# Patient Record
Sex: MALE | Race: WHITE | Employment: UNEMPLOYED | URBAN - METROPOLITAN AREA
[De-identification: names, ages, dates, MRNs, and addresses within clinical notes are randomized per-mention and may not be internally consistent; named-entity substitution may affect disease eponyms.]

---

## 2018-05-14 PROBLEM — R42 DIZZINESS: Status: ACTIVE | Noted: 2017-06-30

## 2018-09-18 ENCOUNTER — HOSPITAL ENCOUNTER (EMERGENCY)
Age: 43
Discharge: HOME | End: 2018-09-18
Attending: EMERGENCY MEDICINE | Admitting: EMERGENCY MEDICINE
Payer: MEDICARE

## 2018-09-18 VITALS
OXYGEN SATURATION: 96 % | HEIGHT: 69 IN | RESPIRATION RATE: 18 BRPM | TEMPERATURE: 98.3 F | HEART RATE: 89 BPM | WEIGHT: 145 LBS | SYSTOLIC BLOOD PRESSURE: 149 MMHG | BODY MASS INDEX: 21.48 KG/M2 | DIASTOLIC BLOOD PRESSURE: 93 MMHG

## 2018-09-18 DIAGNOSIS — E16.2 HYPOGLYCEMIA: Primary | ICD-10-CM

## 2018-09-18 LAB
ALBUMIN SERPL-MCNC: 3.3 G/DL (ref 3.5–5)
ALBUMIN/GLOB SERPL: 0.9 {RATIO} (ref 1.1–2.2)
ALP SERPL-CCNC: 76 U/L (ref 45–117)
ALT SERPL-CCNC: 19 U/L (ref 12–78)
AMPHET UR QL SCN: NEGATIVE
ANION GAP SERPL CALC-SCNC: 9 MMOL/L (ref 5–15)
APPEARANCE UR: CLEAR
AST SERPL-CCNC: 17 U/L (ref 15–37)
BACTERIA URNS QL MICRO: NEGATIVE /HPF
BARBITURATES UR QL SCN: NEGATIVE
BASOPHILS # BLD: 0.2 K/UL (ref 0–0.1)
BASOPHILS NFR BLD: 2 % (ref 0–1)
BENZODIAZ UR QL: NEGATIVE
BILIRUB SERPL-MCNC: 0.2 MG/DL (ref 0.2–1)
BILIRUB UR QL CFM: NEGATIVE
BUN SERPL-MCNC: 15 MG/DL (ref 6–20)
BUN/CREAT SERPL: 12 (ref 12–20)
CALCIUM SERPL-MCNC: 8.6 MG/DL (ref 8.5–10.1)
CANNABINOIDS UR QL SCN: NEGATIVE
CAOX CRY URNS QL MICRO: ABNORMAL
CHLORIDE SERPL-SCNC: 104 MMOL/L (ref 97–108)
CO2 SERPL-SCNC: 26 MMOL/L (ref 21–32)
COCAINE UR QL SCN: NEGATIVE
COLOR UR: ABNORMAL
CREAT SERPL-MCNC: 1.28 MG/DL (ref 0.7–1.3)
DIFFERENTIAL METHOD BLD: ABNORMAL
DRUG SCRN COMMENT,DRGCM: NORMAL
EOSINOPHIL # BLD: 0.1 K/UL (ref 0–0.4)
EOSINOPHIL NFR BLD: 1 % (ref 0–7)
EPITH CASTS URNS QL MICRO: ABNORMAL /LPF
ERYTHROCYTE [DISTWIDTH] IN BLOOD BY AUTOMATED COUNT: 13.2 % (ref 11.5–14.5)
ETHANOL SERPL-MCNC: <10 MG/DL
GLOBULIN SER CALC-MCNC: 3.6 G/DL (ref 2–4)
GLUCOSE BLD STRIP.AUTO-MCNC: 132 MG/DL (ref 65–100)
GLUCOSE BLD STRIP.AUTO-MCNC: 153 MG/DL (ref 65–100)
GLUCOSE SERPL-MCNC: 156 MG/DL (ref 65–100)
GLUCOSE UR STRIP.AUTO-MCNC: NEGATIVE MG/DL
HCT VFR BLD AUTO: 33.5 % (ref 36.6–50.3)
HGB BLD-MCNC: 11.6 G/DL (ref 12.1–17)
HGB UR QL STRIP: ABNORMAL
IMM GRANULOCYTES # BLD: 0 K/UL
IMM GRANULOCYTES NFR BLD AUTO: 0 %
KETONES UR QL STRIP.AUTO: ABNORMAL MG/DL
LEUKOCYTE ESTERASE UR QL STRIP.AUTO: NEGATIVE
LYMPHOCYTES # BLD: 5.2 K/UL (ref 0.8–3.5)
LYMPHOCYTES NFR BLD: 47 % (ref 12–49)
MCH RBC QN AUTO: 30.9 PG (ref 26–34)
MCHC RBC AUTO-ENTMCNC: 34.6 G/DL (ref 30–36.5)
MCV RBC AUTO: 89.1 FL (ref 80–99)
METHADONE UR QL: NEGATIVE
MONOCYTES # BLD: 0.7 K/UL (ref 0–1)
MONOCYTES NFR BLD: 6 % (ref 5–13)
NEUTS SEG # BLD: 4.8 K/UL (ref 1.8–8)
NEUTS SEG NFR BLD: 44 % (ref 32–75)
NITRITE UR QL STRIP.AUTO: NEGATIVE
NRBC # BLD: 0 K/UL (ref 0–0.01)
NRBC BLD-RTO: 0 PER 100 WBC
OPIATES UR QL: NEGATIVE
PCP UR QL: NEGATIVE
PH UR STRIP: 5.5 [PH] (ref 5–8)
PLATELET # BLD AUTO: 275 K/UL (ref 150–400)
PMV BLD AUTO: 10.9 FL (ref 8.9–12.9)
POTASSIUM SERPL-SCNC: 3.1 MMOL/L (ref 3.5–5.1)
PROT SERPL-MCNC: 6.9 G/DL (ref 6.4–8.2)
PROT UR STRIP-MCNC: 100 MG/DL
RBC # BLD AUTO: 3.76 M/UL (ref 4.1–5.7)
RBC #/AREA URNS HPF: ABNORMAL /HPF (ref 0–5)
RBC MORPH BLD: ABNORMAL
SERVICE CMNT-IMP: ABNORMAL
SERVICE CMNT-IMP: ABNORMAL
SODIUM SERPL-SCNC: 139 MMOL/L (ref 136–145)
SP GR UR REFRACTOMETRY: 1.02 (ref 1–1.03)
UA: UC IF INDICATED,UAUC: ABNORMAL
UROBILINOGEN UR QL STRIP.AUTO: 0.2 EU/DL (ref 0.2–1)
WBC # BLD AUTO: 11 K/UL (ref 4.1–11.1)
WBC MORPH BLD: ABNORMAL
WBC URNS QL MICRO: ABNORMAL /HPF (ref 0–4)

## 2018-09-18 PROCEDURE — 93005 ELECTROCARDIOGRAM TRACING: CPT

## 2018-09-18 PROCEDURE — 80307 DRUG TEST PRSMV CHEM ANLYZR: CPT | Performed by: EMERGENCY MEDICINE

## 2018-09-18 PROCEDURE — 82962 GLUCOSE BLOOD TEST: CPT

## 2018-09-18 PROCEDURE — 80053 COMPREHEN METABOLIC PANEL: CPT | Performed by: EMERGENCY MEDICINE

## 2018-09-18 PROCEDURE — 85025 COMPLETE CBC W/AUTO DIFF WBC: CPT | Performed by: EMERGENCY MEDICINE

## 2018-09-18 PROCEDURE — 81001 URINALYSIS AUTO W/SCOPE: CPT | Performed by: EMERGENCY MEDICINE

## 2018-09-18 PROCEDURE — 96360 HYDRATION IV INFUSION INIT: CPT

## 2018-09-18 PROCEDURE — 74011250636 HC RX REV CODE- 250/636: Performed by: EMERGENCY MEDICINE

## 2018-09-18 PROCEDURE — 36415 COLL VENOUS BLD VENIPUNCTURE: CPT | Performed by: EMERGENCY MEDICINE

## 2018-09-18 PROCEDURE — 99284 EMERGENCY DEPT VISIT MOD MDM: CPT

## 2018-09-18 RX ADMIN — SODIUM CHLORIDE 1000 ML: 900 INJECTION, SOLUTION INTRAVENOUS at 20:50

## 2018-09-19 LAB
ATRIAL RATE: 76 BPM
CALCULATED P AXIS, ECG09: 33 DEGREES
CALCULATED R AXIS, ECG10: 54 DEGREES
CALCULATED T AXIS, ECG11: 28 DEGREES
DIAGNOSIS, 93000: NORMAL
P-R INTERVAL, ECG05: 138 MS
Q-T INTERVAL, ECG07: 388 MS
QRS DURATION, ECG06: 92 MS
QTC CALCULATION (BEZET), ECG08: 436 MS
VENTRICULAR RATE, ECG03: 76 BPM

## 2018-09-19 NOTE — ED NOTES
Pt presents to ED via EMS complaining of low blood sugar. Per EMS pt's blood glucose was 41 so he was given oral glucose and his blood sugar came up to 115. Per EMS, they were called because the pt was \"acting weird. \" Pt states \"I got into a staring contest with my food. Must have been 3 hours. \" Pt denies N/V, confusion, or diabetes. Pt reporting lower back pian, denies injury, no swelling or bruising noted at this time. Pt is alert and oriented x 4, RR even and unlabored, skin is warm and dry. Assessment completed and pt updated on plan of care. Emergency Department Nursing Plan of Care The Nursing Plan of Care is developed from the Nursing assessment and Emergency Department Attending provider initial evaluation. The plan of care may be reviewed in the ED Provider note. The Plan of Care was developed with the following considerations:  
Patient / Family readiness to learn indicated by:verbalized understanding Persons(s) to be included in education: patient Barriers to Learning/Limitations:No 
 
Signed Efraín Villavicencio   
9/18/2018   8:13 PM

## 2018-09-19 NOTE — ED PROVIDER NOTES
EMERGENCY DEPARTMENT HISTORY AND PHYSICAL EXAM 
 
 
Date: 9/18/2018 Patient Name: Mady Stafford History of Presenting Illness Chief Complaint Patient presents with  Low Blood Sugar  
  found by EMS at a [employer]. Employees called 911 because pt was not \"acting right,\" was found with BG of 31, they administered 15g oral glucose and pt's BG improved to 115. Pt now A&Ox4 History Provided By: Patient and EMS 
 
HPI: Mady Stafford, 37 y.o. male with PMHx significant for HTN, schizophrenia, bipolar disorder, and depression presents via EMS to the ED with cc of low blood sugar beginning today with no other associated sxs. Per EMS, customers at [email or web address] report he was acting strange and called 911. EMS report a blood sugar of 41 and gave him oral glucose bringing it up to 115 mg/dL. Pt states he does not eat much due to his living situation and is off his medications because he feels fine. He denies any other alleviating or exacerbating factors. He denies any hx of diabetes, alcohol or drug use. He specifically denies any fever, chills, CP, SOB, abd pain, nausea, vomiting, or diarrhea. Chief Complaint: Low blood sugar Duration: 1 Days Timing:  N/A Location: None Quality: None Severity: Mild Modifying Factors: None Associated Symptoms: denies any other associated signs or symptoms There are no other complaints, changes, or physical findings at this time. PCP: Awais Grover MD 
 
Current Facility-Administered Medications Medication Dose Route Frequency Provider Last Rate Last Dose  sodium chloride 0.9 % bolus infusion 1,000 mL  1,000 mL IntraVENous ONCE Melissa Cifuentes MD      
 
Current Outpatient Prescriptions Medication Sig Dispense Refill  albuterol (PROVENTIL VENTOLIN) 2.5 mg /3 mL (0.083 %) nebulizer solution 2.5mg NEB every 4 hours as needed  OTHER Baking Yeast, for good health  benztropine (COGENTIN) 2 mg tablet Take 1 Tab by mouth nightly.  busPIRone (BUSPAR) 15 mg tablet Take 1 Tab by mouth two (2) times a day.  cloZAPine (CLOZARIL) 100 mg tablet Take  by mouth. 3 tab in the morning and 2 at noon  docusate sodium (COLACE) 100 mg capsule Take 1 Tab by mouth two (2) times a day.  clotrimazole (LOTRIMIN) 1 % topical cream Apply to affected areas twice a day as needed  bisacodyl (DULCOLAX, BISACODYL,) 5 mg EC tablet Take 2 Tabs by mouth nightly.  gabapentin (NEURONTIN) 300 mg capsule Take 1 Cap by mouth three (3) times daily.  guaiFENesin (ROBITUSSIN) 100 mg/5 mL liquid 10mls every 4 hours as needed  ibuprofen (MOTRIN) 600 mg tablet Take 1 Tab by mouth every six (6) hours as needed.  lactase (LACTAID) 3,000 unit tablet Take 1-2 Tabs by mouth. Before or with dairy product  sodium chloride (OCEAN FOR KIDS) 0.65 % nasal squeeze bottle 2 Sprays by Both Nostrils route every two (2) hours as needed.  albuterol (PROAIR HFA) 90 mcg/actuation inhaler 2 Puffs every four (4) hours as needed.  propranolol LA (INDERAL LA) 120 mg SR capsule Take 1 Cap by mouth daily.  thiothixene (NAVANE) 5 mg capsule Take 1 Cap by mouth two (2) times a day.  buPROPion XL (WELLBUTRIN XL) 300 mg XL tablet Take 1 Tab by mouth daily.  propranolol LA (INDERAL LA) 120 mg SR capsule TAKE 1 CAPSULE BY MOUTH DAILY 90 Cap 3  ADVAIR DISKUS 250-50 mcg/dose diskus inhaler INHALE 1 PUFF BY MOUTH TWICE A DAY *RINSE MOUTH WITH WATER AFTER EACH USE* 180 Each 3 Past History Past Medical History: 
Past Medical History:  
Diagnosis Date  Back pain  Chronic bronchitis ([address] COPD  Elevated WBCs  H/O splenectomy  HTN (hypertension) Past Surgical History: 
Past Surgical History:  
Procedure Laterality Date  HX OTHER SURGICAL Right   
 two right wrist fractures  HX SPLENECTOMY Family History: 
Family History Problem Relation Age of Onset  No Known Problems Mother  No Known Problems Father  No Known Problems Brother Social History: 
Social History Substance Use Topics  Smoking status: Current Every Day Smoker  Smokeless tobacco: Never Used  Alcohol use No  
 
 
Allergies: Allergies Allergen Reactions  Haloperidol Other (comments) Review of Systems Review of Systems Constitutional: Negative for appetite change, fever and unexpected weight change. Respiratory: Negative for cough, chest tightness and shortness of breath. Cardiovascular: Negative for chest pain. Gastrointestinal: Negative for abdominal pain. Musculoskeletal: Negative for gait problem. Skin: Negative for rash. Neurological: Negative for dizziness, seizures and weakness. Psychiatric/Behavioral: Positive for confusion. Negative for agitation. All other systems reviewed and are negative. Physical Exam  
Physical Exam  
Constitutional: He is oriented to person, place, and time. He appears well-developed and well-nourished. Unkept HENT:  
Head: Normocephalic and atraumatic. Mouth/Throat: Oropharynx is clear and moist.  
Eyes: EOM are normal. Pupils are equal, round, and reactive to light. Neck: Normal range of motion. Neck supple. Cardiovascular: Normal rate, regular rhythm and normal heart sounds. Pulmonary/Chest: Effort normal and breath sounds normal.  
Abdominal: Soft. Musculoskeletal: Normal range of motion. He exhibits no tenderness. Neurological: He is alert and oriented to person, place, and time. No cranial nerve deficit. Coordination normal.  
Skin: Skin is warm and dry. Insect bites bilat lower legs Diagnostic Study Results Labs - Recent Results (from the past 12 hour(s)) EKG, 12 LEAD, INITIAL Collection Time: 09/18/18  8:29 PM  
Result Value Ref Range Ventricular Rate 76 BPM  
 Atrial Rate 76 BPM  
 P-R Interval 138 ms QRS Duration 92 ms Q-T Interval 388 ms QTC Calculation (Bezet) 436 ms Calculated P Axis 33 degrees Calculated R Axis 54 degrees Calculated T Axis 28 degrees Diagnosis Normal sinus rhythm Normal ECG No previous ECGs available GLUCOSE, POC Collection Time: 09/18/18  8:31 PM  
Result Value Ref Range Glucose (POC) 153 (H) 65 - 100 mg/dL Performed by [facility] Armando Radiologic Studies - No orders to display CT Results  (Last 48 hours) None CXR Results  (Last 48 hours) None Medical Decision Making I am the first provider for this patient. I reviewed the vital signs, available nursing notes, past medical history, past surgical history, family history and social history. Vital Signs-Reviewed the patient's vital signs. Patient Vitals for the past 12 hrs: 
 Temp Pulse Resp BP SpO2  
09/18/18 2010 98.3 °F (36.8 °C) 89 18 (!) 149/93 96 % Pulse Oximetry Analysis - 96% onRA Cardiac Monitor:  
Rate:89 bpm 
Rhythm: NSR 
 
EKG interpretation: (Preliminary) Rhythm: normal sinus rhythm; and regular . Rate (approx.): 76; Axis: normal; UT interval: normal; QRS interval: normal ; ST/T wave: normal; Other findings: normal. 
Written and interpreted by WILLIAM Cifuentes MD. Records Reviewed: Nursing Notes, Old Medical Records, Previous electrocardiograms, Ambulance Run Sheet, Previous Radiology Studies and Previous Laboratory Studies Provider Notes (Medical Decision Making):  
Hypoglycemia, electrolyte abnormality, alcohol intoxication, drug use/prescription misuse, dehydration ED Course:  
Initial assessment performed. The patients presenting problems have been discussed, and they are in agreement with the care plan formulated and outlined with them. I have encouraged them to ask questions as they arise throughout their visit. Pt has eaten and maintained blood sugar.  Remains A&ox4 and is ambulatory without assistance. Will DC to fu as an outpatient. Disposition: 
DISCHARGE NOTE 
11:11 PM 
 
The patient has been re-evaluated and is ready for discharge. Reviewed available results with patient and family. Counseled pt and family on diagnosis and care plan. Pt and family have expressed understanding, and all questions have been answered. Pt and family agree with plan and agree to F/U as recommended, or return to the ED if their sxs worsen. Discharge instructions have been provided and explained to the pt and their family, along with reasons to return to the ED. Written by Corrinne Shells, ED Scribe, as dictated by Guille Mccullough MD. 
 
PLAN: 
1. Current Discharge Medication List  
  
 
2. Follow-up Information Follow up With Details Comments Contact [facility] Schedule an appointment as soon as possible for a visit  [address] [place] [id number] [phone] Return to ED if worse Diagnosis Clinical Impression: 1. Hypoglycemia Attestations: This note is prepared by Corrinne Shells, acting as Scribe for Guille Mccullough MD. Charyl Son Call, MD: The scribe's documentation has been prepared under my direction and personally reviewed by me in its entirety. I confirm that the note above accurately reflects all work, treatment, procedures, and medical decision making performed by me. This note will not be viewable in [address]

## 2018-09-19 NOTE — DISCHARGE INSTRUCTIONS
Hypoglycemia: Care Instructions  Your Care Instructions    Hypoglycemia means that your blood sugar is low and your body is not getting enough fuel. Some people get low blood sugar from not eating often enough. Some medicines to treat diabetes can cause low blood sugar. People who have had surgery on their stomachs or intestines may get hypoglycemia. Problems with the pancreas, kidneys, or liver also can cause low blood sugar. A snack or drink with sugar in it will raise your blood sugar and should ease your symptoms right away. Your doctor may recommend that you change or stop your medicines until you can get your blood sugar levels under control. In the long run, you may need to change your diet and eating habits so that you get enough fuel for your body throughout the day. Follow-up care is a key part of your treatment and safety. Be sure to make and go to all appointments, and call your doctor if you are having problems. It's also a good idea to know your test results and keep a list of the medicines you take. How can you care for yourself at home? · Learn to recognize the early signs of low blood sugar. Signs include:  ¨ Nausea. ¨ Hunger. ¨ Feeling nervous, irritable, or shaky. ¨ Cold, clammy, wet skin. ¨ Sweating (when you are not exercising). ¨ A fast heartbeat. ¨ Numbness or tingling of the fingertips or lips. · If you feel an episode of low blood sugar coming on, drink fruit juice or sugared (not diet) soda, or eat sugar in the form of candy, cubes, or tablets. [employer] are another [email or web address]. · Eat small, frequent meals so that you do not get too hungry between meals. · Balance extra exercise with eating more. · Keep a written record of your low blood sugar episodes, including when you last ate and what you ate, so that you can learn what causes your blood sugar to drop.   · Make sure your family, friends, and coworkers know the symptoms of low blood sugar and know what to do to get your sugar level up. · Wear medical alert jewelry that lists your condition. You can buy this at most drugstores. When should you call for help? Call 911 anytime you think you may need emergency care. For example, call if:    · You passed out (lost consciousness).     · You are confused or cannot think clearly.     · Your blood sugar is very high or very low.    Watch closely for changes in your health, and be sure to contact your doctor if:    · Your blood sugar stays outside the level your doctor set for you.     · You have any problems. Where can you learn more? Go to http://holli-luiz.info/. Enter T913 in the search box to learn more about \"Hypoglycemia: Care Instructions. \"  Current as of: December 7, 2017  Content Version: 11.7  © 5955-1735 [employer]. Care instructions adapted under license by [employer] (which disclaims liability or warranty for this information). If you have questions about a medical condition or this instruction, always ask your healthcare professional. [address] any warranty or liability for your use of this information.

## 2018-09-19 NOTE — ED NOTES
Discharge instructions were given to the patient by Theo Glez RN. The patient left the Emergency Department ambulatory, alert and oriented and in no acute distress with 1 prescription. The patient was encouraged to call or return to the ED for worsening issues or problems and was encouraged to schedule a follow up appointment for continuing care. The patient verbalized understanding of discharge instructions and prescriptions, all questions were answered. The patient has no further concerns at this time.

## 2018-09-27 ENCOUNTER — HOSPITAL ENCOUNTER (INPATIENT)
Age: 43
LOS: 29 days | Discharge: HOME | DRG: 885 | End: 2018-10-26
Attending: PSYCHIATRY & NEUROLOGY
Payer: MEDICARE

## 2018-09-27 ENCOUNTER — APPOINTMENT (OUTPATIENT)
Dept: CT IMAGING | Age: 43
End: 2018-09-27
Attending: EMERGENCY MEDICINE
Payer: MEDICARE

## 2018-09-27 ENCOUNTER — HOSPITAL ENCOUNTER (EMERGENCY)
Age: 43
Discharge: ANOTHER HOSPITAL | End: 2018-09-27
Attending: EMERGENCY MEDICINE
Payer: MEDICARE

## 2018-09-27 VITALS
HEART RATE: 76 BPM | OXYGEN SATURATION: 98 % | DIASTOLIC BLOOD PRESSURE: 72 MMHG | RESPIRATION RATE: 18 BRPM | SYSTOLIC BLOOD PRESSURE: 131 MMHG | TEMPERATURE: 98 F

## 2018-09-27 DIAGNOSIS — F25.9 SCHIZOAFFECTIVE DISORDER, UNSPECIFIED TYPE (HCC): ICD-10-CM

## 2018-09-27 DIAGNOSIS — R44.3 HALLUCINATIONS: Primary | ICD-10-CM

## 2018-09-27 LAB
ALBUMIN SERPL-MCNC: 3.5 G/DL (ref 3.5–5)
ALBUMIN/GLOB SERPL: 1 {RATIO} (ref 1.1–2.2)
ALP SERPL-CCNC: 79 U/L (ref 45–117)
ALT SERPL-CCNC: 20 U/L (ref 12–78)
AMPHET UR QL SCN: NEGATIVE
ANION GAP SERPL CALC-SCNC: 9 MMOL/L (ref 5–15)
APAP SERPL-MCNC: <2 UG/ML (ref 10–30)
APPEARANCE UR: CLEAR
AST SERPL-CCNC: 19 U/L (ref 15–37)
BACTERIA URNS QL MICRO: NEGATIVE /HPF
BARBITURATES UR QL SCN: NEGATIVE
BASOPHILS # BLD: 0.1 K/UL (ref 0–0.1)
BASOPHILS NFR BLD: 1 % (ref 0–1)
BENZODIAZ UR QL: NEGATIVE
BILIRUB SERPL-MCNC: 0.3 MG/DL (ref 0.2–1)
BILIRUB UR QL: NEGATIVE
BUN SERPL-MCNC: 23 MG/DL (ref 6–20)
BUN/CREAT SERPL: 20 (ref 12–20)
CALCIUM SERPL-MCNC: 8.3 MG/DL (ref 8.5–10.1)
CANNABINOIDS UR QL SCN: NEGATIVE
CHLORIDE SERPL-SCNC: 104 MMOL/L (ref 97–108)
CO2 SERPL-SCNC: 26 MMOL/L (ref 21–32)
COCAINE UR QL SCN: NEGATIVE
COLOR UR: ABNORMAL
COMMENT, HOLDF: NORMAL
CREAT SERPL-MCNC: 1.14 MG/DL (ref 0.7–1.3)
DIFFERENTIAL METHOD BLD: ABNORMAL
DRUG SCRN COMMENT,DRGCM: NORMAL
EOSINOPHIL # BLD: 0.3 K/UL (ref 0–0.4)
EOSINOPHIL NFR BLD: 2 % (ref 0–7)
EPITH CASTS URNS QL MICRO: ABNORMAL /LPF
ERYTHROCYTE [DISTWIDTH] IN BLOOD BY AUTOMATED COUNT: 13.6 % (ref 11.5–14.5)
ETHANOL SERPL-MCNC: <10 MG/DL
GLOBULIN SER CALC-MCNC: 3.6 G/DL (ref 2–4)
GLUCOSE BLD STRIP.AUTO-MCNC: 97 MG/DL (ref 65–100)
GLUCOSE SERPL-MCNC: 79 MG/DL (ref 65–100)
GLUCOSE UR STRIP.AUTO-MCNC: NEGATIVE MG/DL
HCT VFR BLD AUTO: 33.6 % (ref 36.6–50.3)
HGB BLD-MCNC: 11.3 G/DL (ref 12.1–17)
HGB UR QL STRIP: ABNORMAL
IMM GRANULOCYTES # BLD: 0.1 K/UL (ref 0–0.04)
IMM GRANULOCYTES NFR BLD AUTO: 1 % (ref 0–0.5)
KETONES UR QL STRIP.AUTO: ABNORMAL MG/DL
LEUKOCYTE ESTERASE UR QL STRIP.AUTO: NEGATIVE
LYMPHOCYTES # BLD: 3.4 K/UL (ref 0.8–3.5)
LYMPHOCYTES NFR BLD: 33 % (ref 12–49)
MCH RBC QN AUTO: 31.3 PG (ref 26–34)
MCHC RBC AUTO-ENTMCNC: 33.6 G/DL (ref 30–36.5)
MCV RBC AUTO: 93.1 FL (ref 80–99)
METHADONE UR QL: NEGATIVE
MONOCYTES # BLD: 0.8 K/UL (ref 0–1)
MONOCYTES NFR BLD: 8 % (ref 5–13)
NEUTS SEG # BLD: 5.7 K/UL (ref 1.8–8)
NEUTS SEG NFR BLD: 55 % (ref 32–75)
NITRITE UR QL STRIP.AUTO: NEGATIVE
NRBC # BLD: 0 K/UL (ref 0–0.01)
NRBC BLD-RTO: 0 PER 100 WBC
OPIATES UR QL: NEGATIVE
PCP UR QL: NEGATIVE
PH UR STRIP: 6.5 [PH] (ref 5–8)
PLATELET # BLD AUTO: 289 K/UL (ref 150–400)
PMV BLD AUTO: 10.8 FL (ref 8.9–12.9)
POTASSIUM SERPL-SCNC: 3.5 MMOL/L (ref 3.5–5.1)
PROT SERPL-MCNC: 7.1 G/DL (ref 6.4–8.2)
PROT UR STRIP-MCNC: NEGATIVE MG/DL
RBC # BLD AUTO: 3.61 M/UL (ref 4.1–5.7)
RBC #/AREA URNS HPF: ABNORMAL /HPF (ref 0–5)
SALICYLATES SERPL-MCNC: <1.7 MG/DL (ref 2.8–20)
SAMPLES BEING HELD,HOLD: NORMAL
SERVICE CMNT-IMP: NORMAL
SODIUM SERPL-SCNC: 139 MMOL/L (ref 136–145)
SP GR UR REFRACTOMETRY: <1.005 (ref 1–1.03)
UA: UC IF INDICATED,UAUC: ABNORMAL
UROBILINOGEN UR QL STRIP.AUTO: 0.2 EU/DL (ref 0.2–1)
WBC # BLD AUTO: 10.3 K/UL (ref 4.1–11.1)
WBC URNS QL MICRO: ABNORMAL /HPF (ref 0–4)

## 2018-09-27 PROCEDURE — 81001 URINALYSIS AUTO W/SCOPE: CPT | Performed by: EMERGENCY MEDICINE

## 2018-09-27 PROCEDURE — 90791 PSYCH DIAGNOSTIC EVALUATION: CPT

## 2018-09-27 PROCEDURE — 99285 EMERGENCY DEPT VISIT HI MDM: CPT

## 2018-09-27 PROCEDURE — 85025 COMPLETE CBC W/AUTO DIFF WBC: CPT | Performed by: EMERGENCY MEDICINE

## 2018-09-27 PROCEDURE — 65220000003 HC RM SEMIPRIVATE PSYCH

## 2018-09-27 PROCEDURE — 70450 CT HEAD/BRAIN W/O DYE: CPT

## 2018-09-27 PROCEDURE — 82962 GLUCOSE BLOOD TEST: CPT

## 2018-09-27 PROCEDURE — 80307 DRUG TEST PRSMV CHEM ANLYZR: CPT | Performed by: EMERGENCY MEDICINE

## 2018-09-27 PROCEDURE — 93005 ELECTROCARDIOGRAM TRACING: CPT

## 2018-09-27 PROCEDURE — 80053 COMPREHEN METABOLIC PANEL: CPT | Performed by: EMERGENCY MEDICINE

## 2018-09-27 PROCEDURE — 36415 COLL VENOUS BLD VENIPUNCTURE: CPT | Performed by: EMERGENCY MEDICINE

## 2018-09-27 NOTE — ED PROVIDER NOTES
HPI Comments: 37 y.o. male with past medical history significant for Hypertension, Schizophrenia, COPD who presents via EMS with chief complaint of hearing voices. Patient is a poor historian due to history of schizophrenia. Upon examination patient states he has been unable to sleep due to \"hearing voices and seeing colors. \" When asked if the patient wants to hurt himself or if the voices are telling him to hurt himself the patient states he \"wants to stop running from the voices\", but does not answer the question directly. Patient reports accompanying head pain that onset \"days ago. \" When asked whether he uses tobacco, elicit drugs, or alcohol the patient stated \"not much. \" 
 
PCP: Renato Rg MD 
 
Note written by Elvia Phoenix, as dictated by Rosa Perez MD 2:12 PM 
 
 
The history is provided by the patient. Past Medical History:  
Diagnosis Date  Back pain  Chronic bronchitis ([address] COPD  Elevated WBCs  H/O splenectomy  HTN (hypertension) Past Surgical History:  
Procedure Laterality Date  HX OTHER SURGICAL Right   
 two right wrist fractures  HX SPLENECTOMY Family History:  
Problem Relation Age of Onset  No Known Problems Mother  No Known Problems Father  No Known Problems Brother Social History Social History  Marital status: SINGLE Spouse name: N/A  
 Number of children: N/A  
 Years of education: N/A Occupational History  Not on file. Social History Main Topics  Smoking status: Current Every Day Smoker  Smokeless tobacco: Never Used  Alcohol use No  
 Drug use: No  
   Comment: \"not for years\"  Sexual activity: Not on file Other Topics Concern  Not on file Social History Narrative Social History:  
    Reviewed history from 02/03/2017 and no changes required:  
       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  
       Alcohol: None, sober since 2014 Smoke: 1 PPD Drugs: None For fun: Fishing, crafts, pencil drawing Song Grewal,  Dr. Amari Aguirre, [employer] psychiatry Smoking History:  
       Patient currently smokes every day. Patient has been counseled to quit. ALLERGIES: Haloperidol Review of Systems Unable to perform ROS: Psychiatric disorder There were no vitals filed for this visit. Physical Exam  
Constitutional: He appears well-developed and well-nourished. No distress. Unkempt, disheveled, difficult to understand HENT:  
Head: Normocephalic and atraumatic. Right Ear: External ear normal.  
Left Ear: External ear normal.  
Eyes: EOM are normal. Pupils are equal, round, and reactive to light. Neck: Normal range of motion. Neck supple. No JVD present. No tracheal deviation present. Cardiovascular: Normal rate, regular rhythm and normal heart sounds. Exam reveals no gallop and no friction rub. No murmur heard. Pulmonary/Chest: Effort normal and breath sounds normal. No stridor. No respiratory distress. He has no wheezes. He has no rales. Abdominal: Soft. Bowel sounds are normal. He exhibits no distension. There is no tenderness. There is no rebound and no guarding. Musculoskeletal: Normal range of motion. He exhibits no edema or tenderness. Neurological: He is alert. He has normal reflexes. No cranial nerve deficit. Coordination normal.  
Not oriented to person, place, or time. Not coherent speech. Moving all extremities equally. Skin: Skin is warm and dry. No rash noted. He is not diaphoretic. No erythema. Psychiatric:  
Speaking but thoughts do not go together in a coherent manner Nursing note and vitals reviewed.  
Note written by Elvia Mai, as dictated by Ivet Briones MD 2:13 PM 
 MDM 
Number of Diagnoses or Management Options Diagnosis management comments: 49-year-old very poor historian who presents apparently hearing voices. Patient has a history of schizophrenia. Patient is unkempt. Difficult to tell further history from him. We'll check a head CT, basic blood work, urinalysis, drug screen. We'll have our psychiatric social worker see him. Will reassessed shortly after evaluation. Amount and/or Complexity of Data Reviewed Clinical lab tests: ordered and reviewed Tests in the radiology section of CPT®: ordered and reviewed Tests in the medicine section of CPT®: ordered and reviewed Discussion of test results with the performing providers: yes Decide to obtain previous medical records or to obtain history from someone other than the patient: yes Obtain history from someone other than the patient: yes Review and summarize past medical records: yes Discuss the patient with other providers: yes Independent visualization of images, tracings, or specimens: yes ED Course Procedures CT head- No acute process Blood work is WNL Waiting on UA and UDS 
 
ED EKG interpretation: 
Rhythm: normal sinus rhythm; and regular . Rate (approx.): 70 bpm; Axis: normal; ST/T wave: No ST elevations or depressions. Note written by Elvia Arce, as dictated by Swetha Yung MD 3:39 PM 
 
5:03 PM 
[facility] states that they are asking for a TDO PROGRESS NOTE: 
5:20 PM TDO patient for transfer to [address] [place] [address]. PROGRESS NOTE: 
6:33 PM Labs back and normal. Patient medically cleared for transfer.

## 2018-09-27 NOTE — BSMART NOTE
Comprehensive Assessment Form Part 1 Section I - Disposition Axis I - Psychosis NOS Axis II - Deferred Axis III - Past Medical History:  
Diagnosis Date  Back pain  Chronic bronchitis ([address] COPD  Elevated WBCs  H/O splenectomy  HTN (hypertension) Axis IV - Unknown Axis V - 30 The Medical Doctor to Psychiatrist conference was not completed. The Medical Doctor is in agreement with Psychiatrist disposition because of (reason) Admission recommended upon medical clearance. The plan is request TDO due to lack of capacity to consent. The on-call Psychiatrist consulted was Dr. Ignacio Cardenas. The admitting Psychiatrist will be Dr. Rebekah Wheeler. The admitting Diagnosis is Psychosis. The Payor source is self pay . Section II - Integrated Summary Summary:  Patient came in due to hallucinations. A citizen reportedly called the squad due to patient being on the streets seeming confused. Patient has reportedly come into contact with [place] several times recently but [facility] has not been involved. Patient is alert but not answering questions appropriately. He was showered on arrival due to being unclean and disheveled. A [place] address was given on arrival.  There is some indication in the chart that he has been seen by a [facility] provider last year and was working as a  and living with a girlfriend in [place], [place]. There are past medications in the chart to include Navane, Neurontin, Clozaril, Buspar, Wellbutrin, and Cogentin. None are current however. Patient responded \"hoofing\" when asked why he is here today. He also made a noise \"babababa. \"  He was unable to relay how he got here or why. Patient acknowledges hearing things and the voices say [facility] word that is written. \"  Patient reported decreased sleep when asked and indicated he stayed up last night praying and \"I didn't want them to do it and there's a butterfly on the wall and its all green. \" There is a butterfly picture in his ER room. Patient does not respond when asked if suicidal or homicidal but smacks his lips. Patient is smiling inappropriately at times, not making eye contact, and appears internally preoccupied. Patient can give birth date and shakes head yes when asked if he has ever had inpatient psychiatric treatment but shakes his head no when asked if he feels he needs it today. He is very disheveled and was bathed upon arrival to ER. Patient does not appear capable of meeting basic needs and caring for himself at this time. Patient does not appear to have capacity to consent to treatment either. Telephoned emergency contact listed and left voice message to return call but have not heard back. The patienthas not demonstrated mental capacity to provide informed consent. The information is given by the patient and past medical records. The Chief Complaint is confusion. The Precipitant Factors are Unknown. Previous Hospitalizations: Yes Current Psychiatrist and/or  is None known. Lethality Assessment: 
 
The potential for suicide noted by the following:Patient not responding to that questions . The potential for homicide is patient not responding. The patient has not been a perpetrator of sexual or physical abuse. There are unknown legal charges. The patient is not felt to be at risk for self harm or harm to others. The attending nurse was advised that security has been notified. Patient does not appear capable of consent and does not appear to be capable of making informed decision. Section III - Psychosocial 
The patient's overall mood and attitude is bizarre and confused. Feelings of helplessness and hopelessness are not observed. Generalized anxiety is not observed. Panic is not observed. Phobias are not observed. Obsessive compulsive tendencies are not observed.    
 
Section IV - Mental Status Exam 
 The patient's appearance is unkempt and shows poor hygiene. The patient's behavior is guarded and shows poor eye contact. The patient is disoriented. The patient's speech is impoverished. The patient's mood is withdrawn. The range of affect is flat and is innappropriate. The patient's thought content demonstrates no evidence of impairment. The thought process is blocking. The patient's perception demonstrated changes in the following:  auditory . The patient's memory unable to assess. The patient's appetite is decreased . Patient shakes his head yes to appetite problems and then stated \"its an image\". The patient's sleep has evidence of insomnia. The patient shows no insight. The patient's judgement is psychologically impaired. Section V - Substance Abuse The patient is not using substances. Section VI - Living Arrangements It is unknown patient's marital status or living situation. It is unknown if he has children or where he will go upon discharge. The patient's source of income comes from unknown. Buddhist and cultural practices have not been voiced at this time. The patient's greatest support comes from unknown and this person will not be involved with the treatment. The patient has not been in an event described as horrible or outside the realm of ordinary life experience either currently or in the past. Not reported The patient has not been a victim of sexual/physical abuse. Not reported. Section VII - Other Areas of Clinical Concern The highest grade achieved is NA with the overall quality of school experience being described as NA. Employment status is unknown and speaks [place] as a primary language. The patient has no communication impairments affecting communication. The patient's preference for learning can be described as: Unknown.   The patient's hearing is normal.   The patient's vision is normal. 
 
 
Nir Maradiaga, RUPESH

## 2018-09-27 NOTE — ED TRIAGE NOTES
Patient seen in the rain by a citizen who thought he needed help per squad- patient confused, fidgety, unable to focus, stating that he hears voices. Per squad, patient is off of his meds but will not state which medications those are; patient visibly unclean.

## 2018-09-28 PROBLEM — F29 PSYCHOSIS (HCC): Status: RESOLVED | Noted: 2018-09-28 | Resolved: 2018-09-28

## 2018-09-28 PROBLEM — F29 PSYCHOSIS (HCC): Status: ACTIVE | Noted: 2018-09-28

## 2018-09-28 LAB
ATRIAL RATE: 70 BPM
CALCULATED P AXIS, ECG09: 27 DEGREES
CALCULATED R AXIS, ECG10: 30 DEGREES
CALCULATED T AXIS, ECG11: 22 DEGREES
DIAGNOSIS, 93000: NORMAL
P-R INTERVAL, ECG05: 140 MS
Q-T INTERVAL, ECG07: 396 MS
QRS DURATION, ECG06: 90 MS
QTC CALCULATION (BEZET), ECG08: 427 MS
VENTRICULAR RATE, ECG03: 70 BPM

## 2018-09-28 PROCEDURE — 65220000003 HC RM SEMIPRIVATE PSYCH

## 2018-09-28 PROCEDURE — 74011250637 HC RX REV CODE- 250/637: Performed by: PSYCHIATRY & NEUROLOGY

## 2018-09-28 PROCEDURE — 74011250637 HC RX REV CODE- 250/637

## 2018-09-28 RX ORDER — CLOZAPINE 25 MG/1
25 TABLET ORAL
Status: DISCONTINUED | OUTPATIENT
Start: 2018-09-28 | End: 2018-09-29

## 2018-09-28 RX ORDER — CLOZAPINE 100 MG/1
300 TABLET ORAL DAILY
COMMUNITY
End: 2018-10-26

## 2018-09-28 RX ORDER — DOCUSATE SODIUM 100 MG/1
100 CAPSULE, LIQUID FILLED ORAL 2 TIMES DAILY
Status: DISCONTINUED | OUTPATIENT
Start: 2018-09-28 | End: 2018-10-26 | Stop reason: HOSPADM

## 2018-09-28 RX ORDER — BENZTROPINE MESYLATE 1 MG/1
0.5 TABLET ORAL EVERY 12 HOURS
Status: DISCONTINUED | OUTPATIENT
Start: 2018-09-28 | End: 2018-10-20 | Stop reason: SINTOL

## 2018-09-28 RX ORDER — ZOLPIDEM TARTRATE 10 MG/1
10 TABLET ORAL
Status: DISCONTINUED | OUTPATIENT
Start: 2018-09-28 | End: 2018-10-26 | Stop reason: HOSPADM

## 2018-09-28 RX ORDER — ACETAMINOPHEN 325 MG/1
650 TABLET ORAL
Status: DISCONTINUED | OUTPATIENT
Start: 2018-09-28 | End: 2018-10-26 | Stop reason: HOSPADM

## 2018-09-28 RX ORDER — ADHESIVE BANDAGE
30 BANDAGE TOPICAL DAILY PRN
Status: DISCONTINUED | OUTPATIENT
Start: 2018-09-28 | End: 2018-10-26 | Stop reason: HOSPADM

## 2018-09-28 RX ORDER — LORAZEPAM 2 MG/ML
2 INJECTION INTRAMUSCULAR
Status: DISCONTINUED | OUTPATIENT
Start: 2018-09-28 | End: 2018-10-26 | Stop reason: HOSPADM

## 2018-09-28 RX ORDER — PROPRANOLOL HYDROCHLORIDE 60 MG/1
120 CAPSULE, EXTENDED RELEASE ORAL DAILY
Status: DISCONTINUED | OUTPATIENT
Start: 2018-09-28 | End: 2018-10-26 | Stop reason: HOSPADM

## 2018-09-28 RX ORDER — BENZTROPINE MESYLATE 2 MG/1
1 TABLET ORAL
COMMUNITY
End: 2018-10-26

## 2018-09-28 RX ORDER — BENZTROPINE MESYLATE 1 MG/ML
2 INJECTION INTRAMUSCULAR; INTRAVENOUS
Status: DISCONTINUED | OUTPATIENT
Start: 2018-09-28 | End: 2018-10-26 | Stop reason: HOSPADM

## 2018-09-28 RX ORDER — IBUPROFEN 400 MG/1
400 TABLET ORAL
Status: DISCONTINUED | OUTPATIENT
Start: 2018-09-28 | End: 2018-10-26 | Stop reason: HOSPADM

## 2018-09-28 RX ORDER — DIPHENHYDRAMINE HCL 25 MG
50 CAPSULE ORAL
Status: DISCONTINUED | OUTPATIENT
Start: 2018-09-28 | End: 2018-10-26 | Stop reason: HOSPADM

## 2018-09-28 RX ORDER — OLANZAPINE 5 MG/1
5 TABLET ORAL
Status: DISCONTINUED | OUTPATIENT
Start: 2018-09-28 | End: 2018-10-26 | Stop reason: HOSPADM

## 2018-09-28 RX ORDER — BENZTROPINE MESYLATE 2 MG/1
2 TABLET ORAL
Status: DISCONTINUED | OUTPATIENT
Start: 2018-09-28 | End: 2018-10-26 | Stop reason: HOSPADM

## 2018-09-28 RX ORDER — IBUPROFEN 200 MG
1 TABLET ORAL
Status: DISCONTINUED | OUTPATIENT
Start: 2018-09-28 | End: 2018-10-26 | Stop reason: HOSPADM

## 2018-09-28 RX ORDER — LORAZEPAM 1 MG/1
1 TABLET ORAL
Status: DISCONTINUED | OUTPATIENT
Start: 2018-09-28 | End: 2018-10-23

## 2018-09-28 RX ORDER — CLOZAPINE 100 MG/1
200 TABLET ORAL
COMMUNITY
End: 2018-10-26

## 2018-09-28 RX ADMIN — BENZTROPINE MESYLATE 0.5 MG: 1 TABLET ORAL at 14:45

## 2018-09-28 RX ADMIN — DIPHENHYDRAMINE HYDROCHLORIDE 50 MG: 25 CAPSULE ORAL at 10:13

## 2018-09-28 RX ADMIN — DOCUSATE SODIUM 100 MG: 100 CAPSULE, LIQUID FILLED ORAL at 16:50

## 2018-09-28 RX ADMIN — OLANZAPINE 5 MG: 5 TABLET, FILM COATED ORAL at 10:13

## 2018-09-28 RX ADMIN — LORAZEPAM 1 MG: 1 TABLET ORAL at 00:24

## 2018-09-28 RX ADMIN — OLANZAPINE 5 MG: 5 TABLET, FILM COATED ORAL at 00:24

## 2018-09-28 RX ADMIN — LORAZEPAM 1 MG: 1 TABLET ORAL at 21:27

## 2018-09-28 RX ADMIN — LORAZEPAM 1 MG: 1 TABLET ORAL at 10:13

## 2018-09-28 RX ADMIN — DIPHENHYDRAMINE HYDROCHLORIDE 50 MG: 25 CAPSULE ORAL at 00:24

## 2018-09-28 RX ADMIN — CLOZAPINE 25 MG: 25 TABLET ORAL at 21:31

## 2018-09-28 RX ADMIN — BENZTROPINE MESYLATE 0.5 MG: 1 TABLET ORAL at 21:27

## 2018-09-28 RX ADMIN — PROPRANOLOL HYDROCHLORIDE 120 MG: 60 CAPSULE, EXTENDED RELEASE ORAL at 14:45

## 2018-09-28 NOTE — BH NOTES
Patient is in room most of my shift. OOB for meals. Scheduled to receive dose of Clozaril tonight. Affect disheveled and angry; mood euthymic. Will continue to monitor patient's statue & assess needs.

## 2018-09-28 NOTE — ROUTINE PROCESS
TRANSFER - OUT REPORT: 
 
Verbal report given to Gloria Aguila on Nely Ahumada  being transferred to psych [facility](unit) for routine progression of care Report consisted of patients Situation, Background, Assessment and  
Recommendations(SBAR). Information from the following report(s) ED Summary and Recent Results was reviewed with the receiving nurse. Lines:    
 
Opportunity for questions and clarification was provided.

## 2018-09-28 NOTE — PROGRESS NOTES
Problem: Altered Thought Process (Adult/Pediatric)  Goal: *STG: Decreased hallucinations  Outcome: Progressing Towards Goal  Patient is a new admission received at approximately 2250. No complaints noted during the night. No signs/symptoms of distress, agitation, or anxiety. Will monitor for changes. Q 15 minute checks continue. Pt slept 4.5 hrs this shift.

## 2018-09-28 NOTE — BH NOTES
ADMISSION NOTE:    PT is a 38 yo male here on temporary senior living order due to hearing voices , confused, unable to focus, off of meds unable to care for self. Pt present confused, disoriented x3 , unkempt , homeless. His speech is nonsensical and garbled, his thoughts are scattered and he responds inappropriate to questions. His UDS and BAL neg. He denied SI/HI . He is responding and is preoccupied. He was observed scratching at his lower legs and feet. There were reddened bumps on the lower ankle, eyes and hands and smaller ones on the feet . (Claudy Leon)  Dr. Isaura Alvares called and given report. He ordered [facility] protocol and benedryl 50mg Q6 PRN.  Pt placed on Q15 min checks for safety

## 2018-09-28 NOTE — PROGRESS NOTES
[facility] Admission Pharmacy Medication Reconciliation     Recommendations/Findings:   1) Per pharmacist at [facility], patient picked up prescriptions on 9/5/18 (all but clotrimazole which was filled in July). Patient then returned to the pharmacy on 9/7/18 and announced to the staff that he was moving. Patient is seen by Dr. Jesi Eastman at [facility] ([facility]). 2) Confirmed patient's clozapine dose to be 300 mg in the morning and 200 mg at bedtime. Unable to verify compliance with patient at this time due to current mental state. Pharmacist at [facility] that patient needs a lot of reminders to take his clozapine and get the lab work completed. Per clozapine REMS registry, patient's last [address] reported to the registry was 12.35 on 9/4/18. Patient's lowest reported ANC value is 4.72 which was reported in 2013. The following changes were made to the PTA medication list:    Additions: benztropine PRN dose   Modifications: bisacodyl changed from QHS to daily    Deletions: Advair Diskus 250-50 mcg/dose, albuterol inhaler, albuterol 2.5 mg/3 mL nebulizer solution, buspirone, calamine lotion, gabapentin (last filled June), guaifenesin, ibuprofen, lactase, baking yeast, duplicate propranolol, sodium chloride 0.65% nasal spray, thiothixene    Total Time Spent: 25 minutes     Information obtained from: [employer] in [place], [place] ([phone]), clozapine REMS registry    Patient allergies: Allergies as of 09/27/2018 - Review Complete 09/27/2018   Allergen Reaction Noted    Haloperidol Other (comments) 12/30/2013       Prior to Admission Medications   Prescriptions Last Dose Informant Patient Reported? Taking?   benztropine (COGENTIN) 2 mg tablet   Yes Yes   Sig: Take 1 Tab by mouth nightly. Indications: drug-induced extrapyramidal reaction   benztropine (COGENTIN) 2 mg tablet   Yes Yes   Sig: Take 1 mg by mouth daily as needed.  Indications: extrapyramidal symptoms   bisacodyl (DULCOLAX, BISACODYL,) 5 mg EC tablet   Yes Yes   Sig: Take 2 Tabs by mouth daily. Indications: constipation   buPROPion XL (WELLBUTRIN XL) 300 mg XL tablet   Yes Yes   Sig: Take 1 Tab by mouth daily. Indications: major depressive disorder   cloZAPine (CLOZARIL) 100 mg tablet   Yes Yes   Sig: Take 300 mg by mouth daily. Indications: TREATMENT-RESISTANT SCHIZOPHRENIA   cloZAPine (CLOZARIL) 100 mg tablet   Yes Yes   Sig: Take 200 mg by mouth nightly. Indications: TREATMENT-RESISTANT SCHIZOPHRENIA   clotrimazole (LOTRIMIN) 1 % topical cream   Yes Yes   Sig: Apply to affected areas twice a day as needed   docusate sodium (COLACE) 100 mg capsule   Yes Yes   Sig: Take 1 Tab by mouth two (2) times a day. Indications: constipation   ibuprofen (MOTRIN) 600 mg tablet   Yes Yes   Sig: Take 1 Tab by mouth every six (6) hours as needed.    propranolol LA (INDERAL LA) 120 mg SR capsule   No Yes   Sig: TAKE 1 CAPSULE BY MOUTH DAILY      Facility-Administered Medications: None        Thank you,  Jatin Redman, PHARMD, BCPS

## 2018-09-28 NOTE — H&P
INITIAL PSYCHIATRIC EVALUATION            IDENTIFICATION:    Patient Name  Nighat Raza   Date of Birth 1975   [facility] [id number]   Medical Record Number  [id number]      Age  37 y.o. PCP Nancy Lundberg MD   Admit date:  9/27/2018    Room Number  314/01  @ [facility]   Date of Service  9/28/2018            HISTORY         REASON FOR HOSPITALIZATION:  CC: \"psychosis\". Pt admitted under a temporary alf order (TDO) for severe psychosis proving to be an imminent danger to self and an inability to care for self. HISTORY OF PRESENT ILLNESS:    The patient, Nighat Raza, is a 37 y.o. WHITE [place]  male with a past psychiatric history significant for schizophrenia, who presents at this time with complaints of (and/or evidence of) the following emotional symptoms: psychotic behavior. Additional symptomatology include poor concentration. The above symptoms have been present for 2+ days. These symptoms are of high severity. These symptoms are constant. The patient's condition has been precipitated by unknown stressors. Patient's condition made worse by treatment noncompliance. UDS: +negative; BAL=0. Patient seen in his room; he is grossly disheveled and speaks incoherently. He is able to provide some answers to basic questions but can provide no meaningful narrative about the events prior to admission. He acknowledges taking Clozaril and repeats Haldol unprompted. ALLERGIES:   Allergies   Allergen Reactions    Haloperidol Other (comments)      MEDICATIONS PRIOR TO ADMISSION:   Prescriptions Prior to Admission   Medication Sig    benztropine (COGENTIN) 2 mg tablet Take 1 mg by mouth daily as needed. Indications: extrapyramidal symptoms    benztropine (COGENTIN) 2 mg tablet Take 1 Tab by mouth nightly. Indications: drug-induced extrapyramidal reaction    docusate sodium (COLACE) 100 mg capsule Take 1 Tab by mouth two (2) times a day.  Indications: constipation  clotrimazole (LOTRIMIN) 1 % topical cream Apply to affected areas twice a day as needed    bisacodyl (DULCOLAX, BISACODYL,) 5 mg EC tablet Take 2 Tabs by mouth daily. Indications: constipation    buPROPion XL (WELLBUTRIN XL) 300 mg XL tablet Take 1 Tab by mouth daily. Indications: major depressive disorder    propranolol LA (INDERAL LA) 120 mg SR capsule TAKE 1 CAPSULE BY MOUTH DAILY    cloZAPine (CLOZARIL) 100 mg tablet Take 300 mg by mouth daily. Indications: TREATMENT-RESISTANT SCHIZOPHRENIA    cloZAPine (CLOZARIL) 100 mg tablet Take 200 mg by mouth nightly. Indications: TREATMENT-RESISTANT SCHIZOPHRENIA      PAST MEDICAL HISTORY:   Past Medical History:   Diagnosis Date    Back pain     Chronic bronchitis (HCC)     COPD    Elevated WBCs     H/O splenectomy     HTN (hypertension)      Past Surgical History:   Procedure Laterality Date    HX OTHER SURGICAL Right     two right wrist fractures     HX SPLENECTOMY        SOCIAL HISTORY: from [place], recently left his treatment team under unclear circumstances   Social History     Social History    Marital status: SINGLE     Spouse name: N/A    Number of children: N/A    Years of education: N/A     Occupational History    Not on file.      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 worker          Dr. Mclain Comment, [facility] psychiatry                     Smoking History:          Patient currently smokes every day. Patient has been counseled to quit. FAMILY HISTORY: History reviewed. No pertinent family history. Family History   Problem Relation Age of Onset    No Known Problems Mother     No Known Problems Father     No Known Problems Brother        REVIEW OF SYSTEMS:     Pertinent items are noted in the History of Present Illness. All other Systems reviewed and are considered negative. MENTAL STATUS EXAM & VITALS     MENTAL STATUS EXAM (MSE):    MSE FINDINGS ARE WITHIN NORMAL LIMITS (WNL) UNLESS OTHERWISE STATED BELOW. ( ALL OF THE BELOW CATEGORIES OF THE MSE HAVE BEEN REVIEWED (reviewed 9/28/2018) AND UPDATED AS DEEMED APPROPRIATE )  General Presentation poor hygiene and thin & gaunt looking, cooperative   Orientation not oriented to situation   Vital Signs  See below (reviewed 9/28/2018); Vital Signs (BP, Pulse, & Temp) are within normal limits if not listed below.    Gait and Station Stable/steady, no ataxia   Musculoskeletal System No extrapyramidal symptoms (EPS); no abnormal muscular movements or Tardive Dyskinesia (TD); muscle strength and tone are within normal limits   Language No aphasia or dysarthria   Speech:  normal volume and incoherent   Thought Processes disorganized; normal rate of thoughts; poor abstract reasoning/computation   Thought Associations loose associations   Thought Content word salad   Suicidal Ideations unable to assess   Homicidal Ideations unable to assess   Mood:  anxious    Affect:  constricted and mood-congruent   Memory recent  impaired   Memory remote:  impaired   Concentration/Attention:  distractable   Fund of Knowledge average   Insight:  absent   Reliability unable to assess   Judgment:  poor          VITALS:     Patient Vitals for the past 24 hrs:   Temp Pulse Resp BP   09/28/18 0027 97.7 °F (36.5 °C) 93 16 146/84     Wt Readings from Last 3 Encounters:   09/18/18 65.8 kg (145 lb)     Temp Readings from Last 3 Encounters:   09/28/18 97.7 °F (36.5 °C)   09/27/18 98 °F (36.7 °C)   09/18/18 98.3 °F (36.8 °C)     BP Readings from Last 3 Encounters:   09/28/18 146/84   09/27/18 131/72   09/18/18 (!) 149/93     Pulse Readings from Last 3 Encounters:   09/28/18 93   09/27/18 76   09/18/18 89            DATA     LABORATORY DATA:  Labs Reviewed - No data to display  Admission on 09/27/2018, Discharged on 09/27/2018   Component Date Value Ref Range Status    Ventricular Rate 09/27/2018 70  BPM Final    Atrial Rate 09/27/2018 70  BPM Final    P-R Interval 09/27/2018 140  ms Final    QRS Duration 09/27/2018 90  ms Final    Q-T Interval 09/27/2018 396  ms Final    QTC Calculation (Bezet) 09/27/2018 427  ms Final    Calculated P Axis 09/27/2018 27  degrees Final    Calculated R Axis 09/27/2018 30  degrees Final    Calculated T Axis 09/27/2018 22  degrees Final    Diagnosis 09/27/2018    Final                    Value:Normal sinus rhythm  When compared with ECG of 18-SEP-2018 20:29,  No significant change was found  Confirmed by Sabino Jackson MD, [facility] [id number]) on 9/28/2018 12:18:33 PM      SAMPLES BEING HELD 09/27/2018 1BLU   Final    COMMENT 09/27/2018 Add-on orders for these samples will be processed based on acceptable specimen integrity and analyte stability, which may vary by analyte.     Final    Acetaminophen level 09/27/2018 <2* 10 - 30 ug/mL Final    Salicylate level 48/58/8560 <1.7* 2.8 - 20.0 MG/DL Final    AMPHETAMINES 09/27/2018 NEGATIVE   NEG   Final    BARBITURATES 09/27/2018 NEGATIVE   NEG   Final    BENZODIAZEPINES 09/27/2018 NEGATIVE   NEG   Final    COCAINE 09/27/2018 NEGATIVE   NEG   Final    METHADONE 09/27/2018 NEGATIVE   NEG   Final    OPIATES 09/27/2018 NEGATIVE   NEG   Final    PCP(PHENCYCLIDINE) 09/27/2018 NEGATIVE   NEG   Final    THC (TH-CANNABINOL) 09/27/2018 NEGATIVE   NEG   Final    Drug screen comment 09/27/2018 (NOTE)   Final    ALCOHOL(ETHYL),SERUM 09/27/2018 <10  <10 MG/DL Final    Color 09/27/2018 YELLOW/STRAW    Final    Appearance 09/27/2018 CLEAR  CLEAR   Final    Specific gravity 09/27/2018 <1.005  1.003 - 1.030 Final    pH (UA) 09/27/2018 6.5  5.0 - 8.0   Final    Protein 09/27/2018 NEGATIVE   NEG mg/dL Final    Glucose 09/27/2018 NEGATIVE   NEG mg/dL Final    Ketone 09/27/2018 TRACE* NEG mg/dL Final    Bilirubin 09/27/2018 NEGATIVE   NEG   Final    Blood 09/27/2018 MODERATE* NEG   Final    Urobilinogen 09/27/2018 0.2  0.2 - 1.0 EU/dL Final    Nitrites 09/27/2018 NEGATIVE   NEG   Final    Leukocyte Esterase 09/27/2018 NEGATIVE   NEG   Final    WBC 09/27/2018 0-4  0 - 4 /hpf Final    RBC 09/27/2018 0-5  0 - 5 /hpf Final    Epithelial cells 09/27/2018 FEW  FEW /lpf Final    Bacteria 09/27/2018 NEGATIVE   NEG /hpf Final    UA:UC IF INDICATED 09/27/2018 CULTURE NOT INDICATED BY UA RESULT  CNI   Final    WBC 09/27/2018 10.3  4.1 - 11.1 K/uL Final    RBC 09/27/2018 3.61* 4.10 - 5.70 M/uL Final    HGB 09/27/2018 11.3* 12.1 - 17.0 g/dL Final    HCT 09/27/2018 33.6* 36.6 - 50.3 % Final    MCV 09/27/2018 93.1  80.0 - 99.0 FL Final    MCH 09/27/2018 31.3  26.0 - 34.0 PG Final    MCHC 09/27/2018 33.6  30.0 - 36.5 g/dL Final    RDW 09/27/2018 13.6  11.5 - 14.5 % Final    PLATELET 33/04/4010 712  150 - 400 K/uL Final    MPV 09/27/2018 10.8  8.9 - 12.9 FL Final    NRBC 09/27/2018 0.0  0  WBC Final    ABSOLUTE NRBC 09/27/2018 0.00  0.00 - 0.01 K/uL Final    NEUTROPHILS 09/27/2018 55  32 - 75 % Final    LYMPHOCYTES 09/27/2018 33  12 - 49 % Final    MONOCYTES 09/27/2018 8  5 - 13 % Final    EOSINOPHILS 09/27/2018 2  0 - 7 % Final    BASOPHILS 09/27/2018 1  0 - 1 % Final    IMMATURE GRANULOCYTES 09/27/2018 1* 0.0 - 0.5 % Final    ABS. NEUTROPHILS 09/27/2018 5.7  1.8 - 8.0 K/UL Final    ABS. LYMPHOCYTES 09/27/2018 3.4  0.8 - 3.5 K/UL Final    ABS.  MONOCYTES 09/27/2018 0.8  0.0 - 1.0 K/UL Final    ABS. EOSINOPHILS 09/27/2018 0.3  0.0 - 0.4 K/UL Final    ABS. BASOPHILS 09/27/2018 0.1  0.0 - 0.1 K/UL Final    ABS. IMM. GRANS. 09/27/2018 0.1* 0.00 - 0.04 K/UL Final    DF 09/27/2018 AUTOMATED    Final    Sodium 09/27/2018 139  136 - 145 mmol/L Final    Potassium 09/27/2018 3.5  3.5 - 5.1 mmol/L Final    Chloride 09/27/2018 104  97 - 108 mmol/L Final    CO2 09/27/2018 26  21 - 32 mmol/L Final    Anion gap 09/27/2018 9  5 - 15 mmol/L Final    Glucose 09/27/2018 79  65 - 100 mg/dL Final    BUN 09/27/2018 23* 6 - 20 MG/DL Final    Creatinine 09/27/2018 1.14  0.70 - 1.30 MG/DL Final    BUN/Creatinine ratio 09/27/2018 20  12 - 20   Final    GFR est AA 09/27/2018 >60  >60 ml/min/1.73m2 Final    GFR est non-AA 09/27/2018 >60  >60 ml/min/1.73m2 Final    Calcium 09/27/2018 8.3* 8.5 - 10.1 MG/DL Final    Bilirubin, total 09/27/2018 0.3  0.2 - 1.0 MG/DL Final    ALT (SGPT) 09/27/2018 20  12 - 78 U/L Final    AST (SGOT) 09/27/2018 19  15 - 37 U/L Final    Alk. phosphatase 09/27/2018 79  45 - 117 U/L Final    Protein, total 09/27/2018 7.1  6.4 - 8.2 g/dL Final    Albumin 09/27/2018 3.5  3.5 - 5.0 g/dL Final    Globulin 09/27/2018 3.6  2.0 - 4.0 g/dL Final    A-G Ratio 09/27/2018 1.0* 1.1 - 2.2   Final    Glucose (POC) 09/27/2018 97  65 - 100 mg/dL Final    Performed by 09/27/2018 Jeannie Hughes   Final        RADIOLOGY REPORTS:  No results found for this or any previous visit. Ct Head Wo Cont    Result Date: 9/27/2018  EXAM:  CT HEAD WO CONT INDICATION:   Confusion, AMS COMPARISON: None. CONTRAST:  None. TECHNIQUE: Unenhanced CT of the head was performed using 5 mm images. Brain and bone windows were generated. CT dose reduction was achieved through use of a standardized protocol tailored for this examination and automatic exposure control for dose modulation. FINDINGS: The ventricles and sulci are normal in size, shape and configuration and midline.  There is no significant white matter disease. There is no intracranial hemorrhage, extra-axial collection, mass, mass effect or midline shift. The basilar cisterns are open. No acute infarct is identified. The bone windows demonstrate no abnormalities. The visualized portions of the paranasal sinuses and mastoid air cells are clear. IMPRESSION: No acute intracranial abnormality. MEDICATIONS       ALL MEDICATIONS  Current Facility-Administered Medications   Medication Dose Route Frequency    ziprasidone (GEODON) 20 mg in sterile water (preservative free) 1 mL injection  20 mg IntraMUSCular BID PRN    OLANZapine (ZyPREXA) tablet 5 mg  5 mg Oral Q6H PRN    benztropine (COGENTIN) tablet 2 mg  2 mg Oral BID PRN    benztropine (COGENTIN) injection 2 mg  2 mg IntraMUSCular BID PRN    LORazepam (ATIVAN) injection 2 mg  2 mg IntraMUSCular Q4H PRN    LORazepam (ATIVAN) tablet 1 mg  1 mg Oral Q4H PRN    zolpidem (AMBIEN) tablet 10 mg  10 mg Oral QHS PRN    acetaminophen (TYLENOL) tablet 650 mg  650 mg Oral Q4H PRN    ibuprofen (MOTRIN) tablet 400 mg  400 mg Oral Q8H PRN    magnesium hydroxide (MILK OF MAGNESIA) 400 mg/5 mL oral suspension 30 mL  30 mL Oral DAILY PRN    nicotine (NICODERM CQ) 21 mg/24 hr patch 1 Patch  1 Patch TransDERmal DAILY PRN    diphenhydrAMINE (BENADRYL) capsule 50 mg  50 mg Oral Q6H PRN    cloZAPine (CLOZARIL) tablet 25 mg  25 mg Oral QHS      SCHEDULED MEDICATIONS  Current Facility-Administered Medications   Medication Dose Route Frequency    cloZAPine (CLOZARIL) tablet 25 mg  25 mg Oral QHS                ASSESSMENT & PLAN        The patient, Buddy Montes, is a 37 y.o.  male who presents at this time for treatment of the following diagnoses:  Patient Active Hospital Problem List:   Schizoaffective disorder, bipolar type without good prognostic features ([facility] [address] (1/22/2014)    Assessment: patient with marked disorganization of thought. Suspect medication non-compliance.  Patient appears to have been stable on Clozaril for several years prior to this episode. Will restart, titrate appropriately. Plan:  - START Clozaril 25 mg QHS, plan to titrate  - START Cogentin 0.5 mg Q12H for EPS prophylaxis  - HOLD Wellbutrin for now  - [facility], obtain collateral             A coordinated, multidisplinary treatment team (includes the nurse, unit pharmcist,  and writer) round was conducted for this initial evaluation with the patient present. The following regarding medications was addressed during rounds with patient:   the risks and benefits of the proposed medication. The patient was given the opportunity to ask questions. Informed consent given to the use of the above medications. I will continue to adjust psychiatric and non-psychiatric medications (see above \"medication\" section and orders section for details) as deemed appropriate & based upon diagnoses and response to treatment. I have reviewed admission (and previous/old) labs and medical tests in the EHR and or transferring hospital documents. I will continue to order blood tests/labs and diagnostic tests as deemed appropriate and review results as they become available (see orders for details). I have reviewed old psychiatric and medical records available in the EHR. I Will order additional psychiatric records from other institutions to further elucidate the nature of patient's psychopathology and review once available. I will gather additional collateral information from friends, family and o/p treatment team to further elucidate the nature of patient's psychopathology and baselline level of psychiatric functioning.       ESTIMATED LENGTH OF STAY:  7+ days       STRENGTHS:  Knowledge of medications and Vocational interest, i.e., hobbies                                        SIGNED:    Damir Fernandez MD  9/28/2018

## 2018-09-28 NOTE — PROGRESS NOTES
Laboratory monitoring for mood stabilizer and antipsychotics:    Recommended baseline monitoring has been completed based on this patient's current medication regimen. The patient is currently taking the following medication(s):   Current Facility-Administered Medications   Medication Dose Route Frequency    cloZAPine (CLOZARIL) tablet 75 mg  75 mg Oral QHS    docusate sodium (COLACE) capsule 100 mg  100 mg Oral BID    propranolol LA (INDERAL LA) capsule 120 mg  120 mg Oral DAILY    benztropine (COGENTIN) tablet 0.5 mg  0.5 mg Oral Q12H     Height, Weight, BMI Estimation  Estimated body mass index is 22.89 kg/(m^2) as calculated from the following:    Height as of this encounter: 175.3 cm (69\"). Weight as of this encounter: 70.3 kg (155 lb). Renal Function, Hepatic Function and Chemistry  Estimated Creatinine Clearance: 81.4 mL/min (based on Cr of 1.14). Lab Results   Component Value Date/Time    Sodium 139 09/27/2018 02:26 PM    Potassium 3.5 09/27/2018 02:26 PM    Chloride 104 09/27/2018 02:26 PM    CO2 26 09/27/2018 02:26 PM    Anion gap 9 09/27/2018 02:26 PM    Glucose 79 09/27/2018 02:26 PM    BUN 23 (H) 09/27/2018 02:26 PM    Creatinine 1.14 09/27/2018 02:26 PM    BUN/Creatinine ratio 20 09/27/2018 02:26 PM    GFR est AA >60 09/27/2018 02:26 PM    GFR est non-AA >60 09/27/2018 02:26 PM    Calcium 8.3 (L) 09/27/2018 02:26 PM    ALT (SGPT) 20 09/27/2018 02:26 PM    AST (SGOT) 19 09/27/2018 02:26 PM    Alk.  phosphatase 79 09/27/2018 02:26 PM    Protein, total 7.1 09/27/2018 02:26 PM    Albumin 3.5 09/27/2018 02:26 PM    Globulin 3.6 09/27/2018 02:26 PM    A-G Ratio 1.0 (L) 09/27/2018 02:26 PM    Bilirubin, total 0.3 09/27/2018 02:26 PM       Lab Results   Component Value Date/Time    Glucose 79 09/27/2018 02:26 PM    Glucose (POC) 97 09/27/2018 03:40 PM       Lab Results   Component Value Date/Time    Hemoglobin A1c 5.3 09/30/2018 04:52 AM    Hemoglobin A1c, External 5.2 06/30/2017 Hematology  Lab Results   Component Value Date/Time    WBC 10.3 09/27/2018 02:26 PM    HGB 11.3 (L) 09/27/2018 02:26 PM    HCT 33.6 (L) 09/27/2018 02:26 PM    PLATELET 572 98/18/2149 02:26 PM    MCV 93.1 09/27/2018 02:26 PM    Hct, External 44.70 06/30/2017       Lipids  Lab Results   Component Value Date/Time    Cholesterol, total 107 09/30/2018 04:52 AM    HDL Cholesterol 23 09/30/2018 04:52 AM    LDL, calculated 59.2 09/30/2018 04:52 AM    Triglyceride 124 09/30/2018 04:52 AM    CHOL/HDL Ratio 4.7 09/30/2018 04:52 AM       Thyroid Function    Lab Results   Component Value Date/Time    TSH 1.500 06/30/2017 11:42 AM    Tsh, External 1.500 06/30/2017     Vitals  Visit Vitals    BP (!) 134/93 (BP 1 Location: Left arm, BP Patient Position: Sitting)    Pulse 87    Temp 97.8 °F (36.6 °C)    Resp 18    Ht 175.3 cm (69\")    Wt 70.3 kg (155 lb)    SpO2 100%    BMI 22.89 kg/m2       Myrna Reilly, PharmD, BCPS  [phone] (pharmacy)

## 2018-09-28 NOTE — PROGRESS NOTES
Clozapine Monitoring - Initial Verification    1. Prescriber is registered with the Clozapine REMS Program.    2. Patient is eligible to receive clozapine per the Clozapine REMS program.     3. ANC is current (within past 7 days) and is acceptable for clozapine dispensing. DATE ANC   9/27 5.7             Patient's CBC should be obtained monthly  Next CBC due to be reported to the REMS Program: 10/25/18    4.  Current dosing regimen:  25 mg PO QHS (re-titration due to unable to confirm last clozapine dose)    Thank you,  SHAQ Dooley, [facility]  [phone]

## 2018-09-28 NOTE — BH NOTES
PSYCHOSOCIAL ASSESSMENT  :Patient identifying info:  dAeola Burns is a 37 y.o., male admitted 9/27/2018 11:29 PM     Presenting problem and precipitating factors:  Pt presents as psychotic. Poor historian at this time. States he used to live in [place]. Found on streets seemingly confused - [place] citizen reported to police. Per report pt is followed [facility] Address: [address] [place] [address], [address] [place] [address], Phone: [phone] provider Susan Bach. A message left with Rosangela Hutton -  for collateral information and a return call has not yet occurred. Mental status assessment:  Pt is alert and oriented. Pt denies SI/HI. Pt's mood is anxious, affect is constricted. Pt's thought process is illoigcal  - disorganized, word salad. Pt's insight and judgment is impaired, reliability is poor. Current psychiatric providers and contact info:   [facility] Address: [address] [place] [address], [address] [place] [address], Phone: [phone] provider Susan Bach. A message left with Rosangela Hutton -  and a return call has not yet occurred. Previous psychiatric services/providers and response to treatment: Unknown     Family history of mental illness : Unknown at this time. Substance abuse history:  UDS negative, BAL=0  Per reports sober since 2014  Social History   Substance Use Topics    Smoking status: Current Every Day Smoker    Smokeless tobacco: Never Used    Alcohol use No       Family constellation: Unknown at this time. Is significant other involved? Unknown     [facility]      Describe living arrangements and home environment: Unknown   Health issues:   Hospital Problems  Never Reviewed          Codes Class Noted POA    * (Principal)Schizoaffective disorder, bipolar type without good prognostic features ([facility] ICD-10-CM: F25.0  ICD-9-CM: 295.70  1/22/2014 Yes              Trauma history:  Unknown. Legal issues: Unknown. History of  service: Unknown at this time. Financial status:  SSDI. Adventism/cultural factors:  None expressed at this time. Education/work history: Unknown. 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 Have you been licensed as a rafy care professional (current or ): No.   Leisure and recreation preferences: painting, fishing, crafts, pencil drawing   Describe coping skills: Poor.      Chandana Naqvi  2018

## 2018-09-28 NOTE — CONSULTS
Medical Consult for [facility] Patient    Consult H&P   dictated, see patient chart    Impression:    Marya Donaldson a 37 y.o. male with past medical history of hypertension, schizophrenia, copd, and constipation presents with behavioral health problems of auditory hallucinations admitted for further psychiatric evaluation and treatment. Plan:   1. Psychiatry to manage mental health issues  2. Continuing home medications once confirmed by nursing/pharmacy. 3. Medically stable at this time, will follow up as needed. 4. No VTE prophylaxis indicated or necessary at this time.      Thank you  Vishal Arriola MD  9/28/2018, 6:21 PM

## 2018-09-29 PROCEDURE — 74011250637 HC RX REV CODE- 250/637

## 2018-09-29 PROCEDURE — 74011250637 HC RX REV CODE- 250/637: Performed by: PSYCHIATRY & NEUROLOGY

## 2018-09-29 PROCEDURE — 65220000003 HC RM SEMIPRIVATE PSYCH

## 2018-09-29 RX ORDER — CLOZAPINE 25 MG/1
50 TABLET ORAL
Status: DISCONTINUED | OUTPATIENT
Start: 2018-09-29 | End: 2018-10-01

## 2018-09-29 RX ADMIN — BENZTROPINE MESYLATE 0.5 MG: 1 TABLET ORAL at 08:50

## 2018-09-29 RX ADMIN — OLANZAPINE 5 MG: 5 TABLET, FILM COATED ORAL at 10:35

## 2018-09-29 RX ADMIN — ZOLPIDEM TARTRATE 10 MG: 10 TABLET ORAL at 21:38

## 2018-09-29 RX ADMIN — PROPRANOLOL HYDROCHLORIDE 120 MG: 60 CAPSULE, EXTENDED RELEASE ORAL at 08:50

## 2018-09-29 RX ADMIN — CLOZAPINE 50 MG: 25 TABLET ORAL at 22:24

## 2018-09-29 RX ADMIN — LORAZEPAM 1 MG: 1 TABLET ORAL at 10:35

## 2018-09-29 RX ADMIN — DOCUSATE SODIUM 100 MG: 100 CAPSULE, LIQUID FILLED ORAL at 08:50

## 2018-09-29 RX ADMIN — DOCUSATE SODIUM 100 MG: 100 CAPSULE, LIQUID FILLED ORAL at 16:26

## 2018-09-29 RX ADMIN — BENZTROPINE MESYLATE 0.5 MG: 1 TABLET ORAL at 21:38

## 2018-09-29 NOTE — BH NOTES
Patient has been resting quietly in room. Denies SI/HI. Will continue to monitor patient and assess needs per [facility] status.

## 2018-09-29 NOTE — BH NOTES
PSYCHIATRIC PROGRESS NOTE         Patient Name  Abi Stiles   Date of Birth 1975   [facility] [id number]   Medical Record Number  [id number]      Age  37 y.o. PCP Siria Duff MD   Admit date:  9/27/2018    Room Number  36/65  @ [facility]   Date of Service  9/29/2018          PSYCHOTHERAPY SESSION NOTE:  Length of psychotherapy session: 20 minutes    Main condition/diagnosis/issues treated during session today, 9/29/2018 : attempted to gather hx    I   Overall, patient is not progressing    Treatment Plan Update (reviewed an updated 9/29/2018) : I will modify psychotherapy tx plan by implementing more stress management strategies, building upon cognitive behavioral techniques, increasing coping skills, as well as shoring up psychological defenses). An extended energy and skill set was needed to engage pt in psychotherapy due to some of the following: resistiveness, complexity, negativity, confrontational nature, hostile behaviors, and/or severe abnormalities in thought processes/psychosis resulting in the loss of expressive/receptive language communication skills. E & M PROGRESS NOTE:         HISTORY       CC:  psychotic  HISTORY OF PRESENT ILLNESS/INTERVAL HISTORY:  (reviewed/updated 9/29/2018). per initial evaluation:   The patient, Abi Stiles, is a 37 y.o. WHITE OR  male with a past psychiatric history significant for schizophrenia, who presents at this time with complaints of (and/or evidence of) the following emotional symptoms: psychotic behavior. Additional symptomatology include poor concentration. The above symptoms have been present for 2+ days. These symptoms are of high severity. These symptoms are constant. The patient's condition has been precipitated by unknown stressors. Patient's condition made worse by treatment noncompliance.  UDS: +negative; BAL=0.      Patient seen in his room; he is grossly disheveled and speaks incoherently. He is able to provide some answers to basic questions but can provide no meaningful narrative about the events prior to admission. He acknowledges taking Clozaril and repeats Haldol unprompted. Jordan Sarah presents/reports/evidences the following emotional symptoms today, 9/29/2018:agitation, delusions and psychotic behavior. The above symptoms have been present for unk. These symptoms are of very high severity. The symptoms are  Constant in nature. Additional symptomatology and features include severely disorganized thoughts, paranoia and lability. Peterson Cochran SIDE EFFECTS: (reviewed/updated 9/29/2018)  None reported or admitted to. ALLERGIES:(reviewed/updated 9/29/2018)  Allergies   Allergen Reactions    Haloperidol Other (comments)      MEDICATIONS PRIOR TO ADMISSION:(reviewed/updated 9/29/2018)  Prescriptions Prior to Admission   Medication Sig    benztropine (COGENTIN) 2 mg tablet Take 1 mg by mouth daily as needed. Indications: extrapyramidal symptoms    benztropine (COGENTIN) 2 mg tablet Take 1 Tab by mouth nightly. Indications: drug-induced extrapyramidal reaction    docusate sodium (COLACE) 100 mg capsule Take 1 Tab by mouth two (2) times a day. Indications: constipation    clotrimazole (LOTRIMIN) 1 % topical cream Apply to affected areas twice a day as needed    bisacodyl (DULCOLAX, BISACODYL,) 5 mg EC tablet Take 2 Tabs by mouth daily. Indications: constipation    buPROPion XL (WELLBUTRIN XL) 300 mg XL tablet Take 1 Tab by mouth daily. Indications: major depressive disorder    propranolol LA (INDERAL LA) 120 mg SR capsule TAKE 1 CAPSULE BY MOUTH DAILY    cloZAPine (CLOZARIL) 100 mg tablet Take 300 mg by mouth daily. Indications: TREATMENT-RESISTANT SCHIZOPHRENIA    cloZAPine (CLOZARIL) 100 mg tablet Take 200 mg by mouth nightly.  Indications: TREATMENT-RESISTANT SCHIZOPHRENIA      PAST MEDICAL HISTORY: Past medical history from the initial psychiatric evaluation has been reviewed (reviewed/updated 9/29/2018) with no additional updates (I asked patient and no additional past medical history provided). Past Medical History:   Diagnosis Date    Back pain     Chronic bronchitis (HCC)     COPD    Elevated WBCs     H/O splenectomy     HTN (hypertension)      Past Surgical History:   Procedure Laterality Date    HX OTHER SURGICAL Right     two right wrist fractures     HX SPLENECTOMY        SOCIAL HISTORY: Social history from the initial psychiatric evaluation has been reviewed (reviewed/updated 9/29/2018) with no additional updates (I asked patient and no additional social history provided). Social History     Social History    Marital status: SINGLE     Spouse name: N/A    Number of children: N/A    Years of education: N/A     Occupational History    Not on file. Social History Main Topics    Smoking status: Current Every Day Smoker    Smokeless tobacco: Never Used    Alcohol use No    Drug use: No      Comment: \"not for years\"    Sexual activity: Not on file     Other Topics Concern    Not on file     Social History Narrative    Social History:       Reviewed history from 02/03/2017 and no changes required:          Home: 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 Henrry Pisano, [employer] psychiatry                     Smoking History:          Patient currently smokes every day. Patient has been counseled to quit. FAMILY HISTORY: Family history from the initial psychiatric evaluation has been reviewed (reviewed/updated 9/29/2018) with no additional updates (I asked patient and no additional family history provided).  Family History Problem Relation Age of Onset    No Known Problems Mother     No Known Problems Father     No Known Problems Brother        REVIEW OF SYSTEMS: (reviewed/updated 9/29/2018)  Appetite:good   Sleep: good   All other Review of Systems: Negative except poor hygiene, psychosis         [address] [facility] ([facility]):    MSE FINDINGS ARE WITHIN NORMAL LIMITS (WNL) UNLESS OTHERWISE STATED BELOW. ( ALL OF THE BELOW CATEGORIES OF THE MSE HAVE BEEN REVIEWED (reviewed 9/29/2018) AND UPDATED AS DEEMED APPROPRIATE )  General Presentation age appropriate and casually dressed, cooperative   Orientation not oriented to situation   Vital Signs  See below (reviewed 9/29/2018); Vital Signs (BP, Pulse, & Temp) are within normal limits if not listed below. Gait and Station Stable/steady, no ataxia   Musculoskeletal System No extrapyramidal symptoms (EPS); no abnormal muscular movements or Tardive Dyskinesia (TD); muscle strength and tone are within normal limits   Language No aphasia or dysarthria   Speech:  non-pressured   Thought Processes (+)Illogical; slow rate of thoughts; poor abstract reasoning/computation   Thought Associations loose associations   Thought Content internally preoccupied   Suicidal Ideations none   Homicidal Ideations none   Mood:  labile    Affect:  labile   Memory recent  fair   Memory remote:  fair   Concentration/Attention:  distractable   Fund of Knowledge avg.    Insight:  limited   Reliability poor   Judgment:  poor          VITALS:     Patient Vitals for the past 24 hrs:   Temp Pulse Resp BP   09/29/18 0627 98 °F (36.7 °C) (!) 102 16 133/89   09/28/18 2000 98 °F (36.7 °C) 79 12 (!) 121/92     Wt Readings from Last 3 Encounters:   09/18/18 65.8 kg (145 lb)     Temp Readings from Last 3 Encounters:   09/29/18 98 °F (36.7 °C)   09/27/18 98 °F (36.7 °C)   09/18/18 98.3 °F (36.8 °C)     BP Readings from Last 3 Encounters:   09/29/18 133/89   09/27/18 131/72   09/18/18 (!) 149/93 Pulse Readings from Last 3 Encounters:   09/29/18 (!) 102   09/27/18 76   09/18/18 89            DATA     LABORATORY DATA:(reviewed/updated 9/29/2018)  No results found for this or any previous visit (from the past 24 hour(s)). No results found for: VALF2, VALAC, VALP, VALPR, DS6, CRBAM, CRBAMP, CARB2, XCRBAM  No results found for: LITHM   RADIOLOGY REPORTS:(reviewed/updated 9/29/2018)  Ct Head Wo Cont    Result Date: 9/27/2018  EXAM:  CT HEAD WO CONT INDICATION:   Confusion, AMS COMPARISON: None. CONTRAST:  None. TECHNIQUE: Unenhanced CT of the head was performed using 5 mm images. Brain and bone windows were generated. CT dose reduction was achieved through use of a standardized protocol tailored for this examination and automatic exposure control for dose modulation. FINDINGS: The ventricles and sulci are normal in size, shape and configuration and midline. There is no significant white matter disease. There is no intracranial hemorrhage, extra-axial collection, mass, mass effect or midline shift. The basilar cisterns are open. No acute infarct is identified. The bone windows demonstrate no abnormalities. The visualized portions of the paranasal sinuses and mastoid air cells are clear. IMPRESSION: No acute intracranial abnormality.           MEDICATIONS     ALL MEDICATIONS:   Current Facility-Administered Medications   Medication Dose Route Frequency    ziprasidone (GEODON) 20 mg in sterile water (preservative free) 1 mL injection  20 mg IntraMUSCular BID PRN    OLANZapine (ZyPREXA) tablet 5 mg  5 mg Oral Q6H PRN    benztropine (COGENTIN) tablet 2 mg  2 mg Oral BID PRN    benztropine (COGENTIN) injection 2 mg  2 mg IntraMUSCular BID PRN    LORazepam (ATIVAN) injection 2 mg  2 mg IntraMUSCular Q4H PRN    LORazepam (ATIVAN) tablet 1 mg  1 mg Oral Q4H PRN    zolpidem (AMBIEN) tablet 10 mg  10 mg Oral QHS PRN    acetaminophen (TYLENOL) tablet 650 mg  650 mg Oral Q4H PRN    ibuprofen (MOTRIN) tablet 400 mg  400 mg Oral Q8H PRN    magnesium hydroxide (MILK OF MAGNESIA) 400 mg/5 mL oral suspension 30 mL  30 mL Oral DAILY PRN    nicotine (NICODERM CQ) 21 mg/24 hr patch 1 Patch  1 Patch TransDERmal DAILY PRN    diphenhydrAMINE (BENADRYL) capsule 50 mg  50 mg Oral Q6H PRN    cloZAPine (CLOZARIL) tablet 25 mg  25 mg Oral QHS    docusate sodium (COLACE) capsule 100 mg  100 mg Oral BID    propranolol LA (INDERAL LA) capsule 120 mg  120 mg Oral DAILY    benztropine (COGENTIN) tablet 0.5 mg  0.5 mg Oral Q12H      SCHEDULED MEDICATIONS:   Current Facility-Administered Medications   Medication Dose Route Frequency    cloZAPine (CLOZARIL) tablet 25 mg  25 mg Oral QHS    docusate sodium (COLACE) capsule 100 mg  100 mg Oral BID    propranolol LA (INDERAL LA) capsule 120 mg  120 mg Oral DAILY    benztropine (COGENTIN) tablet 0.5 mg  0.5 mg Oral Q12H          ASSESSMENT & PLAN     DIAGNOSES REQUIRING ACTIVE TREATMENT AND MONITORING: (reviewed/updated 9/29/2018)  Patient Active Hospital Problem List:   Schizoaffective disorder, bipolar type without good prognostic features ([facility] [address] (1/22/2014)    Assessment: severe, very poor functioning    Plan: continued inpatient hospitalization for further stabilization, safety monitoring and medication management  Medications- continue clozaril titration, monitor ANC      In summary, Sudhir Yang, is a 37 y.o.  male who presents with a severe exacerbation of the principal diagnosis of Schizoaffective disorder, bipolar type without good prognostic features ([facility] [address]  Patient's condition is improving. Patient requires continued inpatient hospitalization for further stabilization, safety monitoring and medication management. I will continue to coordinate the provision of individual, milieu, occupational, group, and substance abuse therapies to address target symptoms/diagnoses as deemed appropriate for the individual patient.   A coordinated, multidisplinary treatment team round was conducted with the patient (this team consists of the nurse, psychiatric unit pharmcist,  and writer). Complete current electronic health record for patient has been reviewed today including consultant notes, ancillary staff notes, nurses and psychiatric tech notes. Suicide risk assessment completed and patient deemed to be of low risk for suicide at this time. The following regarding medications was addressed during rounds with patient:   the risks and benefits of the proposed medication. The patient was given the opportunity to ask questions. Informed consent given to the use of the above medications. Will continue to adjust psychiatric and non-psychiatric medications (see above \"medication\" section and orders section for details) as deemed appropriate & based upon diagnoses and response to treatment. I will continue to order blood tests/labs and diagnostic tests as deemed appropriate and review results as they become available (see orders for details and above listed lab/test results). I will order psychiatric records from previous [facility] hospitals to further elucidate the nature of patient's psychopathology and review once available. I will gather additional collateral information from friends, family and o/p treatment team to further elucidate the nature of patient's psychopathology and baselline level of psychiatric functioning. I certify that this patient's inpatient psychiatric hospital services furnished since the previous certification were, and continue to be, required for treatment that could reasonably be expected to improve the patient's condition, or for diagnostic study, and that the patient continues to need, on a daily basis, active treatment furnished directly by or requiring the supervision of inpatient psychiatric facility personnel.  In addition, the hospital records show that services furnished were intensive treatment services, admission or related services, or equivalent services.     EXPECTED DISCHARGE DATE/DAY: TBD     DISPOSITION: Home       Signed By:   Swati Reyna MD  9/29/2018

## 2018-09-29 NOTE — BH NOTES
GROUP THERAPY PROGRESS NOTE    Mavis Clifford is participating in Medication Management- PRNs. Group time: 20 minutes    Personal goal for participation: pt didn't state a goal, he sat in the back of the room and observed    Goal orientation: medication education    Group therapy participation: minimal    Therapeutic interventions reviewed and discussed: non-therapeutic interventions that can be done prior to medications    Impression of participation: Pt was actively participating by nodding his head throughout the group showing that he was listening. He didn't ask any questions was attentive to other peoples questions.

## 2018-09-29 NOTE — BH NOTES
Patient continue to exhibit bizarre behavior, patient took of his clothes and came out of his room without clothes on . Patient was compliant with medications but appear responding to internal stimuli. Staff will continue to monitor patient. Q 15 minutes check maintained for safety.

## 2018-09-29 NOTE — BH NOTES
Remained in bed resting quietly x 7hrs , monitored q15 minutes. Attempted to draw blood uncooperative, unable to obtain blood samples.

## 2018-09-29 NOTE — PROGRESS NOTES
Problem: Altered Thought Process (Adult/Pediatric)  Goal: *STG: Decreased hallucinations  Outcome: Progressing Towards Goal  Pt has had decreased hallucinations today. Pt spent a lot of time in the day room watching a movie with peers. Pt laughed and was sociable which wasn't present yesterday. Pt did have episode of anxiety this am, but calmed down shortly after speaking to medication nurse. Pt has been diet compliant with no concerns or complaints at this time. Denied SI, HI, but did have some hallucinations/ delusions present this morning. Much improvement from yesterday was observed with patient in much better mood overall.

## 2018-09-29 NOTE — BH NOTES
Patient pacing the avitia and responding to internal stimuli. Grabbing self constantly. Appears restless and anxious. PRN Zyprexa and Ativan PO given per order. Will continue to monitor patient status and assess needs per [facility] protocol.

## 2018-09-29 NOTE — BH NOTES
Pt was interactive in the dayroom with staff and other pts. Pt was talking frequently about past memories and observations about his environment. Pt did have a fixation on another pt and would stare at him. Pt did have moments of conversation where he was mumbling incoherently, but they would go back to normal conversation.

## 2018-09-30 LAB
APPEARANCE UR: CLEAR
BACTERIA URNS QL MICRO: NEGATIVE /HPF
BILIRUB UR QL: NEGATIVE
CHOLEST SERPL-MCNC: 107 MG/DL
COLOR UR: ABNORMAL
EPITH CASTS URNS QL MICRO: ABNORMAL /LPF
EST. AVERAGE GLUCOSE BLD GHB EST-MCNC: 105 MG/DL
GLUCOSE UR STRIP.AUTO-MCNC: NEGATIVE MG/DL
HBA1C MFR BLD: 5.3 % (ref 4.2–6.3)
HDLC SERPL-MCNC: 23 MG/DL
HDLC SERPL: 4.7 {RATIO} (ref 0–5)
HGB UR QL STRIP: ABNORMAL
KETONES UR QL STRIP.AUTO: NEGATIVE MG/DL
LDLC SERPL CALC-MCNC: 59.2 MG/DL (ref 0–100)
LEUKOCYTE ESTERASE UR QL STRIP.AUTO: NEGATIVE
LIPID PROFILE,FLP: NORMAL
NITRITE UR QL STRIP.AUTO: NEGATIVE
PH UR STRIP: 6 [PH] (ref 5–8)
PROT UR STRIP-MCNC: 30 MG/DL
RBC #/AREA URNS HPF: ABNORMAL /HPF (ref 0–5)
SP GR UR REFRACTOMETRY: 1.01 (ref 1–1.03)
TRIGL SERPL-MCNC: 124 MG/DL (ref ?–150)
UA: UC IF INDICATED,UAUC: ABNORMAL
UROBILINOGEN UR QL STRIP.AUTO: 0.2 EU/DL (ref 0.2–1)
VLDLC SERPL CALC-MCNC: 24.8 MG/DL
WBC URNS QL MICRO: ABNORMAL /HPF (ref 0–4)

## 2018-09-30 PROCEDURE — 74011250637 HC RX REV CODE- 250/637: Performed by: PSYCHIATRY & NEUROLOGY

## 2018-09-30 PROCEDURE — 36415 COLL VENOUS BLD VENIPUNCTURE: CPT | Performed by: PSYCHIATRY & NEUROLOGY

## 2018-09-30 PROCEDURE — 81001 URINALYSIS AUTO W/SCOPE: CPT | Performed by: FAMILY MEDICINE

## 2018-09-30 PROCEDURE — 65220000003 HC RM SEMIPRIVATE PSYCH

## 2018-09-30 PROCEDURE — 83036 HEMOGLOBIN GLYCOSYLATED A1C: CPT | Performed by: PSYCHIATRY & NEUROLOGY

## 2018-09-30 PROCEDURE — 74011250637 HC RX REV CODE- 250/637

## 2018-09-30 PROCEDURE — 80061 LIPID PANEL: CPT | Performed by: PSYCHIATRY & NEUROLOGY

## 2018-09-30 RX ADMIN — CLOZAPINE 50 MG: 25 TABLET ORAL at 21:52

## 2018-09-30 RX ADMIN — BENZTROPINE MESYLATE 0.5 MG: 1 TABLET ORAL at 21:52

## 2018-09-30 RX ADMIN — DOCUSATE SODIUM 100 MG: 100 CAPSULE, LIQUID FILLED ORAL at 08:38

## 2018-09-30 RX ADMIN — BENZTROPINE MESYLATE 0.5 MG: 1 TABLET ORAL at 08:39

## 2018-09-30 RX ADMIN — LORAZEPAM 1 MG: 1 TABLET ORAL at 17:13

## 2018-09-30 RX ADMIN — DOCUSATE SODIUM 100 MG: 100 CAPSULE, LIQUID FILLED ORAL at 17:13

## 2018-09-30 RX ADMIN — PROPRANOLOL HYDROCHLORIDE 120 MG: 60 CAPSULE, EXTENDED RELEASE ORAL at 08:37

## 2018-09-30 NOTE — BH NOTES
PSYCHIATRIC PROGRESS NOTE         Patient Name  Abdi Blunt   Date of Birth 1975   CSN [id number]   Medical Record Number  [id number]      Age  37 y.o. PCP Yao Gary MD   Admit date:  9/27/2018    Room Number  36/65  @ [facility]   Date of Service  9/30/2018          PSYCHOTHERAPY SESSION NOTE:  Length of psychotherapy session: 20 minutes    Main condition/diagnosis/issues treated during session today, 9/30/2018 : attempted to gather hx    I   Overall, patient is not progressing    Treatment Plan Update (reviewed an updated 9/30/2018) : I will modify psychotherapy tx plan by implementing more stress management strategies, building upon cognitive behavioral techniques, increasing coping skills, as well as shoring up psychological defenses). An extended energy and skill set was needed to engage pt in psychotherapy due to some of the following: resistiveness, complexity, negativity, confrontational nature, hostile behaviors, and/or severe abnormalities in thought processes/psychosis resulting in the loss of expressive/receptive language communication skills. E & M PROGRESS NOTE:         HISTORY       CC:  psychotic  HISTORY OF PRESENT ILLNESS/INTERVAL HISTORY:  (reviewed/updated 9/30/2018). per initial evaluation:   The patient, Abdi Blunt, is a 37 y.o. WHITE OR  male with a past psychiatric history significant for schizophrenia, who presents at this time with complaints of (and/or evidence of) the following emotional symptoms: psychotic behavior. Additional symptomatology include poor concentration. The above symptoms have been present for 2+ days. These symptoms are of high severity. These symptoms are constant. The patient's condition has been precipitated by unknown stressors. Patient's condition made worse by treatment noncompliance.  UDS: +negative; BAL=0.      Patient seen in his room; he is grossly disheveled and speaks incoherently. He is able to provide some answers to basic questions but can provide no meaningful narrative about the events prior to admission. He acknowledges taking Clozaril and repeats Haldol unprompted. Jose C Lamar presents/reports/evidences the following emotional symptoms today, 9/30/2018:agitation, delusions and psychotic behavior. The above symptoms have been present for unk. These symptoms are of very high severity. The symptoms are  Constant in nature. Additional symptomatology and features include severely disorganized thoughts, paranoia and lability. No changes. Compliant with meds      SIDE EFFECTS: (reviewed/updated 9/30/2018)  None reported or admitted to. ALLERGIES:(reviewed/updated 9/30/2018)  Allergies   Allergen Reactions    Haloperidol Other (comments)      MEDICATIONS PRIOR TO ADMISSION:(reviewed/updated 9/30/2018)  Prescriptions Prior to Admission   Medication Sig    benztropine (COGENTIN) 2 mg tablet Take 1 mg by mouth daily as needed. Indications: extrapyramidal symptoms    benztropine (COGENTIN) 2 mg tablet Take 1 Tab by mouth nightly. Indications: drug-induced extrapyramidal reaction    docusate sodium (COLACE) 100 mg capsule Take 1 Tab by mouth two (2) times a day. Indications: constipation    clotrimazole (LOTRIMIN) 1 % topical cream Apply to affected areas twice a day as needed    bisacodyl (DULCOLAX, BISACODYL,) 5 mg EC tablet Take 2 Tabs by mouth daily. Indications: constipation    buPROPion XL (WELLBUTRIN XL) 300 mg XL tablet Take 1 Tab by mouth daily. Indications: major depressive disorder    propranolol LA (INDERAL LA) 120 mg SR capsule TAKE 1 CAPSULE BY MOUTH DAILY    cloZAPine (CLOZARIL) 100 mg tablet Take 300 mg by mouth daily. Indications: TREATMENT-RESISTANT SCHIZOPHRENIA    cloZAPine (CLOZARIL) 100 mg tablet Take 200 mg by mouth nightly.  Indications: TREATMENT-RESISTANT SCHIZOPHRENIA      PAST MEDICAL HISTORY: Past medical history from the initial psychiatric evaluation has been reviewed (reviewed/updated 9/30/2018) with no additional updates (I asked patient and no additional past medical history provided). Past Medical History:   Diagnosis Date    Back pain     Chronic bronchitis (HCC)     COPD    Elevated WBCs     H/O splenectomy     HTN (hypertension)      Past Surgical History:   Procedure Laterality Date    HX OTHER SURGICAL Right     two right wrist fractures     HX SPLENECTOMY        SOCIAL HISTORY: Social history from the initial psychiatric evaluation has been reviewed (reviewed/updated 9/30/2018) with no additional updates (I asked patient and no additional social history provided). Social History     Social History    Marital status: SINGLE     Spouse name: N/A    Number of children: N/A    Years of education: N/A     Occupational History    Not on file. 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 Mclain Comment, [employer] psychiatry                     Smoking History:          Patient currently smokes every day. Patient has been counseled to quit.       FAMILY HISTORY: Family history from the initial psychiatric evaluation has been reviewed (reviewed/updated 9/30/2018) with no additional updates (I asked patient and no additional family history provided). Family History   Problem Relation Age of Onset    No Known Problems Mother     No Known Problems Father     No Known Problems Brother        REVIEW OF SYSTEMS: (reviewed/updated 9/30/2018)  Appetite:good   Sleep: good   All other Review of Systems: Negative except poor hygiene, psychosis         MENTAL STATUS EXAM & VITALS     MENTAL STATUS EXAM (MSE):    MSE FINDINGS ARE WITHIN NORMAL LIMITS (WNL) UNLESS OTHERWISE STATED BELOW. ( ALL OF THE BELOW CATEGORIES OF THE MSE HAVE BEEN REVIEWED (reviewed 9/30/2018) AND UPDATED AS DEEMED APPROPRIATE )  General Presentation age appropriate and casually dressed, cooperative   Orientation not oriented to situation   Vital Signs  See below (reviewed 9/30/2018); Vital Signs (BP, Pulse, & Temp) are within normal limits if not listed below. Gait and Station Stable/steady, no ataxia   Musculoskeletal System No extrapyramidal symptoms (EPS); no abnormal muscular movements or Tardive Dyskinesia (TD); muscle strength and tone are within normal limits   Language No aphasia or dysarthria   Speech:  non-pressured   Thought Processes (+)Illogical; slow rate of thoughts; poor abstract reasoning/computation   Thought Associations loose associations   Thought Content internally preoccupied   Suicidal Ideations none   Homicidal Ideations none   Mood:  labile    Affect:  labile   Memory recent  fair   Memory remote:  fair   Concentration/Attention:  distractable   Fund of Knowledge avg.    Insight:  limited   Reliability poor   Judgment:  poor          VITALS:     Patient Vitals for the past 24 hrs:   Temp Pulse Resp BP SpO2   09/30/18 1453 97.2 °F (36.2 °C) 77 18 (!) 136/91 100 %   09/30/18 0701 98.1 °F (36.7 °C) 95 19 132/85 -   09/29/18 2114 98.2 °F (36.8 °C) 84 17 132/77 -     Wt Readings from Last 3 Encounters:   09/30/18 70.3 kg (155 lb)   09/18/18 65.8 kg (145 lb)     Temp Readings from Last 3 Encounters:   09/30/18 97.2 °F (36.2 °C)   09/27/18 98 °F (36.7 °C) 09/18/18 98.3 °F (36.8 °C)     BP Readings from Last 3 Encounters:   09/30/18 (!) 136/91   09/27/18 131/72   09/18/18 (!) 149/93     Pulse Readings from Last 3 Encounters:   09/30/18 77   09/27/18 76   09/18/18 89            DATA     LABORATORY DATA:(reviewed/updated 9/30/2018)  Recent Results (from the past 24 hour(s))   URINALYSIS W/ REFLEX CULTURE    Collection Time: 09/30/18  4:52 AM   Result Value Ref Range    Color YELLOW/STRAW      Appearance CLEAR CLEAR      Specific gravity 1.015 1.003 - 1.030      pH (UA) 6.0 5.0 - 8.0      Protein 30 (A) NEG mg/dL    Glucose NEGATIVE  NEG mg/dL    Ketone NEGATIVE  NEG mg/dL    Bilirubin NEGATIVE  NEG      Blood LARGE (A) NEG      Urobilinogen 0.2 0.2 - 1.0 EU/dL    Nitrites NEGATIVE  NEG      Leukocyte Esterase NEGATIVE  NEG      WBC 0-4 0 - 4 /hpf    RBC 20-50 0 - 5 /hpf    Epithelial cells FEW FEW /lpf    Bacteria NEGATIVE  NEG /hpf    UA:UC IF INDICATED CULTURE NOT INDICATED BY UA RESULT CNI     LIPID PANEL    Collection Time: 09/30/18  4:52 AM   Result Value Ref Range    LIPID PROFILE          Cholesterol, total 107 <200 MG/DL    Triglyceride 124 <150 MG/DL    HDL Cholesterol 23 MG/DL    LDL, calculated 59.2 0 - 100 MG/DL    VLDL, calculated 24.8 MG/DL    CHOL/HDL Ratio 4.7 0 - 5.0     HEMOGLOBIN A1C WITH EAG    Collection Time: 09/30/18  4:52 AM   Result Value Ref Range    Hemoglobin A1c 5.3 4.2 - 6.3 %    Est. average glucose 105 mg/dL     No results found for: VALF2, VALAC, VALP, VALPR, DS6, CRBAM, CRBAMP, CARB2, XCRBAM  No results found for: LITHM   RADIOLOGY REPORTS:(reviewed/updated 9/30/2018)  Ct Head Wo Cont    Result Date: 9/27/2018  EXAM:  CT HEAD WO CONT INDICATION:   Confusion, AMS COMPARISON: None. CONTRAST:  None. TECHNIQUE: Unenhanced CT of the head was performed using 5 mm images. Brain and bone windows were generated.   CT dose reduction was achieved through use of a standardized protocol tailored for this examination and automatic exposure control for dose modulation. FINDINGS: The ventricles and sulci are normal in size, shape and configuration and midline. There is no significant white matter disease. There is no intracranial hemorrhage, extra-axial collection, mass, mass effect or midline shift. The basilar cisterns are open. No acute infarct is identified. The bone windows demonstrate no abnormalities. The visualized portions of the paranasal sinuses and mastoid air cells are clear. IMPRESSION: No acute intracranial abnormality.           MEDICATIONS     ALL MEDICATIONS:   Current Facility-Administered Medications   Medication Dose Route Frequency    cloZAPine (CLOZARIL) tablet 50 mg  50 mg Oral QHS    ziprasidone (GEODON) 20 mg in sterile water (preservative free) 1 mL injection  20 mg IntraMUSCular BID PRN    OLANZapine (ZyPREXA) tablet 5 mg  5 mg Oral Q6H PRN    benztropine (COGENTIN) tablet 2 mg  2 mg Oral BID PRN    benztropine (COGENTIN) injection 2 mg  2 mg IntraMUSCular BID PRN    LORazepam (ATIVAN) injection 2 mg  2 mg IntraMUSCular Q4H PRN    LORazepam (ATIVAN) tablet 1 mg  1 mg Oral Q4H PRN    zolpidem (AMBIEN) tablet 10 mg  10 mg Oral QHS PRN    acetaminophen (TYLENOL) tablet 650 mg  650 mg Oral Q4H PRN    ibuprofen (MOTRIN) tablet 400 mg  400 mg Oral Q8H PRN    magnesium hydroxide (MILK OF MAGNESIA) 400 mg/5 mL oral suspension 30 mL  30 mL Oral DAILY PRN    nicotine (NICODERM CQ) 21 mg/24 hr patch 1 Patch  1 Patch TransDERmal DAILY PRN    diphenhydrAMINE (BENADRYL) capsule 50 mg  50 mg Oral Q6H PRN    docusate sodium (COLACE) capsule 100 mg  100 mg Oral BID    propranolol LA (INDERAL LA) capsule 120 mg  120 mg Oral DAILY    benztropine (COGENTIN) tablet 0.5 mg  0.5 mg Oral Q12H      SCHEDULED MEDICATIONS:   Current Facility-Administered Medications   Medication Dose Route Frequency    cloZAPine (CLOZARIL) tablet 50 mg  50 mg Oral QHS    docusate sodium (COLACE) capsule 100 mg  100 mg Oral BID    propranolol LA (INDERAL LA) capsule 120 mg  120 mg Oral DAILY    benztropine (COGENTIN) tablet 0.5 mg  0.5 mg Oral Q12H          ASSESSMENT & PLAN     DIAGNOSES REQUIRING ACTIVE TREATMENT AND MONITORING: (reviewed/updated 9/30/2018)  Patient C/Abdullahi Torrez [address] Problem List:   Schizoaffective disorder, bipolar type without good prognostic features ([facility] [address] (1/22/2014)    Assessment: severe, very poor functioning    Plan: continued inpatient hospitalization for further stabilization, safety monitoring and medication management  Medications- continue clozaril titration, monitor ANC      In summary, Vladislav Nelson, is a 37 y.o.  male who presents with a severe exacerbation of the principal diagnosis of Schizoaffective disorder, bipolar type without good prognostic features ([facility] [address]  Patient's condition is improving. Patient requires continued inpatient hospitalization for further stabilization, safety monitoring and medication management. I will continue to coordinate the provision of individual, milieu, occupational, group, and substance abuse therapies to address target symptoms/diagnoses as deemed appropriate for the individual patient. A coordinated, multidisplinary treatment team round was conducted with the patient (this team consists of the nurse, psychiatric unit pharmcist,  and writer). Complete current electronic health record for patient has been reviewed today including consultant notes, ancillary staff notes, nurses and psychiatric tech notes. Suicide risk assessment completed and patient deemed to be of low risk for suicide at this time. The following regarding medications was addressed during rounds with patient:   the risks and benefits of the proposed medication. The patient was given the opportunity to ask questions. Informed consent given to the use of the above medications.  Will continue to adjust psychiatric and non-psychiatric medications (see above \"medication\" section and orders section for details) as deemed appropriate & based upon diagnoses and response to treatment. I will continue to order blood tests/labs and diagnostic tests as deemed appropriate and review results as they become available (see orders for details and above listed lab/test results). I will order psychiatric records from previous [facility] hospitals to further elucidate the nature of patient's psychopathology and review once available. I will gather additional collateral information from friends, family and o/p treatment team to further elucidate the nature of patient's psychopathology and baselline level of psychiatric functioning. I certify that this patient's inpatient psychiatric hospital services furnished since the previous certification were, and continue to be, required for treatment that could reasonably be expected to improve the patient's condition, or for diagnostic study, and that the patient continues to need, on a daily basis, active treatment furnished directly by or requiring the supervision of inpatient psychiatric facility personnel. In addition, the hospital records show that services furnished were intensive treatment services, admission or related services, or equivalent services.     EXPECTED DISCHARGE DATE/DAY: TBD     DISPOSITION: Home       Signed By:   Ty Sanchez MD  9/30/2018

## 2018-09-30 NOTE — BH NOTES
GROUP THERAPY PROGRESS NOTE    The patient Katalina Carrero is participating in [employer]. Group time: 30 minutes    Personal goal for participation: to orient the patient to the unit.     Goal orientation: successful adoption of unit rules    Group therapy participation: active    Therapeutic interventions reviewed and discussed: Yes    Impression of participation:     Mal Raygoza  9/30/2018 9:23 AM

## 2018-09-30 NOTE — CONSULTS
[address]  MR#: [id number]  : 1975  ACCOUNT #: [de-identified]   DATE OF SERVICE: 2018    REFERRING PHYSICIAN:  Naomi Cota MD    REASON FOR CONSULTATION:  Medical evaluation for psychiatric admission. CHIEF COMPLAINT:  Auditory hallucinations. HISTORY OF PRESENT ILLNESS:  This is a 40-year-old who presents with auditory hallucinations requiring further psychiatric evaluation and treatment. He states he does not have a primary and has not been on any medicines and is essentially homeless, has been out of his meds. Denies any chest pain, shortness of breath, nausea, vomiting or diarrhea. PAST MEDICAL HISTORY:  Schizophrenia and COPD with borderline hypertension. PAST SURGICAL HISTORY:  Splenectomy in  as related to accident. ALLERGIES:  HALDOL. MEDICATIONS:  In the past, Cogentin, Colace, Lotrimin, Wellbutrin, propranolol, clozapine, which he has not been on for months. SOCIAL HISTORY:  Does smoke cigarettes, were not quantified. Denies any alcohol or illicit drug use. Single, has no kids, currently is unemployed. REVIEW OF SYSTEMS:  The patient does state he has some constipation. PHYSICAL EXAMINATION:  VITAL SIGNS:  Temperature is 97.7, blood pressure 146/84, pulse 93, respirations 16. GENERAL:  Pleasant, no acute distress. HEAD, EYES, EARS, NOSE AND THROAT:  Oropharynx is clear. NECK:  Supple. LUNGS:  Clear to auscultation. No wheeze, rales or rhonchi. No increased work of breathing. No accessory muscle use. HEART:  Regular rhythm and rate. No murmurs, gallops or rubs. ABDOMEN:  Soft, nontender, nondistended, normoactive bowel sounds. No hepatosplenomegaly. EXTREMITIES:  No cyanosis, clubbing or edema. LABORATORY DATA:  Head CT, no acute intracranial abnormalities. EKG, normal sinus rhythm. No acute ST-T wave changes.   Heart rate is 70.  UA shows moderate blood with specific gravity of 1.005.  CBC:  Hemoglobin 11.3, hematocrit 33.6. Sodium 139, potassium 3.5, chloride 104, bicarbonate 26, BUN is 23, creatinine is 1.14, glucose 79. Acetaminophen less than 2, salicylate less than 1.7. Alcohol level less than 10. Tox screen is negative. IMPRESSION:  A 69-year-old male with past medical history of mental health disease, presents with auditory hallucinations and homeless, admitted for further psychiatric evaluation and treatment. PLAN:  1. Psychiatric management of mental health issues. 2.  Medically stable, will follow up on a p.r.n. basis. 3.  We will give Colace for constipation. 4.  Patient's hemoglobin is 11.3 consistent with anemia. We will start ferrous sulfate, and the patient will need to follow up with primary on an outpatient basis. 5.  Patient's UA shows a moderate amount of blood. We will likely need to send out a urine culture and sensitivity and follow up accordingly. He is asymptomatic. 6.  Otherwise, medically stable. Follow up on a p.r.n. basis. 7.  No VTE prophylaxis indicated or warranted at this time. Thank you for this consult.       MD Dimple Meek / Lily Hung  D: 09/29/2018 12:10     T: 09/29/2018 13:18  JOB #: [id number]

## 2018-09-30 NOTE — BH NOTES
Patient was restless and appears responding to internal stimuli. Patient continue to pace in the milieu and was also mumbling. Patient took his hs medications and also requested for prn for sleep. Zolpidem 10 mg administered for sleep. Patient slept for 3 hours. Q 15 minutes check maintained for safety.

## 2018-09-30 NOTE — BH NOTES
Pt has been fidgety at times, especially when talking to someone. Pt's thoughts are disorganized and appears to be having flight of ideas. Pt is calm and cooperative though and smiles when approached. Will continue to monitor per protocol.

## 2018-10-01 PROCEDURE — 74011250637 HC RX REV CODE- 250/637: Performed by: FAMILY MEDICINE

## 2018-10-01 PROCEDURE — 74011250637 HC RX REV CODE- 250/637

## 2018-10-01 PROCEDURE — 90471 IMMUNIZATION ADMIN: CPT

## 2018-10-01 PROCEDURE — 90686 IIV4 VACC NO PRSV 0.5 ML IM: CPT | Performed by: PSYCHIATRY & NEUROLOGY

## 2018-10-01 PROCEDURE — 65220000003 HC RM SEMIPRIVATE PSYCH

## 2018-10-01 PROCEDURE — 74011250637 HC RX REV CODE- 250/637: Performed by: PSYCHIATRY & NEUROLOGY

## 2018-10-01 PROCEDURE — 74011250636 HC RX REV CODE- 250/636: Performed by: PSYCHIATRY & NEUROLOGY

## 2018-10-01 RX ORDER — NYSTATIN 100000 U/G
CREAM TOPICAL 2 TIMES DAILY
Status: DISCONTINUED | OUTPATIENT
Start: 2018-10-01 | End: 2018-10-26 | Stop reason: HOSPADM

## 2018-10-01 RX ORDER — CLOZAPINE 25 MG/1
75 TABLET ORAL
Status: DISCONTINUED | OUTPATIENT
Start: 2018-10-01 | End: 2018-10-02

## 2018-10-01 RX ADMIN — INFLUENZA VIRUS VACCINE 0.5 ML: 15; 15; 15; 15 SUSPENSION INTRAMUSCULAR at 09:24

## 2018-10-01 RX ADMIN — CLOZAPINE 75 MG: 25 TABLET ORAL at 21:08

## 2018-10-01 RX ADMIN — DIPHENHYDRAMINE HYDROCHLORIDE 50 MG: 25 CAPSULE ORAL at 13:06

## 2018-10-01 RX ADMIN — OLANZAPINE 5 MG: 5 TABLET, FILM COATED ORAL at 20:11

## 2018-10-01 RX ADMIN — DOCUSATE SODIUM 100 MG: 100 CAPSULE, LIQUID FILLED ORAL at 17:31

## 2018-10-01 RX ADMIN — DOCUSATE SODIUM 100 MG: 100 CAPSULE, LIQUID FILLED ORAL at 09:24

## 2018-10-01 RX ADMIN — BENZTROPINE MESYLATE 0.5 MG: 1 TABLET ORAL at 09:24

## 2018-10-01 RX ADMIN — BENZTROPINE MESYLATE 0.5 MG: 1 TABLET ORAL at 20:11

## 2018-10-01 RX ADMIN — NYSTATIN: 100000 CREAM TOPICAL at 21:08

## 2018-10-01 RX ADMIN — PROPRANOLOL HYDROCHLORIDE 120 MG: 60 CAPSULE, EXTENDED RELEASE ORAL at 09:24

## 2018-10-01 NOTE — BH NOTES
GROUP THERAPY PROGRESS NOTE The patient Evans harris 37 y.o. male is participating in [employer]. Group time: 1 hour Personal goal for participation: To concentrate on selected task Goal orientation: social 
 
Group therapy participation: active Therapeutic interventions reviewed and discussed: Crafts, games, music Impression of participation: The patient was attentive. Nilsa Dimas 10/1/2018  5:29 PM

## 2018-10-01 NOTE — BH NOTES
GROUP THERAPY PROGRESS NOTE    Billie Nunezright is participating in [employer].      Group time: 30 minutes    Personal goal for participation:  Unit orientation    Goal orientation: Community    Group therapy participation: active    Therapeutic interventions reviewed and discussed: Yes    Impression of participation: good

## 2018-10-01 NOTE — PROGRESS NOTES
S: pt reported rash in between toes. When I asked, pt states rash is stable and not bad. O: Skin: bwteen toes pt has mild rash with mild white coloratin with no raise lesion or no erythema    A/P: Tinea pedis: mild. Nystatin to be placed on dry area's between toes bilaterally.

## 2018-10-01 NOTE — BH NOTES
GROUP THERAPY PROGRESS NOTE The patient Nikhil harris 37 y.o. male is participating in [employer] Group time: 30 minutes Personal goal for participation: To discuss the daily goals Goal orientation: personal 
 
Group therapy participation: passive Therapeutic interventions reviewed and discussed:  Yes Impression of participation: Otoniel Quezada 9/30/2018  9:31 PM
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
## 2018-10-01 NOTE — PROGRESS NOTES
Problem: Altered Thought Process (Adult/Pediatric)  Goal: *STG: Decreased hallucinations  Outcome: Progressing Towards Goal  Pt mostly isolates self to room. Pt compliant with medication. Pt's thoughts are disorganized. Pt is calm and cooperative. Will continue to monitor Q 15 minutes for safety.

## 2018-10-02 PROCEDURE — 65220000003 HC RM SEMIPRIVATE PSYCH

## 2018-10-02 PROCEDURE — 74011250637 HC RX REV CODE- 250/637: Performed by: PSYCHIATRY & NEUROLOGY

## 2018-10-02 PROCEDURE — 74011250637 HC RX REV CODE- 250/637

## 2018-10-02 RX ORDER — CLOZAPINE 100 MG/1
100 TABLET ORAL
Status: DISCONTINUED | OUTPATIENT
Start: 2018-10-02 | End: 2018-10-03

## 2018-10-02 RX ADMIN — NYSTATIN: 100000 CREAM TOPICAL at 08:05

## 2018-10-02 RX ADMIN — CLOZAPINE 100 MG: 100 TABLET ORAL at 21:30

## 2018-10-02 RX ADMIN — PROPRANOLOL HYDROCHLORIDE 120 MG: 60 CAPSULE, EXTENDED RELEASE ORAL at 08:05

## 2018-10-02 RX ADMIN — DOCUSATE SODIUM 100 MG: 100 CAPSULE, LIQUID FILLED ORAL at 08:05

## 2018-10-02 RX ADMIN — ZOLPIDEM TARTRATE 10 MG: 10 TABLET ORAL at 21:32

## 2018-10-02 RX ADMIN — BENZTROPINE MESYLATE 0.5 MG: 1 TABLET ORAL at 08:05

## 2018-10-02 RX ADMIN — NYSTATIN: 100000 CREAM TOPICAL at 16:14

## 2018-10-02 RX ADMIN — DOCUSATE SODIUM 100 MG: 100 CAPSULE, LIQUID FILLED ORAL at 16:14

## 2018-10-02 RX ADMIN — BENZTROPINE MESYLATE 0.5 MG: 1 TABLET ORAL at 21:30

## 2018-10-02 NOTE — BH NOTES
Pt is meal & medication compliant. Affect is dull; mood quiet, distracted, disorganized. Will continue to monitor patent's status & assess needs.
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
## 2018-10-02 NOTE — BH NOTES
ANIKA  IOP GROUP THERAPY NOTE 
 
IOP Treatment Group: Process Group Description: Education group led by unit staff. Patients are educated on skills designed to support the patient's management of their diagnosis Additional Description of Group: [id number] Session Goal: Patient will gain understanding and treatment of their diagnosis Group Facilitator: Delight Lundborg Co-Facilitator: NONE Date: 10/1/18 Time: 4P 
Duration (Minutes): 20MIN Method: Free discussion Patient Attendance: Less than 50% Patient Level of Participation: Attended - Did Not Participate Patient Behavior/Symptoms: Disinterested Patient Progress: Appears to be improving Patient Progress Note: SEE PROGRESS NOTE

## 2018-10-02 NOTE — BH NOTES
Patient appears responding to internal stimuli, continue to pace in the hallway, olanzapine 5 mg prn administered for psychosis, patient was compliant with hs medications. Medication  was effective. Patient slept for 7 hours, breathing was even and unlabored. There are no noticeable distress at this time. Staff will continue to monitor patient. Q 15 minutes check maintained for safety.

## 2018-10-02 NOTE — BH NOTES
GROUP THERAPY PROGRESS NOTE The patient Mady Stafford a 37 y.o. male is participating in Creative Expression Group. Group time: 1 hour Personal goal for participation: To concentrate on selected task Goal orientation: social 
 
Group therapy participation: minimal 
 
Therapeutic interventions reviewed and discussed: Crafts, games, music Impression of participation: The patient was present-elected to watch-humza Jordan 10/2/2018  5:43 PM

## 2018-10-02 NOTE — PROGRESS NOTES
Problem: Altered Thought Process (Adult/Pediatric)  Goal: *STG: Remains safe in hospital  Outcome: Progressing Towards Goal  Denies SI/HI. Unable to state  About AVH. Cooperative. Med and meal compliant. No acute distress at this time.

## 2018-10-02 NOTE — BH NOTES
Pt was seen in treatment team this morning. Pt is alert and oriented. Pt denies SI/HI. Pt's mood is anxious, affect is anxious. Pt's thought process is disorganized. Pt's insight and judgment are impaired.  spoke with Nazia Fitch and Devonte Jain from [facility] [employer] -report that pt stated on September 5th, \"he was moving to [place], [place] without reason but dead set on moving. \"  Pt has a history of chronic paranoia. They reported he had stable housing, employment and took his medication regularly. Pt had section 8 housing voucher and last saw psychiatrist Dr. Stormy Moore on August 28th. Pt was on ACT team for 3 years  [id number] and decreased services due to stabilizing and not needing daily support. Workers state that if he chooses to come back to [place], [place] they will help him reestablish services and housing. The social work department will continue to coordinate plans for discharge.

## 2018-10-03 PROCEDURE — 74011250637 HC RX REV CODE- 250/637: Performed by: PSYCHIATRY & NEUROLOGY

## 2018-10-03 PROCEDURE — 74011250637 HC RX REV CODE- 250/637

## 2018-10-03 PROCEDURE — 65220000003 HC RM SEMIPRIVATE PSYCH

## 2018-10-03 RX ADMIN — DOCUSATE SODIUM 100 MG: 100 CAPSULE, LIQUID FILLED ORAL at 08:14

## 2018-10-03 RX ADMIN — NYSTATIN: 100000 CREAM TOPICAL at 17:47

## 2018-10-03 RX ADMIN — BENZTROPINE MESYLATE 0.5 MG: 1 TABLET ORAL at 08:14

## 2018-10-03 RX ADMIN — IBUPROFEN 400 MG: 400 TABLET ORAL at 08:14

## 2018-10-03 RX ADMIN — CLOZAPINE 125 MG: 100 TABLET ORAL at 21:41

## 2018-10-03 RX ADMIN — BENZTROPINE MESYLATE 0.5 MG: 1 TABLET ORAL at 21:42

## 2018-10-03 RX ADMIN — PROPRANOLOL HYDROCHLORIDE 120 MG: 60 CAPSULE, EXTENDED RELEASE ORAL at 08:14

## 2018-10-03 RX ADMIN — DOCUSATE SODIUM 100 MG: 100 CAPSULE, LIQUID FILLED ORAL at 17:47

## 2018-10-03 NOTE — PROGRESS NOTES
Mr. Elise Payne actively participated in Spirituality Group about Spiritual Gifts on [facility] 22 unit     Marilynn Parkinson M.Div.    Paging Service [phone])

## 2018-10-03 NOTE — BH NOTES
GROUP THERAPY PROGRESS NOTE The patient Tony Carroll a 37 y.o. male is participating in Creative Expression Group. Group time: 1 hour Personal goal for participation: To concentrate on selected task Goal orientation: social 
 
Group therapy participation: minimal 
 
Therapeutic interventions reviewed and discussed: Crafts, games, music Impression of participation: The patient was present-elected to watch Lynn Agosto 10/3/2018  5:57 PM
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
## 2018-10-04 LAB
BASOPHILS # BLD: 0.2 K/UL (ref 0–0.1)
BASOPHILS NFR BLD: 2 % (ref 0–1)
DIFFERENTIAL METHOD BLD: ABNORMAL
EOSINOPHIL # BLD: 0.5 K/UL (ref 0–0.4)
EOSINOPHIL NFR BLD: 4 % (ref 0–7)
ERYTHROCYTE [DISTWIDTH] IN BLOOD BY AUTOMATED COUNT: 14.2 % (ref 11.5–14.5)
HCT VFR BLD AUTO: 35.4 % (ref 36.6–50.3)
HGB BLD-MCNC: 11.7 G/DL (ref 12.1–17)
IMM GRANULOCYTES # BLD: 0 K/UL
IMM GRANULOCYTES NFR BLD AUTO: 0 %
LYMPHOCYTES # BLD: 6.5 K/UL (ref 0.8–3.5)
LYMPHOCYTES NFR BLD: 52 % (ref 12–49)
MCH RBC QN AUTO: 31 PG (ref 26–34)
MCHC RBC AUTO-ENTMCNC: 33.1 G/DL (ref 30–36.5)
MCV RBC AUTO: 93.7 FL (ref 80–99)
MONOCYTES # BLD: 0.6 K/UL (ref 0–1)
MONOCYTES NFR BLD: 5 % (ref 5–13)
NEUTS SEG # BLD: 4.6 K/UL (ref 1.8–8)
NEUTS SEG NFR BLD: 37 % (ref 32–75)
NRBC # BLD: 0 K/UL (ref 0–0.01)
NRBC BLD-RTO: 0 PER 100 WBC
PLATELET # BLD AUTO: 301 K/UL (ref 150–400)
PMV BLD AUTO: 11.2 FL (ref 8.9–12.9)
RBC # BLD AUTO: 3.78 M/UL (ref 4.1–5.7)
RBC MORPH BLD: ABNORMAL
WBC # BLD AUTO: 12.4 K/UL (ref 4.1–11.1)

## 2018-10-04 PROCEDURE — 74011250637 HC RX REV CODE- 250/637

## 2018-10-04 PROCEDURE — 36415 COLL VENOUS BLD VENIPUNCTURE: CPT | Performed by: PSYCHIATRY & NEUROLOGY

## 2018-10-04 PROCEDURE — 74011250637 HC RX REV CODE- 250/637: Performed by: PSYCHIATRY & NEUROLOGY

## 2018-10-04 PROCEDURE — 85025 COMPLETE CBC W/AUTO DIFF WBC: CPT | Performed by: PSYCHIATRY & NEUROLOGY

## 2018-10-04 PROCEDURE — 65220000003 HC RM SEMIPRIVATE PSYCH

## 2018-10-04 RX ADMIN — CLOZAPINE 150 MG: 25 TABLET ORAL at 21:29

## 2018-10-04 RX ADMIN — NYSTATIN: 100000 CREAM TOPICAL at 17:28

## 2018-10-04 RX ADMIN — MAGNESIUM HYDROXIDE 30 ML: 400 SUSPENSION ORAL at 12:48

## 2018-10-04 RX ADMIN — DOCUSATE SODIUM 100 MG: 100 CAPSULE, LIQUID FILLED ORAL at 08:05

## 2018-10-04 RX ADMIN — BENZTROPINE MESYLATE 0.5 MG: 1 TABLET ORAL at 08:05

## 2018-10-04 RX ADMIN — DOCUSATE SODIUM 100 MG: 100 CAPSULE, LIQUID FILLED ORAL at 17:22

## 2018-10-04 RX ADMIN — BENZTROPINE MESYLATE 0.5 MG: 1 TABLET ORAL at 21:29

## 2018-10-04 RX ADMIN — NYSTATIN: 100000 CREAM TOPICAL at 08:07

## 2018-10-04 RX ADMIN — PROPRANOLOL HYDROCHLORIDE 120 MG: 60 CAPSULE, EXTENDED RELEASE ORAL at 08:05

## 2018-10-04 NOTE — PROGRESS NOTES
Problem: Altered Thought Process (Adult/Pediatric)  Goal: *STG: Remains safe in hospital  Outcome: Progressing Towards Goal  Pt rested quietly in bed with eyes closed. No signs/symptoms of distress, agitation, or anxiety. Will monitor for changes. Q 15 minute checks continue.  PT slept 6.5 hrs

## 2018-10-04 NOTE — BH NOTES
GROUP THERAPY PROGRESS NOTE Cristina De Santiago is participating in [employer]. Group time: 30 minutes Personal goal for participation:  Unit orientation Goal orientation: [place] Group therapy participation: Pt did not attend Therapeutic interventions reviewed and discussed: Yes Impression of participation: N/A

## 2018-10-04 NOTE — PROGRESS NOTES
Problem: Altered Thought Process (Adult/Pediatric)  Goal: *STG: Decreased hallucinations  Outcome: Progressing Towards Goal  Patient is alert and oriented to person. Denies SI/HI, denies auditory or visual hallucinations, but noted responding to internal stimuli. Patient continues to pace in the hallway. Patient was compliant with hs medications. Staff will continue to monitor patient.  Q 15 minutes check maintained for safety

## 2018-10-04 NOTE — BH NOTES
GROUP THERAPY PROGRESS NOTE The patient Evans Bae a 37 y.o. male is participating in [employer]. Group time: 1 hour Personal goal for participation: To concentrate on selected task Goal orientation: social 
 
Group therapy participation: minimal 
 
Therapeutic interventions reviewed and discussed: Crafts, games, music Impression of participation: The patient was present-left early Nilsa Dimas 10/4/2018  6:22 PM
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
## 2018-10-04 NOTE — BH NOTES
Pt has paced or been outside his door most of the shift. Pt denies SI, HI, or pain/ discomfort. Pt did state that he is depressed more than usual today. When asked patient if a new stressor was present he stated no and looked down at the floor. Pt received PRN nicotine patch. Med and diet compliant.

## 2018-10-04 NOTE — PROGRESS NOTES
Clozapine Daily Monitoring  Current regimen:  clozapine 125 mg PO QHS (home dose 500 mg/day - 300 mg daily and 200 mg QHS)  Last CBC done AND reported to the registry: 10/4/18  Next CBC due:  10/11/18     DATE ANC   10/4 4.6   9/27 5.7         ANC must be >1 to dispense clozapine. If patient experiences ANC <1.5, refer to the Clozapine REMS ANC monitoring algorithm for frequency of ANC monitoring. Visit Vitals    /84 (BP Patient Position: Pre-activity; Sitting)    Pulse 88    Temp 97.8 °F (36.6 °C)    Resp 18    Ht 175.3 cm (69\")    Wt 70.3 kg (155 lb)    SpO2 100%    BMI 22.89 kg/m2       Recommendations/comments: ANC is within normal range and is acceptable for clozapine dispensing. ANC was reported to the clozapine REMS registry. Next [address] is due on Thursday, 10/11.       Thank you,  Yohana Lund, [address], [facility]  [phone]

## 2018-10-05 PROCEDURE — 74011250637 HC RX REV CODE- 250/637

## 2018-10-05 PROCEDURE — 74011250637 HC RX REV CODE- 250/637: Performed by: PSYCHIATRY & NEUROLOGY

## 2018-10-05 PROCEDURE — 65220000003 HC RM SEMIPRIVATE PSYCH

## 2018-10-05 RX ORDER — SENNOSIDES 8.6 MG/1
1 TABLET ORAL DAILY
Status: DISCONTINUED | OUTPATIENT
Start: 2018-10-06 | End: 2018-10-24

## 2018-10-05 RX ORDER — CLOZAPINE 100 MG/1
200 TABLET ORAL
Status: DISCONTINUED | OUTPATIENT
Start: 2018-10-06 | End: 2018-10-08

## 2018-10-05 RX ADMIN — NYSTATIN: 100000 CREAM TOPICAL at 08:13

## 2018-10-05 RX ADMIN — BENZTROPINE MESYLATE 0.5 MG: 1 TABLET ORAL at 08:12

## 2018-10-05 RX ADMIN — DOCUSATE SODIUM 100 MG: 100 CAPSULE, LIQUID FILLED ORAL at 17:04

## 2018-10-05 RX ADMIN — MAGNESIUM HYDROXIDE 30 ML: 400 SUSPENSION ORAL at 14:09

## 2018-10-05 RX ADMIN — DOCUSATE SODIUM 100 MG: 100 CAPSULE, LIQUID FILLED ORAL at 08:11

## 2018-10-05 RX ADMIN — BENZTROPINE MESYLATE 0.5 MG: 1 TABLET ORAL at 21:02

## 2018-10-05 RX ADMIN — PROPRANOLOL HYDROCHLORIDE 120 MG: 60 CAPSULE, EXTENDED RELEASE ORAL at 08:12

## 2018-10-05 RX ADMIN — CLOZAPINE 175 MG: 25 TABLET ORAL at 21:03

## 2018-10-05 RX ADMIN — NYSTATIN: 100000 CREAM TOPICAL at 21:03

## 2018-10-05 NOTE — BH NOTES
Pt was seen in treatment team this morning. Pt is alert and oriented. Pt denies SI/HI. Pt's mood is anxious, affect is anxious. Pt's thought process is disorganized and uncertain of timeframe in which he plans to return to [facility]. Pt denied wanting to speak with Annie Carrasco his  at this time - state he will try calling her over the weekend or next week.

## 2018-10-05 NOTE — PROGRESS NOTES
Problem: Altered Thought Process (Adult/Pediatric)  Goal: *STG: Remains safe in hospital  Outcome: Progressing Towards Goal  Pt has been visible on the unit. He appears to be preoccupied and disorganized. Pt's mood has been pleasant. Pt spends a lot of time in his room sitting in the corner by the window. His interactions with staff have been appropriate. Pt has been compliant with meals and meds. He received prn milk of magnesia for constipation. Staff will continue to monitor pt's symptoms and offer support as needed.

## 2018-10-05 NOTE — BH NOTES
Pt alert and visible on the unit. Pt interacting with staff, conversation disorganized and tangential. Pt calm and cooperative, meal and medication compliant. Staff will continue to monitor for health and safety.

## 2018-10-05 NOTE — BH NOTES
GROUP THERAPY PROGRESS NOTE The patient Javier harris 37 y.o. male is participating in Coping Skills Group. Group time: 45 minutes Personal goal for participation: To participate in healthy relationships bingo game Goal orientation:  personal 
 
Group therapy participation: active Therapeutic interventions reviewed and discussed: things pertaining to relationships Impression of participation:  The patient was attentive. Pedro Banda 10/5/2018  5:16 PM
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
## 2018-10-06 PROCEDURE — 74011250637 HC RX REV CODE- 250/637: Performed by: PSYCHIATRY & NEUROLOGY

## 2018-10-06 PROCEDURE — 65220000003 HC RM SEMIPRIVATE PSYCH

## 2018-10-06 RX ADMIN — DOCUSATE SODIUM 100 MG: 100 CAPSULE, LIQUID FILLED ORAL at 17:31

## 2018-10-06 RX ADMIN — SENNOSIDES 8.6 MG: 8.6 TABLET, FILM COATED ORAL at 08:51

## 2018-10-06 RX ADMIN — CLOZAPINE 200 MG: 100 TABLET ORAL at 21:54

## 2018-10-06 RX ADMIN — BENZTROPINE MESYLATE 0.5 MG: 1 TABLET ORAL at 21:54

## 2018-10-06 RX ADMIN — DOCUSATE SODIUM 100 MG: 100 CAPSULE, LIQUID FILLED ORAL at 08:51

## 2018-10-06 RX ADMIN — NYSTATIN: 100000 CREAM TOPICAL at 09:02

## 2018-10-06 RX ADMIN — PROPRANOLOL HYDROCHLORIDE 120 MG: 60 CAPSULE, EXTENDED RELEASE ORAL at 08:50

## 2018-10-06 RX ADMIN — BENZTROPINE MESYLATE 0.5 MG: 1 TABLET ORAL at 08:51

## 2018-10-06 NOTE — PROGRESS NOTES
Problem: Altered Thought Process (Adult/Pediatric)  Goal: *STG: Decreased hallucinations  Outcome: Progressing Towards Goal  Patient alert and oriented at baseline. Isolative to room for most of shift but visible during activities/groups, meals, meds and snacks. Calm and cooperative and less delusional. Pt states he's not hearing voices although he is preoccupied and isolates. Denies SI/HI. Denies hallucinations. Denies pain/discomfort. Continues to be preoccupied but no major behavioral issues. Continues on q15 min safety checks. Will continue to monitor and continue plan of care.

## 2018-10-06 NOTE — PROGRESS NOTES
Problem: Altered Thought Process (Adult/Pediatric)  Goal: *STG: Decreased hallucinations  Outcome: Progressing Towards Goal  Pt was having minimal hallucinating this am.
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
## 2018-10-06 NOTE — BH NOTES
Pt has been up in the day room most of the morning. No c/o SI, HI, or pain/ discomfort. Pt did state that he was depressed, but there was nothing he or we could do about it. Pt med and diet compliant. Minimal hallucinations and delusions, but patient was still able to hold conversation with this nurse.

## 2018-10-06 NOTE — BH NOTES
GROUP THERAPY PROGRESS NOTE Cristina De Santiago is participating in [place]. Group time: 15 minutes Personal goal for participation: daily orientation Goal orientation: personal 
 
Group therapy participation: active Therapeutic interventions reviewed and discussed: yes Impression of participation: good

## 2018-10-07 PROCEDURE — 65220000003 HC RM SEMIPRIVATE PSYCH

## 2018-10-07 PROCEDURE — 74011250637 HC RX REV CODE- 250/637: Performed by: PSYCHIATRY & NEUROLOGY

## 2018-10-07 PROCEDURE — 74011250637 HC RX REV CODE- 250/637

## 2018-10-07 RX ADMIN — PROPRANOLOL HYDROCHLORIDE 120 MG: 60 CAPSULE, EXTENDED RELEASE ORAL at 09:59

## 2018-10-07 RX ADMIN — CLOZAPINE 200 MG: 100 TABLET ORAL at 21:54

## 2018-10-07 RX ADMIN — BENZTROPINE MESYLATE 0.5 MG: 1 TABLET ORAL at 21:55

## 2018-10-07 RX ADMIN — DOCUSATE SODIUM 100 MG: 100 CAPSULE, LIQUID FILLED ORAL at 09:58

## 2018-10-07 RX ADMIN — DOCUSATE SODIUM 100 MG: 100 CAPSULE, LIQUID FILLED ORAL at 17:00

## 2018-10-07 RX ADMIN — SENNOSIDES 8.6 MG: 8.6 TABLET, FILM COATED ORAL at 09:59

## 2018-10-07 RX ADMIN — BENZTROPINE MESYLATE 0.5 MG: 1 TABLET ORAL at 09:58
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
## 2018-10-07 NOTE — BH NOTES
ANIKA  IOP GROUP THERAPY NOTE 
 
IOP Treatment Group: Process Group Description: Education group led by unit staff. Patients are educated on skills designed to support the patient's management of their diagnosis Additional Description of Group: [id number] Session Goal: Patient will gain understanding and treatment of their diagnosis Group Facilitator: Claire Anthony Co-Facilitator: NONE Date: 10/6/18 Time: 4P 
Duration (Minutes): 20MIN Method: Free discussion Patient Attendance: 100% Patient Level of Participation: Participated with prompts Patient Behavior/Symptoms: Appropriate to discussion Patient Progress: Appears to be stable Patient Progress Note: SEE PROGRESS NOTE

## 2018-10-07 NOTE — BH NOTES
GROUP THERAPY PROGRESS NOTE The patient Regina Clifford is participating in [employer]. Group time: 30 minutes Personal goal for participation: to orient the patient to the unit. Goal orientation: successful adoption of unit rules Group therapy participation: active Therapeutic interventions reviewed and discussed: Yes Impression of participation:  
 
Daisy Alvarez 10/7/2018 8:48 AM

## 2018-10-07 NOTE — PROGRESS NOTES
Problem: Altered Thought Process (Adult/Pediatric)  Goal: *STG: Remains safe in hospital  Outcome: Progressing Towards Goal  Pt slept 7 hours overnight. No medical or behavioral concerns noted. Staff will continue to monitor for health and safety.

## 2018-10-07 NOTE — PROGRESS NOTES
Problem: Altered Thought Process (Adult/Pediatric)  Goal: *STG: Remains safe in hospital  Outcome: Progressing Towards Goal  Pt alert and oriented. Pt isolates self mostly to room this shift. Compliant with medications. No med/beh concerns. Staff will continue to monitor for health and safety.

## 2018-10-07 NOTE — BH NOTES
Pt mostly isolates to his room, but has been up and out of bed more today than usual. Pt is calm and cooperative with care, Bright and smiling when approached. Will continue to monitor per protocol.

## 2018-10-08 PROCEDURE — 74011250637 HC RX REV CODE- 250/637: Performed by: FAMILY MEDICINE

## 2018-10-08 PROCEDURE — 74011250637 HC RX REV CODE- 250/637: Performed by: PSYCHIATRY & NEUROLOGY

## 2018-10-08 PROCEDURE — 65220000003 HC RM SEMIPRIVATE PSYCH

## 2018-10-08 RX ORDER — BUSPIRONE HYDROCHLORIDE 10 MG/1
10 TABLET ORAL 2 TIMES DAILY
Status: DISCONTINUED | OUTPATIENT
Start: 2018-10-08 | End: 2018-10-18

## 2018-10-08 RX ADMIN — SENNOSIDES 8.6 MG: 8.6 TABLET, FILM COATED ORAL at 07:52

## 2018-10-08 RX ADMIN — BENZTROPINE MESYLATE 0.5 MG: 1 TABLET ORAL at 21:26

## 2018-10-08 RX ADMIN — BENZTROPINE MESYLATE 0.5 MG: 1 TABLET ORAL at 08:05

## 2018-10-08 RX ADMIN — PROPRANOLOL HYDROCHLORIDE 120 MG: 60 CAPSULE, EXTENDED RELEASE ORAL at 07:52

## 2018-10-08 RX ADMIN — CLOZAPINE 225 MG: 25 TABLET ORAL at 21:28

## 2018-10-08 RX ADMIN — NYSTATIN: 100000 CREAM TOPICAL at 16:00

## 2018-10-08 RX ADMIN — DOCUSATE SODIUM 100 MG: 100 CAPSULE, LIQUID FILLED ORAL at 16:00

## 2018-10-08 RX ADMIN — DOCUSATE SODIUM 100 MG: 100 CAPSULE, LIQUID FILLED ORAL at 07:52

## 2018-10-08 RX ADMIN — BUSPIRONE HYDROCHLORIDE 10 MG: 10 TABLET ORAL at 21:26

## 2018-10-08 RX ADMIN — BUSPIRONE HYDROCHLORIDE 10 MG: 10 TABLET ORAL at 12:10

## 2018-10-08 RX ADMIN — NYSTATIN: 100000 CREAM TOPICAL at 07:52
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
## 2018-10-08 NOTE — BH NOTES
Pt was isolative at start of shift, out for snack then watched tv til bed. Pt observed resting quietly, respirations even and unlabored. No s/s distress noted, no complaints voiced. Pt slept 7 hours. Will continue Q 15 minute monitoring for safety.

## 2018-10-08 NOTE — BH NOTES
GROUP THERAPY PROGRESS NOTE The patient Maria L Barragan a 37 y.o. male is participating in Creative Expression Group. Group time: 1 hour Personal goal for participation: To concentrate on selected task Goal orientation: social 
 
Group therapy participation: active Therapeutic interventions reviewed and discussed: Crafts, games, music Impression of participation: The patient was attentive. Salma Gaines 10/8/2018  6:04 PM

## 2018-10-09 PROCEDURE — 65220000003 HC RM SEMIPRIVATE PSYCH

## 2018-10-09 PROCEDURE — 74011250637 HC RX REV CODE- 250/637

## 2018-10-09 PROCEDURE — 74011250637 HC RX REV CODE- 250/637: Performed by: PSYCHIATRY & NEUROLOGY

## 2018-10-09 RX ADMIN — NYSTATIN: 100000 CREAM TOPICAL at 16:13

## 2018-10-09 RX ADMIN — BENZTROPINE MESYLATE 0.5 MG: 1 TABLET ORAL at 21:06

## 2018-10-09 RX ADMIN — SENNOSIDES 8.6 MG: 8.6 TABLET, FILM COATED ORAL at 09:35

## 2018-10-09 RX ADMIN — CLOZAPINE 250 MG: 25 TABLET ORAL at 21:04

## 2018-10-09 RX ADMIN — NYSTATIN: 100000 CREAM TOPICAL at 08:00

## 2018-10-09 RX ADMIN — DOCUSATE SODIUM 100 MG: 100 CAPSULE, LIQUID FILLED ORAL at 09:35

## 2018-10-09 RX ADMIN — DOCUSATE SODIUM 100 MG: 100 CAPSULE, LIQUID FILLED ORAL at 16:13

## 2018-10-09 RX ADMIN — BUSPIRONE HYDROCHLORIDE 10 MG: 10 TABLET ORAL at 09:35

## 2018-10-09 RX ADMIN — BUSPIRONE HYDROCHLORIDE 10 MG: 10 TABLET ORAL at 21:06

## 2018-10-09 RX ADMIN — BENZTROPINE MESYLATE 0.5 MG: 1 TABLET ORAL at 09:35

## 2018-10-09 RX ADMIN — PROPRANOLOL HYDROCHLORIDE 120 MG: 60 CAPSULE, EXTENDED RELEASE ORAL at 09:35

## 2018-10-09 NOTE — BH NOTES
GROUP THERAPY PROGRESS NOTE The patient Marisela Shepherd a 37 y.o. male is participating in Coping Skills Group. Group time: 45 minutes Personal goal for participation: To participate in happiness game Goal orientation:  personal 
 
Group therapy participation: active Therapeutic interventions reviewed and discussed: things pertaining to happiness Impression of participation:  The patient was attentive. Raynald Lesch 10/9/2018  5:05 PM

## 2018-10-09 NOTE — BH NOTES
ANIKA  IOP GROUP THERAPY NOTE 
 
IOP Treatment Group: Process Group Description: Education group led by unit staff. Patients are educated on skills designed to support the patient's management of their diagnosis Additional Description of Group: [id number] Session Goal: Patient will gain understanding and treatment of their diagnosis Group Facilitator: NONE 
 
Co-Facilitator: NONE Date: 10/9/18 Time: 4P 
Duration (Minutes): 20MIN Method: Free discussion Patient Attendance: 100% Patient Level of Participation: Participated with prompts Patient Behavior/Symptoms: Distracted Patient Progress: Appears to be stable Patient Progress Note: SEE PROGRESS NOTE

## 2018-10-09 NOTE — PROGRESS NOTES
Problem: Altered Thought Process (Adult/Pediatric)  Goal: *STG: Remains safe in hospital  Outcome: Progressing Towards Goal  Patient isolative mostly to room but observed on the unit in the milieu acting bizarrely. Patient observed clapping his hands, talking to himself, responding to internal stimuli and shaking his head rapidly. When approached, patient stops and goes into his room. No major behavioral issues noted. Participatory in some groups/activities. Meal and medication compliant. No PRNs required on shift. No complaints of pain/discomfort. Positive for auditory hallucinations. Continues on q15 min safety checks. Will continue to monitor and continue plan of care.
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
## 2018-10-09 NOTE — BH NOTES
Pt initially pacing in room but then slept well in bed with even respirations. Total hours slept approximately  6. No s/s of distress noted. Patient has remained safe. Will continue to monitor.

## 2018-10-10 PROCEDURE — 65220000003 HC RM SEMIPRIVATE PSYCH

## 2018-10-10 PROCEDURE — 74011250637 HC RX REV CODE- 250/637: Performed by: PSYCHIATRY & NEUROLOGY

## 2018-10-10 PROCEDURE — 74011250637 HC RX REV CODE- 250/637

## 2018-10-10 RX ADMIN — BUSPIRONE HYDROCHLORIDE 10 MG: 10 TABLET ORAL at 08:47

## 2018-10-10 RX ADMIN — BUSPIRONE HYDROCHLORIDE 10 MG: 10 TABLET ORAL at 21:58

## 2018-10-10 RX ADMIN — NYSTATIN: 100000 CREAM TOPICAL at 08:47

## 2018-10-10 RX ADMIN — NYSTATIN: 100000 CREAM TOPICAL at 16:11

## 2018-10-10 RX ADMIN — BENZTROPINE MESYLATE 0.5 MG: 1 TABLET ORAL at 21:57

## 2018-10-10 RX ADMIN — DOCUSATE SODIUM 100 MG: 100 CAPSULE, LIQUID FILLED ORAL at 08:47

## 2018-10-10 RX ADMIN — PROPRANOLOL HYDROCHLORIDE 120 MG: 60 CAPSULE, EXTENDED RELEASE ORAL at 08:46

## 2018-10-10 RX ADMIN — CLOZAPINE 275 MG: 25 TABLET ORAL at 21:57

## 2018-10-10 RX ADMIN — SENNOSIDES 8.6 MG: 8.6 TABLET, FILM COATED ORAL at 08:47

## 2018-10-10 RX ADMIN — DOCUSATE SODIUM 100 MG: 100 CAPSULE, LIQUID FILLED ORAL at 16:11

## 2018-10-10 RX ADMIN — LORAZEPAM 1 MG: 1 TABLET ORAL at 08:47

## 2018-10-10 RX ADMIN — BENZTROPINE MESYLATE 0.5 MG: 1 TABLET ORAL at 08:47

## 2018-10-10 NOTE — BH NOTES
GROUP THERAPY PROGRESS NOTE The patient Abi Stiles a 37 y.o. male is participating in [employer]. Group time: 1 hour Personal goal for participation: To concentrate on selected task Goal orientation: social 
 
Group therapy participation: active Therapeutic interventions reviewed and discussed: Crafts, games, music Impression of participation: The patient was attentive. Leonor Fagan 10/10/2018  5:19 PM

## 2018-10-10 NOTE — BH NOTES
Pt was seen in treatment team this morning. Pt is alert and oriented. Pt denies SI/HI. Pt's mood is anxious, affect is anxious. Pt's thought process is disorganized - expressed interest in returning to work. Pt's insight and judgment are impaired, reliability is fair to poor.  visited patient during afternoon and he was sitting on floor in room - appeared to be responding to internal stimuli. Pleasant mood - pt stated he would call [place] [address], his  on Thursday. Social work department will continue to coordinate discharge plans.

## 2018-10-10 NOTE — PROGRESS NOTES
Problem: Altered Thought Process (Adult/Pediatric)  Goal: *STG: Participates in treatment plan  [address] [facility] [address]  Master Treatment Plan for Abdi Blunt    Date Treatment Plan Initiated: 9/28/18  Treatment Plan Modalities:  Type of Modality Amount  (x minutes) Frequency (x/week) Duration (x days) Name of Responsible Staff  [address] [facility] meetings to encourage peer interactions 15 7 1     Group psychotherapy to assist in building coping skills and internal controls 60 7 1 Gus Sauer  Therapeutic activity groups to build coping skills 60 7 1 Gus Sauer  Psychoeducation in group setting to address:  Medication education   15 7 1   Coping skills        Relaxation techniques        Symptom management        Discharge planning   61 2 1 [facility] [address]   60 1 1 volunteer  Recovery/AA/NA      volunteer  Physician medication management   15 7 1   Family meeting/discharge planning   15 2 [address] [facility] and [place]                               Outcome: Progressing Towards Goal  Patient calm and cooperative. No behavioral issues at this time. Will continue to assess and complete safety checks.

## 2018-10-10 NOTE — PROGRESS NOTES
Problem: Altered Thought Process (Adult/Pediatric)  Goal: *STG: Remains safe in hospital  Outcome: Progressing Towards Goal  Patient alert and oriented x 4. Appears anxious and restless but cooperative; no behavioral issues at this time. Compliant with medications and meals. Patient denies suicidal and homicidal ideations. Denies any hallucinations. Will continue to assess patient and complete safety checks.
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
## 2018-10-11 LAB
BASOPHILS # BLD: 0 K/UL (ref 0–0.1)
BASOPHILS NFR BLD: 0 % (ref 0–1)
DIFFERENTIAL METHOD BLD: ABNORMAL
EOSINOPHIL # BLD: 0.7 K/UL (ref 0–0.4)
EOSINOPHIL NFR BLD: 5 % (ref 0–7)
ERYTHROCYTE [DISTWIDTH] IN BLOOD BY AUTOMATED COUNT: 13.5 % (ref 11.5–14.5)
HCT VFR BLD AUTO: 34.3 % (ref 36.6–50.3)
HGB BLD-MCNC: 11.4 G/DL (ref 12.1–17)
IMM GRANULOCYTES # BLD: 0 K/UL
IMM GRANULOCYTES NFR BLD AUTO: 0 %
LYMPHOCYTES # BLD: 7 K/UL (ref 0.8–3.5)
LYMPHOCYTES NFR BLD: 49 % (ref 12–49)
MCH RBC QN AUTO: 30.8 PG (ref 26–34)
MCHC RBC AUTO-ENTMCNC: 33.2 G/DL (ref 30–36.5)
MCV RBC AUTO: 92.7 FL (ref 80–99)
MONOCYTES # BLD: 1 K/UL (ref 0–1)
MONOCYTES NFR BLD: 7 % (ref 5–13)
NEUTS BAND NFR BLD MANUAL: 3 % (ref 0–6)
NEUTS SEG # BLD: 5.6 K/UL (ref 1.8–8)
NEUTS SEG NFR BLD: 36 % (ref 32–75)
NRBC # BLD: 0 K/UL (ref 0–0.01)
NRBC BLD-RTO: 0 PER 100 WBC
PLATELET # BLD AUTO: 316 K/UL (ref 150–400)
PMV BLD AUTO: 11.2 FL (ref 8.9–12.9)
RBC # BLD AUTO: 3.7 M/UL (ref 4.1–5.7)
RBC MORPH BLD: ABNORMAL
WBC # BLD AUTO: 14.3 K/UL (ref 4.1–11.1)

## 2018-10-11 PROCEDURE — 36415 COLL VENOUS BLD VENIPUNCTURE: CPT | Performed by: PSYCHIATRY & NEUROLOGY

## 2018-10-11 PROCEDURE — 74011250637 HC RX REV CODE- 250/637

## 2018-10-11 PROCEDURE — 65220000003 HC RM SEMIPRIVATE PSYCH

## 2018-10-11 PROCEDURE — 74011250637 HC RX REV CODE- 250/637: Performed by: PSYCHIATRY & NEUROLOGY

## 2018-10-11 PROCEDURE — 74011250637 HC RX REV CODE- 250/637: Performed by: FAMILY MEDICINE

## 2018-10-11 PROCEDURE — 85025 COMPLETE CBC W/AUTO DIFF WBC: CPT | Performed by: PSYCHIATRY & NEUROLOGY

## 2018-10-11 RX ORDER — CLOZAPINE 100 MG/1
300 TABLET ORAL
Status: DISCONTINUED | OUTPATIENT
Start: 2018-10-11 | End: 2018-10-26 | Stop reason: HOSPADM

## 2018-10-11 RX ADMIN — BUSPIRONE HYDROCHLORIDE 10 MG: 10 TABLET ORAL at 09:12

## 2018-10-11 RX ADMIN — SENNOSIDES 8.6 MG: 8.6 TABLET, FILM COATED ORAL at 09:12

## 2018-10-11 RX ADMIN — BENZTROPINE MESYLATE 0.5 MG: 1 TABLET ORAL at 00:05

## 2018-10-11 RX ADMIN — DOCUSATE SODIUM 100 MG: 100 CAPSULE, LIQUID FILLED ORAL at 09:12

## 2018-10-11 RX ADMIN — CLOZAPINE 300 MG: 100 TABLET ORAL at 21:11

## 2018-10-11 RX ADMIN — BUSPIRONE HYDROCHLORIDE 10 MG: 10 TABLET ORAL at 21:11

## 2018-10-11 RX ADMIN — BENZTROPINE MESYLATE 0.5 MG: 1 TABLET ORAL at 09:12

## 2018-10-11 RX ADMIN — NYSTATIN: 100000 CREAM TOPICAL at 09:12

## 2018-10-11 RX ADMIN — DOCUSATE SODIUM 100 MG: 100 CAPSULE, LIQUID FILLED ORAL at 16:07

## 2018-10-11 RX ADMIN — PROPRANOLOL HYDROCHLORIDE 120 MG: 60 CAPSULE, EXTENDED RELEASE ORAL at 09:12

## 2018-10-11 RX ADMIN — LORAZEPAM 1 MG: 1 TABLET ORAL at 21:11

## 2018-10-11 RX ADMIN — NYSTATIN: 100000 CREAM TOPICAL at 16:07
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
## 2018-10-11 NOTE — PROGRESS NOTES
Problem: Altered Thought Process (Adult/Pediatric)  Goal: *STG: Remains safe in hospital  Outcome: Progressing Towards Goal  Patient alert and oriented. Denies suicidal and homicidal ideations. Denies any hallucinations. Patient compliant with meals and medications. Isolative to his room; present in dayroom for meals. No behavioral issues at this time. Will continue to assess patient and complete safety checks.

## 2018-10-11 NOTE — PROGRESS NOTES
Clozapine Daily Monitoring  Current regimen:  clozapine 275 mg PO QHS  (home dose 500 mg/day - 300 mg daily and 200 mg QHS)  Last CBC done AND reported to the registry: 10/11/18  Next CBC due:  10/18/18     DATE ANC   10/11 5.6   10/4 4.6   9/27 5.7     ANC must be >1 to dispense clozapine. Visit Vitals    /79    Pulse 95    Temp 97.8 °F (36.6 °C)    Resp 18    Ht 175.3 cm (69\")    Wt 70.3 kg (155 lb)    SpO2 98%    BMI 22.89 kg/m2       Assessment/plan: ANC is within normal limits, 5.6 K/uL, and is within acceptable range to continue clozapine dispensing. Next [address] is due on 10/18/18.     Thank you,  Bing Higuera, PHARMD, BCPS

## 2018-10-11 NOTE — PROGRESS NOTES
Problem: Altered Thought Process (Adult/Pediatric)  Goal: *STG: Participates in treatment plan  [address] [facility] [address]  Master Treatment Plan for Billie Ruiz    Date Treatment Plan Initiated: 9/28/18  Treatment Plan Modalities:  Type of Modality Amount  (x minutes) Frequency (x/week) Duration (x days) Name of Responsible Staff  [address] [facility] meetings to encourage peer interactions 15 7 1     Group psychotherapy to assist in building coping skills and internal controls 60 7 1 Gus Sauer  Therapeutic activity groups to build coping skills 60 7 1 Gus Sauer  Psychoeducation in group setting to address:  Medication education   15 7 1   Coping skills        Relaxation techniques        Symptom management        Discharge planning   61 [phone] [facility] 2 1 [address] [facility]   60 1 1 volunteer  Recovery/AA/NA      volunteer  Physician medication management   15 7 1   Family meeting/discharge planning   15 2 [address] [facility] and Maegan [place]                               Outcome: Progressing Towards Goal  Patient alert and oriented. Denies SI/HI. No behavioral issues at this time. Will continue safety checks and assessments.

## 2018-10-12 PROCEDURE — 74011250637 HC RX REV CODE- 250/637: Performed by: PSYCHIATRY & NEUROLOGY

## 2018-10-12 PROCEDURE — 74011250637 HC RX REV CODE- 250/637

## 2018-10-12 PROCEDURE — 65220000003 HC RM SEMIPRIVATE PSYCH

## 2018-10-12 RX ORDER — CLOZAPINE 25 MG/1
25 TABLET ORAL DAILY
Status: DISCONTINUED | OUTPATIENT
Start: 2018-10-13 | End: 2018-10-15

## 2018-10-12 RX ADMIN — BENZTROPINE MESYLATE 0.5 MG: 1 TABLET ORAL at 21:23

## 2018-10-12 RX ADMIN — DOCUSATE SODIUM 100 MG: 100 CAPSULE, LIQUID FILLED ORAL at 09:34

## 2018-10-12 RX ADMIN — PROPRANOLOL HYDROCHLORIDE 120 MG: 60 CAPSULE, EXTENDED RELEASE ORAL at 09:34

## 2018-10-12 RX ADMIN — MAGNESIUM HYDROXIDE 30 ML: 400 SUSPENSION ORAL at 18:28

## 2018-10-12 RX ADMIN — BUSPIRONE HYDROCHLORIDE 10 MG: 10 TABLET ORAL at 21:23

## 2018-10-12 RX ADMIN — NYSTATIN: 100000 CREAM TOPICAL at 17:00

## 2018-10-12 RX ADMIN — BENZTROPINE MESYLATE 0.5 MG: 1 TABLET ORAL at 09:34

## 2018-10-12 RX ADMIN — BUSPIRONE HYDROCHLORIDE 10 MG: 10 TABLET ORAL at 09:34

## 2018-10-12 RX ADMIN — CLOZAPINE 300 MG: 100 TABLET ORAL at 21:21

## 2018-10-12 RX ADMIN — SENNOSIDES 8.6 MG: 8.6 TABLET, FILM COATED ORAL at 09:34

## 2018-10-12 RX ADMIN — DOCUSATE SODIUM 100 MG: 100 CAPSULE, LIQUID FILLED ORAL at 17:00

## 2018-10-12 RX ADMIN — NYSTATIN: 100000 CREAM TOPICAL at 09:34
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
## 2018-10-12 NOTE — BH NOTES
GROUP THERAPY PROGRESS NOTE The patient Karlie harris 37 y.o. male is participating in Coping Skills Group. Group time: 45 minutes Personal goal for participation: To participate in relaxation activity Goal orientation:  relaxation Group therapy participation: active Therapeutic interventions reviewed and discussed: favorite ways to relax Impression of participation:  The patient was attentive. Leta Morfin 10/12/2018  5:59 PM

## 2018-10-12 NOTE — PROGRESS NOTES
Problem: Altered Thought Process (Adult/Pediatric)  Goal: *STG: Participates in treatment plan  [address] [facility] [address]  Master Treatment Plan for Tony Carroll    Date Treatment Plan Initiated: 9/28/18  Treatment Plan Modalities:  Type of Modality Amount  (x minutes) Frequency (x/week) Duration (x days) Name of Responsible Staff  [address] [facility] meetings to encourage peer interactions 15 7 1     Group psychotherapy to assist in building coping skills and internal controls 60 7 1 Gus Sauer  Therapeutic activity groups to build coping skills 60 7 1 Gus Sauer  Psychoeducation in group setting to address:  Medication education   15 7 1   Coping skills        Relaxation techniques        Symptom management        Discharge planning   61 2 [address] [facility] 2 1 [facility]   61 1 1 volunteer  Recovery/AA/NA      volunteer  Physician medication management   15 7 1   Family meeting/discharge planning   15 2 [facility]                               Outcome: Progressing Towards Goal  Patient in dayroom siting quietly. Quiet and keeps to himself. Remains isolative to room but is gradually becoming more visible on the milieu. Patient denies HI/SI. No behavioral issues at this time. Will continue to assess patient and complete safety checks.

## 2018-10-12 NOTE — BH NOTES
GROUP THERAPY PROGRESS NOTE The patient Juan Carlos harris 37 y.o. male is participating in Creative Expression Group. Group time: 1 hour Personal goal for participation: To concentrate on selected task Goal orientation: social 
 
Group therapy participation: active Therapeutic interventions reviewed and discussed: Crafts, games, music Impression of participation: The patient was attentive. Vy Huerta 10/12/2018  6:23 PM

## 2018-10-12 NOTE — PROGRESS NOTES
Problem: Altered Thought Process (Adult/Pediatric)  Goal: *STG: Remains safe in hospital  Outcome: Progressing Towards Goal  Patient alert and oriented. No behavioral issues at this time. Remains isolative to room. Patient denies HI/SI. Compliant with meals and medications. Will continue assessments and safety checks.

## 2018-10-13 PROCEDURE — 74011250637 HC RX REV CODE- 250/637: Performed by: PSYCHIATRY & NEUROLOGY

## 2018-10-13 PROCEDURE — 65220000003 HC RM SEMIPRIVATE PSYCH

## 2018-10-13 RX ADMIN — BENZTROPINE MESYLATE 0.5 MG: 1 TABLET ORAL at 08:35

## 2018-10-13 RX ADMIN — DOCUSATE SODIUM 100 MG: 100 CAPSULE, LIQUID FILLED ORAL at 16:52

## 2018-10-13 RX ADMIN — BUSPIRONE HYDROCHLORIDE 10 MG: 10 TABLET ORAL at 21:28

## 2018-10-13 RX ADMIN — CLOZAPINE 300 MG: 100 TABLET ORAL at 21:27

## 2018-10-13 RX ADMIN — SENNOSIDES 8.6 MG: 8.6 TABLET, FILM COATED ORAL at 08:34

## 2018-10-13 RX ADMIN — PROPRANOLOL HYDROCHLORIDE 120 MG: 60 CAPSULE, EXTENDED RELEASE ORAL at 08:34

## 2018-10-13 RX ADMIN — BUSPIRONE HYDROCHLORIDE 10 MG: 10 TABLET ORAL at 08:35

## 2018-10-13 RX ADMIN — CLOZAPINE 25 MG: 25 TABLET ORAL at 08:37

## 2018-10-13 RX ADMIN — NYSTATIN: 100000 CREAM TOPICAL at 16:52

## 2018-10-13 RX ADMIN — DOCUSATE SODIUM 100 MG: 100 CAPSULE, LIQUID FILLED ORAL at 08:35

## 2018-10-13 RX ADMIN — BENZTROPINE MESYLATE 0.5 MG: 1 TABLET ORAL at 21:29

## 2018-10-13 RX ADMIN — NYSTATIN: 100000 CREAM TOPICAL at 08:37
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
## 2018-10-13 NOTE — BH NOTES
GROUP THERAPY PROGRESS NOTE    Sudhir Yang is participating in Psychiatric Medication Management. Group time: 35 minutes    Personal goal for participation: understand medications more thoroughly     Goal orientation: medication education    Group therapy participation: active    Therapeutic interventions reviewed and discussed:    psychotropic medications (side effects, adverse reactions, symptoms of toxicity as appropriate for each med, therapeutic ranges of specific meds)    Impression of participation: Pt shared own experience about when he took Haldol with group. He also asked several questions throughout the group.

## 2018-10-13 NOTE — BH NOTES
Patient appears restless, pacing, and anxious. Patient was visible in the milieu this evening occasionally responding to internal stimuli. Patient denies SI/HI, auditory hallucination noticeable. Patient was compliant with medications. Staff will continue to monitor patient. Q 15 minutes check maintained for safety.

## 2018-10-13 NOTE — PROGRESS NOTES
Diet as tolerated. Added lactose free restriction as pt is lactose intolerant. Pt noted to be meal compliant. Hx notable for COPD, HTN, hyperlipidemia, impaired glucose tolerance, lactose intolerance.   Ht: 5'9\"  Wt: 155 lb  BMI: 22.89 kg/(m^2) c/w normal weight  Est energy needs: 1810 kcal, 70 g protein, 1 mL/kcal fluids  Pt will consume > 75% of meals at follow up 7-10 days

## 2018-10-13 NOTE — PROGRESS NOTES
Problem: Altered Thought Process (Adult/Pediatric)  Goal: *STG: Remains safe in hospital  Outcome: Progressing Towards Goal  Pt is alert. Pt is visible on the unit, with appropriate interaction with peers and staff. Pt is compliant with medications and meals. Pt denies any needs or distress at this time. Will continue to monitor pt q15 minutes.

## 2018-10-13 NOTE — BH NOTES
Patient is calm and remained quiet. Patient slept for 6.5 hours. There is no anxiety reported. Patient breathing is even and unlabored. Staff will continue to monitor patient. Q 15 minutes check maintained for safety.

## 2018-10-14 PROCEDURE — 74011250637 HC RX REV CODE- 250/637

## 2018-10-14 PROCEDURE — 74011250637 HC RX REV CODE- 250/637: Performed by: PSYCHIATRY & NEUROLOGY

## 2018-10-14 PROCEDURE — 65220000003 HC RM SEMIPRIVATE PSYCH

## 2018-10-14 RX ADMIN — BUSPIRONE HYDROCHLORIDE 10 MG: 10 TABLET ORAL at 20:48

## 2018-10-14 RX ADMIN — NYSTATIN: 100000 CREAM TOPICAL at 16:29

## 2018-10-14 RX ADMIN — BENZTROPINE MESYLATE 0.5 MG: 1 TABLET ORAL at 20:48

## 2018-10-14 RX ADMIN — DOCUSATE SODIUM 100 MG: 100 CAPSULE, LIQUID FILLED ORAL at 16:29

## 2018-10-14 RX ADMIN — PROPRANOLOL HYDROCHLORIDE 120 MG: 60 CAPSULE, EXTENDED RELEASE ORAL at 09:25

## 2018-10-14 RX ADMIN — DOCUSATE SODIUM 100 MG: 100 CAPSULE, LIQUID FILLED ORAL at 09:26

## 2018-10-14 RX ADMIN — NYSTATIN: 100000 CREAM TOPICAL at 09:27

## 2018-10-14 RX ADMIN — BENZTROPINE MESYLATE 0.5 MG: 1 TABLET ORAL at 09:26

## 2018-10-14 RX ADMIN — NYSTATIN: 100000 CREAM TOPICAL at 20:49

## 2018-10-14 RX ADMIN — SENNOSIDES 8.6 MG: 8.6 TABLET, FILM COATED ORAL at 09:25

## 2018-10-14 RX ADMIN — CLOZAPINE 25 MG: 25 TABLET ORAL at 09:46

## 2018-10-14 RX ADMIN — CLOZAPINE 300 MG: 100 TABLET ORAL at 20:50

## 2018-10-14 RX ADMIN — BUSPIRONE HYDROCHLORIDE 10 MG: 10 TABLET ORAL at 09:25

## 2018-10-14 NOTE — BH NOTES
GROUP THERAPY PROGRESS NOTE Adeola Burns is participating in [employer]. Group time: 30 minutes Personal goal for participation:  Unit orientation Goal orientation: [place] Group therapy participation: active Therapeutic interventions reviewed and discussed: Yes Impression of participation: good

## 2018-10-14 NOTE — BH NOTES
Patient appears responding to internal stimuli, restless and continue to pace on the hallway. Patient was compliant with medications. Patient affect is labile. Staff will continue to monitor patient. Q 15 minutes check maintained for safety.

## 2018-10-14 NOTE — PROGRESS NOTES
Problem: Altered Thought Process (Adult/Pediatric)  Goal: *STG: Remains safe in hospital  Outcome: Progressing Towards Goal  Pt is alert. He is compliant with medications and meals. Pt is visible on the unit. Pt denies any needs at this time. Pt appears preoccupied but he's cooperative. Will continue to monitor pt q15 minutes for safety and support.

## 2018-10-14 NOTE — BH NOTES
Patient had no complaints throughout the night, remained in room, monitored q 15minutes for safety, and slept for 8 hours. Will continue to monitor for remainder of shift for changes/issues/complaints.

## 2018-10-15 PROCEDURE — 65220000003 HC RM SEMIPRIVATE PSYCH

## 2018-10-15 PROCEDURE — 74011250637 HC RX REV CODE- 250/637: Performed by: PSYCHIATRY & NEUROLOGY

## 2018-10-15 RX ORDER — CLOZAPINE 25 MG/1
50 TABLET ORAL DAILY
Status: DISCONTINUED | OUTPATIENT
Start: 2018-10-16 | End: 2018-10-16

## 2018-10-15 RX ADMIN — BUSPIRONE HYDROCHLORIDE 10 MG: 10 TABLET ORAL at 08:53

## 2018-10-15 RX ADMIN — BENZTROPINE MESYLATE 0.5 MG: 1 TABLET ORAL at 08:54

## 2018-10-15 RX ADMIN — PROPRANOLOL HYDROCHLORIDE 120 MG: 60 CAPSULE, EXTENDED RELEASE ORAL at 08:00

## 2018-10-15 RX ADMIN — SENNOSIDES 8.6 MG: 8.6 TABLET, FILM COATED ORAL at 08:00

## 2018-10-15 RX ADMIN — DOCUSATE SODIUM 100 MG: 100 CAPSULE, LIQUID FILLED ORAL at 08:53

## 2018-10-15 RX ADMIN — CLOZAPINE 300 MG: 100 TABLET ORAL at 21:06

## 2018-10-15 RX ADMIN — BENZTROPINE MESYLATE 0.5 MG: 1 TABLET ORAL at 21:03

## 2018-10-15 RX ADMIN — DOCUSATE SODIUM 100 MG: 100 CAPSULE, LIQUID FILLED ORAL at 17:16

## 2018-10-15 RX ADMIN — NYSTATIN: 100000 CREAM TOPICAL at 08:00

## 2018-10-15 RX ADMIN — NYSTATIN: 100000 CREAM TOPICAL at 17:47

## 2018-10-15 RX ADMIN — BUSPIRONE HYDROCHLORIDE 10 MG: 10 TABLET ORAL at 21:03
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
## 2018-10-15 NOTE — BH NOTES
GROUP THERAPY PROGRESS NOTE The patient Katalina harris 37 y.o. male is participating in Reflections Group Group time: 30 minutes Personal goal for participation: To discuss the daily goals Goal orientation: personal 
 
Group therapy participation: passive Therapeutic interventions reviewed and discussed:  Yes Impression of participation: maria g Rockwell 10/14/2018  9:48 PM

## 2018-10-15 NOTE — PROGRESS NOTES
Problem: Altered Thought Process (Adult/Pediatric)  Goal: *STG: Remains safe in hospital  Outcome: Progressing Towards Goal  Alert and oriented. Calm and cooperative. No distress noted. Medication compliant. Remains isolative to self and appears to continue to respond to internal stimuli.

## 2018-10-15 NOTE — BH NOTES
Patient remained quiet and calm all night. Patient slept for 6.5 hours, breathing was even and unlabored. Staff will continue to monitor patient.

## 2018-10-15 NOTE — BH NOTES
met with pt to encourage call to [facility] support workers in [address] [facility] - [phone]. Pt left voicemail. Enrique Atkins will be expecting a call from pt on Wednesday at 8:15 AM.  Pt is alert and oriented. Pt denies SI/HI. Pt's mood is irritable, labile, affect is irrtiable. Pt wanted the television sound turned downed but was unwilling to wait for staff to use remote and unplugged the TV himself. Pt's thought process is more organized. Pt's insight and judgment are impaired, reliability is fair to poor. Social work department will continue to coordinate discharge plans.

## 2018-10-15 NOTE — BH NOTES
Patient appears responding to internal stimuli by talking to self. Patient was compliant with medications. Staff will continue to monitor patient. Q 15 minutes check maintained for safety.

## 2018-10-15 NOTE — BH NOTES
GROUP THERAPY PROGRESS NOTE The patient Mavis harris 37 y.o. male is participating in Creative Expression Group. Group time: 1 hour Personal goal for participation: To concentrate on selected task Goal orientation: social 
 
Group therapy participation: active Therapeutic interventions reviewed and discussed: Crafts, games, music Impression of participation: The patient was attentive. [place] 10/15/2018  6:03 PM

## 2018-10-16 PROCEDURE — 74011250637 HC RX REV CODE- 250/637: Performed by: FAMILY MEDICINE

## 2018-10-16 PROCEDURE — 65220000003 HC RM SEMIPRIVATE PSYCH

## 2018-10-16 PROCEDURE — 74011250637 HC RX REV CODE- 250/637

## 2018-10-16 PROCEDURE — 74011250637 HC RX REV CODE- 250/637: Performed by: PSYCHIATRY & NEUROLOGY

## 2018-10-16 RX ORDER — CLOZAPINE 25 MG/1
75 TABLET ORAL DAILY
Status: DISCONTINUED | OUTPATIENT
Start: 2018-10-17 | End: 2018-10-17

## 2018-10-16 RX ADMIN — BENZTROPINE MESYLATE 0.5 MG: 1 TABLET ORAL at 09:08

## 2018-10-16 RX ADMIN — CLOZAPINE 300 MG: 100 TABLET ORAL at 21:08

## 2018-10-16 RX ADMIN — BUSPIRONE HYDROCHLORIDE 10 MG: 10 TABLET ORAL at 09:08

## 2018-10-16 RX ADMIN — NYSTATIN: 100000 CREAM TOPICAL at 09:09

## 2018-10-16 RX ADMIN — BUSPIRONE HYDROCHLORIDE 10 MG: 10 TABLET ORAL at 21:08

## 2018-10-16 RX ADMIN — CLOZAPINE 50 MG: 25 TABLET ORAL at 09:09

## 2018-10-16 RX ADMIN — DOCUSATE SODIUM 100 MG: 100 CAPSULE, LIQUID FILLED ORAL at 17:09

## 2018-10-16 RX ADMIN — NYSTATIN 1 G: 100000 CREAM TOPICAL at 17:10

## 2018-10-16 RX ADMIN — DOCUSATE SODIUM 100 MG: 100 CAPSULE, LIQUID FILLED ORAL at 09:09

## 2018-10-16 RX ADMIN — PROPRANOLOL HYDROCHLORIDE 120 MG: 60 CAPSULE, EXTENDED RELEASE ORAL at 09:08

## 2018-10-16 RX ADMIN — BENZTROPINE MESYLATE 0.5 MG: 1 TABLET ORAL at 21:10

## 2018-10-16 RX ADMIN — SENNOSIDES 8.6 MG: 8.6 TABLET, FILM COATED ORAL at 09:08

## 2018-10-16 NOTE — BH NOTES
Patient ambulates in hallway. Is appropriate upon approach. Affect is interested; mood calm, cooperative. Compliant with medications & meals.
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
## 2018-10-16 NOTE — BH NOTES
Patient was calm and quiet. Patient denies SI/HI, denies auditory or visual hallucination, observed responding to internal stimuli. Patient was visible in the milieu and compliant with medications. Staff will continue to monitor patient.

## 2018-10-17 PROCEDURE — 74011250636 HC RX REV CODE- 250/636

## 2018-10-17 PROCEDURE — 74011250637 HC RX REV CODE- 250/637

## 2018-10-17 PROCEDURE — 74011000250 HC RX REV CODE- 250

## 2018-10-17 PROCEDURE — 65220000003 HC RM SEMIPRIVATE PSYCH

## 2018-10-17 PROCEDURE — 74011250637 HC RX REV CODE- 250/637: Performed by: PSYCHIATRY & NEUROLOGY

## 2018-10-17 RX ORDER — CLOZAPINE 100 MG/1
100 TABLET ORAL DAILY
Status: DISCONTINUED | OUTPATIENT
Start: 2018-10-18 | End: 2018-10-18

## 2018-10-17 RX ADMIN — ZOLPIDEM TARTRATE 10 MG: 10 TABLET ORAL at 21:27

## 2018-10-17 RX ADMIN — DOCUSATE SODIUM 100 MG: 100 CAPSULE, LIQUID FILLED ORAL at 08:00

## 2018-10-17 RX ADMIN — NYSTATIN: 100000 CREAM TOPICAL at 16:12

## 2018-10-17 RX ADMIN — PROPRANOLOL HYDROCHLORIDE 120 MG: 60 CAPSULE, EXTENDED RELEASE ORAL at 08:00

## 2018-10-17 RX ADMIN — BUSPIRONE HYDROCHLORIDE 10 MG: 10 TABLET ORAL at 09:00

## 2018-10-17 RX ADMIN — BUSPIRONE HYDROCHLORIDE 10 MG: 10 TABLET ORAL at 21:26

## 2018-10-17 RX ADMIN — BENZTROPINE MESYLATE 0.5 MG: 1 TABLET ORAL at 21:26

## 2018-10-17 RX ADMIN — SENNOSIDES 8.6 MG: 8.6 TABLET, FILM COATED ORAL at 08:00

## 2018-10-17 RX ADMIN — WATER 20 MG: 1 INJECTION INTRAMUSCULAR; INTRAVENOUS; SUBCUTANEOUS at 16:59

## 2018-10-17 RX ADMIN — DOCUSATE SODIUM 100 MG: 100 CAPSULE, LIQUID FILLED ORAL at 16:12

## 2018-10-17 RX ADMIN — BENZTROPINE MESYLATE 0.5 MG: 1 TABLET ORAL at 09:00

## 2018-10-17 RX ADMIN — CLOZAPINE 300 MG: 100 TABLET ORAL at 21:25

## 2018-10-17 RX ADMIN — CLOZAPINE 75 MG: 25 TABLET ORAL at 08:00

## 2018-10-17 RX ADMIN — LORAZEPAM 2 MG: 2 INJECTION INTRAMUSCULAR; INTRAVENOUS at 16:59

## 2018-10-17 NOTE — PROGRESS NOTES
Problem: Altered Thought Process (Adult/Pediatric)  Goal: *STG: Remains safe in hospital  Outcome: Progressing Towards Goal  Pt alert, calm and cooperative this shift. Pt is meal and medication compliant. Thought process seems linear and clearer. NO medical/behavioral concerns this shift. Staff will continue to monitor for health and safety.

## 2018-10-17 NOTE — BH NOTES
Patient remains calm, and cooperative. Thoughts seem clearer. No medical/behavioral concerns thus far. Will monitor for patient's status & assess needs.

## 2018-10-17 NOTE — BH NOTES
Pt was in room pacing cursing loud. Responding to internal stimuli. Throwing items around in room. Pt was redirected to calm down and express feeling with staff. Patient continued to become increasingly   agitated. Yelling and screaming. Pacing in hallways continues to curse and display pyshically threatening behaviors. Pt was offered PO PRN but refused to take medications. Security called. Patient was given ativan 2mg IM and Geodon 20mg IM. Placed in time out for about 15 minutes. Patient was moved into room 314 by himself for impulsive behaviors and being unpredictable. Will continue to monitor patient and assess needs.
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
## 2018-10-17 NOTE — BH NOTES
GROUP THERAPY PROGRESS NOTE The patient Mavis harris 37 y.o. male is participating in [employer]. Group time: 1 hour Personal goal for participation: To concentrate on selected task Goal orientation: social 
 
Group therapy participation: active Therapeutic interventions reviewed and discussed: Crafts, games, music Impression of participation: The patient was attentive. Hue Branch 10/17/2018  5:56 PM

## 2018-10-18 LAB
BASOPHILS # BLD: 0 K/UL (ref 0–0.1)
BASOPHILS NFR BLD: 0 % (ref 0–1)
DIFFERENTIAL METHOD BLD: ABNORMAL
EOSINOPHIL # BLD: 1 K/UL (ref 0–0.4)
EOSINOPHIL NFR BLD: 6 % (ref 0–7)
ERYTHROCYTE [DISTWIDTH] IN BLOOD BY AUTOMATED COUNT: 13.6 % (ref 11.5–14.5)
HCT VFR BLD AUTO: 35.7 % (ref 36.6–50.3)
HGB BLD-MCNC: 12 G/DL (ref 12.1–17)
IMM GRANULOCYTES # BLD: 0 K/UL
IMM GRANULOCYTES NFR BLD AUTO: 0 %
LYMPHOCYTES # BLD: 5.3 K/UL (ref 0.8–3.5)
LYMPHOCYTES NFR BLD: 33 % (ref 12–49)
MCH RBC QN AUTO: 31.1 PG (ref 26–34)
MCHC RBC AUTO-ENTMCNC: 33.6 G/DL (ref 30–36.5)
MCV RBC AUTO: 92.5 FL (ref 80–99)
MONOCYTES # BLD: 1.1 K/UL (ref 0–1)
MONOCYTES NFR BLD: 7 % (ref 5–13)
NEUTS BAND NFR BLD MANUAL: 2 % (ref 0–6)
NEUTS SEG # BLD: 8.8 K/UL (ref 1.8–8)
NEUTS SEG NFR BLD: 52 % (ref 32–75)
NRBC # BLD: 0 K/UL (ref 0–0.01)
NRBC BLD-RTO: 0 PER 100 WBC
PLATELET # BLD AUTO: 331 K/UL (ref 150–400)
PMV BLD AUTO: 10.6 FL (ref 8.9–12.9)
RBC # BLD AUTO: 3.86 M/UL (ref 4.1–5.7)
RBC MORPH BLD: ABNORMAL
WBC # BLD AUTO: 16.2 K/UL (ref 4.1–11.1)

## 2018-10-18 PROCEDURE — 65220000003 HC RM SEMIPRIVATE PSYCH

## 2018-10-18 PROCEDURE — 74011250637 HC RX REV CODE- 250/637: Performed by: PSYCHIATRY & NEUROLOGY

## 2018-10-18 PROCEDURE — 36415 COLL VENOUS BLD VENIPUNCTURE: CPT | Performed by: PSYCHIATRY & NEUROLOGY

## 2018-10-18 PROCEDURE — 85025 COMPLETE CBC W/AUTO DIFF WBC: CPT | Performed by: PSYCHIATRY & NEUROLOGY

## 2018-10-18 PROCEDURE — 74011250637 HC RX REV CODE- 250/637

## 2018-10-18 RX ADMIN — DOCUSATE SODIUM 100 MG: 100 CAPSULE, LIQUID FILLED ORAL at 08:00

## 2018-10-18 RX ADMIN — MAGNESIUM HYDROXIDE 30 ML: 400 SUSPENSION ORAL at 19:44

## 2018-10-18 RX ADMIN — CLOZAPINE 100 MG: 100 TABLET ORAL at 08:59

## 2018-10-18 RX ADMIN — BUSPIRONE HYDROCHLORIDE 15 MG: 10 TABLET ORAL at 21:37

## 2018-10-18 RX ADMIN — CLOZAPINE 300 MG: 100 TABLET ORAL at 21:38

## 2018-10-18 RX ADMIN — PROPRANOLOL HYDROCHLORIDE 120 MG: 60 CAPSULE, EXTENDED RELEASE ORAL at 08:00

## 2018-10-18 RX ADMIN — BENZTROPINE MESYLATE 0.5 MG: 1 TABLET ORAL at 09:00

## 2018-10-18 RX ADMIN — DOCUSATE SODIUM 100 MG: 100 CAPSULE, LIQUID FILLED ORAL at 17:10

## 2018-10-18 RX ADMIN — NYSTATIN: 100000 CREAM TOPICAL at 12:03

## 2018-10-18 RX ADMIN — SENNOSIDES 8.6 MG: 8.6 TABLET, FILM COATED ORAL at 08:00

## 2018-10-18 RX ADMIN — BENZTROPINE MESYLATE 0.5 MG: 1 TABLET ORAL at 21:36

## 2018-10-18 RX ADMIN — LORAZEPAM 1 MG: 1 TABLET ORAL at 11:08

## 2018-10-18 RX ADMIN — BUSPIRONE HYDROCHLORIDE 10 MG: 10 TABLET ORAL at 09:00

## 2018-10-18 RX ADMIN — NYSTATIN: 100000 CREAM TOPICAL at 17:10

## 2018-10-18 NOTE — PROGRESS NOTES
Problem: Altered Thought Process (Adult/Pediatric)  Goal: *STG: Remains safe in hospital  Outcome: Progressing Towards Goal  Patient alert and oriented. Ativan 1 mg PO given at 1108 for anxiety. Compliant with meals and medications. No behavioral issues at this time. Patient denies SI/HI. Will continue assessments and safety checks.

## 2018-10-18 NOTE — BH NOTES
GROUP THERAPY PROGRESS NOTE Wilfred Sprague is participating in [employer]. Group time: 30 minutes Personal goal for participation:  Unit orientation Goal orientation: [employer] Group therapy participation: active Therapeutic interventions reviewed and discussed: Yes Impression of participation: good

## 2018-10-18 NOTE — BH NOTES
GROUP THERAPY PROGRESS NOTE The patient Mavis harris 37 y.o. male is participating in [employer]. Group time: 1 hour Personal goal for participation: To concentrate on selected task Goal orientation: social 
 
Group therapy participation: active Therapeutic interventions reviewed and discussed: Crafts, games, music Impression of participation: The patient was attentive. Hue Branch 10/18/2018  6:50 PM
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
## 2018-10-18 NOTE — PROGRESS NOTES
Patient reports athletes foot and has spots on the bottom of his feet. Will contact the doctor. 1204 Order was found and cream was given.

## 2018-10-18 NOTE — BH NOTES
After Visit Summary   4/27/2017    Codi Headley    MRN: [id number]           Patient Information     Date Of Birth          1994        Visit Information        Provider Department      4/27/2017 11:20 AM Tianna Barber APRN [facility]        Care Instructions      Local Lymph Node Swelling in the Neck, No Antibiotic Treatment    You have a swollen lymph node in your neck that is not infected. The lymph nodes are part of the immune system. They are found under the jaw and along the side of the neck, in the armpits, and in the groin. A nearby infection or inflammation causes the lymph nodes in that area to swell. They may also be mildly tender. This is normal.  Antibiotics are not used for a swollen lymph node that is not infected. You can use hot compresses and pain medicine to treat this condition. The pain will get better over the next 7 to 10 days. The swelling may take several months to go away.  Rarely, a bacterial infection occurs inside the lymph node, itself. When this happens, the lymph node becomes very painful and the nearby skin gets red and warm. You may also have a fever. If this happens, call your health care provider. You may need to take antibiotics. You may also need to have the lymph node drained.  Home care  Follow these guidelines when caring for yourself at home:    Make a hot compress by running hot water over a face cloth. Put the compress on the sore area until the compress cools off. Repeat this for 20 minutes. Use the hot compress 3 times a day for the first 3 days, or until the pain and redness begin to get better. The heat will make more blood flow to the area and speed the healing process.    You may use acetaminophen or ibuprofen to control pain and fever, unless another medicine was prescribed for this. Don t use ibuprofen in children under 6 months of age. If you have chronic liver or kidney disease, talk with your health care  GROUP THERAPY PROGRESS NOTE The patient Camelia harris 37 y.o. male is participating in Coping Skills Group. Group time: 45 minutes Personal goal for participation: To participate in mental health journey game Goal orientation:  personal 
 
Group therapy participation: active Therapeutic interventions reviewed and discussed: choices in recovery Impression of participation:  The patient was attentive. Kenroy Boudreaux 10/18/2018  6:23 PM 
 
 provider before using these medicines. Also talk with your provider if you ve had a stomach ulcer or GI bleeding. Don t give aspirin to anyone under 18 years of age who is ill with a fever. It may cause severe liver damage.  Follow-up care  Follow up with your health care provider, or as advised.  When to seek medical advice  Call your health care provider right away if any of these occur:    Redness over the lymph node    Swelling or pain in the lymph node gets worse    Lymph node is getting soft in the middle    Pus or fluid drains from the lymph node    You have difficulty breathing or swallowing    Fever of 101 F (38.3 C) oral or higher that doesn t get better with fever medicine    You have questions or concerns     9212-1118 [employer]. [address] [place] [address], [place] [address]. All rights reserved. This information is not intended as a substitute for professional medical care. Always follow your healthcare professional's instructions.              Follow-ups after your visit        Your next 10 appointments already scheduled     May 04, 2017  9:30 AM CDT   [facility]   [facility] ([facility])    [address] [place] [id number] [phone]              Who to contact     If you have questions or need follow up information about today's clinic visit or your schedule please contact [facility] directly at [phone].  Normal or non-critical lab and imaging results will be communicated to you by MyChart, letter or phone within 4 business days after the clinic has received the results. If you do not hear from us within 7 days, please contact the clinic through MyChart or phone. If you have a critical or abnormal lab result, we will notify you by phone as soon as possible.  Submit refill requests through [employer] or call your pharmacy and they will forward the refill request to us.  "Please allow 3 business days for your refill to be completed.          Additional Information About Your Visit        MyChart Information     [employer] gives you secure access to your electronic health record. If you see a primary care provider, you can also send messages to your care team and make appointments. If you have questions, please call your primary care clinic.  If you do not have a primary care provider, please call [phone] and they will assist you.        Care EveryWhere ID     This is your Care EveryWhere ID. This could be used by other organizations to access your [place] medical records  [id number]        Your Vitals Were     Pulse Temperature Height Last Period Pulse Oximetry Breastfeeding?    76 98.2  F (36.8  C) 5' 10\" (1.778 m) 04/21/2017 100% No    BMI (Body Mass Index)                   15.93 kg/m2            Blood Pressure from Last 3 Encounters:   04/27/17 107/66   01/27/17 121/84   12/05/16 105/66    Weight from Last 3 Encounters:   04/27/17 111 lb (50.3 kg)   01/27/17 106 lb (48.1 kg)   12/05/16 108 lb (49 kg)              Today, you had the following     No orders found for display       Primary Care Provider Office Phone # Fax #    Maggie Powell PA-C [phone]       [facility] [address] [facility] [address]        Thank you!     Thank you for choosing [facility]  for your care. Our goal is always to provide you with excellent care. Hearing back from our patients is one way we can continue to improve our services. Please take a few minutes to complete the written survey that you may receive in the mail after your visit with us. Thank you!             Your Updated Medication List - Protect others around you: Learn how to safely use, store and throw away your medicines at www.disposemymeds.org.          This list is accurate as of: 4/27/17 11:59 AM.  Always use your most recent med list.                   " Brand Name Dispense Instructions for use    calcium carbonate-vitamin D 600-400 MG-UNIT Chew     180 tablet    Take 1 chew tab by mouth 2 times daily       fluticasone 50 MCG/ACT spray    FLONASE    1 Bottle    Spray 1-2 sprays into both nostrils daily       lamoTRIgine 200 MG tablet    LaMICtal    60 tablet    Take 1 and 1/2 to 2 per day       loratadine 10 MG tablet    CLARITIN    30 tablet    Take 1 tablet (10 mg) by mouth daily       Multi-vitamin Tabs tablet      Take 1 tablet by mouth daily Reported on 4/27/2017       PARoxetine 40 MG tablet    PAXIL    30 tablet    Take 1 tablet per day       polyethylene glycol powder    MIRALAX    510 g    Take 17 g (1 capful) by mouth daily

## 2018-10-18 NOTE — PROGRESS NOTES
Problem: Altered Thought Process (Adult/Pediatric)  Goal: *STG: Remains safe in hospital  Outcome: Progressing Towards Goal  Patient alert and oriented. Denies SI/HI. No behavioral issues at this time. Will continue assessments and safety checks.

## 2018-10-18 NOTE — PROGRESS NOTES
Clozapine Daily Monitoring  Current regimen:  100 mg PO daily and 300 mg PO QHS (home regimen 500 mg/day - 300 mg daily and 200 mg QHS) - to start 125 mg PO daily and 300 mg PO QHS tomorrow (10/19)  Last CBC done AND reported to the registry:  10/18/18  Next CBC due:  10/25/18     DATE ANC   10/18 8.8   10/11 5.6   10/4 4.6     ANC must be >1 to dispense clozapine. Visit Vitals  /90 (BP 1 Location: Right arm, BP Patient Position: Sitting)   Pulse (!) 118   Temp 97.3 °F (36.3 °C)   Resp 18   Ht 175.3 cm (69\")   Wt 70.3 kg (155 lb)   SpO2 100%   BMI 22.89 kg/m²     Recommendations/comments:  ANC is slightly elevated but within the acceptable range for clozapine dispensing. ANC has been reported to the Clozapine REMS program.  Next ANC is due on 10/25/18.       Thank you,  Yohana Lund, [address], [facility]  [phone]

## 2018-10-18 NOTE — BH NOTES
Patient was observed to be sleeping for 7 hours without any distress and even respirations. He was cooperative with blood drawn for the labs. Q 15 minutes monitoring maintained for the safety and support.

## 2018-10-19 PROCEDURE — 74011250637 HC RX REV CODE- 250/637

## 2018-10-19 PROCEDURE — 74011250637 HC RX REV CODE- 250/637: Performed by: PSYCHIATRY & NEUROLOGY

## 2018-10-19 PROCEDURE — 65220000003 HC RM SEMIPRIVATE PSYCH

## 2018-10-19 RX ADMIN — SENNOSIDES 8.6 MG: 8.6 TABLET, FILM COATED ORAL at 08:38

## 2018-10-19 RX ADMIN — MAGNESIUM HYDROXIDE 30 ML: 400 SUSPENSION ORAL at 13:12

## 2018-10-19 RX ADMIN — LORAZEPAM 1 MG: 1 TABLET ORAL at 10:14

## 2018-10-19 RX ADMIN — CLOZAPINE 125 MG: 100 TABLET ORAL at 08:36

## 2018-10-19 RX ADMIN — NYSTATIN: 100000 CREAM TOPICAL at 17:51

## 2018-10-19 RX ADMIN — PROPRANOLOL HYDROCHLORIDE 120 MG: 60 CAPSULE, EXTENDED RELEASE ORAL at 08:37

## 2018-10-19 RX ADMIN — DOCUSATE SODIUM 100 MG: 100 CAPSULE, LIQUID FILLED ORAL at 17:48

## 2018-10-19 RX ADMIN — DOCUSATE SODIUM 100 MG: 100 CAPSULE, LIQUID FILLED ORAL at 08:38

## 2018-10-19 RX ADMIN — CLOZAPINE 300 MG: 100 TABLET ORAL at 21:20

## 2018-10-19 RX ADMIN — BUSPIRONE HYDROCHLORIDE 15 MG: 10 TABLET ORAL at 08:37

## 2018-10-19 RX ADMIN — NYSTATIN: 100000 CREAM TOPICAL at 09:25

## 2018-10-19 RX ADMIN — BENZTROPINE MESYLATE 0.5 MG: 1 TABLET ORAL at 21:18

## 2018-10-19 RX ADMIN — BENZTROPINE MESYLATE 0.5 MG: 1 TABLET ORAL at 09:26

## 2018-10-19 RX ADMIN — BUSPIRONE HYDROCHLORIDE 15 MG: 10 TABLET ORAL at 21:18

## 2018-10-19 NOTE — BH NOTES
GROUP THERAPY PROGRESS NOTE The patient Karlie harris 37 y.o. male is participating in Creative [employer] Group. Group time: 1 hour Personal goal for participation: To concentrate on selected task Goal orientation: social 
 
Group therapy participation: minimal 
 
Therapeutic interventions reviewed and discussed: Crafts, games, music Impression of participation: The patient was present-arrived late Leta Morfin 10/19/2018  5:51 PM

## 2018-10-19 NOTE — BH NOTES
Pt was seen in treatment team this morning. Pt is alert and oriented. Pt denies HI - reports intermittent SI but currently wants to live. Opened up about previous suicide attempts. Pt's mood is less anxious, affect is brighter. Pt's thought process is more organized and goal oriented. Pt's insight and judgment are fair, reliability is fair. Pt asked if MD would consider small dose of Klonopin as a PRN due to times of \"extreme anxiety\". Records obtained from [facility] in [place]. [employer] worker - Josette Gonsalves was reached after multiple attempts this week and will meet with patient in regard to [facility] application on Monday. Social work department will continue to coordinate discharge plans.
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
## 2018-10-19 NOTE — PROGRESS NOTES
Problem: Altered Thought Process (Adult/Pediatric)  Goal: *STG: Remains safe in hospital  Outcome: Progressing Towards Goal  Patient alert and oriented to person. Patient isolates self to room. Compliant with medications. Patient denies HI/SI, but observed responding to internal stimuli. No behavioral issues at this time. Will continue assessments and Q 15 checks for safety.

## 2018-10-19 NOTE — BH NOTES
GROUP THERAPY PROGRESS NOTE The patient Vladislav harris 37 y.o. male is participating in Coping Skills Group. Group time: 45 minutes Personal goal for participation: To participate in relaxation activity Goal orientation:  relaxation Group therapy participation: active Therapeutic interventions reviewed and discussed: favorite ways to relax Impression of participation:  The patient was attentive. [employer] 10/19/2018  2:10 PM

## 2018-10-20 PROCEDURE — 74011250637 HC RX REV CODE- 250/637

## 2018-10-20 PROCEDURE — 80159 DRUG ASSAY CLOZAPINE: CPT | Performed by: PSYCHIATRY & NEUROLOGY

## 2018-10-20 PROCEDURE — 74011250637 HC RX REV CODE- 250/637: Performed by: PSYCHIATRY & NEUROLOGY

## 2018-10-20 PROCEDURE — 36415 COLL VENOUS BLD VENIPUNCTURE: CPT | Performed by: PSYCHIATRY & NEUROLOGY

## 2018-10-20 PROCEDURE — 65220000003 HC RM SEMIPRIVATE PSYCH

## 2018-10-20 PROCEDURE — 74011250637 HC RX REV CODE- 250/637: Performed by: FAMILY MEDICINE

## 2018-10-20 RX ORDER — MAGNESIUM CITRATE
296 SOLUTION, ORAL ORAL
Status: COMPLETED | OUTPATIENT
Start: 2018-10-20 | End: 2018-10-20

## 2018-10-20 RX ADMIN — BUSPIRONE HYDROCHLORIDE 15 MG: 10 TABLET ORAL at 08:46

## 2018-10-20 RX ADMIN — BENZTROPINE MESYLATE 0.5 MG: 1 TABLET ORAL at 08:47

## 2018-10-20 RX ADMIN — NYSTATIN: 100000 CREAM TOPICAL at 16:51

## 2018-10-20 RX ADMIN — DOCUSATE SODIUM 100 MG: 100 CAPSULE, LIQUID FILLED ORAL at 08:47

## 2018-10-20 RX ADMIN — ZOLPIDEM TARTRATE 10 MG: 10 TABLET ORAL at 21:55

## 2018-10-20 RX ADMIN — DOCUSATE SODIUM 100 MG: 100 CAPSULE, LIQUID FILLED ORAL at 16:41

## 2018-10-20 RX ADMIN — BUSPIRONE HYDROCHLORIDE 15 MG: 10 TABLET ORAL at 21:27

## 2018-10-20 RX ADMIN — MAGESIUM CITRATE 296 ML: 1.75 LIQUID ORAL at 22:28

## 2018-10-20 RX ADMIN — SENNOSIDES 8.6 MG: 8.6 TABLET, FILM COATED ORAL at 08:47

## 2018-10-20 RX ADMIN — MAGNESIUM HYDROXIDE 30 ML: 400 SUSPENSION ORAL at 08:48

## 2018-10-20 RX ADMIN — PROPRANOLOL HYDROCHLORIDE 120 MG: 60 CAPSULE, EXTENDED RELEASE ORAL at 08:46

## 2018-10-20 RX ADMIN — CLOZAPINE 150 MG: 25 TABLET ORAL at 08:50

## 2018-10-20 RX ADMIN — NYSTATIN: 100000 CREAM TOPICAL at 08:51

## 2018-10-20 RX ADMIN — CLOZAPINE 300 MG: 100 TABLET ORAL at 21:27

## 2018-10-20 RX ADMIN — LORAZEPAM 1 MG: 1 TABLET ORAL at 12:26

## 2018-10-20 NOTE — PROGRESS NOTES
Problem: Altered Thought Process (Adult/Pediatric)  Goal: *STG: Remains safe in hospital  Outcome: Progressing Towards Goal  Patient alert and oriented at baseline. Anxious and pacing the unit. Visible interacting appropriately with staff and peers. Cooperative. Participatory in groups/activities. Denies SI/HI/A/V Hallucinations. Clorazil labs obtained, awaiting results. WBC noted 16.2 and ANC 8.8 on 10/18. Placed in H&P book for consult with medical doctor. Patient reports no bowel movement x 3 days. PRN MOM administered, awaiting results. Cogentin discontinued by Psychiatrist. Ativan PRN administered for anxiety. No major behavioral issues noted. Continue on q15 min safety checks. Will continue to monitor for changes in mental and physical health and continue plan of care.

## 2018-10-20 NOTE — PROGRESS NOTES
Pnt resting quietly in bed with eyes closed. RR even and unlabored. No agitation or aggressive behaviors observed. Pnt stable. Will continue to monitor pnt for safety.

## 2018-10-20 NOTE — BH NOTES
Pt approached writer and requested something for anxiety. Pt appeared anxious a/e/b/ his pacing in his room and the hallway. Administered PRN ativan PO.

## 2018-10-21 ENCOUNTER — HOSPITAL ENCOUNTER (OUTPATIENT)
Dept: GENERAL RADIOLOGY | Age: 43
Discharge: HOME | End: 2018-10-21
Attending: FAMILY MEDICINE
Payer: MEDICARE

## 2018-10-21 LAB
APPEARANCE UR: CLEAR
BACTERIA URNS QL MICRO: NEGATIVE /HPF
BILIRUB UR QL: NEGATIVE
COLOR UR: NORMAL
EPITH CASTS URNS QL MICRO: NORMAL /LPF
GLUCOSE UR STRIP.AUTO-MCNC: NEGATIVE MG/DL
HGB UR QL STRIP: NEGATIVE
KETONES UR QL STRIP.AUTO: NEGATIVE MG/DL
LEUKOCYTE ESTERASE UR QL STRIP.AUTO: NEGATIVE
NITRITE UR QL STRIP.AUTO: NEGATIVE
PH UR STRIP: 7 [PH] (ref 5–8)
PROT UR STRIP-MCNC: NEGATIVE MG/DL
RBC #/AREA URNS HPF: NORMAL /HPF (ref 0–5)
SP GR UR REFRACTOMETRY: 1.01 (ref 1–1.03)
UA: UC IF INDICATED,UAUC: NORMAL
UROBILINOGEN UR QL STRIP.AUTO: 0.2 EU/DL (ref 0.2–1)
WBC URNS QL MICRO: NORMAL /HPF (ref 0–4)

## 2018-10-21 PROCEDURE — 81001 URINALYSIS AUTO W/SCOPE: CPT | Performed by: FAMILY MEDICINE

## 2018-10-21 PROCEDURE — 74011250637 HC RX REV CODE- 250/637: Performed by: PSYCHIATRY & NEUROLOGY

## 2018-10-21 PROCEDURE — 71045 X-RAY EXAM CHEST 1 VIEW: CPT

## 2018-10-21 PROCEDURE — 74011250637 HC RX REV CODE- 250/637

## 2018-10-21 PROCEDURE — 65220000003 HC RM SEMIPRIVATE PSYCH

## 2018-10-21 PROCEDURE — 74011250637 HC RX REV CODE- 250/637: Performed by: FAMILY MEDICINE

## 2018-10-21 RX ORDER — CLONAZEPAM 0.5 MG/1
0.25 TABLET ORAL 2 TIMES DAILY
Status: DISCONTINUED | OUTPATIENT
Start: 2018-10-21 | End: 2018-10-22

## 2018-10-21 RX ADMIN — NYSTATIN: 100000 CREAM TOPICAL at 08:29

## 2018-10-21 RX ADMIN — CLOZAPINE 300 MG: 100 TABLET ORAL at 22:23

## 2018-10-21 RX ADMIN — LORAZEPAM 1 MG: 1 TABLET ORAL at 11:48

## 2018-10-21 RX ADMIN — BUSPIRONE HYDROCHLORIDE 15 MG: 10 TABLET ORAL at 21:18

## 2018-10-21 RX ADMIN — PROPRANOLOL HYDROCHLORIDE 120 MG: 60 CAPSULE, EXTENDED RELEASE ORAL at 08:30

## 2018-10-21 RX ADMIN — CLOZAPINE 150 MG: 25 TABLET ORAL at 08:29

## 2018-10-21 RX ADMIN — BUSPIRONE HYDROCHLORIDE 15 MG: 10 TABLET ORAL at 08:29

## 2018-10-21 RX ADMIN — SENNOSIDES 8.6 MG: 8.6 TABLET, FILM COATED ORAL at 08:29

## 2018-10-21 RX ADMIN — DOCUSATE SODIUM 100 MG: 100 CAPSULE, LIQUID FILLED ORAL at 08:29

## 2018-10-21 RX ADMIN — ZOLPIDEM TARTRATE 10 MG: 10 TABLET ORAL at 21:18

## 2018-10-21 RX ADMIN — DOCUSATE SODIUM 100 MG: 100 CAPSULE, LIQUID FILLED ORAL at 16:12

## 2018-10-21 RX ADMIN — CLONAZEPAM 0.25 MG: 0.5 TABLET ORAL at 16:12

## 2018-10-21 RX ADMIN — NYSTATIN: 100000 CREAM TOPICAL at 16:12

## 2018-10-21 NOTE — PROGRESS NOTES
S; Ask to see pt due to increase wbc's. Pt is doing well and has no complaints. Denies any f/c, cough, fatigue, sore throat or bladder problems. Pt is having issues with constipation which is a chronic issues. Pt states he responds to magnesium citrate. O: Afebrile with stable vitals signs. Gen: nad  HEENT: OP clear  Lungs: cta bilaterally  CVS; RRR  Abd: s, nt, nd, +nabs no hsm. Ext; no c/c/e. Labs: reviewed. With wbc's trending up with no shift. A/P:1. Leukocytosis: unknown etiology? . Will check cxr and UA. Clinical stable. Monitor wbc's.   2. Constipation: chronic issues. Increase fiber. Trial of magnesium citrate.
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
## 2018-10-21 NOTE — PROGRESS NOTES
Problem: Altered Thought Process (Adult/Pediatric)  Goal: *STG: Decreased hallucinations  Outcome: Progressing Towards Goal  Patient alert and oriented. Calm, cooperative, isolative. Denies SI/HI. No behavioral issues at this time will continue to assess patient and complete safety checks.

## 2018-10-21 NOTE — BH NOTES
Patient was restless but visible in the milieu with guarded affect. Patient received scheduled magnesium citrate for constipation. Patient remained restless with clearer thoughts. Start will continue to monitor patient.
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
## 2018-10-21 NOTE — PROGRESS NOTES
Problem: Altered Thought Process (Adult/Pediatric)  Goal: *STG: Remains safe in hospital  Outcome: Progressing Towards Goal  Patient denies any HI/SI. Compliant with meals and medications. Anxious and isolative to room. No behavioral issues at this time.

## 2018-10-22 PROCEDURE — 74011250637 HC RX REV CODE- 250/637

## 2018-10-22 PROCEDURE — 65220000003 HC RM SEMIPRIVATE PSYCH

## 2018-10-22 PROCEDURE — 74011250637 HC RX REV CODE- 250/637: Performed by: PSYCHIATRY & NEUROLOGY

## 2018-10-22 RX ADMIN — BUSPIRONE HYDROCHLORIDE 15 MG: 10 TABLET ORAL at 21:57

## 2018-10-22 RX ADMIN — LORAZEPAM 1 MG: 1 TABLET ORAL at 16:54

## 2018-10-22 RX ADMIN — BUSPIRONE HYDROCHLORIDE 15 MG: 10 TABLET ORAL at 08:51

## 2018-10-22 RX ADMIN — DOCUSATE SODIUM 100 MG: 100 CAPSULE, LIQUID FILLED ORAL at 08:51

## 2018-10-22 RX ADMIN — CLOZAPINE 300 MG: 100 TABLET ORAL at 21:57

## 2018-10-22 RX ADMIN — CLONAZEPAM 0.25 MG: 0.5 TABLET ORAL at 08:51

## 2018-10-22 RX ADMIN — NYSTATIN: 100000 CREAM TOPICAL at 16:56

## 2018-10-22 RX ADMIN — PROPRANOLOL HYDROCHLORIDE 120 MG: 60 CAPSULE, EXTENDED RELEASE ORAL at 08:52

## 2018-10-22 RX ADMIN — NYSTATIN: 100000 CREAM TOPICAL at 13:00

## 2018-10-22 RX ADMIN — DOCUSATE SODIUM 100 MG: 100 CAPSULE, LIQUID FILLED ORAL at 16:54

## 2018-10-22 RX ADMIN — CLOZAPINE 175 MG: 25 TABLET ORAL at 12:59

## 2018-10-22 RX ADMIN — LORAZEPAM 1 MG: 1 TABLET ORAL at 11:05

## 2018-10-22 RX ADMIN — SENNOSIDES 8.6 MG: 8.6 TABLET, FILM COATED ORAL at 08:52
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
## 2018-10-22 NOTE — PROGRESS NOTES
Pnt ambulatory verbal alert and oriented  RR even and unlabored  Denies any complaints @ this time  No agitation or aggressive behaviors noted  Will continue to monitor for safety

## 2018-10-23 LAB
CLOZAPINE SERPL-MCNC: 295 NG/ML (ref 350–650)
CLOZAPINE+NOR SERPL-MCNC: 478 NG/ML
NORCLOZAPINE SERPL-MCNC: 183 NG/ML

## 2018-10-23 PROCEDURE — 65220000003 HC RM SEMIPRIVATE PSYCH

## 2018-10-23 PROCEDURE — 74011250637 HC RX REV CODE- 250/637

## 2018-10-23 PROCEDURE — 74011250637 HC RX REV CODE- 250/637: Performed by: PSYCHIATRY & NEUROLOGY

## 2018-10-23 RX ORDER — HYDROXYZINE PAMOATE 25 MG/1
25 CAPSULE ORAL
Status: DISCONTINUED | OUTPATIENT
Start: 2018-10-23 | End: 2018-10-26 | Stop reason: HOSPADM

## 2018-10-23 RX ORDER — CLOZAPINE 100 MG/1
200 TABLET ORAL DAILY
Status: DISCONTINUED | OUTPATIENT
Start: 2018-10-24 | End: 2018-10-26 | Stop reason: HOSPADM

## 2018-10-23 RX ADMIN — BUSPIRONE HYDROCHLORIDE 15 MG: 10 TABLET ORAL at 21:44

## 2018-10-23 RX ADMIN — CLOZAPINE 175 MG: 25 TABLET ORAL at 10:23

## 2018-10-23 RX ADMIN — NYSTATIN: 100000 CREAM TOPICAL at 17:02

## 2018-10-23 RX ADMIN — CLOZAPINE 300 MG: 100 TABLET ORAL at 21:44

## 2018-10-23 RX ADMIN — NYSTATIN: 100000 CREAM TOPICAL at 09:01

## 2018-10-23 RX ADMIN — SENNOSIDES 8.6 MG: 8.6 TABLET, FILM COATED ORAL at 08:58

## 2018-10-23 RX ADMIN — DOCUSATE SODIUM 100 MG: 100 CAPSULE, LIQUID FILLED ORAL at 08:58

## 2018-10-23 RX ADMIN — HYDROXYZINE PAMOATE 25 MG: 25 CAPSULE ORAL at 15:59

## 2018-10-23 RX ADMIN — LORAZEPAM 1 MG: 1 TABLET ORAL at 10:24

## 2018-10-23 RX ADMIN — PROPRANOLOL HYDROCHLORIDE 120 MG: 60 CAPSULE, EXTENDED RELEASE ORAL at 08:58

## 2018-10-23 RX ADMIN — DOCUSATE SODIUM 100 MG: 100 CAPSULE, LIQUID FILLED ORAL at 17:02

## 2018-10-23 RX ADMIN — BUSPIRONE HYDROCHLORIDE 15 MG: 10 TABLET ORAL at 08:58

## 2018-10-23 RX ADMIN — MAGNESIUM HYDROXIDE 30 ML: 400 SUSPENSION ORAL at 19:34

## 2018-10-23 NOTE — BH NOTES
Pt received PRN ativan for anxiety. He was tapping on the medication window and pacing. Pt stated he is ready to talk to the doctor about going home and needs help calming down. PRN given and accepted by patient.

## 2018-10-23 NOTE — BH NOTES
GROUP THERAPY PROGRESS NOTE The patient Scarlett harris 37 y.o. male is participating in [employer]. Group time: 1 hour Personal goal for participation: To concentrate on selected task Goal orientation: social 
 
Group therapy participation: minimal 
 
Therapeutic interventions reviewed and discussed: Crafts, games, music Impression of participation: The patient was present-arrived late Marie Pelletier 10/23/2018  5:58 PM

## 2018-10-23 NOTE — BH NOTES
Pt mostly stays in his room, comes out into the hallway and talks with staff and peers. Pt is calm, pleasant and appropriate. Team disussed with pt the idea of getting a roommate today and he agreed to try. When pt was given a roommate he was receptive and agreed that he could come to staff if he felt unsafe. Pt has remained calm and in control of himself. Will continue to monitor per protocol.

## 2018-10-23 NOTE — BH NOTES
Lexa Smith,  from [facility] will be coming to interview patient for placement at [facility] on Thursday.  will complete [facility] and MD ordered chest X-Ray.

## 2018-10-23 NOTE — BH NOTES
GROUP THERAPY PROGRESS NOTE The patient Mady Stafford a 37 y.o. male is participating in Coping Skills Group. Group time: 45 minutes Personal goal for participation: To participate in happiness game Goal orientation:  personal 
 
Group therapy participation: active Therapeutic interventions reviewed and discussed: life scenarios exploring the pursuit of happiness Impression of participation:  The patient was attentive. Kristi Jordan 10/23/2018  5:30 PM

## 2018-10-23 NOTE — BH NOTES
GROUP THERAPY PROGRESS NOTE Sonna Duverney is participating in [employer]. Group time: 30 minutes Personal goal for participation:  Unit orientation Goal orientation: [place] Group therapy participation: active Therapeutic interventions reviewed and discussed: Yes Impression of participation: good
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
## 2018-10-24 ENCOUNTER — HOSPITAL ENCOUNTER (OUTPATIENT)
Dept: GENERAL RADIOLOGY | Age: 43
Discharge: HOME | End: 2018-10-24
Attending: PSYCHIATRY & NEUROLOGY
Payer: MEDICARE

## 2018-10-24 PROCEDURE — 74011250637 HC RX REV CODE- 250/637

## 2018-10-24 PROCEDURE — 74011250637 HC RX REV CODE- 250/637: Performed by: PSYCHIATRY & NEUROLOGY

## 2018-10-24 PROCEDURE — 71045 X-RAY EXAM CHEST 1 VIEW: CPT

## 2018-10-24 PROCEDURE — 65220000003 HC RM SEMIPRIVATE PSYCH

## 2018-10-24 RX ORDER — MAGNESIUM CITRATE
296 SOLUTION, ORAL ORAL
Status: COMPLETED | OUTPATIENT
Start: 2018-10-24 | End: 2018-10-24

## 2018-10-24 RX ORDER — SENNOSIDES 8.6 MG/1
2 TABLET ORAL DAILY
Status: DISCONTINUED | OUTPATIENT
Start: 2018-10-25 | End: 2018-10-26 | Stop reason: HOSPADM

## 2018-10-24 RX ADMIN — MAGESIUM CITRATE 296 ML: 1.75 LIQUID ORAL at 13:02

## 2018-10-24 RX ADMIN — SENNOSIDES 8.6 MG: 8.6 TABLET, FILM COATED ORAL at 09:00

## 2018-10-24 RX ADMIN — CLOZAPINE 200 MG: 100 TABLET ORAL at 09:01

## 2018-10-24 RX ADMIN — BUSPIRONE HYDROCHLORIDE 15 MG: 10 TABLET ORAL at 21:43

## 2018-10-24 RX ADMIN — NYSTATIN: 100000 CREAM TOPICAL at 16:22

## 2018-10-24 RX ADMIN — DOCUSATE SODIUM 100 MG: 100 CAPSULE, LIQUID FILLED ORAL at 16:22

## 2018-10-24 RX ADMIN — BUSPIRONE HYDROCHLORIDE 15 MG: 10 TABLET ORAL at 09:00

## 2018-10-24 RX ADMIN — HYDROXYZINE PAMOATE 25 MG: 25 CAPSULE ORAL at 12:10

## 2018-10-24 RX ADMIN — CLOZAPINE 300 MG: 100 TABLET ORAL at 21:45

## 2018-10-24 RX ADMIN — DOCUSATE SODIUM 100 MG: 100 CAPSULE, LIQUID FILLED ORAL at 09:00

## 2018-10-24 RX ADMIN — PROPRANOLOL HYDROCHLORIDE 120 MG: 60 CAPSULE, EXTENDED RELEASE ORAL at 09:00

## 2018-10-24 NOTE — BH NOTES
GROUP THERAPY PROGRESS NOTE The patient Pat Meter a 37 y.o. male is participating in Creative Expression Group. Group time: 1 hour Personal goal for participation: To concentrate on selected task Goal orientation: social 
 
Group therapy participation: active Therapeutic interventions reviewed and discussed: Crafts, games, music Impression of participation: The patient was attentive. Jonas Serna 10/24/2018  6:16 PM

## 2018-10-24 NOTE — BH NOTES
GROUP THERAPY PROGRESS NOTE The patient Maria L Barragan a 37 y.o. male is participating in Coping Skills Group. Group time: 45 minutes Personal goal for participation: To participate in chair exercise routine Goal orientation:  personal 
 
Group therapy participation: minimal 
 
Therapeutic interventions reviewed and discussed: benefits of exercise Impression of participation:  The patient was present-arrived late Salma Eder 10/24/2018  5:44 PM

## 2018-10-24 NOTE — BH NOTES
GROUP THERAPY PROGRESS NOTE The patient Abi Ards a 37 y.o. male is participating in Reflections Group Group time: 30 minutes Personal goal for participation: To discuss the daily goals Goal orientation: personal 
 
Group therapy participation: {GROUP THERAPY PARTICIPATION:[id number]} Therapeutic interventions reviewed and discussed:  Yes Impression of participation: MINIMAL Vance Romero 10/23/2018  10:28 PM

## 2018-10-24 NOTE — PROGRESS NOTES
Problem: Altered Thought Process (Adult/Pediatric)  Goal: *STG: Participates in treatment plan  [address] [facility] [address]  Master Treatment Plan for Mancarlos Sitter    Date Treatment Plan Initiated: 9/28/18  Treatment Plan Modalities:  Type of Modality Amount  (x minutes) Frequency (x/week) Duration (x days) Name of Responsible Staff  [address] [facility] meetings to encourage peer interactions 15 7 1     Group psychotherapy to assist in building coping skills and internal controls 60 7 1 Gus Sauer  Therapeutic activity groups to build coping skills 60 7 1 Gus Sauer  Psychoeducation in group setting to address:  Medication education   15 7 1   Coping skills        Relaxation techniques        Symptom management        Discharge planning   60 2 1 [facility] [address] Slater [address]   60 1 1 volunteer  Recovery/AA/NA      volunteer  Physician medication management   15 7 1   Family meeting/discharge planning   15 2 [address] [facility] and Georgie Pak                               Outcome: Progressing Towards Goal  Pt was alert and visible on the unit. Pt mood was labile. He was cooperative and playful at times. Pt engaged with peers appropriately. Pt is meal and medication compliant. No med/beh concerns noted. Staff will continue to support and monitor for health and safety.
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
## 2018-10-24 NOTE — PROGRESS NOTES
Problem: Altered Thought Process (Adult/Pediatric)  Goal: Interventions  Outcome: Progressing Towards Goal  Pt denies SI, HI, hallucinations, and delusions. Pt was able to voice his medications and what they were for. Pt has been med and diet compliant. PRN was requested earlier due to anxiety he was experiencing. PRN was effective and pt has had no further complaints or concerns.

## 2018-10-25 PROBLEM — F25.9 SCHIZOAFFECTIVE DISORDER (HCC): Status: ACTIVE | Noted: 2018-10-25

## 2018-10-25 LAB
BASOPHILS # BLD: 0.2 K/UL (ref 0–0.1)
BASOPHILS NFR BLD: 1 % (ref 0–1)
DIFFERENTIAL METHOD BLD: ABNORMAL
EOSINOPHIL # BLD: 0.7 K/UL (ref 0–0.4)
EOSINOPHIL NFR BLD: 4 % (ref 0–7)
ERYTHROCYTE [DISTWIDTH] IN BLOOD BY AUTOMATED COUNT: 13.8 % (ref 11.5–14.5)
HCT VFR BLD AUTO: 37.9 % (ref 36.6–50.3)
HGB BLD-MCNC: 12.4 G/DL (ref 12.1–17)
IMM GRANULOCYTES # BLD: 0 K/UL
IMM GRANULOCYTES NFR BLD AUTO: 0 %
LYMPHOCYTES # BLD: 6.4 K/UL (ref 0.8–3.5)
LYMPHOCYTES NFR BLD: 38 % (ref 12–49)
MCH RBC QN AUTO: 31 PG (ref 26–34)
MCHC RBC AUTO-ENTMCNC: 32.7 G/DL (ref 30–36.5)
MCV RBC AUTO: 94.8 FL (ref 80–99)
MONOCYTES # BLD: 1 K/UL (ref 0–1)
MONOCYTES NFR BLD: 6 % (ref 5–13)
NEUTS SEG # BLD: 8.6 K/UL (ref 1.8–8)
NEUTS SEG NFR BLD: 51 % (ref 32–75)
NRBC # BLD: 0 K/UL (ref 0–0.01)
NRBC BLD-RTO: 0 PER 100 WBC
PLATELET # BLD AUTO: 340 K/UL (ref 150–400)
PMV BLD AUTO: 10.7 FL (ref 8.9–12.9)
RBC # BLD AUTO: 4 M/UL (ref 4.1–5.7)
RBC MORPH BLD: ABNORMAL
WBC # BLD AUTO: 16.9 K/UL (ref 4.1–11.1)

## 2018-10-25 PROCEDURE — 74011250637 HC RX REV CODE- 250/637: Performed by: FAMILY MEDICINE

## 2018-10-25 PROCEDURE — 65220000001 HC RM PRIVATE PSYCH

## 2018-10-25 PROCEDURE — 74011250637 HC RX REV CODE- 250/637: Performed by: PSYCHIATRY & NEUROLOGY

## 2018-10-25 PROCEDURE — 36415 COLL VENOUS BLD VENIPUNCTURE: CPT | Performed by: PSYCHIATRY & NEUROLOGY

## 2018-10-25 PROCEDURE — 85025 COMPLETE CBC W/AUTO DIFF WBC: CPT | Performed by: PSYCHIATRY & NEUROLOGY

## 2018-10-25 RX ADMIN — BUSPIRONE HYDROCHLORIDE 15 MG: 10 TABLET ORAL at 08:40

## 2018-10-25 RX ADMIN — BUSPIRONE HYDROCHLORIDE 15 MG: 10 TABLET ORAL at 21:04

## 2018-10-25 RX ADMIN — DOCUSATE SODIUM 100 MG: 100 CAPSULE, LIQUID FILLED ORAL at 08:40

## 2018-10-25 RX ADMIN — CLOZAPINE 300 MG: 100 TABLET ORAL at 21:05

## 2018-10-25 RX ADMIN — CLOZAPINE 200 MG: 100 TABLET ORAL at 08:40

## 2018-10-25 RX ADMIN — DOCUSATE SODIUM 100 MG: 100 CAPSULE, LIQUID FILLED ORAL at 16:15

## 2018-10-25 RX ADMIN — NYSTATIN: 100000 CREAM TOPICAL at 16:15

## 2018-10-25 RX ADMIN — PROPRANOLOL HYDROCHLORIDE 120 MG: 60 CAPSULE, EXTENDED RELEASE ORAL at 08:40

## 2018-10-25 RX ADMIN — HYDROXYZINE PAMOATE 25 MG: 25 CAPSULE ORAL at 13:51

## 2018-10-25 RX ADMIN — SENNOSIDES 17.2 MG: 8.6 TABLET, FILM COATED ORAL at 08:40

## 2018-10-25 NOTE — BH NOTES
GROUP THERAPY PROGRESS NOTE The patient Jax harris 37 y.o. male is participating in Coping Skills Group. Group time: 45 minutes Personal goal for participation: To participate in healthy relationships bingo game Goal orientation:  personal 
 
Group therapy participation: active Therapeutic interventions reviewed and discussed: things pertaining to healthy relationships Impression of participation:  The patient was attentive. Darius Ballard 10/25/2018  6:10 PM

## 2018-10-25 NOTE — BH NOTES
Patient is alert and oriented. Medication and meal compliant. Denies SI / HI, pain or discomfort. Continue to monitor the patient's status and assess needs.
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
## 2018-10-25 NOTE — PROGRESS NOTES
Clozapine Daily Monitoring  Current regimen:  200 mg daily + 300 mg at bedtime (500 mg/day)  Last CBC done AND reported to the registry:  10/25/18  Next CBC due:  11/1/18     DATE ANC   10/25 8.6   10/18 8.8   10/11 5.6     ANC must be >1 to dispense clozapine. If patient experiences ANC <1.5, refer to the Clozapine REMS ANC monitoring algorithm for frequency of ANC monitoring. Visit Vitals  /81   Pulse 90   Temp 98.2 °F (36.8 °C)   Resp 18   Ht 175.3 cm (69\")   Wt 70.3 kg (155 lb)   SpO2 100%   BMI 22.89 kg/m²     Recommendations/comments:  ANC is within acceptable range to continue with clozapine dispensing.   ANC was reported to the Clozapine REMS program.  Next ANC is due Thursday, November 1st.      Thank you,  Courtney Soliz, [address], [facility]  [phone]

## 2018-10-25 NOTE — PROGRESS NOTES
Problem: Altered Thought Process (Adult/Pediatric)  Goal: *STG: Participates in treatment plan  [address] [facility]  Master Treatment Plan for Vladislav Nelson    Date Treatment Plan Initiated: 9/28/18  Treatment Plan Modalities:  Type of Modality Amount  (x minutes) Frequency (x/week) Duration (x days) Name of Responsible Staff  [address] [facility] meetings to encourage peer interactions 15 7 1     Group psychotherapy to assist in building coping skills and internal controls 60 7 1 Gus Sauer  Therapeutic activity groups to build coping skills 60 7 1 Gus Sauer  Psychoeducation in group setting to address:  Medication education   15 7 1   Coping skills        Relaxation techniques        Symptom management        Discharge planning   61 2 [address] [facility] 2 1 [facility]   60 1 1 volunteer  Recovery/AA/NA      volunteer  Physician medication management   15 7 1   Family meeting/discharge planning   15 2 1400 [facility] and Ani Barrios                               Outcome: Progressing Towards Goal  Patient alert and oriented. Compliant with meals and medications. Denies SI/HI. No behavioral issues at this time. Patient being transferred to room 307. Will continue assessments and safety checks.

## 2018-10-25 NOTE — BH NOTES
Pt approached  nurse about a female peer being beautiful and he is attracted to her. Patient was educated about being appropriate on the unit with females peers and other staff made aware. Will continue to monitor patient and assess needs.

## 2018-10-26 VITALS
DIASTOLIC BLOOD PRESSURE: 88 MMHG | OXYGEN SATURATION: 100 % | WEIGHT: 155 LBS | HEIGHT: 69 IN | SYSTOLIC BLOOD PRESSURE: 148 MMHG | HEART RATE: 100 BPM | TEMPERATURE: 98 F | RESPIRATION RATE: 16 BRPM | BODY MASS INDEX: 22.96 KG/M2

## 2018-10-26 PROCEDURE — 74011250637 HC RX REV CODE- 250/637: Performed by: PSYCHIATRY & NEUROLOGY

## 2018-10-26 RX ORDER — SENNOSIDES 8.6 MG/1
2 TABLET ORAL DAILY
Qty: 28 TAB | Refills: 0 | Status: ON HOLD | OUTPATIENT
Start: 2018-10-27 | End: 2020-05-27

## 2018-10-26 RX ORDER — PROPRANOLOL HYDROCHLORIDE 120 MG/1
120 CAPSULE, EXTENDED RELEASE ORAL DAILY
Qty: 30 CAP | Refills: 0 | Status: ON HOLD | OUTPATIENT
Start: 2018-10-27 | End: 2020-05-27

## 2018-10-26 RX ORDER — BUSPIRONE HYDROCHLORIDE 15 MG/1
15 TABLET ORAL 2 TIMES DAILY
Qty: 28 TAB | Refills: 0 | Status: ON HOLD | OUTPATIENT
Start: 2018-10-26 | End: 2020-05-27

## 2018-10-26 RX ORDER — CLOZAPINE 100 MG/1
300 TABLET ORAL
Qty: 21 TAB | Refills: 0 | Status: ON HOLD | OUTPATIENT
Start: 2018-10-26 | End: 2020-05-27

## 2018-10-26 RX ORDER — CLOZAPINE 200 MG/1
200 TABLET ORAL DAILY
Qty: 7 TAB | Refills: 0 | Status: ON HOLD | OUTPATIENT
Start: 2018-10-27 | End: 2020-05-27

## 2018-10-26 RX ORDER — DOCUSATE SODIUM 100 MG/1
100 CAPSULE, LIQUID FILLED ORAL 2 TIMES DAILY
Qty: 28 CAP | Refills: 0 | Status: SHIPPED | OUTPATIENT
Start: 2018-10-26 | End: 2018-11-09

## 2018-10-26 RX ADMIN — NYSTATIN: 100000 CREAM TOPICAL at 08:59

## 2018-10-26 RX ADMIN — CLOZAPINE 200 MG: 100 TABLET ORAL at 08:56

## 2018-10-26 RX ADMIN — DOCUSATE SODIUM 100 MG: 100 CAPSULE, LIQUID FILLED ORAL at 09:01

## 2018-10-26 RX ADMIN — SENNOSIDES 17.2 MG: 8.6 TABLET, FILM COATED ORAL at 08:57

## 2018-10-26 RX ADMIN — BUSPIRONE HYDROCHLORIDE 15 MG: 10 TABLET ORAL at 08:56

## 2018-10-26 RX ADMIN — PROPRANOLOL HYDROCHLORIDE 120 MG: 60 CAPSULE, EXTENDED RELEASE ORAL at 08:57

## 2018-10-26 NOTE — PROGRESS NOTES
Problem: Altered Thought Process (Adult/Pediatric)  Goal: *STG: Remains safe in hospital  Outcome: Progressing Towards Goal  Patient has been relatively stable and calm. Patient was medication compliant, denies SI/HI, appears responding to internal stimuli. Staff will continue to monitor patient.

## 2018-10-26 NOTE — BH NOTES
Pt is alert and oriented x 4. Pt denies SI/HI and denies hallucinations. Pt was briefed on discharge instructions and prescriptions. All questions asked were answered. All belongings and valuables were returned to pt. Pt was discharged with transportation provided by taxi.

## 2018-10-26 NOTE — BH NOTES
Behavioral Health Transition Record to Provider    Patient Name: Karlie Dejesus  YOB: 1975  Medical Record Number: [id number]  Date of Admission: 9/27/2018  Date of Discharge: 10/26/2018     Attending Provider: Jonas Up, *  Discharging Provider: Jonas Up, *  To contact this individual call [phone]  and ask the  to page. If unavailable, ask to be transferred to [facility] Provider on call. [facility] Provider will be available on call 24/7 and during holidays. Primary Care Provider: Yumiko Michele MD    Allergies   Allergen Reactions    Haloperidol Other (comments)       Reason for Admission: Pt was disorganized and off his medications.      Admission Diagnosis: schizophrenia  Psychosis  Schizoaffective disorder ([facility] [address]    * No surgery found *    Results for orders placed or performed during the hospital encounter of 09/27/18   URINALYSIS W/ REFLEX CULTURE   Result Value Ref Range    Color YELLOW/STRAW      Appearance CLEAR CLEAR      Specific gravity 1.015 1.003 - 1.030      pH (UA) 6.0 5.0 - 8.0      Protein 30 (A) NEG mg/dL    Glucose NEGATIVE  NEG mg/dL    Ketone NEGATIVE  NEG mg/dL    Bilirubin NEGATIVE  NEG      Blood LARGE (A) NEG      Urobilinogen 0.2 0.2 - 1.0 EU/dL    Nitrites NEGATIVE  NEG      Leukocyte Esterase NEGATIVE  NEG      WBC 0-4 0 - 4 /hpf    RBC 20-50 0 - 5 /hpf    Epithelial cells FEW FEW /lpf    Bacteria NEGATIVE  NEG /hpf    UA:UC IF INDICATED CULTURE NOT INDICATED BY UA RESULT CNI     LIPID PANEL   Result Value Ref Range    LIPID PROFILE          Cholesterol, total 107 <200 MG/DL    Triglyceride 124 <150 MG/DL    HDL Cholesterol 23 MG/DL    LDL, calculated 59.2 0 - 100 MG/DL    VLDL, calculated 24.8 MG/DL    CHOL/HDL Ratio 4.7 0 - 5.0     HEMOGLOBIN A1C WITH EAG   Result Value Ref Range    Hemoglobin A1c 5.3 4.2 - 6.3 %    Est. average glucose 105 mg/dL   CBC WITH AUTOMATED DIFF   Result Value Ref Range WBC 12.4 (H) 4.1 - 11.1 K/uL    RBC 3.78 (L) 4.10 - 5.70 M/uL    HGB 11.7 (L) 12.1 - 17.0 g/dL    HCT 35.4 (L) 36.6 - 50.3 %    MCV 93.7 80.0 - 99.0 FL    MCH 31.0 26.0 - 34.0 PG    MCHC 33.1 30.0 - 36.5 g/dL    RDW 14.2 11.5 - 14.5 %    PLATELET 480 478 - 506 K/uL    MPV 11.2 8.9 - 12.9 FL    NRBC 0.0 0  WBC    ABSOLUTE NRBC 0.00 0.00 - 0.01 K/uL    NEUTROPHILS 37 32 - 75 %    LYMPHOCYTES 52 (H) 12 - 49 %    MONOCYTES 5 5 - 13 %    EOSINOPHILS 4 0 - 7 %    BASOPHILS 2 (H) 0 - 1 %    IMMATURE GRANULOCYTES 0 %    ABS. NEUTROPHILS 4.6 1.8 - 8.0 K/UL    ABS. LYMPHOCYTES 6.5 (H) 0.8 - 3.5 K/UL    ABS. MONOCYTES 0.6 0.0 - 1.0 K/UL    ABS. EOSINOPHILS 0.5 (H) 0.0 - 0.4 K/UL    ABS. BASOPHILS 0.2 (H) 0.0 - 0.1 K/UL    ABS. IMM. GRANS. 0.0 K/UL    DF MANUAL      RBC COMMENTS NORMOCYTIC, NORMOCHROMIC     CBC WITH AUTOMATED DIFF   Result Value Ref Range    WBC 14.3 (H) 4.1 - 11.1 K/uL    RBC 3.70 (L) 4.10 - 5.70 M/uL    HGB 11.4 (L) 12.1 - 17.0 g/dL    HCT 34.3 (L) 36.6 - 50.3 %    MCV 92.7 80.0 - 99.0 FL    MCH 30.8 26.0 - 34.0 PG    MCHC 33.2 30.0 - 36.5 g/dL    RDW 13.5 11.5 - 14.5 %    PLATELET 125 906 - 519 K/uL    MPV 11.2 8.9 - 12.9 FL    NRBC 0.0 0  WBC    ABSOLUTE NRBC 0.00 0.00 - 0.01 K/uL    NEUTROPHILS 36 32 - 75 %    BAND NEUTROPHILS 3 0 - 6 %    LYMPHOCYTES 49 12 - 49 %    MONOCYTES 7 5 - 13 %    EOSINOPHILS 5 0 - 7 %    BASOPHILS 0 0 - 1 %    IMMATURE GRANULOCYTES 0 %    ABS. NEUTROPHILS 5.6 1.8 - 8.0 K/UL    ABS. LYMPHOCYTES 7.0 (H) 0.8 - 3.5 K/UL    ABS. MONOCYTES 1.0 0.0 - 1.0 K/UL    ABS. EOSINOPHILS 0.7 (H) 0.0 - 0.4 K/UL    ABS. BASOPHILS 0.0 0.0 - 0.1 K/UL    ABS. IMM.  GRANS. 0.0 K/UL    DF MANUAL      RBC COMMENTS NORMOCYTIC, NORMOCHROMIC     CBC WITH AUTOMATED DIFF   Result Value Ref Range    WBC 16.2 (H) 4.1 - 11.1 K/uL    RBC 3.86 (L) 4.10 - 5.70 M/uL    HGB 12.0 (L) 12.1 - 17.0 g/dL    HCT 35.7 (L) 36.6 - 50.3 %    MCV 92.5 80.0 - 99.0 FL    MCH 31.1 26.0 - 34.0 PG    MCHC 33.6 30.0 - 36.5 g/dL    RDW 13.6 11.5 - 14.5 %    PLATELET 081 546 - 413 K/uL    MPV 10.6 8.9 - 12.9 FL    NRBC 0.0 0  WBC    ABSOLUTE NRBC 0.00 0.00 - 0.01 K/uL    NEUTROPHILS 52 32 - 75 %    BAND NEUTROPHILS 2 0 - 6 %    LYMPHOCYTES 33 12 - 49 %    MONOCYTES 7 5 - 13 %    EOSINOPHILS 6 0 - 7 %    BASOPHILS 0 0 - 1 %    IMMATURE GRANULOCYTES 0 %    ABS. NEUTROPHILS 8.8 (H) 1.8 - 8.0 K/UL    ABS. LYMPHOCYTES 5.3 (H) 0.8 - 3.5 K/UL    ABS. MONOCYTES 1.1 (H) 0.0 - 1.0 K/UL    ABS. EOSINOPHILS 1.0 (H) 0.0 - 0.4 K/UL    ABS. BASOPHILS 0.0 0.0 - 0.1 K/UL    ABS. IMM.  GRANS. 0.0 K/UL    DF MANUAL      RBC COMMENTS NORMOCYTIC, NORMOCHROMIC     CLOZAPINE   Result Value Ref Range    Clozapine 295 (L) 350 - 650 ng/mL    Norclozapine, Serum 183 Not Estab. ng/mL    Total(Cloz+Norcloz) 478 ng/mL   URINALYSIS W/ REFLEX CULTURE   Result Value Ref Range    Color YELLOW/STRAW      Appearance CLEAR CLEAR      Specific gravity 1.010 1.003 - 1.030      pH (UA) 7.0 5.0 - 8.0      Protein NEGATIVE  NEG mg/dL    Glucose NEGATIVE  NEG mg/dL    Ketone NEGATIVE  NEG mg/dL    Bilirubin NEGATIVE  NEG      Blood NEGATIVE  NEG      Urobilinogen 0.2 0.2 - 1.0 EU/dL    Nitrites NEGATIVE  NEG      Leukocyte Esterase NEGATIVE  NEG      WBC 0-4 0 - 4 /hpf    RBC 0-5 0 - 5 /hpf    Epithelial cells FEW FEW /lpf    Bacteria NEGATIVE  NEG /hpf    UA:UC IF INDICATED CULTURE NOT INDICATED BY UA RESULT CNI     CBC WITH AUTOMATED DIFF   Result Value Ref Range    WBC 16.9 (H) 4.1 - 11.1 K/uL    RBC 4.00 (L) 4.10 - 5.70 M/uL    HGB 12.4 12.1 - 17.0 g/dL    HCT 37.9 36.6 - 50.3 %    MCV 94.8 80.0 - 99.0 FL    MCH 31.0 26.0 - 34.0 PG    MCHC 32.7 30.0 - 36.5 g/dL    RDW 13.8 11.5 - 14.5 %    PLATELET 277 816 - 090 K/uL    MPV 10.7 8.9 - 12.9 FL    NRBC 0.0 0  WBC    ABSOLUTE NRBC 0.00 0.00 - 0.01 K/uL    NEUTROPHILS 51 32 - 75 %    LYMPHOCYTES 38 12 - 49 %    MONOCYTES 6 5 - 13 %    EOSINOPHILS 4 0 - 7 %    BASOPHILS 1 0 - 1 %    IMMATURE GRANULOCYTES 0 % ABS. NEUTROPHILS 8.6 (H) 1.8 - 8.0 K/UL    ABS. LYMPHOCYTES 6.4 (H) 0.8 - 3.5 K/UL    ABS. MONOCYTES 1.0 0.0 - 1.0 K/UL    ABS. EOSINOPHILS 0.7 (H) 0.0 - 0.4 K/UL    ABS. BASOPHILS 0.2 (H) 0.0 - 0.1 K/UL    ABS. IMM. GRANS. 0.0 K/UL    DF MANUAL      RBC COMMENTS NORMOCYTIC, NORMOCHROMIC         Immunizations administered during this encounter:   Immunization History   Administered Date(s) Administered    Influenza Vaccine 10/07/2014, 01/07/2016    Influenza Vaccine (Quad) PF 10/01/2018    Pneumococcal Vaccine (Unspecified Type) 01/07/2016    TB Skin Test (PPD) 09/22/2014       Screening for Metabolic Disorders for Patients on Antipsychotic Medications  (Data obtained from the EMR)    Estimated Body Mass Index  Estimated body mass index is 22.89 kg/m² as calculated from the following:    Height as of this encounter: 5' 9\" (1.753 m). Weight as of this encounter: 70.3 kg (155 lb). Vital Signs/Blood Pressure  Visit Vitals  /88 (BP 1 Location: Right arm, BP Patient Position: Sitting)   Pulse 100   Temp 98 °F (36.7 °C)   Resp 16   Ht 5' 9\" (1.753 m)   Wt 70.3 kg (155 lb)   SpO2 100%   BMI 22.89 kg/m²       Blood Glucose/Hemoglobin A1c  Lab Results   Component Value Date/Time    Glucose 79 09/27/2018 02:26 PM    Glucose (POC) 97 09/27/2018 03:40 PM       Lab Results   Component Value Date/Time    Hemoglobin A1c 5.3 09/30/2018 04:52 AM    Hemoglobin A1c, External 5.2 06/30/2017        Lipid Panel  Lab Results   Component Value Date/Time    Cholesterol, total 107 09/30/2018 04:52 AM    HDL Cholesterol 23 09/30/2018 04:52 AM    LDL, calculated 59.2 09/30/2018 04:52 AM    Triglyceride 124 09/30/2018 04:52 AM    CHOL/HDL Ratio 4.7 09/30/2018 04:52 AM        Discharge Diagnosis: Schizophrenia     Discharge Plan: Pt [facility]  Pt was alert and oriented. Pt denies SI/HI. Pt's thought process is linear. Pt's insight and judgment has improved.  Pt is in agreement with the plan. Discharge Medication List and Instructions:   Current Discharge Medication List      START taking these medications    Details   busPIRone (BUSPAR) 15 mg tablet Take 1 Tab by mouth two (2) times a day. Qty: 28 Tab, Refills: 0      senna (SENOKOT) 8.6 mg tablet Take 2 Tabs by mouth daily. Qty: 28 Tab, Refills: 0         CONTINUE these medications which have CHANGED    Details   !! cloZAPine (CLOZARIL) 100 mg tablet Take 3 Tabs by mouth nightly. Qty: 21 Tab, Refills: 0      !! cloZAPine 200 mg tablet Take 1 Tab by mouth daily. Qty: 7 Tab, Refills: 0      propranolol LA (INDERAL LA) 120 mg SR capsule Take 1 Cap by mouth daily. Qty: 30 Cap, Refills: 0      !! docusate sodium (COLACE) 100 mg capsule Take 1 Cap by mouth two (2) times a day for 14 days. Qty: 28 Cap, Refills: 0       !! - Potential duplicate medications found. Please discuss with provider. CONTINUE these medications which have NOT CHANGED    Details   !! docusate sodium (COLACE) 100 mg capsule Take 1 Tab by mouth two (2) times a day. Indications: constipation       !! - Potential duplicate medications found. Please discuss with provider. STOP taking these medications       benztropine (COGENTIN) 2 mg tablet Comments:   Reason for Stopping:         benztropine (COGENTIN) 2 mg tablet Comments:   Reason for Stopping:         clotrimazole (LOTRIMIN) 1 % topical cream Comments:   Reason for Stopping:         bisacodyl (DULCOLAX, BISACODYL,) 5 mg EC tablet Comments:   Reason for Stopping:         buPROPion XL (WELLBUTRIN XL) 300 mg XL tablet Comments:   Reason for Stopping:               Unresulted Labs (24h ago, onward)    None        To obtain results of studies pending at discharge, please contact N/A     Follow-up Information     Follow up With Specialties Details Why Contact Mckay SANTANA  Go on 10/29/2018 Rapid access appointment 8:00AM -11:00AM & 12:00PM-3:00PM for ongoing mental health case management.    [facility]   Address: [address], [address]  Phone: [phone]  Fax: [address] on 11/5/2018 Primary care appointment at 1:30 PM with Dr. Abby Zhong. Please bring list of medications and 2 forms of I.D. Address: [facility], [address]    Phone: [phone]  Fax: [phone], [address]74 [address]  [phone]            Advanced Directive:   Does the patient have an appointed surrogate decision maker? No  Does the patient have a Medical Advance Directive? No  Does the patient have a Psychiatric Advance Directive? No  If the patient does not have a surrogate or Medical Advance Directive AND Psychiatric Advance Directive, the patient was offered information on these advance directives Patient will complete at a later time    Patient Instructions: Please continue all medications until otherwise directed by physician. Review discharge paperwork     Tobacco Cessation Discharge Plan:   Is the patient a smoker and needs referral for smoking cessation? Yes  Patient referred to the following for smoking cessation with an appointment? No     Patient was offered medication to assist with smoking cessation at discharge? No  Was education for smoking cessation added to the discharge instructions? Yes    Alcohol/Substance Abuse Discharge Plan:   Does the patient have a history of substance/alcohol abuse and requires a referral for treatment? No  Patient referred to the following for substance/alcohol abuse treatment with an appointment? No  Patient was offered medication to assist with alcohol cessation at discharge? No  Was education for substance/alcohol abuse added to discharge instructions? No    Patient discharged to Home; provided to the patient/caregiver either in hard copy or electronically.

## 2018-10-26 NOTE — DISCHARGE INSTRUCTIONS
Learning About Schizoaffective Disorder  What is schizoaffective disorder? Schizoaffective (say \"[id number]-tiv\") disorder is a complex mental illness. People who have it have the symptoms of both schizophrenia and a mood disorder. The disorder affects how clearly you can think. It can also make it hard to manage your emotions and connect with others. And it affects how happy or sad you feel. What causes it? Experts don't know what causes schizoaffective disorder. It may have different causes for different people. It's not caused by anything you did or how your parents raised you. And it's not a sign of weakness. What are the symptoms? The symptoms of schizoaffective disorder are the same as those of schizophrenia and a mood disorder. People with schizoaffective disorder may have many of these symptoms. Schizophrenia symptoms include:  · Having hallucinations. This means that you see or hear things that aren't really there. · Having delusions. These are beliefs that aren't real.  · Having a hard time feeling and showing emotion. Mood disorder symptoms include:  · Depression. · Feeling extremely happy or having lots of energy. How is it diagnosed? Your doctor or mental health professional usually can tell if you have schizoaffective disorder by talking with you. He or she will look at the order and timing of your symptoms and how long your symptoms last.  Your doctor will ask you about other things too. These may include questions about:  · Any odd experiences you may have had, such as hearing voices or having confusing thoughts. · Your feelings. · Any changes in eating habits, energy level, and interest in daily tasks. · How well you are sleeping. · If you can focus on the things you do. How is it treated? Finding out that you have schizoaffective disorder can be scary and hard to deal with. But the disorder can be treated.   The goal of treatment is to lower your stress and help your brain work as it should. Ongoing treatment can keep the disorder under control. Treatment includes medicines and counseling. Medicines help your symptoms. It's important to take your medicines on schedule to keep your moods even. When you feel good, you may think that you don't need them. But it is important to keep taking them. Counseling helps you change how you think about things. It can also help you cope with the illness. You will work with a mental health professional. This may be a psychologist, a licensed professional counselor, a clinical , or a psychiatrist.  Follow-up care is a key part of your treatment and safety. Be sure to make and go to all appointments, and call your doctor if you are having problems. It's also a good idea to know your test results and keep a list of the medicines you take. Where can you learn more? Go to http://holli-luiz.info/. Enter A016 in the search box to learn more about \"Learning About Schizoaffective Disorder. \"  Current as of: December 7, 2017  Content Version: 11.8  © 3321-2361 [employer]. Care instructions adapted under license by [employer] (which disclaims liability or warranty for this information). If you have questions about a medical condition or this instruction, always ask your healthcare professional. [address] any warranty or liability for your use of this information. DISCHARGE SUMMARY from Nurse    PATIENT INSTRUCTIONS:  If I feel that I can not keep these promises and I am at risk of hurting myself or others, I will call the crisis office and speak with a crisis worker who will assist me during my crisis.     Chapito Sprague  [phone]  [address] [place] [address]         What to do at Home:  Recommended activity: Activity as tolerated        *  Please give a list of your current medications to your Primary Care Provider. *  Please update this list whenever your medications are discontinued, doses are      changed, or new medications (including over-the-counter products) are added. *  Please carry medication information at all times in case of emergency situations. These are general instructions for a healthy lifestyle:    No smoking/ No tobacco products/ Avoid exposure to second hand smoke  Surgeon General's Warning:  Quitting smoking now greatly reduces serious risk to your health. Obesity, smoking, and sedentary lifestyle greatly increases your risk for illness    A healthy diet, regular physical exercise & weight monitoring are important for maintaining a healthy lifestyle    You may be retaining fluid if you have a history of heart failure or if you experience any of the following symptoms:  Weight gain of 3 pounds or more overnight or 5 pounds in a week, increased swelling in our hands or feet or shortness of breath while lying flat in bed. Please call your doctor as soon as you notice any of these symptoms; do not wait until your next office visit. Recognize signs and symptoms of STROKE:    F-face looks uneven    A-arms unable to move or move unevenly    S-speech slurred or non-existent    T-time-call 911 as soon as signs and symptoms begin-DO NOT go       Back to bed or wait to see if you get better-TIME IS BRAIN. Warning Signs of HEART ATTACK     Call 911 if you have these symptoms:   Chest discomfort. Most heart attacks involve discomfort in the center of the chest that lasts more than a few minutes, or that goes away and comes back. It can feel like uncomfortable pressure, squeezing, fullness, or pain.  Discomfort in other areas of the upper body. Symptoms can include pain or discomfort in one or both arms, the back, neck, jaw, or stomach.  Shortness of breath with or without chest discomfort.    Other signs may include breaking out in a cold sweat, nausea, or lightheadedness. Don't wait more than five minutes to call 911 - MINUTES MATTER! Fast action can save your life. Calling 911 is almost always the fastest way to get lifesaving treatment. Emergency Medical Services staff can begin treatment when they arrive -- up to an hour sooner than if someone gets to the hospital by car. The discharge information has been reviewed with the patient. The patient verbalized understanding. Discharge medications reviewed with the patient and appropriate educational materials and side effects teaching were provided.   ___________________________________________________________________________________________________________________________________
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
## 2018-11-21 ENCOUNTER — HOSPITAL ENCOUNTER (EMERGENCY)
Age: 43
Discharge: HOME | End: 2018-11-21
Attending: EMERGENCY MEDICINE | Admitting: EMERGENCY MEDICINE
Payer: MEDICARE

## 2018-11-21 VITALS
HEART RATE: 112 BPM | DIASTOLIC BLOOD PRESSURE: 89 MMHG | RESPIRATION RATE: 20 BRPM | SYSTOLIC BLOOD PRESSURE: 132 MMHG | OXYGEN SATURATION: 99 % | TEMPERATURE: 97.8 F

## 2018-11-21 DIAGNOSIS — N39.0 URINARY TRACT INFECTION WITH HEMATURIA, SITE UNSPECIFIED: Primary | ICD-10-CM

## 2018-11-21 DIAGNOSIS — R31.9 URINARY TRACT INFECTION WITH HEMATURIA, SITE UNSPECIFIED: Primary | ICD-10-CM

## 2018-11-21 LAB
ALBUMIN SERPL-MCNC: 3.5 G/DL (ref 3.5–5)
ALBUMIN/GLOB SERPL: 0.8 {RATIO} (ref 1.1–2.2)
ALP SERPL-CCNC: 122 U/L (ref 45–117)
ALT SERPL-CCNC: 16 U/L (ref 12–78)
AMPHET UR QL SCN: NEGATIVE
ANION GAP SERPL CALC-SCNC: 11 MMOL/L (ref 5–15)
APPEARANCE UR: ABNORMAL
AST SERPL-CCNC: 17 U/L (ref 15–37)
BACTERIA URNS QL MICRO: NEGATIVE /HPF
BARBITURATES UR QL SCN: NEGATIVE
BASOPHILS # BLD: 0.1 K/UL (ref 0–0.1)
BASOPHILS NFR BLD: 1 % (ref 0–1)
BENZODIAZ UR QL: NEGATIVE
BILIRUB SERPL-MCNC: 0.6 MG/DL (ref 0.2–1)
BILIRUB UR QL: NEGATIVE
BUN SERPL-MCNC: 21 MG/DL (ref 6–20)
BUN/CREAT SERPL: 17 (ref 12–20)
CALCIUM SERPL-MCNC: 8.7 MG/DL (ref 8.5–10.1)
CANNABINOIDS UR QL SCN: NEGATIVE
CHLORIDE SERPL-SCNC: 101 MMOL/L (ref 97–108)
CO2 SERPL-SCNC: 25 MMOL/L (ref 21–32)
COCAINE UR QL SCN: NEGATIVE
COLOR UR: ABNORMAL
CREAT SERPL-MCNC: 1.22 MG/DL (ref 0.7–1.3)
DIFFERENTIAL METHOD BLD: ABNORMAL
DRUG SCRN COMMENT,DRGCM: NORMAL
EOSINOPHIL # BLD: 0.3 K/UL (ref 0–0.4)
EOSINOPHIL NFR BLD: 2 % (ref 0–7)
EPITH CASTS URNS QL MICRO: ABNORMAL /LPF
ERYTHROCYTE [DISTWIDTH] IN BLOOD BY AUTOMATED COUNT: 13.2 % (ref 11.5–14.5)
ETHANOL SERPL-MCNC: <10 MG/DL
GLOBULIN SER CALC-MCNC: 4.3 G/DL (ref 2–4)
GLUCOSE SERPL-MCNC: 86 MG/DL (ref 65–100)
GLUCOSE UR STRIP.AUTO-MCNC: NEGATIVE MG/DL
HCT VFR BLD AUTO: 37.1 % (ref 36.6–50.3)
HGB BLD-MCNC: 12.4 G/DL (ref 12.1–17)
HGB UR QL STRIP: ABNORMAL
IMM GRANULOCYTES # BLD: 0 K/UL
IMM GRANULOCYTES NFR BLD AUTO: 0 %
KETONES UR QL STRIP.AUTO: NEGATIVE MG/DL
LEUKOCYTE ESTERASE UR QL STRIP.AUTO: ABNORMAL
LYMPHOCYTES # BLD: 6.4 K/UL (ref 0.8–3.5)
LYMPHOCYTES NFR BLD: 48 % (ref 12–49)
MCH RBC QN AUTO: 30.5 PG (ref 26–34)
MCHC RBC AUTO-ENTMCNC: 33.4 G/DL (ref 30–36.5)
MCV RBC AUTO: 91.2 FL (ref 80–99)
METHADONE UR QL: NEGATIVE
MONOCYTES # BLD: 0.9 K/UL (ref 0–1)
MONOCYTES NFR BLD: 7 % (ref 5–13)
NEUTS SEG # BLD: 5.6 K/UL (ref 1.8–8)
NEUTS SEG NFR BLD: 42 % (ref 32–75)
NITRITE UR QL STRIP.AUTO: NEGATIVE
NRBC # BLD: 0 K/UL (ref 0–0.01)
NRBC BLD-RTO: 0 PER 100 WBC
OPIATES UR QL: NEGATIVE
PCP UR QL: NEGATIVE
PH UR STRIP: 5.5 [PH] (ref 5–8)
PLATELET # BLD AUTO: 298 K/UL (ref 150–400)
PMV BLD AUTO: 10.5 FL (ref 8.9–12.9)
POTASSIUM SERPL-SCNC: 3.8 MMOL/L (ref 3.5–5.1)
PROT SERPL-MCNC: 7.8 G/DL (ref 6.4–8.2)
PROT UR STRIP-MCNC: 100 MG/DL
RBC # BLD AUTO: 4.07 M/UL (ref 4.1–5.7)
RBC #/AREA URNS HPF: ABNORMAL /HPF (ref 0–5)
RBC MORPH BLD: ABNORMAL
SODIUM SERPL-SCNC: 137 MMOL/L (ref 136–145)
SP GR UR REFRACTOMETRY: 1.02 (ref 1–1.03)
UA: UC IF INDICATED,UAUC: ABNORMAL
UROBILINOGEN UR QL STRIP.AUTO: 0.2 EU/DL (ref 0.2–1)
WBC # BLD AUTO: 13.3 K/UL (ref 4.1–11.1)
WBC MORPH BLD: ABNORMAL
WBC URNS QL MICRO: ABNORMAL /HPF (ref 0–4)

## 2018-11-21 PROCEDURE — 81001 URINALYSIS AUTO W/SCOPE: CPT

## 2018-11-21 PROCEDURE — 87086 URINE CULTURE/COLONY COUNT: CPT

## 2018-11-21 PROCEDURE — 36415 COLL VENOUS BLD VENIPUNCTURE: CPT

## 2018-11-21 PROCEDURE — 80053 COMPREHEN METABOLIC PANEL: CPT

## 2018-11-21 PROCEDURE — 87491 CHLMYD TRACH DNA AMP PROBE: CPT

## 2018-11-21 PROCEDURE — 90791 PSYCH DIAGNOSTIC EVALUATION: CPT

## 2018-11-21 PROCEDURE — 99284 EMERGENCY DEPT VISIT MOD MDM: CPT

## 2018-11-21 PROCEDURE — 85025 COMPLETE CBC W/AUTO DIFF WBC: CPT

## 2018-11-21 PROCEDURE — 80307 DRUG TEST PRSMV CHEM ANLYZR: CPT

## 2018-11-21 PROCEDURE — 74011250637 HC RX REV CODE- 250/637: Performed by: PHYSICIAN ASSISTANT

## 2018-11-21 RX ORDER — CIPROFLOXACIN 500 MG/1
500 TABLET ORAL
Status: COMPLETED | OUTPATIENT
Start: 2018-11-21 | End: 2018-11-21

## 2018-11-21 RX ORDER — CIPROFLOXACIN 500 MG/1
500 TABLET ORAL 2 TIMES DAILY
Qty: 14 TAB | Refills: 0 | Status: SHIPPED | OUTPATIENT
Start: 2018-11-21 | End: 2018-11-28

## 2018-11-21 RX ADMIN — CIPROFLOXACIN HYDROCHLORIDE 500 MG: 500 TABLET, FILM COATED ORAL at 14:42

## 2018-11-21 NOTE — BSMART NOTE
Comprehensive Assessment Form Part 1 Section I - Disposition Axis I - Schizoaffetive Disorder Axis II - Deferred Axis III - Past Medical History:  
Diagnosis Date  Back pain  Chronic bronchitis ([address] COPD  Elevated WBCs  H/O splenectomy  HTN (hypertension) Axis IV - None reported Stephenson V - 38 The Medical Doctor to Psychiatrist conference was not completed. The Medical Doctor is in agreement with Psychiatrist disposition because of (reason) Admission is not recommended or requested by patient. The plan is discharge to [facility] and follow up 11-28-18 with [facility] , Kassie Mckinnon. The on-call Psychiatrist consulted was Dr. Carmen Cortez. The admitting Psychiatrist will be Dr. Sita Whalen. The admitting Diagnosis is NA. The Payor source is Medicare and Medicaid from out of state. Section II - Integrated Summary Summary:  Patient was brought him by squad due to hallucinations. Per [facility] he was talking to self, jumping off and on the curb, and making weird hand gestures and they had never seen him do that so staff encouraged him to come in for evaluation. Patient came voluntarily to be seen and has been cooperative in the ER. Patient has history of Schizoaffective Disorder and was admitted to [facility] on a TDO on 9-27-18 to 10-26-18. Patient currently reporting medication compliance and was discharged on Clozaril and Buspar. Patient is seen by [facility] and his  is Rosa. He seen by  for the first time on 11-7-18 and the  agreed to see him on 11-28-18 at 10:30a for follow up. Patient is alert and oriented. Patient reported he \"got carried away in the front yard\" and they got nervous. Patient reported he went out to work on leaves but \"tied up talking. \"  Patient reported the voices and spirits were crossing his mind at the time.   Patient denied any suicidal or homicidal ideation currently. Patient was seen by this clinician when admitted previously and was much clearer today and able to give some history on his own this time when last time was totally unable to give history or unable to volunteer for admission at that time. Patient does not want admission and wants to go home. He reported the home and HA are supportive. Patient's adult home, [facility], was contacted and they admitted they became concerned because they don't know him very well but they were agreeable to taking him back tonight and relieved that everything was all right with the patient. The patienthas demonstrated mental capacity to provide informed consent. The information is given by the patient, past medical records and  and [facility]. The Chief Complaint is hallucinations. The Precipitant Factors are not clear. Previous Hospitalizations: Yes The patient has been in restraints in the past and has not escaped from them. Current Psychiatrist and/or  is Sara Howard. Lethality Assessment: 
 
The potential for suicide noted by the following: No current suicidal ideation but reported he cut himself when 31 yo and overdosed 12 years ago. The potential for homicide is not noted. Patient did report a couple of assault charges 10-15 years ago. The patient has not been a perpetrator of sexual or physical abuse. There are not pending charges. The patient is not felt to be at risk for self harm or harm to others. The attending nurse was advised that security has not been notified. Section III - Psychosocial 
The patient's overall mood and attitude is \"good\". Feelings of helplessness and hopelessness are not observed. Generalized anxiety is not observed. Panic is not observed. Phobias are not observed. Obsessive compulsive tendencies are not observed.    
 
Section IV - Mental Status Exam 
 The patient's appearance is unkempt. The patient's behavior shows no evidence of impairment. The patient is oriented to time, place, person and situation. The patient's speech shows no evidence of impairment. The patient's mood is euthymic. The range of affect shows no evidence of impairment. The patient's thought content demonstrates no evidence of impairment. The thought process shows no evidence of impairment. The patient's perception demonstrated changes in the following:  auditory . The patient's memory shows no evidence of impairment. The patient's appetite shows no evidence of impairment. The patient's sleep shows no evidence of impairment. The patient shows no insight. The patient's judgement is psychologically impaired. Section V - Substance Abuse The patient is not using substances. Section VI - Living Arrangements The patient is single. The patient lives with a caregiver. The patient has no children. The patient does plan to return home upon discharge. The patient does not have legal issues pending. The patient's source of income comes from disability. Pentecostalism and cultural practices have not been voiced at this time. The patient's greatest support comes from [employer] and this person will be involved with the treatment. The patient has not been in an event described as horrible or outside the realm of ordinary life experience either currently or in the past. 
The patient has not been a victim of sexual/physical abuse. Section VII - Other Areas of Clinical Concern The highest grade achieved is GED with the overall quality of school experience being described as NA. The patient is currently disabled and speaks [place] as a primary language. The patient has no communication impairments affecting communication. The patient's preference for learning can be described as: can read and write adequately.   The patient's hearing is normal.  The patient's vision is normal. 
 
 
Risa Rome, RUPESH

## 2018-11-21 NOTE — ED PROVIDER NOTES
EMERGENCY DEPARTMENT HISTORY AND PHYSICAL EXAM 
 
 
Date: 11/21/2018 Patient Name: Vero Massey History of Presenting Illness Chief Complaint Patient presents with  Mental Health Problem According to pt hearing voices, wandering off facility property, repeative movement ex, stepping in and out of the road for hours. Pt denies SI/HI, denies hearing or seeing people yet talking to people in the back of the ambulance that doesn't includ EMS History Provided By: Patient and EMS 
 
HPI: Vero Massey, 37 y.o. male with PMHx significant for COPD, splenectomy, HTN, hyperlipidemia, schizoaffective disorder, insomnia presents viz EMS to the ED with cc from residence and staff in home facility that pt has been wandering around/ off property at night and talking to himself w/ repetitive motions x 2 days. Pt denies SI/HI/hallucinations. No pain, alcohol, drug use, sob, headache, abd pain, n/v, fever, chills, recent illness. Endorses taking his Rx as prescribed daily including today. There are no other complaints, changes, or physical findings at this time. PCP: None Current Outpatient Medications Medication Sig Dispense Refill  ciprofloxacin HCl (CIPRO) 500 mg tablet Take 1 Tab by mouth two (2) times a day for 7 days. 14 Tab 0  cloZAPine (CLOZARIL) 100 mg tablet Take 3 Tabs by mouth nightly. 21 Tab 0  cloZAPine 200 mg tablet Take 1 Tab by mouth daily. 7 Tab 0  
 busPIRone (BUSPAR) 15 mg tablet Take 1 Tab by mouth two (2) times a day. 28 Tab 0  propranolol LA (INDERAL LA) 120 mg SR capsule Take 1 Cap by mouth daily. 30 Cap 0  
 senna (SENOKOT) 8.6 mg tablet Take 2 Tabs by mouth daily. 28 Tab 0  
 docusate sodium (COLACE) 100 mg capsule Take 1 Tab by mouth two (2) times a day. Indications: constipation Past History Past Medical History: 
Past Medical History:  
Diagnosis Date  Back pain  Chronic bronchitis ([address] COPD  Elevated WBCs  H/O splenectomy  HTN (hypertension) Past Surgical History: 
Past Surgical History:  
Procedure Laterality Date  HX OTHER SURGICAL Right   
 two right wrist fractures  HX SPLENECTOMY Family History: 
Family History Problem Relation Age of Onset  No Known Problems Mother  No Known Problems Father  No Known Problems Brother Social History: 
Social History Tobacco Use  Smoking status: Current Every Day Smoker  Smokeless tobacco: Never Used Substance Use Topics  Alcohol use: No  
 Drug use: No  
  Comment: \"not for years\" Allergies: Allergies Allergen Reactions  Haloperidol Other (comments) Review of Systems Review of Systems Constitutional: Negative. Negative for activity change, appetite change, chills, fatigue and fever. HENT: Negative. Eyes: Negative. Negative for pain and visual disturbance. Respiratory: Negative. Negative for apnea, cough, chest tightness and shortness of breath. Cardiovascular: Negative. Negative for chest pain, palpitations and leg swelling. Gastrointestinal: Negative. Negative for abdominal pain, diarrhea, nausea and vomiting. Genitourinary: Negative. Musculoskeletal: Negative. Skin: Negative. Neurological: Negative for dizziness, tremors, syncope, weakness, light-headedness and headaches. Psychiatric/Behavioral: Positive for behavioral problems and sleep disturbance. Negative for self-injury and suicidal ideas. Physical Exam  
Physical Exam  
Constitutional: He is oriented to person, place, and time. He appears well-nourished. No distress. Disheveled  male. HENT:  
Head: Normocephalic and atraumatic. Right Ear: Hearing and external ear normal.  
Left Ear: Hearing and external ear normal.  
Nose: Nose normal.  
Eyes: Conjunctivae and EOM are normal. Pupils are equal, round, and reactive to light. Neck: Normal range of motion. Cardiovascular: Normal rate, regular rhythm and normal heart sounds. Pulmonary/Chest: Effort normal and breath sounds normal. No accessory muscle usage. No respiratory distress. Musculoskeletal: Normal range of motion. Neurological: He is alert and oriented to person, place, and time. He is not disoriented. GCS eye subscore is 4. GCS verbal subscore is 5. GCS motor subscore is 6. Skin: Skin is warm, dry and intact. He is not diaphoretic. No pallor. Psychiatric: He has a normal mood and affect. His speech is normal and behavior is normal. Judgment and thought content normal. Thought content is not paranoid. He expresses no homicidal and no suicidal ideation. He expresses no suicidal plans and no homicidal plans. Nursing note and vitals reviewed. Diagnostic Study Results Labs - Recent Results (from the past 12 hour(s)) ETHYL ALCOHOL Collection Time: 11/21/18  1:45 PM  
Result Value Ref Range ALCOHOL(ETHYL),SERUM <10 <10 MG/DL  
CBC WITH AUTOMATED DIFF Collection Time: 11/21/18  1:45 PM  
Result Value Ref Range WBC 13.3 (H) 4.1 - 11.1 K/uL  
 RBC 4.07 (L) 4.10 - 5.70 M/uL  
 HGB 12.4 12.1 - 17.0 g/dL HCT 37.1 36.6 - 50.3 % MCV 91.2 80.0 - 99.0 FL  
 MCH 30.5 26.0 - 34.0 PG  
 MCHC 33.4 30.0 - 36.5 g/dL  
 RDW 13.2 11.5 - 14.5 % PLATELET 804 585 - 746 K/uL MPV 10.5 8.9 - 12.9 FL  
 NRBC 0.0 0  WBC ABSOLUTE NRBC 0.00 0.00 - 0.01 K/uL NEUTROPHILS 42 32 - 75 % LYMPHOCYTES 48 12 - 49 % MONOCYTES 7 5 - 13 % EOSINOPHILS 2 0 - 7 % BASOPHILS 1 0 - 1 % IMMATURE GRANULOCYTES 0 %  
 ABS. NEUTROPHILS 5.6 1.8 - 8.0 K/UL  
 ABS. LYMPHOCYTES 6.4 (H) 0.8 - 3.5 K/UL  
 ABS. MONOCYTES 0.9 0.0 - 1.0 K/UL  
 ABS. EOSINOPHILS 0.3 0.0 - 0.4 K/UL  
 ABS. BASOPHILS 0.1 0.0 - 0.1 K/UL  
 ABS. IMM.  GRANS. 0.0 K/UL  
 DF SMEAR SCANNED    
 RBC COMMENTS NORMOCYTIC, NORMOCHROMIC    
 WBC COMMENTS REACTIVE LYMPHS    
DRUG SCREEN, URINE  
 Collection Time: 11/21/18  1:45 PM  
Result Value Ref Range AMPHETAMINES NEGATIVE  NEG    
 BARBITURATES NEGATIVE  NEG BENZODIAZEPINES NEGATIVE  NEG    
 COCAINE NEGATIVE  NEG METHADONE NEGATIVE  NEG    
 OPIATES NEGATIVE  NEG    
 PCP(PHENCYCLIDINE) NEGATIVE  NEG    
 THC (TH-CANNABINOL) NEGATIVE  NEG Drug screen comment (NOTE) METABOLIC PANEL, COMPREHENSIVE Collection Time: 11/21/18  1:45 PM  
Result Value Ref Range Sodium 137 136 - 145 mmol/L Potassium 3.8 3.5 - 5.1 mmol/L Chloride 101 97 - 108 mmol/L  
 CO2 25 21 - 32 mmol/L Anion gap 11 5 - 15 mmol/L Glucose 86 65 - 100 mg/dL BUN 21 (H) 6 - 20 MG/DL Creatinine 1.22 0.70 - 1.30 MG/DL  
 BUN/Creatinine ratio 17 12 - 20 GFR est AA >60 >60 ml/min/1.73m2 GFR est non-AA >60 >60 ml/min/1.73m2 Calcium 8.7 8.5 - 10.1 MG/DL Bilirubin, total 0.6 0.2 - 1.0 MG/DL  
 ALT (SGPT) 16 12 - 78 U/L  
 AST (SGOT) 17 15 - 37 U/L Alk. phosphatase 122 (H) 45 - 117 U/L Protein, total 7.8 6.4 - 8.2 g/dL Albumin 3.5 3.5 - 5.0 g/dL Globulin 4.3 (H) 2.0 - 4.0 g/dL A-G Ratio 0.8 (L) 1.1 - 2.2 URINALYSIS W/ REFLEX CULTURE Collection Time: 11/21/18  1:45 PM  
Result Value Ref Range Color YELLOW/STRAW Appearance CLOUDY (A) CLEAR Specific gravity 1.020 1.003 - 1.030    
 pH (UA) 5.5 5.0 - 8.0 Protein 100 (A) NEG mg/dL Glucose NEGATIVE  NEG mg/dL Ketone NEGATIVE  NEG mg/dL Bilirubin NEGATIVE  NEG Blood TRACE (A) NEG Urobilinogen 0.2 0.2 - 1.0 EU/dL Nitrites NEGATIVE  NEG Leukocyte Esterase SMALL (A) NEG    
 WBC 5-10 0 - 4 /hpf  
 RBC 5-10 0 - 5 /hpf Epithelial cells FEW FEW /lpf Bacteria NEGATIVE  NEG /hpf  
 UA:UC IF INDICATED URINE CULTURE ORDERED (A) CNI Radiologic Studies - No orders to display CT Results  (Last 48 hours) None CXR Results  (Last 48 hours) None Medical Decision Making I am the first provider for this patient. I reviewed the vital signs, available nursing notes, past medical history, past surgical history, family history and social history. Vital Signs-Reviewed the patient's vital signs. Patient Vitals for the past 12 hrs: 
 Temp Pulse Resp BP SpO2  
11/21/18 1316 97.8 °F (36.6 °C) (!) 112 20 132/89 99 % Pulse Oximetry Analysis - 99% on RA Records Reviewed: Nursing Notes, Old Medical Records and Previous Laboratory Studies Provider Notes (Medical Decision Making): DDx: hallucinations, insomnia, schizophrenia, mood disorder, substance induced disorder, substance abuse, medication noncompliance ED Course:  
Initial assessment performed. The patients presenting problems have been discussed, and they are in agreement with the care plan formulated and outlined with them. I have encouraged them to ask questions as they arise throughout their visit. 3:55 PM 
[facility] en route; pt resting comfortably in room in NAD. 
 
4:23 PM 
Coni Brower, endorses pt is moderately improved from previous visit/admission. On assessment, she does not feel he meets criteria for admission. Pt does not want to be admitted. She will continue to attempt to call Home Facility to find out if there is more to the storyline, but plan to discharge unless inclined differently. 4:33 PM 
Home facility informed [facility] they are willing to have the pt return. Pt has been at their facility for only 1 month and they were not aware of his baseline. Pt was supposedly making strange gestures and walking close to road so they were concerned for safety. They agree with care plan at present. Critical Care Time:  
 
Disposition: 
4:33 PM 
I have discussed with patient their diagnosis, treatment, and follow up plan. The patient agrees to follow up as outlined in discharge paperwork and also to return to the ED with any worsening. Amalia Brennan PA-C 
 
 
PLAN: 
1. Current Discharge Medication List  
  
START taking these medications Details  
ciprofloxacin HCl (CIPRO) 500 mg tablet Take 1 Tab by mouth two (2) times a day for 7 days. Qty: 14 Tab, Refills: 0 CONTINUE these medications which have NOT CHANGED Details  
!! cloZAPine (CLOZARIL) 100 mg tablet Take 3 Tabs by mouth nightly. Qty: 21 Tab, Refills: 0  
  
!! cloZAPine 200 mg tablet Take 1 Tab by mouth daily. Qty: 7 Tab, Refills: 0  
  
busPIRone (BUSPAR) 15 mg tablet Take 1 Tab by mouth two (2) times a day. Qty: 28 Tab, Refills: 0  
  
propranolol LA (INDERAL LA) 120 mg SR capsule Take 1 Cap by mouth daily. Qty: 30 Cap, Refills: 0  
  
senna (SENOKOT) 8.6 mg tablet Take 2 Tabs by mouth daily. Qty: 28 Tab, Refills: 0  
  
docusate sodium (COLACE) 100 mg capsule Take 1 Tab by mouth two (2) times a day. Indications: constipation !! - Potential duplicate medications found. Please discuss with provider. 2.  
Follow-up Information Follow up With Specialties Details Why Contact [facility]  Schedule an appointment as soon as possible for a visit in 1 day As needed [address] [place] [address] 
[phone] Return to ED if worse Diagnosis Clinical Impression: 1. Urinary tract infection with hematuria, site unspecified Attestations:

## 2018-11-21 NOTE — ED NOTES
Pt here for evaluation of repetative behavior according to facility. Pt brought in by EMS, Pt is speaking in complete sentences. Emergency Department Nursing Plan of Care The Nursing Plan of Care is developed from the Nursing assessment and Emergency Department Attending provider initial evaluation. The plan of care may be reviewed in the ED Provider note. The Plan of Care was developed with the following considerations:  
Patient / Family readiness to learn indicated by:verbalized understanding Persons(s) to be included in education: patient Barriers to Learning/Limitations:No 
 
Signed Laura Vidal RN   
11/21/2018   1:22 PM

## 2018-11-21 NOTE — DISCHARGE INSTRUCTIONS

## 2018-11-21 NOTE — ED TRIAGE NOTES
Patient presents to ED via EMS for concerns of wandering at night. [facility] Caretakers were concerned that patient was wandering last night. Patient alert to name and place. Currently denies SI/HI. Does admit prevoius inpatient psychiatric admissions. Currently, denies ETOH, illicit substance or prescription drug abuse.

## 2018-11-21 NOTE — ED NOTES
Nursing supervisor contacted to assist with arranging return transportation back to [facility].

## 2018-11-21 NOTE — ED NOTES
[facility] spoke with provider and plan is to return pt to facility. Plan is to DC pt back to facility. PIV removed intact and pt awaiting transportation to return to facility. Patient (s)  given copy of dc instructions and 0 script(s). Patient (s)  verbalized understanding of instructions and script (s). Patient given a current medication reconciliation form and verbalized understanding of their medications. Patient (s) verbalized understanding of the importance of discussing medications with  his or her physician or clinic they will be following up with. Patient alert and oriented and in no acute distress. Patient discharged home ambulatory with self.

## 2018-11-22 NOTE — ED NOTES
Discharge summary and discharge medications reviewed with patient and appropriate educational materials and side effects teaching were provided. patient  Given 1 paper prescriptions and 0 electronic prescriptions sent to pt's listed pharmacy. Patient  verbalized understanding of the importance of discussing medications with his or her physician or clinic they will be following up with. No si/s of acute distress prior to discharge. Patient offered wheelchair from treatment area to hospital entrance, patient escorted out via wheelchair. Discharged via [place]. ED tech escorted patient to cab ride/Lyft.

## 2018-11-23 LAB
BACTERIA SPEC CULT: NORMAL
C TRACH DNA SPEC QL NAA+PROBE: NEGATIVE
CC UR VC: NORMAL
N GONORRHOEA DNA SPEC QL NAA+PROBE: NEGATIVE
SAMPLE TYPE: NORMAL
SERVICE CMNT-IMP: NORMAL
SERVICE CMNT-IMP: NORMAL
SPECIMEN SOURCE: NORMAL

## 2018-12-02 ENCOUNTER — APPOINTMENT (OUTPATIENT)
Dept: GENERAL RADIOLOGY | Age: 43
End: 2018-12-02
Attending: PHYSICIAN ASSISTANT
Payer: MEDICARE

## 2018-12-02 ENCOUNTER — HOSPITAL ENCOUNTER (EMERGENCY)
Age: 43
Discharge: HOME | End: 2018-12-02
Attending: EMERGENCY MEDICINE | Admitting: EMERGENCY MEDICINE
Payer: MEDICARE

## 2018-12-02 VITALS
HEART RATE: 84 BPM | DIASTOLIC BLOOD PRESSURE: 101 MMHG | HEIGHT: 70 IN | TEMPERATURE: 97.9 F | BODY MASS INDEX: 23.91 KG/M2 | WEIGHT: 167 LBS | RESPIRATION RATE: 16 BRPM | SYSTOLIC BLOOD PRESSURE: 140 MMHG | OXYGEN SATURATION: 95 %

## 2018-12-02 DIAGNOSIS — K59.00 CONSTIPATION, UNSPECIFIED CONSTIPATION TYPE: Primary | ICD-10-CM

## 2018-12-02 PROCEDURE — 74019 RADEX ABDOMEN 2 VIEWS: CPT

## 2018-12-02 PROCEDURE — 99284 EMERGENCY DEPT VISIT MOD MDM: CPT

## 2018-12-02 PROCEDURE — 74011250637 HC RX REV CODE- 250/637: Performed by: PHYSICIAN ASSISTANT

## 2018-12-02 RX ORDER — MAGNESIUM CITRATE
296 SOLUTION, ORAL ORAL
Status: COMPLETED | OUTPATIENT
Start: 2018-12-02 | End: 2018-12-02

## 2018-12-02 RX ADMIN — MAGESIUM CITRATE 296 ML: 1.75 LIQUID ORAL at 10:40

## 2018-12-02 NOTE — ED NOTES
Soap suds enema completed. BSC positioned closely to bed for patient to use. Call bell within reach.

## 2018-12-02 NOTE — ED PROVIDER NOTES
37year old male presenting to the ED for constipation. Pt reports that he has had a problem with constipation for years. Notes that for the last 1.5 weeks has had increasing issues. Last BM 3-4 days ago. No relief with senna or colace. + abdominal and back pain. No vomiting. No fever. PMHx: splenectomy (traumatic), HTN, COPD Social: lives in a group home.  + tobacco use. Past Medical History:  
Diagnosis Date  Back pain  Chronic bronchitis ([address] COPD  Elevated WBCs  H/O splenectomy  HTN (hypertension)  Ill-defined condition   
 constipation  Psychiatric disorder   
 depression, anxiety Past Surgical History:  
Procedure Laterality Date  HX OTHER SURGICAL Right   
 two right wrist fractures  HX SPLENECTOMY Family History:  
Problem Relation Age of Onset  No Known Problems Mother  No Known Problems Father  No Known Problems Brother Social History Socioeconomic History  Marital status: SINGLE Spouse name: Not on file  Number of children: Not on file  Years of education: Not on file  Highest education level: Not on file Social Needs  Financial resource strain: Not on file  Food insecurity - worry: Not on file  Food insecurity - inability: Not on file  Transportation needs - medical: Not on file  Transportation needs - non-medical: Not on file Occupational History  Not on file Tobacco Use  Smoking status: Current Every Day Smoker  Smokeless tobacco: Never Used Substance and Sexual Activity  Alcohol use: No  
 Drug use: No  
  Comment: \"not for years\"  Sexual activity: No  
Other Topics Concern  Not on file Social History Narrative Social History:  
    Reviewed history from 02/03/2017 and no changes required:  
       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  
       Alcohol: None, sober since 2014 Smoke: 1 PPD Drugs: None For fun: Fishing, crafts, pencil drawing Salvador Corbin,  Dr. Puri, [employer] psychiatry Smoking History:  
       Patient currently smokes every day. Patient has been counseled to quit. ALLERGIES: Haloperidol Review of Systems Constitutional: Negative for fever. HENT: Negative for congestion. Respiratory: Negative for shortness of breath. Cardiovascular: Negative for chest pain. Gastrointestinal: Positive for abdominal pain. Negative for diarrhea, nausea and vomiting. Genitourinary: Negative for difficulty urinating. Musculoskeletal: Negative for joint swelling. Skin: Negative for wound. Neurological: Negative for syncope. All other systems reviewed and are negative. Vitals:  
 12/02/18 8004 BP: (!) 140/101 Pulse: 84 Resp: 16 Temp: 97.9 °F (36.6 °C) SpO2: 95% Weight: 75.8 kg (167 lb) Height: 5' 9.5\" (1.765 m) Physical Exam  
Constitutional: He appears well-developed and well-nourished. No distress. Pleasant WM  
HENT:  
Right Ear: External ear normal.  
Left Ear: External ear normal.  
Eyes: Pupils are equal, round, and reactive to light. Neck: Normal range of motion. Neck supple. Cardiovascular: Normal rate, regular rhythm and normal heart sounds. Exam reveals no gallop and no friction rub. No murmur heard. Pulmonary/Chest: Effort normal and breath sounds normal. No respiratory distress. He has no wheezes. Abdominal: Soft. Bowel sounds are normal. There is no tenderness. There is no guarding. Soft, non-tender Musculoskeletal: Normal range of motion. Neurological: He is alert. Skin: Skin is warm and dry. Psychiatric: He has a normal mood and affect. Nursing note and vitals reviewed.  
  
 
[place] 
 Number of Diagnoses or Management Options Constipation, unspecified constipation type:  
Diagnosis management comments: 37year old male presenting for abdominal pain, constipation. Hx same. Prior surgery - no signs of obstruction on XR, no vomiting, abdomen benign, normal bowel sounds. Markedly improved after mag citrate, enema in the ED. Return precautions given, pt already has senna and colace at home. Amount and/or Complexity of Data Reviewed Tests in the radiology section of CPT®: ordered and reviewed Discuss the patient with other providers: yes (Dr. Saira Ortega, ED attending) Independent visualization of images, tracings, or specimens: yes (KUB) Procedures Pt had BM and feels much better. Ready for discharge.  
SHAINA Angeles 
11:50 AM

## 2018-12-02 NOTE — ED TRIAGE NOTES
Pt states that he has been having difficulty have a BM since Thursday with pain to his abd. Pt was recently on cipro for an URI.

## 2018-12-02 NOTE — ED NOTES
Patient resting comfortably in chair, patient requesting OJ. Per provider, patient unable to have OJ at this time. Patient denies further needs at this time.

## 2018-12-02 NOTE — DISCHARGE INSTRUCTIONS
- return for new or worsening symptoms  - primary care follow up this week     Constipation: Care Instructions  Your Care Instructions    Constipation means that you have a hard time passing stools (bowel movements). People pass stools from 3 times a day to once every 3 days. What is normal for you may be different. Constipation may occur with pain in the rectum and cramping. The pain may get worse when you try to pass stools. Sometimes there are small amounts of bright red blood on toilet paper or the surface of stools. This is because of enlarged veins near the rectum (hemorrhoids). A few changes in your diet and lifestyle may help you avoid ongoing constipation. Your doctor may also prescribe medicine to help loosen your stool. Some medicines can cause constipation. These include pain medicines and antidepressants. Tell your doctor about all the medicines you take. Your doctor may want to make a medicine change to ease your symptoms. Follow-up care is a key part of your treatment and safety. Be sure to make and go to all appointments, and call your doctor if you are having problems. It's also a good idea to know your test results and keep a list of the medicines you take. How can you care for yourself at home? · Drink plenty of fluids, enough so that your urine is light yellow or clear like water. If you have kidney, heart, or liver disease and have to limit fluids, talk with your doctor before you increase the amount of fluids you drink. · Include high-fiber foods in your diet each day. These include fruits, vegetables, beans, and whole grains. · Get at least 30 minutes of exercise on most days of the week. Walking is a good choice. You also may want to do other activities, such as running, swimming, cycling, or playing tennis or team sports. · Take a fiber supplement, such as Citrucel or Metamucil, every day. Read and follow all instructions on the label. · Schedule time each day for a bowel movement. A daily routine may help. Take your time having your bowel movement. · Support your feet with a small step stool when you sit on the toilet. This helps flex your hips and places your pelvis in a squatting position. · Your doctor may recommend an over-the-counter laxative to relieve your constipation. Examples are Milk of Magnesia and MiraLax. Read and follow all instructions on the label. Do not use laxatives on a long-term basis. When should you call for help? Call your doctor now or seek immediate medical care if:    · You have new or worse belly pain.     · You have new or worse nausea or vomiting.     · You have blood in your stools.    Watch closely for changes in your health, and be sure to contact your doctor if:    · Your constipation is getting worse.     · You do not get better as expected. Where can you learn more? Go to http://holli-luiz.info/. Enter [phone] in the search box to learn more about \"Constipation: Care Instructions. \"  Current as of: November 20, 2017  Content Version: 11.8  © 1630-4031 [employer], Incorporated. Care instructions adapted under license by [employer] (which disclaims liability or warranty for this information). If you have questions about a medical condition or this instruction, always ask your healthcare professional. [address] any warranty or liability for your use of this information.

## 2019-02-26 ENCOUNTER — HOSPITAL ENCOUNTER (EMERGENCY)
Age: 44
Discharge: HOME | End: 2019-02-26
Attending: EMERGENCY MEDICINE
Payer: MEDICARE

## 2019-02-26 VITALS
HEART RATE: 96 BPM | SYSTOLIC BLOOD PRESSURE: 123 MMHG | OXYGEN SATURATION: 97 % | TEMPERATURE: 97.9 F | DIASTOLIC BLOOD PRESSURE: 86 MMHG | RESPIRATION RATE: 16 BRPM

## 2019-02-26 DIAGNOSIS — S09.90XA CLOSED HEAD INJURY, INITIAL ENCOUNTER: ICD-10-CM
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
## 2019-02-26 DIAGNOSIS — S01.81XA FACIAL LACERATION, INITIAL ENCOUNTER: Primary | ICD-10-CM

## 2019-02-26 PROCEDURE — 77030018836 HC SOL IRR NACL ICUM -A

## 2019-02-26 PROCEDURE — 75810000293 HC SIMP/SUPERF WND  RPR

## 2019-02-26 PROCEDURE — 77030002888 HC SUT CHRMC J&J -A

## 2019-02-26 PROCEDURE — 74011250636 HC RX REV CODE- 250/636: Performed by: PHYSICIAN ASSISTANT

## 2019-02-26 PROCEDURE — 74011250637 HC RX REV CODE- 250/637: Performed by: PHYSICIAN ASSISTANT

## 2019-02-26 PROCEDURE — 99283 EMERGENCY DEPT VISIT LOW MDM: CPT

## 2019-02-26 RX ORDER — LIDOCAINE HYDROCHLORIDE 10 MG/ML
10 INJECTION, SOLUTION EPIDURAL; INFILTRATION; INTRACAUDAL; PERINEURAL ONCE
Status: COMPLETED | OUTPATIENT
Start: 2019-02-26 | End: 2019-02-26

## 2019-02-26 RX ORDER — ACETAMINOPHEN 500 MG
1000 TABLET ORAL
Status: COMPLETED | OUTPATIENT
Start: 2019-02-26 | End: 2019-02-26

## 2019-02-26 RX ADMIN — LIDOCAINE HYDROCHLORIDE 10 ML: 10 INJECTION, SOLUTION EPIDURAL; INFILTRATION; INTRACAUDAL; PERINEURAL at 17:37

## 2019-02-26 RX ADMIN — ACETAMINOPHEN 1000 MG: 500 TABLET ORAL at 17:37

## 2019-02-26 NOTE — DISCHARGE INSTRUCTIONS
Patient Education        Cuts: Care Instructions  Your Care Instructions  A cut can happen anywhere on your body. Stitches, staples, skin adhesives, or pieces of tape called Steri-Strips are sometimes used to keep the edges of a cut together and help it heal. Steri-Strips can be used by themselves or with stitches or staples. Sometimes cuts are left open. If the cut went deep and through the skin, the doctor may have closed the cut in two layers. A deeper layer of stitches brings the deep part of the cut together. These stitches will dissolve and don't need to be removed. The upper layer closure, which could be stitches, staples, Steri-Strips, or adhesive, is what you see on the cut. A cut is often covered by a bandage. The doctor has checked you carefully, but problems can develop later. If you notice any problems or new symptoms, get medical treatment right away. Follow-up care is a key part of your treatment and safety. Be sure to make and go to all appointments, and call your doctor if you are having problems. It's also a good idea to know your test results and keep a list of the medicines you take. How can you care for yourself at home? If a cut is open or closed  · Prop up the sore area on a pillow anytime you sit or lie down during the next 3 days. Try to keep it above the level of your heart. This will help reduce swelling. · Keep the cut dry for the first 24 to 48 hours. After this, you can shower if your doctor okays it. Pat the cut dry. · Don't soak the cut, such as in a bathtub. Your doctor will tell you when it's safe to get the cut wet. · After the first 24 to 48 hours, clean the cut with soap and water 2 times a day unless your doctor gives you different instructions. ? Don't use hydrogen peroxide or alcohol, which can slow healing. ? You may cover the cut with a thin layer of petroleum jelly and a nonstick bandage.   ? If the doctor put a bandage over the cut, put on a new bandage after cleaning the cut or if the bandage gets wet or dirty. · Avoid any activity that could cause your cut to reopen. · Be safe with medicines. Read and follow all instructions on the label. ? If the doctor gave you a prescription medicine for pain, take it as prescribed. ? If you are not taking a prescription pain medicine, ask your doctor if you can take an over-the-counter medicine. If the cut is closed with stitches, staples, or Steri-Strips  · Follow the above instructions for open or closed cuts. · Do not remove the stitches or staples on your own. Your doctor will tell you when to come back to have the stitches or staples removed. · Leave Steri-Strips on until they fall off. If the cut is closed with a skin adhesive  · Follow the above instructions for open or closed cuts. · Leave the skin adhesive on your skin until it falls off on its own. This may take 5 to 10 days. · Do not scratch, rub, or pick at the adhesive. · Do not put the sticky part of a bandage directly on the adhesive. · Do not put any kind of ointment, cream, or lotion over the area. This can make the adhesive fall off too soon. Do not use hydrogen peroxide or alcohol, which can slow healing. When should you call for help? Call your doctor now or seek immediate medical care if:    · You have new pain, or your pain gets worse.     · The skin near the cut is cold or pale or changes color.     · You have tingling, weakness, or numbness near the cut.     · The cut starts to bleed, and blood soaks through the bandage. Oozing small amounts of blood is normal.     · You have trouble moving the area near the cut.     · You have symptoms of infection, such as:  ? Increased pain, swelling, warmth, or redness around the cut.  ? Red streaks leading from the cut.  ? Pus draining from the cut.  ? A fever.    Watch closely for changes in your health, and be sure to contact your doctor if:    · The cut reopens.     · You do not get better as expected. Where can you learn more? Go to http://holli-luiz.info/. Enter M735 in the search box to learn more about \"Cuts: Care Instructions. \"  Current as of: September 23, 2018  Content Version: 11.9  © 5432-3013 [employer]. Care instructions adapted under license by [employer] (which disclaims liability or warranty for this information). If you have questions about a medical condition or this instruction, always ask your healthcare professional. [place] [address] any warranty or liability for your use of this information. Patient Education        Learning About a Closed Head Injury  What is a closed head injury? A closed head injury happens when your head gets hit hard. The strong force of the blow causes your brain to shake in your skull. This movement can cause the brain to bruise, swell, or tear. Sometimes nerves or blood vessels also get damaged. This can cause bleeding in or around the brain. A concussion is a type of closed head injury. What are the symptoms? If you have a mild concussion, you may have a mild headache or feel \"not quite right. \" These symptoms are common. They usually go away over a few days to 4 weeks. But sometimes after a concussion, you feel like you can't function as well as before the injury. And you have new symptoms. This is called postconcussive syndrome. You may:  · Find it harder to solve problems, think, concentrate, or remember. · Have headaches. · Have changes in your sleep patterns, such as not being able to sleep or sleeping all the time. · Have changes in your personality. · Not be interested in your usual activities. · Feel angry or anxious without a clear reason. · Lose your sense of taste or smell. · Be dizzy, lightheaded, or unsteady. It may be hard to stand or walk. How is a closed head injury treated? Any person who may have a concussion needs to see a doctor.  Some people have to stay in the hospital to be watched. Others can go home safely. If you go home, follow your doctor's instructions. He or she will tell you if you need someone to watch you closely for the next 24 hours or longer. Rest is the best treatment. Get plenty of sleep at night. And try to rest during the day. · Avoid activities that are physically or mentally demanding. These include housework, exercise, and schoolwork. And don't play video games, send text messages, or use the computer. You may need to change your school or work schedule to be able to avoid these activities. · Ask your doctor when it's okay to drive, ride a bike, or operate machinery. · Take an over-the-counter pain medicine, such as acetaminophen (Tylenol), ibuprofen (Advil, Motrin), or naproxen (Aleve). Be safe with medicines. Read and follow all instructions on the label. · Check with your doctor before you use any other medicines for pain. · Do not drink alcohol or use illegal drugs. They can slow recovery. They can also increase your risk of getting a second head injury. Follow-up care is a key part of your treatment and safety. Be sure to make and go to all appointments, and call your doctor if you are having problems. It's also a good idea to know your test results and keep a list of the medicines you take. Where can you learn more? Go to http://holli-luiz.info/. Enter E235 in the search box to learn more about \"Learning About a Closed Head Injury. \"  Current as of: Jessica 3, 2018  Content Version: 11.9  © 0986-6561 [employer]. Care instructions adapted under license by [employer] (which disclaims liability or warranty for this information). If you have questions about a medical condition or this instruction, always ask your healthcare professional. [address] any warranty or liability for your use of this information.
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
## 2019-02-26 NOTE — ED NOTES
Discharge instructions given to patient by PA and nurse. Pt verbalizes understanding. Pt ambulated off of unit in no signs of distress. [employer] set up.

## 2020-05-26 ENCOUNTER — HOSPITAL ENCOUNTER (INPATIENT)
Age: 45
LOS: 10 days | Discharge: ANOTHER HOSPITAL | DRG: 885 | End: 2020-06-05
Attending: EMERGENCY MEDICINE | Admitting: INTERNAL MEDICINE
Payer: MEDICARE

## 2020-05-26 ENCOUNTER — APPOINTMENT (OUTPATIENT)
Dept: GENERAL RADIOLOGY | Age: 45
DRG: 885 | End: 2020-05-26
Attending: EMERGENCY MEDICINE
Payer: MEDICARE

## 2020-05-26 DIAGNOSIS — T18.9XXA FOREIGN BODY IN DIGESTIVE TRACT, INITIAL ENCOUNTER: ICD-10-CM

## 2020-05-26 DIAGNOSIS — F25.9 SCHIZOAFFECTIVE DISORDER, UNSPECIFIED TYPE (HCC): Primary | ICD-10-CM

## 2020-05-26 PROBLEM — F20.9 SCHIZOPHRENIA (HCC): Status: ACTIVE | Noted: 2020-05-26

## 2020-05-26 LAB
ALBUMIN SERPL-MCNC: 2.8 G/DL (ref 3.5–5)
ALBUMIN/GLOB SERPL: 0.8 {RATIO} (ref 1.1–2.2)
ALP SERPL-CCNC: 88 U/L (ref 45–117)
ALT SERPL-CCNC: 18 U/L (ref 12–78)
AMPHET UR QL SCN: NEGATIVE
ANION GAP SERPL CALC-SCNC: 8 MMOL/L (ref 5–15)
APAP SERPL-MCNC: 2 UG/ML (ref 10–30)
APPEARANCE UR: ABNORMAL
AST SERPL-CCNC: 17 U/L (ref 15–37)
BACTERIA URNS QL MICRO: NEGATIVE /HPF
BARBITURATES UR QL SCN: NEGATIVE
BASOPHILS # BLD: 0.1 K/UL (ref 0–0.1)
BASOPHILS NFR BLD: 1 % (ref 0–1)
BENZODIAZ UR QL: NEGATIVE
BILIRUB SERPL-MCNC: 0.2 MG/DL (ref 0.2–1)
BILIRUB UR QL CFM: NEGATIVE
BUN SERPL-MCNC: 17 MG/DL (ref 6–20)
BUN/CREAT SERPL: 16 (ref 12–20)
CALCIUM SERPL-MCNC: 8.1 MG/DL (ref 8.5–10.1)
CANNABINOIDS UR QL SCN: POSITIVE
CHLORIDE SERPL-SCNC: 106 MMOL/L (ref 97–108)
CO2 SERPL-SCNC: 27 MMOL/L (ref 21–32)
COCAINE UR QL SCN: NEGATIVE
COLOR UR: ABNORMAL
CREAT SERPL-MCNC: 1.09 MG/DL (ref 0.7–1.3)
DIFFERENTIAL METHOD BLD: ABNORMAL
DRUG SCRN COMMENT,DRGCM: ABNORMAL
EOSINOPHIL # BLD: 0.4 K/UL (ref 0–0.4)
EOSINOPHIL NFR BLD: 3 % (ref 0–7)
EPITH CASTS URNS QL MICRO: ABNORMAL /LPF
ERYTHROCYTE [DISTWIDTH] IN BLOOD BY AUTOMATED COUNT: 13 % (ref 11.5–14.5)
ETHANOL SERPL-MCNC: <10 MG/DL
GLOBULIN SER CALC-MCNC: 3.4 G/DL (ref 2–4)
GLUCOSE SERPL-MCNC: 122 MG/DL (ref 65–100)
GLUCOSE UR STRIP.AUTO-MCNC: NEGATIVE MG/DL
HCT VFR BLD AUTO: 35.5 % (ref 36.6–50.3)
HGB BLD-MCNC: 12 G/DL (ref 12.1–17)
HGB UR QL STRIP: ABNORMAL
IMM GRANULOCYTES # BLD AUTO: 0 K/UL (ref 0–0.04)
IMM GRANULOCYTES NFR BLD AUTO: 0 % (ref 0–0.5)
KETONES UR QL STRIP.AUTO: ABNORMAL MG/DL
LEUKOCYTE ESTERASE UR QL STRIP.AUTO: NEGATIVE
LYMPHOCYTES # BLD: 3.7 K/UL (ref 0.8–3.5)
LYMPHOCYTES NFR BLD: 28 % (ref 12–49)
MCH RBC QN AUTO: 30.6 PG (ref 26–34)
MCHC RBC AUTO-ENTMCNC: 33.8 G/DL (ref 30–36.5)
MCV RBC AUTO: 90.6 FL (ref 80–99)
METHADONE UR QL: NEGATIVE
MONOCYTES # BLD: 1.3 K/UL (ref 0–1)
MONOCYTES NFR BLD: 10 % (ref 5–13)
NEUTS SEG # BLD: 7.4 K/UL (ref 1.8–8)
NEUTS SEG NFR BLD: 58 % (ref 32–75)
NITRITE UR QL STRIP.AUTO: NEGATIVE
NRBC # BLD: 0 K/UL (ref 0–0.01)
NRBC BLD-RTO: 0 PER 100 WBC
OPIATES UR QL: NEGATIVE
PCP UR QL: NEGATIVE
PH UR STRIP: 6 [PH] (ref 5–8)
PLATELET # BLD AUTO: 346 K/UL (ref 150–400)
PMV BLD AUTO: 10.2 FL (ref 8.9–12.9)
POTASSIUM SERPL-SCNC: 3.3 MMOL/L (ref 3.5–5.1)
PROT SERPL-MCNC: 6.2 G/DL (ref 6.4–8.2)
PROT UR STRIP-MCNC: >300 MG/DL
RBC # BLD AUTO: 3.92 M/UL (ref 4.1–5.7)
RBC #/AREA URNS HPF: ABNORMAL /HPF (ref 0–5)
SALICYLATES SERPL-MCNC: 3.2 MG/DL (ref 2.8–20)
SODIUM SERPL-SCNC: 141 MMOL/L (ref 136–145)
SP GR UR REFRACTOMETRY: 1.02 (ref 1–1.03)
UA: UC IF INDICATED,UAUC: ABNORMAL
UROBILINOGEN UR QL STRIP.AUTO: 1 EU/DL (ref 0.2–1)
WBC # BLD AUTO: 12.9 K/UL (ref 4.1–11.1)
WBC URNS QL MICRO: ABNORMAL /HPF (ref 0–4)

## 2020-05-26 PROCEDURE — 80307 DRUG TEST PRSMV CHEM ANLYZR: CPT

## 2020-05-26 PROCEDURE — 80053 COMPREHEN METABOLIC PANEL: CPT

## 2020-05-26 PROCEDURE — 36415 COLL VENOUS BLD VENIPUNCTURE: CPT

## 2020-05-26 PROCEDURE — 81001 URINALYSIS AUTO W/SCOPE: CPT

## 2020-05-26 PROCEDURE — 65220000003 HC RM SEMIPRIVATE PSYCH

## 2020-05-26 PROCEDURE — 85025 COMPLETE CBC W/AUTO DIFF WBC: CPT

## 2020-05-26 PROCEDURE — 99285 EMERGENCY DEPT VISIT HI MDM: CPT

## 2020-05-26 PROCEDURE — 74018 RADEX ABDOMEN 1 VIEW: CPT

## 2020-05-26 RX ORDER — LORAZEPAM 2 MG/ML
1 INJECTION INTRAMUSCULAR
Status: DISCONTINUED | OUTPATIENT
Start: 2020-05-26 | End: 2020-06-05 | Stop reason: HOSPADM

## 2020-05-26 RX ORDER — ACETAMINOPHEN 325 MG/1
650 TABLET ORAL
Status: DISCONTINUED | OUTPATIENT
Start: 2020-05-26 | End: 2020-06-05 | Stop reason: HOSPADM

## 2020-05-26 RX ORDER — OLANZAPINE 5 MG/1
5 TABLET ORAL
Status: DISCONTINUED | OUTPATIENT
Start: 2020-05-26 | End: 2020-06-05 | Stop reason: HOSPADM

## 2020-05-26 RX ORDER — HYDROXYZINE 25 MG/1
50 TABLET, FILM COATED ORAL
Status: DISCONTINUED | OUTPATIENT
Start: 2020-05-26 | End: 2020-06-05 | Stop reason: HOSPADM

## 2020-05-26 RX ORDER — CHLORPROMAZINE HYDROCHLORIDE 25 MG/ML
25 INJECTION INTRAMUSCULAR
Status: DISCONTINUED | OUTPATIENT
Start: 2020-05-26 | End: 2020-06-05 | Stop reason: HOSPADM

## 2020-05-26 RX ORDER — ADHESIVE BANDAGE
30 BANDAGE TOPICAL DAILY PRN
Status: DISCONTINUED | OUTPATIENT
Start: 2020-05-26 | End: 2020-06-01

## 2020-05-26 RX ORDER — BENZTROPINE MESYLATE 1 MG/1
1 TABLET ORAL
Status: DISCONTINUED | OUTPATIENT
Start: 2020-05-26 | End: 2020-06-05 | Stop reason: HOSPADM

## 2020-05-26 RX ORDER — DIPHENHYDRAMINE HYDROCHLORIDE 50 MG/ML
50 INJECTION, SOLUTION INTRAMUSCULAR; INTRAVENOUS
Status: DISCONTINUED | OUTPATIENT
Start: 2020-05-26 | End: 2020-06-05 | Stop reason: HOSPADM

## 2020-05-26 RX ORDER — TRAZODONE HYDROCHLORIDE 50 MG/1
50 TABLET ORAL
Status: DISCONTINUED | OUTPATIENT
Start: 2020-05-26 | End: 2020-06-05 | Stop reason: HOSPADM
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
## 2020-05-26 NOTE — ED PROVIDER NOTES
EMERGENCY DEPARTMENT HISTORY AND PHYSICAL EXAM      Date: 5/26/2020  Patient Name: Caron Hollins    Please note that this dictation was completed with [employer], the computer voice recognition software. Quite often unanticipated grammatical, syntax, homophones, and other interpretive errors are inadvertently transcribed by the computer software. Please disregard these errors. Please excuse any errors that have escaped final proofreading. History of Presenting Illness     Chief Complaint   Patient presents with   [address]       History Provided By: Patient, police    HPI: Caron Hollins, 50 y.o. male, with a past medical history significant for schizoaffective disorder brought in in police custody after he was found sitting in front of a store eating grass and breaking apart pieces of loose pavement. Please initially tried to get him to move away from the store, but he kept coming back. They reported his behavior is extremely bizarre. Patient is oriented to place, but not time or completely to situation. He states that he was \"grinding out daija dust \"and that he is a  and he is working on making \"earth dope \". He denies any SI or HI. He is very tangential and is unable to provide a coherent history. He denies any alcohol today, he does report that he would like to drink, he also reports that he would like to use drugs, but he denies any recent drug use. Denies any acute medical concerns today. Denies any chest pain, abdominal pain, nausea vomiting or diarrhea. PCP: None    No current facility-administered medications on file prior to encounter. Current Outpatient Medications on File Prior to Encounter   Medication Sig Dispense Refill    cloZAPine (CLOZARIL) 100 mg tablet Take 3 Tabs by mouth nightly. 21 Tab 0    cloZAPine 200 mg tablet Take 1 Tab by mouth daily. 7 Tab 0    busPIRone (BUSPAR) 15 mg tablet Take 1 Tab by mouth two (2) times a day.  28 Tab 0    propranolol LA (INDERAL LA) 120 mg SR capsule Take 1 Cap by mouth daily. 30 Cap 0    senna (SENOKOT) 8.6 mg tablet Take 2 Tabs by mouth daily. 28 Tab 0    docusate sodium (COLACE) 100 mg capsule Take 1 Tab by mouth two (2) times a day. Indications: constipation         Past History     Past Medical History:  Past Medical History:   Diagnosis Date    Back pain     Chronic bronchitis (HCC)     COPD    Elevated WBCs     H/O splenectomy     HTN (hypertension)     Ill-defined condition     constipation    Psychiatric disorder     depression, anxiety       Past Surgical History:  Past Surgical History:   Procedure Laterality Date    HX OTHER SURGICAL Right     two right wrist fractures     HX SPLENECTOMY         Family History:  Family History   Problem Relation Age of Onset    No Known Problems Mother     No Known Problems Father     No Known Problems Brother        Social History:  Social History     Tobacco Use    Smoking status: Former Smoker    Smokeless tobacco: Never Used   Substance Use Topics    Alcohol use: No    Drug use: No     Comment: \"not for years\"       Allergies: Allergies   Allergen Reactions    Haloperidol Other (comments)         Review of Systems   Review of Systems   Constitutional: Negative for chills and fever. Respiratory: Negative for cough and shortness of breath. Cardiovascular: Negative for chest pain. Gastrointestinal: Negative for nausea and vomiting. Psychiatric/Behavioral: Positive for behavioral problems. All other systems reviewed and are negative. Physical Exam   Physical Exam  Vitals signs and nursing note reviewed. Constitutional:       Appearance: He is well-developed. Comments: Disheveled male,   HENT:      Head: Normocephalic and atraumatic. Eyes:      General:         Right eye: No discharge. Left eye: No discharge. Conjunctiva/sclera: Conjunctivae normal.      Pupils: Pupils are equal, round, and reactive to light.    Neck: Musculoskeletal: Normal range of motion and neck supple. Trachea: No tracheal deviation. Cardiovascular:      Rate and Rhythm: Normal rate and regular rhythm. Heart sounds: Normal heart sounds. No murmur. Pulmonary:      Effort: Pulmonary effort is normal. No respiratory distress. Breath sounds: Normal breath sounds. No wheezing or rales. Abdominal:      General: Bowel sounds are normal.      Palpations: Abdomen is soft. Tenderness: There is no abdominal tenderness. There is no guarding or rebound. Musculoskeletal: Normal range of motion. General: No tenderness or deformity. Skin:     General: Skin is warm and dry. Findings: No erythema or rash. Comments: Patient skin is extremely dirty, he has several excoriations on the arms and on his face that appear most consistent with picking and scratching. Neurological:      Mental Status: He is alert. Comments:  place, but not time or situation   Psychiatric:         Attention and Perception: He perceives auditory and visual hallucinations. Speech: Speech is tangential.         Behavior: Behavior is uncooperative. Thought Content: Thought content is delusional.         Judgment: Judgment is inappropriate. Comments: Patient reports auditory and visual hallucinations. He reports seeing a temple and hearing spirits. He is tangential and delusional.  For me he is most fixated on the idea that he is a  and that he is making something called \"earth dope \".          Diagnostic Study Results     Labs -     Recent Results (from the past 12 hour(s))   CBC WITH AUTOMATED DIFF    Collection Time: 05/26/20  3:44 PM   Result Value Ref Range    WBC 12.9 (H) 4.1 - 11.1 K/uL    RBC 3.92 (L) 4.10 - 5.70 M/uL    HGB 12.0 (L) 12.1 - 17.0 g/dL    HCT 35.5 (L) 36.6 - 50.3 %    MCV 90.6 80.0 - 99.0 FL    MCH 30.6 26.0 - 34.0 PG    MCHC 33.8 30.0 - 36.5 g/dL    RDW 13.0 11.5 - 14.5 %    PLATELET 048 854 - 674 K/uL MPV 10.2 8.9 - 12.9 FL    NRBC 0.0 0  WBC    ABSOLUTE NRBC 0.00 0.00 - 0.01 K/uL    NEUTROPHILS 58 32 - 75 %    LYMPHOCYTES 28 12 - 49 %    MONOCYTES 10 5 - 13 %    EOSINOPHILS 3 0 - 7 %    BASOPHILS 1 0 - 1 %    IMMATURE GRANULOCYTES 0 0.0 - 0.5 %    ABS. NEUTROPHILS 7.4 1.8 - 8.0 K/UL    ABS. LYMPHOCYTES 3.7 (H) 0.8 - 3.5 K/UL    ABS. MONOCYTES 1.3 (H) 0.0 - 1.0 K/UL    ABS. EOSINOPHILS 0.4 0.0 - 0.4 K/UL    ABS. BASOPHILS 0.1 0.0 - 0.1 K/UL    ABS. IMM. GRANS. 0.0 0.00 - 0.04 K/UL    DF AUTOMATED     METABOLIC PANEL, COMPREHENSIVE    Collection Time: 05/26/20  3:44 PM   Result Value Ref Range    Sodium 141 136 - 145 mmol/L    Potassium 3.3 (L) 3.5 - 5.1 mmol/L    Chloride 106 97 - 108 mmol/L    CO2 27 21 - 32 mmol/L    Anion gap 8 5 - 15 mmol/L    Glucose 122 (H) 65 - 100 mg/dL    BUN 17 6 - 20 MG/DL    Creatinine 1.09 0.70 - 1.30 MG/DL    BUN/Creatinine ratio 16 12 - 20      GFR est AA >60 >60 ml/min/1.73m2    GFR est non-AA >60 >60 ml/min/1.73m2    Calcium 8.1 (L) 8.5 - 10.1 MG/DL    Bilirubin, total 0.2 0.2 - 1.0 MG/DL    ALT (SGPT) 18 12 - 78 U/L    AST (SGOT) 17 15 - 37 U/L    Alk.  phosphatase 88 45 - 117 U/L    Protein, total 6.2 (L) 6.4 - 8.2 g/dL    Albumin 2.8 (L) 3.5 - 5.0 g/dL    Globulin 3.4 2.0 - 4.0 g/dL    A-G Ratio 0.8 (L) 1.1 - 2.2     ACETAMINOPHEN    Collection Time: 05/26/20  3:44 PM   Result Value Ref Range    Acetaminophen level 2 (L) 10 - 30 ug/mL   SALICYLATE    Collection Time: 05/26/20  3:44 PM   Result Value Ref Range    Salicylate level 3.2 2.8 - 20.0 MG/DL   ETHYL ALCOHOL    Collection Time: 05/26/20  3:44 PM   Result Value Ref Range    ALCOHOL(ETHYL),SERUM <10 <10 MG/DL   DRUG SCREEN, URINE    Collection Time: 05/26/20  5:34 PM   Result Value Ref Range    AMPHETAMINES Negative NEG      BARBITURATES Negative NEG      BENZODIAZEPINES Negative NEG      COCAINE Negative NEG      METHADONE Negative NEG      OPIATES Negative NEG      PCP(PHENCYCLIDINE) Negative NEG      THC (TH-CANNABINOL) Positive (A) NEG      Drug screen comment (NOTE)    URINALYSIS W/ REFLEX CULTURE    Collection Time: 05/26/20  5:34 PM   Result Value Ref Range    Color YELLOW/STRAW      Appearance CLOUDY (A) CLEAR      Specific gravity 1.025 1.003 - 1.030      pH (UA) 6.0 5.0 - 8.0      Protein >300 (A) NEG mg/dL    Glucose Negative NEG mg/dL    Ketone TRACE (A) NEG mg/dL    Blood LARGE (A) NEG      Urobilinogen 1.0 0.2 - 1.0 EU/dL    Nitrites Negative NEG      Leukocyte Esterase Negative NEG      WBC 0-4 0 - 4 /hpf    RBC 10-20 0 - 5 /hpf    Epithelial cells FEW FEW /lpf    Bacteria Negative NEG /hpf    UA:UC IF INDICATED CULTURE NOT INDICATED BY UA RESULT CNI     BILIRUBIN, CONFIRM    Collection Time: 05/26/20  5:34 PM   Result Value Ref Range    Bilirubin UA, confirm Negative NEG         Radiologic Studies -   XR ABD PORT  1 V   Final Result   IMPRESSION: Radiopaque structures in the right lower quadrant and transverse   colon could be compatible with ingested rocks. No plain film evidence for   obstruction                       CT Results  (Last 48 hours)    None        CXR Results  (Last 48 hours)    None            Medical Decision Making   I am the first provider for this patient. I reviewed the vital signs, available nursing notes, past medical history, past surgical history, family history and social history. Vital Signs-Reviewed the patient's vital signs. Patient Vitals for the past 12 hrs:   Temp Pulse Resp BP SpO2   05/26/20 1937  91 16 120/67 97 %   05/26/20 1524    135/78    05/26/20 1503 99.2 °F (37.3 °C) (!) 108 20  99 %           Records Reviewed:   Nursing notes, Prior visits     Provider Notes (Medical Decision Making):   Decompensated schizoaffective male with delusions. I believe he likely needs to be hospitalized, will initiate medical clearance with labs. Will obtain an x-ray of the abdomen given reports that he may have been ingesting rocks and other foreign substances.     ED Course:   Initial assessment performed. The patients presenting problems have been discussed, and they are in agreement with the care plan formulated and outlined with them. I have encouraged them to ask questions as they arise throughout their visit. ED Course as of May 26 2014   Tue May 26, 2020   1618 Lab interpretation: White blood cell count chronically elevated, will replete potassium. KUB shows possible radiopaque foreign body in the right lower quadrant. These could be rocks as per reports the patient may have been eating rocks or pavement. These are small and will pass on their own and should not likely cause much of a problem.    [AR]      ED Course User Index  [AR] Neelam Mahajan DO             Critical Care Time:   none    Disposition:  Patient is being admitted to the hospital by Dr. Lanre Christian The results of their tests and reasons for their admission have been discussed with them and/or available family. They convey agreement and understanding for the need to be admitted and for their admission diagnosis. Consultation has been made with the inpatient physician specialist for hospitalization. PLAN:  1. Current Discharge Medication List        2. Follow-up Information    None         Return to ED if worse     Diagnosis     Clinical Impression:   1. Schizoaffective disorder, unspecified type ([facility] [address]    2. Foreign body in digestive tract, initial encounter        Attestations:   This note was completed by Burgess Tiffany DO

## 2020-05-27 PROCEDURE — 65220000003 HC RM SEMIPRIVATE PSYCH

## 2020-05-27 PROCEDURE — 74011250637 HC RX REV CODE- 250/637: Performed by: NURSE PRACTITIONER

## 2020-05-27 PROCEDURE — 74011250637 HC RX REV CODE- 250/637: Performed by: PSYCHIATRY & NEUROLOGY

## 2020-05-27 RX ORDER — PROPRANOLOL HYDROCHLORIDE 10 MG/1
20 TABLET ORAL 2 TIMES DAILY
Status: DISCONTINUED | OUTPATIENT
Start: 2020-05-27 | End: 2020-06-05 | Stop reason: HOSPADM

## 2020-05-27 RX ORDER — CLOZAPINE 25 MG/1
25 TABLET ORAL
Status: DISCONTINUED | OUTPATIENT
Start: 2020-05-27 | End: 2020-05-28

## 2020-05-27 RX ORDER — MONTELUKAST SODIUM 10 MG/1
10 TABLET ORAL
Status: DISCONTINUED | OUTPATIENT
Start: 2020-05-27 | End: 2020-06-05 | Stop reason: HOSPADM

## 2020-05-27 RX ORDER — AMOXICILLIN 250 MG
2 CAPSULE ORAL DAILY
Status: DISCONTINUED | OUTPATIENT
Start: 2020-05-28 | End: 2020-06-05 | Stop reason: HOSPADM

## 2020-05-27 RX ADMIN — CLOZAPINE 25 MG: 25 TABLET ORAL at 21:30

## 2020-05-27 RX ADMIN — PROPRANOLOL HYDROCHLORIDE 20 MG: 10 TABLET ORAL at 18:18

## 2020-05-27 RX ADMIN — MONTELUKAST SODIUM 10 MG: 10 TABLET, FILM COATED ORAL at 21:30

## 2020-05-27 RX ADMIN — TRAZODONE HYDROCHLORIDE 50 MG: 50 TABLET ORAL at 22:02

## 2020-05-27 NOTE — BH NOTES
Attempted to meet with Brigette Almaraz today. He reports that he does not want to talk to this writer and that \"i'm good. \" Informed him that this writer would check back with him tomorrow and he nodded his head. Will follow up with patient tomorrow.     Arsenio Hammond LPC [facility]

## 2020-05-27 NOTE — H&P
INITIAL PSYCHIATRIC EVALUATION            IDENTIFICATION:    Patient Name  Lubertha Boeck   Date of Birth 1975   [facility] [id number]   Medical Record Number  [id number]      Age  50 y.o. PCP None   Admit date:  5/26/2020    Room Number  617/76  @ [facility]   Date of Service  5/27/2020            HISTORY         REASON FOR HOSPITALIZATION:  CC: \"psychosis\". Pt admitted under a temporary correction order (TDO) for severe psychosis proving to be an imminent danger to self and an inability to care for self. HISTORY OF PRESENT ILLNESS:    The patient, Lubertha Boeck, is a 50 y.o. WHITE [place]  male with a past psychiatric history significant for schizophrenia, who presents at this time with complaints of (and/or evidence of) the following emotional symptoms: delusions and psychotic behavior. Additional symptomatology include poor self care. The above symptoms have been present for 2+ weeks. These symptoms are of moderate to high severity. These symptoms are constant in nature. The patient's condition has been precipitated by psychosocial stressors. Patient's condition made worse by illicit substance use and treatment noncompliance. UDS: THC; BAL=0. The patient is a fair historian. The patient corroborates the above narrative. The patient contracts for safety on the unit and gives consent for the team to contact collateral. The patient is amenable to initiating treatment while on the unit. The patient is forthcoming about his symptoms and lifestyle since leaving the hospital, he acknowledges recently leaving a hospital and living in a tent recently. ALLERGIES:   Allergies   Allergen Reactions    Haloperidol Other (comments)      MEDICATIONS PRIOR TO ADMISSION:   No medications prior to admission.       PAST MEDICAL HISTORY:   Past Medical History:   Diagnosis Date    Back pain     Chronic bronchitis (HCC)     COPD    Elevated WBCs     H/O splenectomy     HTN (hypertension)  Ill-defined condition     constipation    Psychiatric disorder     depression, anxiety     Past Surgical History:   Procedure Laterality Date    HX OTHER SURGICAL Right     two right wrist fractures     HX SPLENECTOMY        SOCIAL HISTORY:   Social History     Socioeconomic History    Marital status: SINGLE     Spouse name: Not on file    Number of children: Not on file    Years of education: Not on file    Highest education level: Not on file   Occupational History    Not on file   Social Needs    Financial resource strain: Not on file    Food insecurity     Worry: Not on file     Inability: Not on file    Transportation needs     Medical: Not on file     Non-medical: Not on file   Tobacco Use    Smoking status: Former Smoker    Smokeless tobacco: Never Used   Substance and Sexual Activity    Alcohol use: No    Drug use: No     Comment: \"not for years\"    Sexual activity: Not on file   Lifestyle    Physical activity     Days per week: Not on file     Minutes per session: Not on file    Stress: Not on file   Relationships    Social connections     Talks on phone: Not on file     Gets together: Not on file     Attends Scientologist service: Not on file     Active member of club or organization: Not on file     Attends meetings of clubs or organizations: Not on file     Relationship status: Not on file    Intimate partner violence     Fear of current or ex partner: Not on file     Emotionally abused: Not on file     Physically abused: Not on file     Forced sexual activity: Not on file   Other Topics Concern    Not on file   Social History Narrative    Social History:       Reviewed history from 02/03/2017 and no changes required:          Home: Lives with Carlos Jones, his girlfriend of 2 years, in a [place] apartment with pet lizard and fish          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 Candido Vidal, [facility] psychiatry                     Smoking History:          Patient currently smokes every day. Patient has been counseled to quit. FAMILY HISTORY: History reviewed, pertinent family history as below:   Family History   Problem Relation Age of Onset    No Known Problems Mother     No Known Problems Father     No Known Problems Brother        REVIEW OF SYSTEMS:   Pertinent items are noted in the History of Present Illness. All other Systems reviewed and are considered negative. MENTAL STATUS EXAM & VITALS     MENTAL STATUS EXAM (MSE):    MSE FINDINGS ARE WITHIN NORMAL LIMITS (WNL) UNLESS OTHERWISE STATED BELOW. ( ALL OF THE BELOW CATEGORIES OF THE MSE HAVE BEEN REVIEWED (reviewed 5/27/2020) AND UPDATED AS DEEMED APPROPRIATE )  General Presentation disheveled, cooperative   Orientation not oriented to situation   Vital Signs  See below (reviewed 5/27/2020); Vital Signs (BP, Pulse, & Temp) are within normal limits if not listed below.    Gait and Station Stable/steady, no ataxia   Musculoskeletal System No extrapyramidal symptoms (EPS); no abnormal muscular movements or Tardive Dyskinesia (TD); muscle strength and tone are within normal limits   Language No aphasia or dysarthria   Speech:  normal volume and non-pressured   Thought Processes illogical; normal rate of thoughts; poor abstract reasoning/computation   Thought Associations circumstantial   Thought Content paranoid delusions, grandiose delusions and preoccupations   Suicidal Ideations none   Homicidal Ideations none   Mood:  euthymic   Affect:  euthymic and odd demeanor   Memory recent  intact   Memory remote:  intact   Concentration/Attention:  intact   Fund of Knowledge average   Insight:  poor   Reliability fair   Judgment:  poor          VITALS:     Patient Vitals for the past 24 hrs:   Temp Pulse Resp BP SpO2   05/27/20 0807 98.1 °F (36.7 °C) 99 16 144/84 97 %   05/26/20 2100 99 °F (37.2 °C) 95 20 (!) 138/94 98 %   05/26/20 1937  91 16 120/67 97 %   05/26/20 1524    135/78    05/26/20 1503 99.2 °F (37.3 °C) (!) 108 20  99 %     Wt Readings from Last 3 Encounters:   05/26/20 68 kg (150 lb)   12/02/18 75.8 kg (167 lb)   09/30/18 70.3 kg (155 lb)     Temp Readings from Last 3 Encounters:   05/27/20 98.1 °F (36.7 °C)   02/26/19 97.9 °F (36.6 °C)   12/02/18 97.9 °F (36.6 °C)     BP Readings from Last 3 Encounters:   05/27/20 144/84   02/26/19 123/86   12/02/18 (!) 140/101     Pulse Readings from Last 3 Encounters:   05/27/20 99   02/26/19 96   12/02/18 84            DATA     LABORATORY DATA:  Labs Reviewed   CBC WITH AUTOMATED DIFF - Abnormal; Notable for the following components:       Result Value    WBC 12.9 (*)     RBC 3.92 (*)     HGB 12.0 (*)     HCT 35.5 (*)     ABS. LYMPHOCYTES 3.7 (*)     ABS.  MONOCYTES 1.3 (*)     All other components within normal limits   METABOLIC PANEL, COMPREHENSIVE - Abnormal; Notable for the following components:    Potassium 3.3 (*)     Glucose 122 (*)     Calcium 8.1 (*)     Protein, total 6.2 (*)     Albumin 2.8 (*)     A-G Ratio 0.8 (*)     All other components within normal limits   ACETAMINOPHEN - Abnormal; Notable for the following components:    Acetaminophen level 2 (*)     All other components within normal limits   DRUG SCREEN, URINE - Abnormal; Notable for the following components:    THC (TH-CANNABINOL) Positive (*)     All other components within normal limits   URINALYSIS W/ REFLEX CULTURE - Abnormal; Notable for the following components:    Appearance CLOUDY (*)     Protein >300 (*)     Ketone TRACE (*)     Blood LARGE (*)     All other components within normal limits   SALICYLATE   ETHYL ALCOHOL   BILIRUBIN, CONFIRM     Admission on 05/26/2020   Component Date Value Ref Range Status    WBC 05/26/2020 12.9* 4.1 - 11.1 K/uL Final    RBC 05/26/2020 3.92* 4.10 - 5.70 M/uL Final    HGB 05/26/2020 12.0* 12.1 - 17.0 g/dL Final    HCT 05/26/2020 35.5* 36.6 - 50.3 % Final    MCV 05/26/2020 90.6  80.0 - 99.0 FL Final    MCH 05/26/2020 30.6  26.0 - 34.0 PG Final    MCHC 05/26/2020 33.8  30.0 - 36.5 g/dL Final    RDW 05/26/2020 13.0  11.5 - 14.5 % Final    PLATELET 63/50/0904 562  150 - 400 K/uL Final    MPV 05/26/2020 10.2  8.9 - 12.9 FL Final    NRBC 05/26/2020 0.0  0  WBC Final    ABSOLUTE NRBC 05/26/2020 0.00  0.00 - 0.01 K/uL Final    NEUTROPHILS 05/26/2020 58  32 - 75 % Final    LYMPHOCYTES 05/26/2020 28  12 - 49 % Final    MONOCYTES 05/26/2020 10  5 - 13 % Final    EOSINOPHILS 05/26/2020 3  0 - 7 % Final    BASOPHILS 05/26/2020 1  0 - 1 % Final    IMMATURE GRANULOCYTES 05/26/2020 0  0.0 - 0.5 % Final    ABS. NEUTROPHILS 05/26/2020 7.4  1.8 - 8.0 K/UL Final    ABS. LYMPHOCYTES 05/26/2020 3.7* 0.8 - 3.5 K/UL Final    ABS. MONOCYTES 05/26/2020 1.3* 0.0 - 1.0 K/UL Final    ABS. EOSINOPHILS 05/26/2020 0.4  0.0 - 0.4 K/UL Final    ABS. BASOPHILS 05/26/2020 0.1  0.0 - 0.1 K/UL Final    ABS. IMM. GRANS. 05/26/2020 0.0  0.00 - 0.04 K/UL Final    DF 05/26/2020 AUTOMATED    Final    Sodium 05/26/2020 141  136 - 145 mmol/L Final    Potassium 05/26/2020 3.3* 3.5 - 5.1 mmol/L Final    Chloride 05/26/2020 106  97 - 108 mmol/L Final    CO2 05/26/2020 27  21 - 32 mmol/L Final    Anion gap 05/26/2020 8  5 - 15 mmol/L Final    Glucose 05/26/2020 122* 65 - 100 mg/dL Final    BUN 05/26/2020 17  6 - 20 MG/DL Final    Creatinine 05/26/2020 1.09  0.70 - 1.30 MG/DL Final    BUN/Creatinine ratio 05/26/2020 16  12 - 20   Final    GFR est AA 05/26/2020 >60  >60 ml/min/1.73m2 Final    GFR est non-AA 05/26/2020 >60  >60 ml/min/1.73m2 Final    Calcium 05/26/2020 8.1* 8.5 - 10.1 MG/DL Final    Bilirubin, total 05/26/2020 0.2  0.2 - 1.0 MG/DL Final    ALT (SGPT) 05/26/2020 18  12 - 78 U/L Final    AST (SGOT) 05/26/2020 17  15 - 37 U/L Final    Alk.  phosphatase 05/26/2020 88  45 - 117 U/L Final    Protein, total 05/26/2020 6.2* 6.4 - 8.2 g/dL Final    Albumin 05/26/2020 2.8* 3.5 - 5.0 g/dL Final    Globulin 05/26/2020 3.4  2.0 - 4.0 g/dL Final    A-G Ratio 05/26/2020 0.8* 1.1 - 2.2   Final    Acetaminophen level 05/26/2020 2* 10 - 30 ug/mL Final    Salicylate level 17/34/2869 3.2  2.8 - 20.0 MG/DL Final    ALCOHOL(ETHYL),SERUM 05/26/2020 <10  <10 MG/DL Final    AMPHETAMINES 05/26/2020 Negative  NEG   Final    BARBITURATES 05/26/2020 Negative  NEG   Final    BENZODIAZEPINES 05/26/2020 Negative  NEG   Final    COCAINE 05/26/2020 Negative  NEG   Final    METHADONE 05/26/2020 Negative  NEG   Final    OPIATES 05/26/2020 Negative  NEG   Final    PCP(PHENCYCLIDINE) 05/26/2020 Negative  NEG   Final    THC (TH-CANNABINOL) 05/26/2020 Positive* NEG   Final    Drug screen comment 05/26/2020 (NOTE)   Final    Color 05/26/2020 YELLOW/STRAW    Final    Appearance 05/26/2020 CLOUDY* CLEAR   Final    Specific gravity 05/26/2020 1.025  1.003 - 1.030   Final    pH (UA) 05/26/2020 6.0  5.0 - 8.0   Final    Protein 05/26/2020 >300* NEG mg/dL Final    Glucose 05/26/2020 Negative  NEG mg/dL Final    Ketone 05/26/2020 TRACE* NEG mg/dL Final    Blood 05/26/2020 LARGE* NEG   Final    Urobilinogen 05/26/2020 1.0  0.2 - 1.0 EU/dL Final    Nitrites 05/26/2020 Negative  NEG   Final    Leukocyte Esterase 05/26/2020 Negative  NEG   Final    WBC 05/26/2020 0-4  0 - 4 /hpf Final    RBC 05/26/2020 10-20  0 - 5 /hpf Final    Epithelial cells 05/26/2020 FEW  FEW /lpf Final    Bacteria 05/26/2020 Negative  NEG /hpf Final    UA:UC IF INDICATED 05/26/2020 CULTURE NOT INDICATED BY UA RESULT  CNI   Final    Bilirubin UA, confirm 05/26/2020 Negative  NEG   Final        RADIOLOGY REPORTS:  Results from Hospital Encounter encounter on 05/26/20   XR ABD PORT  1 V    Narrative EXAM: XR ABD PORT  1 V    INDICATION: ? of patient ingesting rocks    COMPARISON: 12.2.2018.     FINDINGS: A supine radiograph of the abdomen shows a nonspecific abdominal gas  pattern. There are 2 radiopaque structures in the right lower quadrant that  individually measure 16 and 11 mm in size. In addition, in the distal transverse  colon, there is a 12 x 3 mm calcification. Impression IMPRESSION: Radiopaque structures in the right lower quadrant and transverse  colon could be compatible with ingested rocks. No plain film evidence for  obstruction             Xr Abd Port  1 V    Result Date: 5/26/2020  EXAM: XR ABD PORT  1 V INDICATION: ? of patient ingesting rocks COMPARISON: 12.2.2018. FINDINGS: A supine radiograph of the abdomen shows a nonspecific abdominal gas pattern. There are 2 radiopaque structures in the right lower quadrant that individually measure 16 and 11 mm in size. In addition, in the distal transverse colon, there is a 12 x 3 mm calcification. IMPRESSION: Radiopaque structures in the right lower quadrant and transverse colon could be compatible with ingested rocks.  No plain film evidence for obstruction              MEDICATIONS       ALL MEDICATIONS  Current Facility-Administered Medications   Medication Dose Route Frequency    OLANZapine (ZyPREXA) tablet 5 mg  5 mg Oral Q6H PRN    benztropine (COGENTIN) tablet 1 mg  1 mg Oral BID PRN    diphenhydrAMINE (BENADRYL) injection 50 mg  50 mg IntraMUSCular BID PRN    hydrOXYzine HCL (ATARAX) tablet 50 mg  50 mg Oral TID PRN    LORazepam (ATIVAN) injection 1 mg  1 mg IntraMUSCular Q4H PRN    traZODone (DESYREL) tablet 50 mg  50 mg Oral QHS PRN    acetaminophen (TYLENOL) tablet 650 mg  650 mg Oral Q4H PRN    magnesium hydroxide (MILK OF MAGNESIA) 400 mg/5 mL oral suspension 30 mL  30 mL Oral DAILY PRN    chlorproMAZINE (THORAZINE) injection 25 mg  25 mg IntraMUSCular Q6H PRN      SCHEDULED MEDICATIONS  Current Facility-Administered Medications   Medication Dose Route Frequency                ASSESSMENT & PLAN        The patient, Mikael Vieyra, is a 50 y.o.  male who presents at this time for treatment of the following diagnoses:  Patient Active Hospital Problem List:   Schizophrenia [facility]) (5/26/2020)    Assessment: patient with worsening self care and ingested foreign body in the setting of medication non-compliance. He is verbal and amenable to restarting treatment which is an improvement over his previous presentation. Will restart Clozaril, bowel regimen. 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 A coordinated, multidisplinary treatment team (includes the nurse, unit pharmacist,  and writer) round was conducted for this initial evaluation with the patient present. The following regarding medications was addressed during rounds with patient: the risks and benefits of the proposed medication. The patient was given the opportunity to ask questions. Informed consent given to the use of the above medications. I will continue to adjust psychiatric and non-psychiatric medications (see above \"medication\" section and orders section for details) as deemed appropriate & based upon diagnoses and response to treatment. I have reviewed admission (and previous/old) labs and medical tests in the EHR and or transferring hospital documents. I will continue to order blood tests/labs and diagnostic tests as deemed appropriate and review results as they become available (see orders for details).  I have reviewed old psychiatric and medical records available in the EHR. I Will order additional psychiatric records from other institutions to further elucidate the nature of patient's psychopathology and review once available. I will gather additional collateral information from friends, family and o/p treatment team to further elucidate the nature of patient's psychopathology and baselline level of psychiatric functioning.       ESTIMATED LENGTH OF STAY:  7-10 days       STRENGTHS:  Exercising self-direction/Resourceful and Motivated and ready for change                                        SIGNED:    Roger Babin MD  5/27/2020

## 2020-05-27 NOTE — BH NOTES
Admit Note:  Sebastian Ortiz, a very thin 50 y.o. caucasiian male, was received to the [address] at 2100 via wheelchair and accompanied by security personnel on a TDO. He was assisted to the assessment room without complications. BP:  138/94, R:  20, T:  99.0, P:  95, O2 sat:  98.  Weight per standing scale was 146.9. He presented with unkept appearance, foul body odor, dirty hair and dirty finger and toenails. He presented with broad affect and euthymic mood and smiling easily. He was cooperative with the admit process including the paperwork and the skin assessment which was performed by two staff persons, Adrian Palacios RN and SHERITA Quezada. There were numerous (too many to count) scabs and scratches all over his body including face, forehead, ears, neck, arms. There were also many knotted, nodule type areas to bilateral inner arms and AC area of lt arm. On the top of the left foot was a scab and was a little swollen. He c/o the foot being sore. Pt was a poor historian and his answers to the admit questions is questionable. He just denied all problems including SI, HI, and all s/s of psychosis including AV hallucinations. However, when pt was observed alone he carried on an animated conversation with himself as if someone were with him. When asked about ETOH and drug use he stated, \"If I can but Rosangela takes all the bud.\"  He stated he lives in a tent in  [facility] and denied having any support system. He was only oriented to his name and stated the date was 6/25/25. He denied allergies but according to my report from the ED, pt is allergic to Haldol and hasn't been on any medications for a long period of time. Per the report I received from [facility], [facility], in [facility] ED, Sebastian Ortiz was found by the [employer] grass, flowers, breaking up asphalt and was possibly eating some of it with some rocks AEB his xray.   She also reported that pt may give his real name or a fake name such as Marlo Koyanagi who talks to him and made frequent delusional statements. Stated pt had not showered in months, had not seen any kind of doctor in yrs or taken any kind of medication in years. [place]. Pt was oriented on his admit packet, oriented to the unit, was given a meal which he ate almost all of and was assisted with getting a shower prior to going to bed. He went to bed without incident. Providing emotional support and monitoring for mood, behavior and for safety. Addendum at  :  Pt has slept for 8 hrs. No problems during the shift.

## 2020-05-27 NOTE — PROGRESS NOTES
Problem: Falls - Risk of  Goal: *Absence of Falls  Description: Document Kathryn Jara Fall Risk and appropriate interventions in the flowsheet.   Outcome: Progressing Towards Goal  Note: Fall Risk Interventions:       Mentation Interventions: Adequate sleep, hydration, pain control                        Problem: Altered Thought Process (Adult/Pediatric)  Goal: *STG: Participates in treatment plan  Outcome: Progressing Towards Goal  Goal: *STG: Remains safe in hospital  Outcome: Progressing Towards Goal

## 2020-05-27 NOTE — ED NOTES
This RN attempting to call report on pt. Noted that no one answers the phone. Nursing supervisor made aware.

## 2020-05-27 NOTE — BH NOTES
[employer] conducted a virtual TDO hearing this afternoon. The ending result of hearing, patient committed.

## 2020-05-27 NOTE — BH NOTES
PSYCHOSOCIAL ASSESSMENT  :Patient identifying info:  Lieutenant Veloz is a 50 y.o., male admitted 5/26/2020  2:52 PM     Presenting problem and precipitating factors: \"I am ready to take the medication now. \" Pt was admitted under a TDO status and committed at his hearing due to psychosis and bizarre behaviors. Pt has been living in a tent in the [place] in [facility] for the past 8 months after leaving his [facility] placement. Pt admits that he left due to [facility] requiring he take medications. Mental status assessment: odd, cooperative, illogical thought process and internally preoccupied. Strengths: some insight into mental illness and aware he needs to restart medication. Collateral information: from 2018 [facility] -   spoke with Yuliana Corrales and Ainsley Arriaga from [facility], [address] [place] [address] [employer] -report that pt stated on September 5th, \"he was moving to [place], [place] without reason but dead set on moving. \"  Pt has a history of chronic paranoia. They reported he had stable housing, employment and took his medication regularly. Pt had section 8 housing voucher and last saw psychiatrist Dr. June Mclaughlin on August 28th. Pt was on ACT team for 3 years  [id number] and decreased services due to stabilizing and not needing daily support. Current psychiatric /substance abuse providers and contact info: None - [facility]      Previous psychiatric/substance abuse providers and response to treatment: Long history of psych hospitalizations and periods of high functioning in the community while on Clozaril.     Family history of mental illness or substance abuse: None known    Substance abuse history:  UDS: +THC; BAL=0  Social History     Tobacco Use    Smoking status: Former Smoker    Smokeless tobacco: Never Used   Substance Use Topics    Alcohol use: No       History of biomedical complications associated with substance abuse : None     Patient's current acceptance of treatment or motivation for change: unknown     Family constellation:  Single and without children    Is significant other involved? N/A    Describe support system: limited     Describe living arrangements and home environment: Tent near  [address] Problems  Never Reviewed          Codes Class Noted POA    Schizophrenia [facility]) ICD-10-CM: F20.9  ICD-9-CM: 295.90  2020 Unknown              Trauma history: Unknown     Legal issues: None reported. History of  service: None. Financial status: SSDI. Spiritism/cultural factors: None expressed. Education/work history:  High school graduate. Have you been licensed as a health care professional (current or ): No    Leisure and recreation preferences: painting, fishing, crafts, pencil drawing    Describe coping skills: Ineffectual at this time.      Davian Jaffe  2020

## 2020-05-27 NOTE — PROGRESS NOTES
Problem: Falls - Risk of  Goal: *Absence of Falls  Description: Document Clydene Bone Fall Risk and appropriate interventions in the flowsheet. Outcome: Progressing Towards Goal  Note: Fall Risk Interventions: gripper socks, Q 15 min checks   Mentation Interventions: Reorient patient    Problem: Altered Thought Process (Adult/Pediatric)  Goal: *STG: Participates in treatment plan  Outcome: Progressing Towards Goal  Goal: *STG: Remains safe in hospital  Outcome: Progressing Towards Goal  Goal: *STG: Absence of lethality  Outcome: Progressing Towards Goal    Patient isolative to room most of shift. Patient did eat lunch in dayroom and was verbally aggressive with another patient but able to redirect. Patient denies depression, denies anxiety, denies SI, HI, AH, VH.  Although patient denies AH, patient was responding to [facility] while in hallway waiting to see MD.

## 2020-05-27 NOTE — PROGRESS NOTES
Clozapine Monitoring - Initial Verification    1. Prescriber is registered with the Clozapine REMS Program.     2. Patient is eligible to receive clozapine per the Clozapine REMS Program and ANC is current and acceptable for clozapine dispensing. DATE ANC (K/uL)   5/26 7.4               Patient's CBC should be obtained weekly  Next CBC due to be reported to the REMS Program: 6/3/20    Therapy may be continued from home if ANC >1. For new starts (general population), baseline ANC must be >1.5.  Refer to monitoring guidelines if ANC <1.5.    4. Current dosing regimen:  25 mg PO QHS    Thank you,  Danny Lisa, [facility], [address] [facility] Pharmacist, [address] [place] [address] Nw: [phone]  Pharmacy: [phone] ([id number])

## 2020-05-27 NOTE — PROGRESS NOTES
[facility] Admission Pharmacy Medication Reconciliation     Information obtained from: Ramiro RHODES [phone]), behavioral health pharmacist at [facility], TDO pre-screening assessment  RxQuery data available1: Yes    Comments/recommendations:    1) Per TDO pre-screening assessment, patient signed himself out from his [facility] on 3/16/20 and has been off medications since that time. The below medications are what patient was receiving at [facility] ([facility]):  Propranolol  mg capsule: 1 capsule daily  Benztropine 1 mg tablet: 1 tablet PO BID for EPS  Buspirone 15 mg tablet: 1 tablet PO BID  Clozapine 100 mg tablet: 1.5 tablets (150 mg) PO QHS  Clozapine 50 mg tablet: 1 tablet PO every morning  Invega Sustenna 234 mg IM every 30 days  Mirtazapine 45 mg tablet: 1 tablet PO QHS  Montelukast 10 mg tablet: 1 tablet PO daily  Polyethylene glycol: 1 packet in 8 oz  of beverage once daily  Senna laxatlve tablet 8.6 m tablets PO daily     2) Clozapine REMS Program was accessed to check patient's eligibility. His last reported [address] was on 3/9/20. Current monitoring frequency is weekly. 3) Per admission at [facility] (-3/13/20), he was started on [place]. He was due for an injection on 3/15/20. It is unknown at this time if patient received that injection. 4) Medication changes (since last review): Added: None    Removed  - Buspirone  -Clozapine 100 mg & 200 mg tablets  -Docusate  -Propranolol LA  -Senna    Adjusted: None     1RxQuery pharmacy benefit data reflects medications filled and processed through the patient's insurance, however                this data does NOT capture whether the medication was picked up or is currently being taken by the patient.        Total Time Spent: 20 minutes    Past Medical History/Disease States:  Past Medical History:   Diagnosis Date    Back pain     Chronic bronchitis (HCC)     COPD    Elevated WBCs     H/O splenectomy     HTN (hypertension) Ill-defined condition     constipation    Psychiatric disorder     depression, anxiety         Patient allergies:    Allergies as of 05/26/2020 - Review Complete 05/26/2020   Allergen Reaction Noted    Haloperidol Other (comments) 12/30/2013       Prior to admission medications:   None        Thank you,  SHAQ Muniz, [facility]  Desk: [phone]  Pharmacy: [phone] ([id number])

## 2020-05-27 NOTE — PROGRESS NOTES
Laboratory monitoring for mood stabilizer and antipsychotics:    Recommended baseline monitoring has not been completed based on this patient's current medication regimen. The following labs have been ordered to complete baseline monitoring: lipid panel, hemoglobin A1c (will order with next weekly CBC in order to minimize blood draws for patient)      The patient is currently taking the following medication(s):   Current Facility-Administered Medications   Medication Dose Route Frequency    cloZAPine (CLOZARIL) tablet 25 mg  25 mg Oral QHS    propranoloL (INDERAL) tablet 20 mg  20 mg Oral BID    [START ON 5/28/2020] senna-docusate (PERICOLACE) 8.6-50 mg per tablet 2 Tab  2 Tab Oral DAILY    montelukast (SINGULAIR) tablet 10 mg  10 mg Oral QHS     Height, Weight, BMI Estimation  Estimated body mass index is 21.83 kg/m² as calculated from the following:    Height as of this encounter: 176.5 cm (69.5\"). Weight as of this encounter: 68 kg (150 lb). Renal Function, Hepatic Function and Chemistry  Estimated Creatinine Clearance: 80.6 mL/min (based on SCr of 1.09 mg/dL). Lab Results   Component Value Date/Time    Sodium 141 05/26/2020 03:44 PM    Potassium 3.3 (L) 05/26/2020 03:44 PM    Chloride 106 05/26/2020 03:44 PM    CO2 27 05/26/2020 03:44 PM    Anion gap 8 05/26/2020 03:44 PM    Glucose 122 (H) 05/26/2020 03:44 PM    BUN 17 05/26/2020 03:44 PM    Creatinine 1.09 05/26/2020 03:44 PM    BUN/Creatinine ratio 16 05/26/2020 03:44 PM    GFR est AA >60 05/26/2020 03:44 PM    GFR est non-AA >60 05/26/2020 03:44 PM    Calcium 8.1 (L) 05/26/2020 03:44 PM    ALT (SGPT) 18 05/26/2020 03:44 PM    Alk.  phosphatase 88 05/26/2020 03:44 PM    Protein, total 6.2 (L) 05/26/2020 03:44 PM    Albumin 2.8 (L) 05/26/2020 03:44 PM    Globulin 3.4 05/26/2020 03:44 PM    A-G Ratio 0.8 (L) 05/26/2020 03:44 PM    Bilirubin, total 0.2 05/26/2020 03:44 PM       Lab Results   Component Value Date/Time    Glucose 122 (H) 05/26/2020 03:44 PM    Glucose (POC) 97 09/27/2018 03:40 PM       Lab Results   Component Value Date/Time    Hemoglobin A1c 5.3 09/30/2018 04:52 AM    Hemoglobin A1c, External 5.2 06/30/2017       Hematology  Lab Results   Component Value Date/Time    WBC 12.9 (H) 05/26/2020 03:44 PM    HGB 12.0 (L) 05/26/2020 03:44 PM    HCT 35.5 (L) 05/26/2020 03:44 PM    PLATELET 033 21/65/6957 03:44 PM    MCV 90.6 05/26/2020 03:44 PM    Hct, External 44.70 06/30/2017       Lipids  Lab Results   Component Value Date/Time    Cholesterol, total 107 09/30/2018 04:52 AM    HDL Cholesterol 23 09/30/2018 04:52 AM    LDL, calculated 59.2 09/30/2018 04:52 AM    Triglyceride 124 09/30/2018 04:52 AM    CHOL/HDL Ratio 4.7 09/30/2018 04:52 AM       Thyroid Function    Lab Results   Component Value Date/Time    TSH 1.500 06/30/2017 11:42 AM    Tsh, External 1.500 06/30/2017     Vitals  Visit Vitals  /84 (BP 1 Location: Right arm, BP Patient Position: Sitting)   Pulse 99   Temp 98.1 °F (36.7 °C)   Resp 16   Ht 176.5 cm (69.5\")   Wt 68 kg (150 lb)   SpO2 97%   BMI 21.83 kg/m²       Magnolia Kaba, PharmD, BCPS  [phone] (pharmacy)

## 2020-05-27 NOTE — ED NOTES
TRANSFER - OUT REPORT:    Verbal report given to Arlen Miller RN(name) on Gale Frias  being transferred to [place] [id number]) for routine progression of care       Report consisted of patients Situation, Background, Assessment and   Recommendations(SBAR). Information from the following report(s) SBAR and ED Summary was reviewed with the receiving nurse. Lines:       Opportunity for questions and clarification was provided.       Patient transported with:  [facility]

## 2020-05-28 ENCOUNTER — APPOINTMENT (OUTPATIENT)
Dept: GENERAL RADIOLOGY | Age: 45
DRG: 885 | End: 2020-05-28
Attending: PSYCHIATRY & NEUROLOGY
Payer: MEDICARE

## 2020-05-28 PROCEDURE — 74011250637 HC RX REV CODE- 250/637: Performed by: PSYCHIATRY & NEUROLOGY

## 2020-05-28 PROCEDURE — 74011250637 HC RX REV CODE- 250/637: Performed by: NURSE PRACTITIONER

## 2020-05-28 PROCEDURE — 65220000003 HC RM SEMIPRIVATE PSYCH

## 2020-05-28 PROCEDURE — 73630 X-RAY EXAM OF FOOT: CPT

## 2020-05-28 RX ORDER — CLOZAPINE 25 MG/1
50 TABLET ORAL
Status: DISCONTINUED | OUTPATIENT
Start: 2020-05-28 | End: 2020-05-29

## 2020-05-28 RX ADMIN — SENNOSIDES AND DOCUSATE SODIUM 2 TABLET: 8.6; 5 TABLET ORAL at 08:35

## 2020-05-28 RX ADMIN — MONTELUKAST SODIUM 10 MG: 10 TABLET, FILM COATED ORAL at 21:23

## 2020-05-28 RX ADMIN — TRAZODONE HYDROCHLORIDE 50 MG: 50 TABLET ORAL at 21:23

## 2020-05-28 RX ADMIN — ACETAMINOPHEN 650 MG: 325 TABLET, FILM COATED ORAL at 08:35

## 2020-05-28 RX ADMIN — PROPRANOLOL HYDROCHLORIDE 20 MG: 10 TABLET ORAL at 08:34

## 2020-05-28 RX ADMIN — HYDROXYZINE HYDROCHLORIDE 50 MG: 25 TABLET, FILM COATED ORAL at 21:22

## 2020-05-28 RX ADMIN — PROPRANOLOL HYDROCHLORIDE 20 MG: 10 TABLET ORAL at 16:52

## 2020-05-28 RX ADMIN — CLOZAPINE 50 MG: 25 TABLET ORAL at 21:22

## 2020-05-28 NOTE — BH NOTES
PSYCHIATRIC PROGRESS NOTE         Patient Name  Kylie Olivo   Date of Birth 1975   [facility] [id number]   Medical Record Number  [id number]      Age  50 y.o. PCP None   Admit date:  5/26/2020    Room Number  471/56  @ [facility]   Date of Service  5/28/2020         E & M PROGRESS NOTE:         HISTORY       CC:  \"psychosis\"  HISTORY OF PRESENT ILLNESS/INTERVAL HISTORY:  (reviewed/updated 5/28/2020). per initial evaluation: The patient, Kylie Olivo, is a 50 y.o. WHITE OR  male with a past psychiatric history significant for schizophrenia, who presents at this time with complaints of (and/or evidence of) the following emotional symptoms: delusions and psychotic behavior. Additional symptomatology include poor self care. The above symptoms have been present for 2+ weeks. These symptoms are of moderate to high severity. These symptoms are constant in nature. The patient's condition has been precipitated by psychosocial stressors. Patient's condition made worse by illicit substance use and treatment noncompliance. UDS: THC; BAL=0.      The patient is a fair historian. The patient corroborates the above narrative. The patient contracts for safety on the unit and gives consent for the team to contact collateral. The patient is amenable to initiating treatment while on the unit. The patient is forthcoming about his symptoms and lifestyle since leaving the hospital, he acknowledges recently leaving a hospital and living in a tent recently. 05/28 - patient slept 9 hours overnight, remains odd, but cooperative. He is isolative to room, compliant with Clozaril tolerating titration thus far. Bowel regimen initiated. Patient still with ongoing bizarre delusions but no agitation, self care remains poor. He is ambivalent about disposition stating he intends to return to living in a tent upon discharge. SIDE EFFECTS: (reviewed/updated 5/28/2020)  None reported or admitted to.   No noted toxicity with use of Clozaril   ALLERGIES:(reviewed/updated 5/28/2020)  Allergies   Allergen Reactions    Haloperidol Other (comments)      MEDICATIONS PRIOR TO ADMISSION:(reviewed/updated 5/28/2020)  No medications prior to admission. PAST MEDICAL HISTORY: Past medical history from the initial psychiatric evaluation has been reviewed (reviewed/updated 5/28/2020) with no additional updates (I asked patient and no additional past medical history provided). Past Medical History:   Diagnosis Date    Back pain     Chronic bronchitis (HCC)     COPD    Elevated WBCs     H/O splenectomy     HTN (hypertension)     Ill-defined condition     constipation    Psychiatric disorder     depression, anxiety     Past Surgical History:   Procedure Laterality Date    HX OTHER SURGICAL Right     two right wrist fractures     HX SPLENECTOMY        SOCIAL HISTORY: Social history from the initial psychiatric evaluation has been reviewed (reviewed/updated 5/28/2020) with no additional updates (I asked patient and no additional social history provided).  Social History     Socioeconomic History    Marital status: SINGLE     Spouse name: Not on file    Number of children: Not on file    Years of education: Not on file    Highest education level: Not on file   Occupational History    Not on file   Social Needs    Financial resource strain: Not on file    Food insecurity     Worry: Not on file     Inability: Not on file    Transportation needs     Medical: Not on file     Non-medical: Not on file   Tobacco Use    Smoking status: Former Smoker    Smokeless tobacco: Never Used   Substance and Sexual Activity    Alcohol use: No    Drug use: No     Comment: \"not for years\"    Sexual activity: Not on file   Lifestyle    Physical activity     Days per week: Not on file     Minutes per session: Not on file    Stress: Not on file   Relationships    Social connections     Talks on phone: Not on file     Gets together: Not on file     Attends Jainism service: Not on file     Active member of club or organization: Not on file     Attends meetings of clubs or organizations: Not on file     Relationship status: Not on file    Intimate partner violence     Fear of current or ex partner: Not on file     Emotionally abused: Not on file     Physically abused: Not on file     Forced sexual activity: Not on file   Other Topics Concern    Not on file   Social History Narrative    Social History:       Reviewed history from 02/03/2017 and no changes required:          Home: Lives with Shanthi Longoria, his girlfriend of 2 years, in a [place] apartment with pet lizard and fish          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 June Mclaughlin, [employer] psychiatry                     Smoking History:          Patient currently smokes every day. Patient has been counseled to quit. FAMILY HISTORY: Family history from the initial psychiatric evaluation has been reviewed (reviewed/updated 5/28/2020) with no additional updates (I asked patient and no additional family history provided).  Family History   Problem Relation Age of Onset    No Known Problems Mother     No Known Problems Father     No Known Problems Brother        REVIEW OF SYSTEMS: (reviewed/updated 5/28/2020)  Appetite:improved   Sleep: improved   All other Review of Systems: Negative except foot swelling         MENTAL STATUS EXAM & VITALS     MENTAL STATUS EXAM (MSE):    MSE FINDINGS ARE WITHIN NORMAL LIMITS (WNL) UNLESS OTHERWISE STATED BELOW. ( ALL OF THE BELOW CATEGORIES OF THE MSE HAVE BEEN REVIEWED (reviewed 5/28/2020) AND UPDATED AS DEEMED APPROPRIATE )  General Presentation age appropriate, guarded   Orientation oriented to time, place and person Vital Signs  See below (reviewed 5/28/2020); Vital Signs (BP, Pulse, & Temp) are within normal limits if not listed below. Gait and Station Stable/steady, no ataxia   Musculoskeletal System No extrapyramidal symptoms (EPS); no abnormal muscular movements or Tardive Dyskinesia (TD); muscle strength and tone are within normal limits   Language No aphasia or dysarthria   Speech:  non-pressured and soft   Thought Processes illogical; normal rate of thoughts; fair abstract reasoning/computation   Thought Associations goal directed   Thought Content internally preoccupied   Suicidal Ideations none   Homicidal Ideations none   Mood:  euthymic   Affect:  constricted and mood-congruent   Memory recent  intact   Memory remote:  intact   Concentration/Attention:  intact   Fund of Knowledge average   Insight:  poor   Reliability fair   Judgment:  poor          VITALS:     Patient Vitals for the past 24 hrs:   Temp Pulse Resp BP SpO2   05/28/20 0807 98.3 °F (36.8 °C) 83 18 (!) 131/92 100 %   05/27/20 2025 98.2 °F (36.8 °C) 84 18 137/81 98 %   05/27/20 1817  89 16 134/82 98 %     Wt Readings from Last 3 Encounters:   05/26/20 68 kg (150 lb)   12/02/18 75.8 kg (167 lb)   09/30/18 70.3 kg (155 lb)     Temp Readings from Last 3 Encounters:   05/28/20 98.3 °F (36.8 °C)   02/26/19 97.9 °F (36.6 °C)   12/02/18 97.9 °F (36.6 °C)     BP Readings from Last 3 Encounters:   05/28/20 (!) 131/92   02/26/19 123/86   12/02/18 (!) 140/101     Pulse Readings from Last 3 Encounters:   05/28/20 83   02/26/19 96   12/02/18 84            DATA     LABORATORY DATA:(reviewed/updated 5/28/2020)  No results found for this or any previous visit (from the past 24 hour(s)).   No results found for: VALF2, VALAC, VALP, VALPR, DS6, CRBAM, CRBAMP, CARB2, XCRBAM  No results found for: LITHM   RADIOLOGY REPORTS:(reviewed/updated 5/28/2020)  Xr Abd Port  1 V    Result Date: 5/26/2020  EXAM: XR ABD PORT  1 V INDICATION: ? of patient ingesting rocks COMPARISON: 12.2.2018. FINDINGS: A supine radiograph of the abdomen shows a nonspecific abdominal gas pattern. There are 2 radiopaque structures in the right lower quadrant that individually measure 16 and 11 mm in size. In addition, in the distal transverse colon, there is a 12 x 3 mm calcification. IMPRESSION: Radiopaque structures in the right lower quadrant and transverse colon could be compatible with ingested rocks.  No plain film evidence for obstruction          MEDICATIONS     ALL MEDICATIONS:   Current Facility-Administered Medications   Medication Dose Route Frequency    cloZAPine (CLOZARIL) tablet 50 mg  50 mg Oral QHS    propranoloL (INDERAL) tablet 20 mg  20 mg Oral BID    senna-docusate (PERICOLACE) 8.6-50 mg per tablet 2 Tab  2 Tab Oral DAILY    montelukast (SINGULAIR) tablet 10 mg  10 mg Oral QHS    OLANZapine (ZyPREXA) tablet 5 mg  5 mg Oral Q6H PRN    benztropine (COGENTIN) tablet 1 mg  1 mg Oral BID PRN    diphenhydrAMINE (BENADRYL) injection 50 mg  50 mg IntraMUSCular BID PRN    hydrOXYzine HCL (ATARAX) tablet 50 mg  50 mg Oral TID PRN    LORazepam (ATIVAN) injection 1 mg  1 mg IntraMUSCular Q4H PRN    traZODone (DESYREL) tablet 50 mg  50 mg Oral QHS PRN    acetaminophen (TYLENOL) tablet 650 mg  650 mg Oral Q4H PRN    magnesium hydroxide (MILK OF MAGNESIA) 400 mg/5 mL oral suspension 30 mL  30 mL Oral DAILY PRN    chlorproMAZINE (THORAZINE) injection 25 mg  25 mg IntraMUSCular Q6H PRN      SCHEDULED MEDICATIONS:   Current Facility-Administered Medications   Medication Dose Route Frequency    cloZAPine (CLOZARIL) tablet 50 mg  50 mg Oral QHS    propranoloL (INDERAL) tablet 20 mg  20 mg Oral BID    senna-docusate (PERICOLACE) 8.6-50 mg per tablet 2 Tab  2 Tab Oral DAILY    montelukast (SINGULAIR) tablet 10 mg  10 mg Oral QHS          ASSESSMENT & PLAN     DIAGNOSES REQUIRING ACTIVE TREATMENT AND MONITORING: (reviewed/updated 5/28/2020)  Patient Active Hospital Problem List: Schizophrenia ([facility].) (5/26/2020)    Assessment: patient with worsening self care and ingested foreign body in the setting of medication non-compliance. He is verbal and amenable to restarting treatment which is an improvement over his previous presentation. Will restart Clozaril, bowel regimen. 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 In summary, Shaniqua Burger, is a 50 y.o.  male who presents with a severe exacerbation of the principal diagnosis of Schizophrenia ([facility]    Patient's condition is not improving. Patient requires continued inpatient hospitalization for further stabilization, safety monitoring and medication management. I will continue to coordinate the provision of individual, milieu, occupational, group, and substance abuse therapies to address target symptoms/diagnoses as deemed appropriate for the individual patient. A coordinated, multidisplinary treatment team round was conducted with the patient (this team consists of the nurse, psychiatric unit pharmacist,  and writer). Complete current electronic health record for patient has been reviewed today including consultant notes, ancillary staff notes, nurses and psychiatric tech notes. Suicide risk assessment completed and patient deemed to be of low risk for suicide at this time.      The following regarding medications was addressed during rounds with patient: the risks and benefits of the proposed medication. The patient was given the opportunity to ask questions. Informed consent given to the use of the above medications. Will continue to adjust psychiatric and non-psychiatric medications (see above \"medication\" section and orders section for details) as deemed appropriate & based upon diagnoses and response to treatment. I will continue to order blood tests/labs and diagnostic tests as deemed appropriate and review results as they become available (see orders for details and above listed lab/test results). I will order psychiatric records from previous [facility] hospitals to further elucidate the nature of patient's psychopathology and review once available. I will gather additional collateral information from friends, family and o/p treatment team to further elucidate the nature of patient's psychopathology and baselline level of psychiatric functioning. I certify that this patient's inpatient psychiatric hospital services furnished since the previous certification were, and continue to be, required for treatment that could reasonably be expected to improve the patient's condition, or for diagnostic study, and that the patient continues to need, on a daily basis, active treatment furnished directly by or requiring the supervision of inpatient psychiatric facility personnel. In addition, the hospital records show that services furnished were intensive treatment services, admission or related services, or equivalent services.     EXPECTED DISCHARGE DATE/DAY: TBD     DISPOSITION: Home       Signed By:   Maico Tay MD  5/28/2020

## 2020-05-28 NOTE — PROGRESS NOTES
Problem: Falls - Risk of  Goal: *Absence of Falls  Description: Document Earnest Ly Fall Risk and appropriate interventions in the flowsheet. Outcome: Progressing Towards Goal  Note: Fall Risk Interventions:gripper socks, Q 15 min checks    Mentation Interventions: Reorient patient     Problem: Altered Thought Process (Adult/Pediatric)  Goal: *STG: Remains safe in hospital  Outcome: Progressing Towards Goal  Goal: *STG: Complies with medication therapy  Outcome: Progressing Towards Goal  Goal: *STG: Absence of lethality  Outcome: Progressing Towards Goal    Patient awake, alert, oriented to self, disoriented to time, place, situation. Answers questions with nonsensical responses. Patient compliant with vital signs and medications. Patient had verbal argument with another patient in dayroom this morning during breakfast and was redirected to room. AMANDA coronado completed this afternoon. Patient eating and drinking well.  Isolative to room following breakfast.

## 2020-05-28 NOTE — PROGRESS NOTES
Problem: Falls - Risk of  Goal: *Absence of Falls  Description: Document Venecia Dave Fall Risk and appropriate interventions in the flowsheet.   5/28/2020 1206 by Jesusita Bryson RN  Outcome: Progressing Towards Goal  Note: Fall Risk Interventions: gripper socks, Q 15 min checks     Problem: Altered Thought Process (Adult/Pediatric)  Goal: *STG: Remains safe in hospital  5/28/2020 1206 by Jesusita Bryson RN  Outcome: Progressing Towards Goal  5/28/2020 1204 by Jesusita Bryson RN  Outcome: Progressing Towards Goal  Goal: *STG: Complies with medication therapy  5/28/2020 1206 by Jesusita Bryson RN  Outcome: Progressing Towards Goal  5/28/2020 1204 by Jesusita Bryson RN  Outcome: Progressing Towards Goal  Goal: *STG: Absence of lethality  5/28/2020 1206 by Jesusita Bryson RN  Outcome: Progressing Towards Goal  5/28/2020 1204 by Jesusita Bryson RN  Outcome: Progressing Towards Goal

## 2020-05-28 NOTE — PROGRESS NOTES
Problem: Discharge Planning  Goal: *Knowledge of medication management  5/28/2020 1645 by Vamshi Zapata  Outcome: Progressing Towards Goal  Note: Patient verbalizes understanding of medication regimen. Patient is taking medications as prescribed. 5/28/2020 1644 by Vamshi Zapata  Outcome: Progressing Towards Goal  Goal: *Knowledge of discharge instructions  Outcome: Progressing Towards Goal  Note: Patient verbalizes understanding of goals for treatment and safe discharge. Problem: Discharge Planning  Goal: *Discharge to safe environment  Outcome: Not Progressing Towards Goal  Note: Patient is homeless. Patient does not identify an alternative discharge option.

## 2020-05-29 PROCEDURE — 65220000003 HC RM SEMIPRIVATE PSYCH

## 2020-05-29 PROCEDURE — 74011250637 HC RX REV CODE- 250/637: Performed by: PSYCHIATRY & NEUROLOGY

## 2020-05-29 RX ORDER — CLOZAPINE 25 MG/1
75 TABLET ORAL
Status: COMPLETED | OUTPATIENT
Start: 2020-05-29 | End: 2020-05-29

## 2020-05-29 RX ORDER — CLOZAPINE 100 MG/1
100 TABLET ORAL
Status: COMPLETED | OUTPATIENT
Start: 2020-05-30 | End: 2020-05-30

## 2020-05-29 RX ORDER — IBUPROFEN 200 MG
1 TABLET ORAL DAILY
Status: DISCONTINUED | OUTPATIENT
Start: 2020-05-29 | End: 2020-06-05 | Stop reason: HOSPADM

## 2020-05-29 RX ORDER — CLOZAPINE 25 MG/1
75 TABLET ORAL
Status: DISCONTINUED | OUTPATIENT
Start: 2020-05-29 | End: 2020-05-29

## 2020-05-29 RX ADMIN — PROPRANOLOL HYDROCHLORIDE 20 MG: 10 TABLET ORAL at 08:39

## 2020-05-29 RX ADMIN — CLOZAPINE 75 MG: 25 TABLET ORAL at 23:00

## 2020-05-29 RX ADMIN — PROPRANOLOL HYDROCHLORIDE 20 MG: 10 TABLET ORAL at 16:27

## 2020-05-29 RX ADMIN — SENNOSIDES AND DOCUSATE SODIUM 2 TABLET: 8.6; 5 TABLET ORAL at 08:38

## 2020-05-29 RX ADMIN — MONTELUKAST SODIUM 10 MG: 10 TABLET, FILM COATED ORAL at 23:00

## 2020-05-29 NOTE — INTERDISCIPLINARY ROUNDS
Behavioral Health Interdisciplinary Rounds Patient Name: Alexandra Dumont: 50 y.o. Room/Bed:  310/02 Primary Diagnosis: Schizophrenia ([facility] Admission Status: TDO Readmission within 30 days: No 
Power of  in place: Unknown Patient requires a blocked bed: Yes Reason for blocked bed: COVID Order for blocked bed obtained: No   
 
Sleep hours: 10 Morning Labs completed per orders:  None ordered Participation in Care/Groups:  No 
Medication Compliant?: Yes PRNS (last 24 hours): Antianxiety, Sleep Aid and Pain Restraints (last 24 hours):  No 
Substance Abuse:  Yes   
24 hour chart check complete:  Yes

## 2020-05-29 NOTE — BH NOTES
PSYCHIATRIC PROGRESS NOTE         Patient Name  Emily Azar   Date of Birth 1975   [facility] [id number]   Medical Record Number  [id number]      Age  50 y.o. PCP None   Admit date:  5/26/2020    Room Number  619/35  @ [facility]   Date of Service  5/29/2020         E & M PROGRESS NOTE:         HISTORY       CC:  \"psychosis\"  HISTORY OF PRESENT ILLNESS/INTERVAL HISTORY:  (reviewed/updated 5/29/2020). per initial evaluation: The patient, Emily Azar, is a 50 y.o. WHITE OR  male with a past psychiatric history significant for schizophrenia, who presents at this time with complaints of (and/or evidence of) the following emotional symptoms: delusions and psychotic behavior. Additional symptomatology include poor self care. The above symptoms have been present for 2+ weeks. These symptoms are of moderate to high severity. These symptoms are constant in nature. The patient's condition has been precipitated by psychosocial stressors. Patient's condition made worse by illicit substance use and treatment noncompliance. UDS: THC; BAL=0.      The patient is a fair historian. The patient corroborates the above narrative. The patient contracts for safety on the unit and gives consent for the team to contact collateral. The patient is amenable to initiating treatment while on the unit. The patient is forthcoming about his symptoms and lifestyle since leaving the hospital, he acknowledges recently leaving a hospital and living in a tent recently. 05/28 - patient slept 9 hours overnight, remains odd, but cooperative. He is isolative to room, compliant with Clozaril tolerating titration thus far. Bowel regimen initiated. Patient still with ongoing bizarre delusions but no agitation, self care remains poor. He is ambivalent about disposition stating he intends to return to living in a tent upon discharge.     05/29 - overnight, patient noted to be internally preoccupied, responding to internal stimuli but otherwise calm and cooperative. He is tolerating Clozaril titration, requests nicotine patch. Patient slept 10 hours and got Atarax and Trazodone for anxiety and insomnia. He remains disheveled but is cooperative. No change in his plans for discharge. Counseled on cigarette use and Clozaril interaction. SIDE EFFECTS: (reviewed/updated 5/29/2020)  None reported or admitted to. No noted toxicity with use of Clozaril   ALLERGIES:(reviewed/updated 5/29/2020)  Allergies   Allergen Reactions    Haloperidol Other (comments)      MEDICATIONS PRIOR TO ADMISSION:(reviewed/updated 5/29/2020)  No medications prior to admission. PAST MEDICAL HISTORY: Past medical history from the initial psychiatric evaluation has been reviewed (reviewed/updated 5/29/2020) with no additional updates (I asked patient and no additional past medical history provided). Past Medical History:   Diagnosis Date    Back pain     Chronic bronchitis (HCC)     COPD    Elevated WBCs     H/O splenectomy     HTN (hypertension)     Ill-defined condition     constipation    Psychiatric disorder     depression, anxiety     Past Surgical History:   Procedure Laterality Date    HX OTHER SURGICAL Right     two right wrist fractures     HX SPLENECTOMY        SOCIAL HISTORY: Social history from the initial psychiatric evaluation has been reviewed (reviewed/updated 5/29/2020) with no additional updates (I asked patient and no additional social history provided).    Social History     Socioeconomic History    Marital status: SINGLE     Spouse name: Not on file    Number of children: Not on file    Years of education: Not on file    Highest education level: Not on file   Occupational History    Not on file   Social Needs    Financial resource strain: Not on file    Food insecurity     Worry: Not on file     Inability: Not on file    Transportation needs     Medical: Not on file     Non-medical: Not on file   Tobacco Use    Smoking status: Former Smoker    Smokeless tobacco: Never Used   Substance and Sexual Activity    Alcohol use: No    Drug use: No     Comment: \"not for years\"    Sexual activity: Not on file   Lifestyle    Physical activity     Days per week: Not on file     Minutes per session: Not on file    Stress: Not on file   Relationships    Social connections     Talks on phone: Not on file     Gets together: Not on file     Attends Nondenominational service: Not on file     Active member of club or organization: Not on file     Attends meetings of clubs or organizations: Not on file     Relationship status: Not on file    Intimate partner violence     Fear of current or ex partner: Not on file     Emotionally abused: Not on file     Physically abused: Not on file     Forced sexual activity: Not on file   Other Topics Concern    Not on file   Social History Narrative    Social History:       Reviewed history from 02/03/2017 and no changes required:          Home: Lives with Campos Hanley, his girlfriend of 2 years, in a [place] apartment with pet lizard and fish          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 Josias Barron, [employer] psychiatry                     Smoking History:          Patient currently smokes every day. Patient has been counseled to quit. FAMILY HISTORY: Family history from the initial psychiatric evaluation has been reviewed (reviewed/updated 5/29/2020) with no additional updates (I asked patient and no additional family history provided).    Family History   Problem Relation Age of Onset    No Known Problems Mother     No Known Problems Father     No Known Problems Brother        REVIEW OF SYSTEMS: (reviewed/updated 5/29/2020)  Appetite:improved   Sleep: improved   All other Review of Systems: Negative except foot swelling         MENTAL STATUS EXAM & VITALS     MENTAL STATUS EXAM (MSE):    MSE FINDINGS ARE WITHIN NORMAL LIMITS (WNL) UNLESS OTHERWISE STATED BELOW. ( ALL OF THE BELOW CATEGORIES OF THE MSE HAVE BEEN REVIEWED (reviewed 5/29/2020) AND UPDATED AS DEEMED APPROPRIATE )  General Presentation age appropriate, guarded   Orientation oriented to time, place and person   Vital Signs  See below (reviewed 5/29/2020); Vital Signs (BP, Pulse, & Temp) are within normal limits if not listed below.    Gait and Station Stable/steady, no ataxia   Musculoskeletal System No extrapyramidal symptoms (EPS); no abnormal muscular movements or Tardive Dyskinesia (TD); muscle strength and tone are within normal limits   Language No aphasia or dysarthria   Speech:  non-pressured and soft   Thought Processes illogical; normal rate of thoughts; fair abstract reasoning/computation   Thought Associations goal directed   Thought Content internally preoccupied   Suicidal Ideations none   Homicidal Ideations none   Mood:  euthymic   Affect:  constricted and mood-congruent   Memory recent  intact   Memory remote:  intact   Concentration/Attention:  intact   Fund of Knowledge average   Insight:  poor   Reliability fair   Judgment:  poor          VITALS:     Patient Vitals for the past 24 hrs:   Temp Pulse Resp BP SpO2   05/29/20 0753 98.3 °F (36.8 °C) 67 16 132/82 100 %   05/28/20 1930 98 °F (36.7 °C) 73 16 117/52 99 %   05/28/20 1650  88 18 153/88 99 %     Wt Readings from Last 3 Encounters:   05/26/20 68 kg (150 lb)   12/02/18 75.8 kg (167 lb)   09/30/18 70.3 kg (155 lb)     Temp Readings from Last 3 Encounters:   05/29/20 98.3 °F (36.8 °C)   02/26/19 97.9 °F (36.6 °C)   12/02/18 97.9 °F (36.6 °C)     BP Readings from Last 3 Encounters:   05/29/20 132/82   02/26/19 123/86   12/02/18 (!) 140/101     Pulse Readings from Last 3 Encounters:   05/29/20 67   02/26/19 96   12/02/18 84            DATA LABORATORY DATA:(reviewed/updated 5/29/2020)  No results found for this or any previous visit (from the past 24 hour(s)). No results found for: VALF2, VALAC, VALP, VALPR, DS6, CRBAM, CRBAMP, CARB2, XCRBAM  No results found for: LITHM   RADIOLOGY REPORTS:(reviewed/updated 5/29/2020)  Xr Abd Port  1 V    Result Date: 5/26/2020  EXAM: XR ABD PORT  1 V INDICATION: ? of patient ingesting rocks COMPARISON: 12.2.2018. FINDINGS: A supine radiograph of the abdomen shows a nonspecific abdominal gas pattern. There are 2 radiopaque structures in the right lower quadrant that individually measure 16 and 11 mm in size. In addition, in the distal transverse colon, there is a 12 x 3 mm calcification. IMPRESSION: Radiopaque structures in the right lower quadrant and transverse colon could be compatible with ingested rocks.  No plain film evidence for obstruction          MEDICATIONS     ALL MEDICATIONS:   Current Facility-Administered Medications   Medication Dose Route Frequency    nicotine (NICODERM CQ) 21 mg/24 hr patch 1 Patch  1 Patch TransDERmal DAILY    cloZAPine (CLOZARIL) tablet 75 mg  75 mg Oral QHS    [START ON 5/30/2020] cloZAPine (CLOZARIL) tablet 100 mg  100 mg Oral QHS    [START ON 5/31/2020] cloZAPine (CLOZARIL) tablet 125 mg  125 mg Oral QHS    [START ON 6/1/2020] cloZAPine (CLOZARIL) tablet 150 mg  150 mg Oral QHS    propranoloL (INDERAL) tablet 20 mg  20 mg Oral BID    senna-docusate (PERICOLACE) 8.6-50 mg per tablet 2 Tab  2 Tab Oral DAILY    montelukast (SINGULAIR) tablet 10 mg  10 mg Oral QHS    OLANZapine (ZyPREXA) tablet 5 mg  5 mg Oral Q6H PRN    benztropine (COGENTIN) tablet 1 mg  1 mg Oral BID PRN    diphenhydrAMINE (BENADRYL) injection 50 mg  50 mg IntraMUSCular BID PRN    hydrOXYzine HCL (ATARAX) tablet 50 mg  50 mg Oral TID PRN    LORazepam (ATIVAN) injection 1 mg  1 mg IntraMUSCular Q4H PRN    traZODone (DESYREL) tablet 50 mg  50 mg Oral QHS PRN    acetaminophen (TYLENOL) tablet 650 mg  650 mg Oral Q4H PRN    magnesium hydroxide (MILK OF MAGNESIA) 400 mg/5 mL oral suspension 30 mL  30 mL Oral DAILY PRN    chlorproMAZINE (THORAZINE) injection 25 mg  25 mg IntraMUSCular Q6H PRN      SCHEDULED MEDICATIONS:   Current Facility-Administered Medications   Medication Dose Route Frequency    nicotine (NICODERM CQ) 21 mg/24 hr patch 1 Patch  1 Patch TransDERmal DAILY    cloZAPine (CLOZARIL) tablet 75 mg  75 mg Oral QHS    [START ON 5/30/2020] cloZAPine (CLOZARIL) tablet 100 mg  100 mg Oral QHS    [START ON 5/31/2020] cloZAPine (CLOZARIL) tablet 125 mg  125 mg Oral QHS    [START ON 6/1/2020] cloZAPine (CLOZARIL) tablet 150 mg  150 mg Oral QHS    propranoloL (INDERAL) tablet 20 mg  20 mg Oral BID    senna-docusate (PERICOLACE) 8.6-50 mg per tablet 2 Tab  2 Tab Oral DAILY    montelukast (SINGULAIR) tablet 10 mg  10 mg Oral QHS          ASSESSMENT & PLAN     DIAGNOSES REQUIRING ACTIVE TREATMENT AND MONITORING: (reviewed/updated 5/29/2020)  Patient Active Hospital Problem List:    Schizophrenia [facility]) (5/26/2020)    Assessment: patient with worsening self care and ingested foreign body in the setting of medication non-compliance. He is verbal and amenable to restarting treatment which is an improvement over his previous presentation. Will restart Clozaril, bowel regimen.     Plan:   - CONTINUE Propranolol 20 mg BID for iatrogenic tachycardia, akathisia  - INCREASE Clozaril to 150 mg QHS over the next 96 hours for treatment resistant psychosis  - CONTINUE Pericolace 2 tablets QDAY for iatrogenic constipation  - IGM therapy as tolerated  - Expand database / obtain collateral  - Dispo planning     I will continue to monitor blood levels (clozapine---a drug with a narrow therapeutic index= NTI) and associated labs for drug therapy implemented that require intense monitoring for toxicity as deemed appropriate based on current medication side effects and pharmacodynamically determined drug 1/2 lives. In summary, Kylie Olivo, is a 50 y.o.  male who presents with a severe exacerbation of the principal diagnosis of Schizophrenia ([address]    Patient's condition is not improving. Patient requires continued inpatient hospitalization for further stabilization, safety monitoring and medication management. I will continue to coordinate the provision of individual, milieu, occupational, group, and substance abuse therapies to address target symptoms/diagnoses as deemed appropriate for the individual patient. A coordinated, multidisplinary treatment team round was conducted with the patient (this team consists of the nurse, psychiatric unit pharmacist,  and writer). Complete current electronic health record for patient has been reviewed today including consultant notes, ancillary staff notes, nurses and psychiatric tech notes. Suicide risk assessment completed and patient deemed to be of low risk for suicide at this time. The following regarding medications was addressed during rounds with patient:   the risks and benefits of the proposed medication. The patient was given the opportunity to ask questions. Informed consent given to the use of the above medications. Will continue to adjust psychiatric and non-psychiatric medications (see above \"medication\" section and orders section for details) as deemed appropriate & based upon diagnoses and response to treatment. I will continue to order blood tests/labs and diagnostic tests as deemed appropriate and review results as they become available (see orders for details and above listed lab/test results). I will order psychiatric records from previous [facility] hospitals to further elucidate the nature of patient's psychopathology and review once available.     I will gather additional collateral information from friends, family and o/p treatment team to further elucidate the nature of patient's psychopathology and baselline level of psychiatric functioning. I certify that this patient's inpatient psychiatric hospital services furnished since the previous certification were, and continue to be, required for treatment that could reasonably be expected to improve the patient's condition, or for diagnostic study, and that the patient continues to need, on a daily basis, active treatment furnished directly by or requiring the supervision of inpatient psychiatric facility personnel. In addition, the hospital records show that services furnished were intensive treatment services, admission or related services, or equivalent services.     EXPECTED DISCHARGE DATE/DAY: TBD     DISPOSITION: Home       Signed By:   Lexii Covarrubias MD  5/29/2020

## 2020-05-29 NOTE — BH NOTES
Assumed care of patient and given report by off going shift HEIDY Carmona. Pt was in his room when coming to assess him. He was calm and cooperative, anxious and a bit distracted. He is a bit odd at times and remained isolative to his room. He was medication compliant. His hygiene was poor; had numerous scabs and reddened areas to his arms and legs. He does appear preoccupied at times, illogical thinking. He reported  His anxiety was high. He also stated he was a heavy smoker and was requesting nicotine with I advised we could get tomorrow as it was too late to give that now. Pt denied depression. He also denied SI/HI or AVH and said \"I'm doing good now. \" He did ask about his L foot and if they found anything on his xray; which I was advised it was negative. Pt denies knowing what he did with it and denied pain when taking to me but said he felt like there was something in it. Pt ate snack in his room and remained there reading. Pt has no outbursts. Pt did have prn trazodone and atarax at 2122 which was effective. He remains on 15 in safety checks. Will continue to monitor and treat.

## 2020-05-29 NOTE — PROGRESS NOTES
Problem: Altered Thought Process (Adult/Pediatric)  Goal: *STG: Participates in treatment plan  Outcome: Progressing Towards Goal  Goal: *STG: Remains safe in hospital  Outcome: Progressing Towards Goal

## 2020-05-29 NOTE — PROGRESS NOTES
Problem: Altered Thought Process (Adult/Pediatric)  Goal: *STG: Remains safe in hospital  Outcome: Progressing Towards Goal  Goal: *STG: Seeks staff when feelings of anxiety and fear arise  Outcome: Progressing Towards Goal  Goal: *STG: Complies with medication therapy  Outcome: Progressing Towards Goal

## 2020-05-30 PROCEDURE — 65220000003 HC RM SEMIPRIVATE PSYCH

## 2020-05-30 PROCEDURE — 74011250637 HC RX REV CODE- 250/637: Performed by: NURSE PRACTITIONER

## 2020-05-30 PROCEDURE — 74011250637 HC RX REV CODE- 250/637: Performed by: PSYCHIATRY & NEUROLOGY

## 2020-05-30 RX ADMIN — PROPRANOLOL HYDROCHLORIDE 20 MG: 10 TABLET ORAL at 08:30

## 2020-05-30 RX ADMIN — CLOZAPINE 100 MG: 100 TABLET ORAL at 21:50

## 2020-05-30 RX ADMIN — PROPRANOLOL HYDROCHLORIDE 20 MG: 10 TABLET ORAL at 16:39

## 2020-05-30 RX ADMIN — TRAZODONE HYDROCHLORIDE 50 MG: 50 TABLET ORAL at 21:49

## 2020-05-30 RX ADMIN — SENNOSIDES AND DOCUSATE SODIUM 2 TABLET: 8.6; 5 TABLET ORAL at 08:31

## 2020-05-30 RX ADMIN — ACETAMINOPHEN 650 MG: 325 TABLET, FILM COATED ORAL at 01:48

## 2020-05-30 RX ADMIN — ACETAMINOPHEN 650 MG: 325 TABLET, FILM COATED ORAL at 21:49

## 2020-05-30 RX ADMIN — ACETAMINOPHEN 650 MG: 325 TABLET, FILM COATED ORAL at 16:39

## 2020-05-30 RX ADMIN — MONTELUKAST SODIUM 10 MG: 10 TABLET, FILM COATED ORAL at 21:50

## 2020-05-30 NOTE — PROGRESS NOTES
Problem: Patient Education: Go to Patient Education Activity  Goal: Patient/Family Education  Outcome: Progressing Towards Goal     Problem: Altered Thought Process (Adult/Pediatric)  Goal: *STG: Participates in treatment plan  Outcome: Progressing Towards Goal

## 2020-05-30 NOTE — BH NOTES
0700-Report received  From Don RN  0800-1000-Patient in no  Acute distress  he is still bizarre, staring at the ceiling and mumble he denies SI/HI when asked. He denies auditory or visual  hallunication  But  Appears to be responding to internal stimuli at this time. Staff will  Continue  To monitor. 1000-1200-Patient out of room in the dayroom for lunch then  back to his room appetite is good at this time. 1200-1400-Patient in no acute distress at this time he is resting quietly in bed at this time. 1400-1600-Patient complained of foot pain and was given tylenol with some relief at this time. 1600-1800-Patient in no acute distress at this time he at supper, then back to his room  , no unsafe behavior noted throughout the shift.

## 2020-05-30 NOTE — PROGRESS NOTES
Problem: Falls - Risk of  Goal: *Absence of Falls  Description: Document Matthew Ventura Fall Risk and appropriate interventions in the flowsheet.   Outcome: Progressing Towards Goal  Note: Fall Risk Interventions:       Mentation Interventions: Reorient patient

## 2020-05-30 NOTE — BH NOTES
Patient c/o left foot pain. Left foot is red, swollen and painful to touch. +PPP and brisk cap refill in extremity. Tylenol given per order. Patient states some relief. Medication compliant. Patient has been up in room most of the night. Slept approx 3 hours.

## 2020-05-31 PROCEDURE — 74011250637 HC RX REV CODE- 250/637: Performed by: NURSE PRACTITIONER

## 2020-05-31 PROCEDURE — 65220000003 HC RM SEMIPRIVATE PSYCH

## 2020-05-31 PROCEDURE — 74011250637 HC RX REV CODE- 250/637: Performed by: PSYCHIATRY & NEUROLOGY

## 2020-05-31 RX ORDER — MUPIROCIN 20 MG/G
OINTMENT TOPICAL 2 TIMES DAILY
Status: DISCONTINUED | OUTPATIENT
Start: 2020-05-31 | End: 2020-06-02

## 2020-05-31 RX ADMIN — TRAZODONE HYDROCHLORIDE 50 MG: 50 TABLET ORAL at 21:07

## 2020-05-31 RX ADMIN — BENZTROPINE MESYLATE 1 MG: 1 TABLET ORAL at 07:51

## 2020-05-31 RX ADMIN — ACETAMINOPHEN 650 MG: 325 TABLET, FILM COATED ORAL at 07:51

## 2020-05-31 RX ADMIN — PROPRANOLOL HYDROCHLORIDE 20 MG: 10 TABLET ORAL at 16:46

## 2020-05-31 RX ADMIN — MONTELUKAST SODIUM 10 MG: 10 TABLET, FILM COATED ORAL at 21:07

## 2020-05-31 RX ADMIN — SENNOSIDES AND DOCUSATE SODIUM 2 TABLET: 8.6; 5 TABLET ORAL at 07:51

## 2020-05-31 RX ADMIN — MUPIROCIN: 20 OINTMENT TOPICAL at 16:37

## 2020-05-31 RX ADMIN — CLOZAPINE 125 MG: 100 TABLET ORAL at 21:07

## 2020-05-31 RX ADMIN — PROPRANOLOL HYDROCHLORIDE 20 MG: 10 TABLET ORAL at 07:51

## 2020-05-31 RX ADMIN — MUPIROCIN: 20 OINTMENT TOPICAL at 13:00

## 2020-05-31 NOTE — PROGRESS NOTES
Problem: Altered Thought Process (Adult/Pediatric)  Goal: *STG: Complies with medication therapy  Outcome: Progressing Towards Goal  Goal: *STG: Attends activities and groups  Outcome: Not Progressing Towards Goal  Goal: *STG: Demonstrates ability to understand and use improved judgment in daily activities and relationships  Outcome: Not Progressing Towards Goal

## 2020-05-31 NOTE — BH NOTES
Attempted to meet with patient for individual session. Patient declined at this time.          Rosaura Castañeda, MFT Candidate

## 2020-05-31 NOTE — BH NOTES
Psychiatric Progress Note    Patient: Caron Hollins MRN: [id number]  SSN: [id number]    YOB: 1975  Age: 50 y.o. Sex: male      Admit Date: 5/26/2020    LOS: 4 days     Subjective:     Caron Hollins  reports feeling \"okay\" and moods are depressed and isolative. Denies SI/HI/AH/VH. No aggression or violence. Appropriately interactive and aware. Tolerating medications well. Eating well and sleeping well. Karey An reports \"id rather be fishing\". He is conversational upon assessment, though has been isolative to his room. He reports pain to L. Foot, 1/10 at this time. It is observed swollen as well.      Objective:     Vitals:    05/29/20 0753 05/29/20 2010 05/30/20 0806 05/30/20 1918   BP: 132/82 141/90 142/86 138/82   Pulse: 67 80 84 82   Resp: 16 18 18 17   Temp: 98.3 °F (36.8 °C) 98.5 °F (36.9 °C) 97.9 °F (36.6 °C) 98.2 °F (36.8 °C)   SpO2: 100% 99% 100% 99%   Weight:       Height:            Mental Status Exam:   Sensorium  oriented to time, place and person   Relations cooperative   Eye Contact appropriate   Appearance:  age appropriate   Speech:  normal pitch and normal volume   Thought Process: within normal limits   Thought Content free of delusions and free of hallucinations   Suicidal ideations none   Mood:  depressed   Affect:  mood-CONGRUENT   Memory   adequate   Concentration:  adequate   Insight:  fair   Judgment:  fair       MEDICATIONS:  Current Facility-Administered Medications   Medication Dose Route Frequency    nicotine (NICODERM CQ) 21 mg/24 hr patch 1 Patch  1 Patch TransDERmal DAILY    [START ON 5/31/2020] cloZAPine (CLOZARIL) tablet 125 mg  125 mg Oral QHS    [START ON 6/1/2020] cloZAPine (CLOZARIL) tablet 150 mg  150 mg Oral QHS    propranoloL (INDERAL) tablet 20 mg  20 mg Oral BID    senna-docusate (PERICOLACE) 8.6-50 mg per tablet 2 Tab  2 Tab Oral DAILY    montelukast (SINGULAIR) tablet 10 mg  10 mg Oral QHS    OLANZapine (ZyPREXA) tablet 5 mg  5 mg Oral Q6H PRN    benztropine (COGENTIN) tablet 1 mg  1 mg Oral BID PRN    diphenhydrAMINE (BENADRYL) injection 50 mg  50 mg IntraMUSCular BID PRN    hydrOXYzine HCL (ATARAX) tablet 50 mg  50 mg Oral TID PRN    LORazepam (ATIVAN) injection 1 mg  1 mg IntraMUSCular Q4H PRN    traZODone (DESYREL) tablet 50 mg  50 mg Oral QHS PRN    acetaminophen (TYLENOL) tablet 650 mg  650 mg Oral Q4H PRN    magnesium hydroxide (MILK OF MAGNESIA) 400 mg/5 mL oral suspension 30 mL  30 mL Oral DAILY PRN    chlorproMAZINE (THORAZINE) injection 25 mg  25 mg IntraMUSCular Q6H PRN      DISCUSSION:   the risks and benefits of the proposed medication  N/A    Lab/Data Review: All lab results for the last 24 hours reviewed.          Assessment:     Principal Problem:    Schizophrenia ([facility] [address] (5/26/2020)        Plan:     Continue current care    Disposition planning with social work    Signed By: MOLLY Avelar     May 30, 2020

## 2020-05-31 NOTE — BH NOTES
0700-Report received from Morena Lorenz. 0800-1000-Patient in no acute distress at this time, he still complaint of pain in the left foot which is slightly swollen. He denies SIi/HI at this time. No auditory or visual hallucination noted at this time. Staff will continue to monitor. 1000-1200-Patient  Out of room for lunch then back in his room at this time, he states the pain is little better in his left foot  But not completely gone away. 1200-1400-Patient in no acute distress at this time  Resting quietly in bed  During quiet time. 1400-1600-Patient  Out of room for snack then back to his room at this time   1600-1800-Patient has bee self isolating in the room most of the shift, he ws given tylenol for foot pain  , he has new order for  bactrim ointment for the left foot which was applied patient tolerated with minimal complaint.

## 2020-05-31 NOTE — INTERDISCIPLINARY ROUNDS
Behavioral Health Interdisciplinary Rounds  
  
Patient Name: Max Higgins: 50 y.o. Room/Bed:  310/02 Primary Diagnosis: Schizophrenia ([facility] Admission Status: Committed Readmission within 30 days: No 
Power of  in place: Unknown Patient requires a blocked bed: Yes Reason for blocked bed: COVID Order for blocked bed obtained: No   
  
Sleep hours: 4.25 Morning Labs completed per orders:  None ordered Participation in Care/Groups:  No 
Medication Compliant?: Yes PRNS (last 24 hours): Sleep Aid,  and Pain x2 Restraints (last 24 hours):  No 
Substance Abuse: Yes                       
24 hour chart check complete:  Yes

## 2020-05-31 NOTE — BH NOTES
Assumed care of patient and given report by off going nurse SAMI Farah RN. Pt was in the dayroom for a bit tonight watching tv otherwise he was in his room drawing or reading. He remains a bit odd, denies SI/HI or AVH but noted to be responding to stimuli at times. Pt had no outbursts. He has been medication compliant. He did have snack. He continues to have poor hygiene and was encouraged to get in the shower tonight but said he had no hot water in his shower; so a work order was put in. He reports still having anxiety and some depression but says \"not too much. \"  He is still having discomfort to his L foot and it was noted to be painful to touch, red, swollen and warm with 2+ non pitting edema. He also appears to have several scabbed areas above where the redness it that looks to have once been an open wound as well as a blister to the outside of his L foot near same area of redness. Pt has been up in his room and asked to soak his foot again stated \"that's the only thing that helps so it doesn't hurt as much\" and then says \"then I can walk on it. \"  Pt food has worsened since I was here last; appears to have cellulitis. Updated hospital charge of change in condition. Will update RN in morning to have patient seen. Pt was to be seen today per report but hospitalist did not come to see him. He had a recent xray to foot that showed it was negative for fracture. Pt had tylenol earlier along with trazodone with scheduled meds but stated that didn't help but soaking his foot did. He states no change either from trying an ice pack. Pt advised to elevate foot again. Pt remains on 15 min checks. Will continue to monitor and treat.

## 2020-05-31 NOTE — PROGRESS NOTES
Problem: Falls - Risk of  Goal: *Absence of Falls  Description: Document Ousmane Montero Fall Risk and appropriate interventions in the flowsheet.   Outcome: Progressing Towards Goal  Note: Fall Risk Interventions:       Mentation Interventions: Reorient patient

## 2020-06-01 LAB
ANION GAP SERPL CALC-SCNC: 9 MMOL/L (ref 5–15)
BASOPHILS # BLD: 0.1 K/UL (ref 0–0.1)
BASOPHILS NFR BLD: 1 % (ref 0–1)
BUN SERPL-MCNC: 31 MG/DL (ref 6–20)
BUN/CREAT SERPL: 26 (ref 12–20)
CALCIUM SERPL-MCNC: 8.8 MG/DL (ref 8.5–10.1)
CHLORIDE SERPL-SCNC: 104 MMOL/L (ref 97–108)
CO2 SERPL-SCNC: 24 MMOL/L (ref 21–32)
CREAT SERPL-MCNC: 1.21 MG/DL (ref 0.7–1.3)
DIFFERENTIAL METHOD BLD: ABNORMAL
EOSINOPHIL # BLD: 0.5 K/UL (ref 0–0.4)
EOSINOPHIL NFR BLD: 3 % (ref 0–7)
ERYTHROCYTE [DISTWIDTH] IN BLOOD BY AUTOMATED COUNT: 13.2 % (ref 11.5–14.5)
GLUCOSE SERPL-MCNC: 98 MG/DL (ref 65–100)
HCT VFR BLD AUTO: 40 % (ref 36.6–50.3)
HGB BLD-MCNC: 13 G/DL (ref 12.1–17)
IMM GRANULOCYTES # BLD AUTO: 0.1 K/UL (ref 0–0.04)
IMM GRANULOCYTES NFR BLD AUTO: 0 % (ref 0–0.5)
LYMPHOCYTES # BLD: 2.6 K/UL (ref 0.8–3.5)
LYMPHOCYTES NFR BLD: 14 % (ref 12–49)
MAGNESIUM SERPL-MCNC: 2 MG/DL (ref 1.6–2.4)
MCH RBC QN AUTO: 30.4 PG (ref 26–34)
MCHC RBC AUTO-ENTMCNC: 32.5 G/DL (ref 30–36.5)
MCV RBC AUTO: 93.7 FL (ref 80–99)
MONOCYTES # BLD: 1.1 K/UL (ref 0–1)
MONOCYTES NFR BLD: 6 % (ref 5–13)
NEUTS SEG # BLD: 13.9 K/UL (ref 1.8–8)
NEUTS SEG NFR BLD: 76 % (ref 32–75)
NRBC # BLD: 0 K/UL (ref 0–0.01)
NRBC BLD-RTO: 0 PER 100 WBC
PHOSPHATE SERPL-MCNC: 4.7 MG/DL (ref 2.6–4.7)
PLATELET # BLD AUTO: 377 K/UL (ref 150–400)
PMV BLD AUTO: 11.3 FL (ref 8.9–12.9)
POTASSIUM SERPL-SCNC: 4.6 MMOL/L (ref 3.5–5.1)
RBC # BLD AUTO: 4.27 M/UL (ref 4.1–5.7)
SODIUM SERPL-SCNC: 137 MMOL/L (ref 136–145)
WBC # BLD AUTO: 18.2 K/UL (ref 4.1–11.1)

## 2020-06-01 PROCEDURE — 80048 BASIC METABOLIC PNL TOTAL CA: CPT

## 2020-06-01 PROCEDURE — 84100 ASSAY OF PHOSPHORUS: CPT

## 2020-06-01 PROCEDURE — 74011250637 HC RX REV CODE- 250/637: Performed by: STUDENT IN AN ORGANIZED HEALTH CARE EDUCATION/TRAINING PROGRAM

## 2020-06-01 PROCEDURE — 74011250637 HC RX REV CODE- 250/637: Performed by: PSYCHIATRY & NEUROLOGY

## 2020-06-01 PROCEDURE — 85025 COMPLETE CBC W/AUTO DIFF WBC: CPT

## 2020-06-01 PROCEDURE — 74011250637 HC RX REV CODE- 250/637: Performed by: NURSE PRACTITIONER

## 2020-06-01 PROCEDURE — 65220000003 HC RM SEMIPRIVATE PSYCH

## 2020-06-01 PROCEDURE — 83735 ASSAY OF MAGNESIUM: CPT

## 2020-06-01 PROCEDURE — 36415 COLL VENOUS BLD VENIPUNCTURE: CPT

## 2020-06-01 RX ORDER — DOXYCYCLINE HYCLATE 100 MG
100 TABLET ORAL EVERY 12 HOURS
Status: DISCONTINUED | OUTPATIENT
Start: 2020-06-01 | End: 2020-06-05 | Stop reason: HOSPADM

## 2020-06-01 RX ORDER — CEFDINIR 300 MG/1
300 CAPSULE ORAL EVERY 12 HOURS
Status: DISCONTINUED | OUTPATIENT
Start: 2020-06-01 | End: 2020-06-05 | Stop reason: HOSPADM

## 2020-06-01 RX ADMIN — CLOZAPINE 150 MG: 100 TABLET ORAL at 21:25

## 2020-06-01 RX ADMIN — MUPIROCIN: 20 OINTMENT TOPICAL at 17:54

## 2020-06-01 RX ADMIN — CEFDINIR 300 MG: 300 CAPSULE ORAL at 21:25

## 2020-06-01 RX ADMIN — PROPRANOLOL HYDROCHLORIDE 20 MG: 10 TABLET ORAL at 17:54

## 2020-06-01 RX ADMIN — SENNOSIDES AND DOCUSATE SODIUM 2 TABLET: 8.6; 5 TABLET ORAL at 09:03

## 2020-06-01 RX ADMIN — TRAZODONE HYDROCHLORIDE 50 MG: 50 TABLET ORAL at 21:26

## 2020-06-01 RX ADMIN — DOXYCYCLINE HYCLATE 100 MG: 100 TABLET, COATED ORAL at 21:25

## 2020-06-01 RX ADMIN — MONTELUKAST SODIUM 10 MG: 10 TABLET, FILM COATED ORAL at 21:25

## 2020-06-01 RX ADMIN — PROPRANOLOL HYDROCHLORIDE 20 MG: 10 TABLET ORAL at 09:03

## 2020-06-01 RX ADMIN — ACETAMINOPHEN 650 MG: 325 TABLET, FILM COATED ORAL at 17:54

## 2020-06-01 RX ADMIN — MUPIROCIN: 20 OINTMENT TOPICAL at 09:03

## 2020-06-01 NOTE — BH NOTES
0700  Received report from [facility], RN. Assumed care of the patient. 0800  Pt in day room eating breakfast    0900  Pt lying in bed upon assessment. Pt denies SI/HI/anxiety/depression. Pt reports he hears voices and has visual hallucinations all the time. Last bm was yesterday. Pt has L foot pain 2/10, declines offer of prn pain medication. Top of L foot is reddened with slight swelling. Pt compliant with am medications.     1145  Pt eating lunch in his room    1400  Pt compliant with labs    1715  Pt eating dinner in his room at this time    1754  Pt received prn tylenol per request for c/o L foot pain 4/10

## 2020-06-01 NOTE — PROGRESS NOTES
Diet as tolerated. Pt noted to be meal compliant. Hx notable for COPD, HTN, hyperlipidemia. Ht: 5'9.5\"  Wt: 150 lb  BMI: 21.83 kg/(m^2) c/w normal weight.   Est energy needs: 2020 kcal, 74 g protein, 1 mL/kcal fluids  Pt will consume > 75% of meals at follow up, 7-10 days  LOS

## 2020-06-01 NOTE — PROGRESS NOTES
Problem: Altered Thought Process (Adult/Pediatric)  Goal: *STG: Remains safe in hospital  Outcome: Progressing Towards Goal  Goal: *STG: Complies with medication therapy  Outcome: Progressing Towards Goal  Goal: *STG: Decreased delusional thinking  Outcome: Progressing Towards Goal

## 2020-06-01 NOTE — INTERDISCIPLINARY ROUNDS
Behavioral Health Interdisciplinary Rounds Patient Name: Artie Sheldon  Age: 50 y.o. Room/Bed:  310/02 Primary Diagnosis: Schizophrenia ([facility] [address] Admission Status: Court Readmission within 30 days: No 
Power of  in place: No 
Patient requires a blocked bed: Yes Reason for blocked bed: COVID Order for blocked bed obtained: N/A Sleep hours: 6.5 Morning Labs completed per orders:  N/A Participation in Care/Groups:  Yes Medication Compliant?: Yes PRNS (last 24 hours):  
Restraints (last 24 hours):  No 
Substance Abuse:  No   
24 hour chart check complete: Yes Patient goal(s) for today: continue taking medication as prescribed. Treatment team focus/goals:stabilize and dispo planning Progress note: Pt remains calm, cooperative and tolerating Clozaril. LOS:  6  Expected LOS: TBD Financial concerns/prescription coverage: [place] [employer] Advantage Part A&B with QMB Extended Coverage and [place] [facility] Family contact: None Family requesting physician contact today: No 
Discharge plan:Group home vs tent in woods Access to weapons: None involved. Outpatient provider(s): To be linked Patient's preferred phone number for follow up call:  
 
Participating treatment team members: Artie Sheldon, BESS Cummings, Dr. Abdelrahman Rico, Dr. Emiliana Peterson.

## 2020-06-01 NOTE — BH NOTES
1930 - 5/31/20 - pt sitting on bed in room, soaking left foot in bucket of warm water. Reddened area noted on anterior surface in middle of foot. States pain is 2/10 - denies need for any pain meds. A&O x2. VSS. Pt did not display any delusions. Answered assessment questions appropriately. Flat affect, withdrawn but cooperative. Pt appears more in tune w/ his surroundings and more logical in conversation than previous days. Denies SI/HI/AH/VH. Admits to some depression but verbally contracted for safety and insists he has no SI.    2107 - pt requested & was given Trazadone 50mg PO for sleep. 2300 - sleeping soundly and w/o distress, resp WDL.     0100 - continues to sleepy soundly w/o distress. 0400 - pt sleeping & w/o distress. 0500 - Lav tube drawn from L AC for CBC w/ Diff and taken to lab.     0700 - sleeping - slept 6.5 hrs w/o distress.

## 2020-06-01 NOTE — PROGRESS NOTES
Problem: Falls - Risk of  Goal: *Absence of Falls  Description: Document Liliana Blood Fall Risk and appropriate interventions in the flowsheet.   Outcome: Progressing Towards Goal  Note: Fall Risk Interventions:       Mentation Interventions: Reorient patient                        Problem: Altered Thought Process (Adult/Pediatric)  Goal: *STG: Remains safe in hospital  Outcome: Progressing Towards Goal

## 2020-06-01 NOTE — PROGRESS NOTES
Clozapine Daily Monitoring  Current regimen:  150 mg PO QHS  Last CBC done AND reported to the registry:  6/1/20  Next CBC due:  6/8/20    DATE ANC (K/uL)   5/26 7.4   6/1 13.9         ANC must be >1 to dispense clozapine. If patient experiences ANC <1.5, refer to the Clozapine REMS ANC monitoring algorithm for frequency of ANC monitoring. Visit Vitals  BP 97/51 (BP 1 Location: Left arm, BP Patient Position: Lying right side)   Pulse 81   Temp 97.8 °F (36.6 °C)   Resp 16   Ht 176.5 cm (69.5\")   Wt 68 kg (150 lb)   SpO2 98%   BMI 21.83 kg/m²       Recommendations/comments: ANC value is elevated, 13.9 K/uL (range 1.8-8.0 K/uL), and is acceptable to continue with clozapine dispensing. ANC value was reported to the Clozapine REMS Program. Next [address] is due in 1 week on 6/8/20.       Thank you,  Chelsey Méndez, [address] [place] [address] Ave, [address] [place] [address] Avenue Nw: [phone]  Pharmacy: [phone] ([phone])

## 2020-06-01 NOTE — BH NOTES
PSYCHIATRIC PROGRESS NOTE         Patient Name  Aura Quinonez   Date of Birth 1975   [facility] [id number]   Medical Record Number  [id number]      Age  50 y.o. PCP None   Admit date:  5/26/2020    Room Number  677/73  @ [facility]   Date of Service  6/1/2020         E & M PROGRESS NOTE:         HISTORY       CC:  \"psychosis\"  HISTORY OF PRESENT ILLNESS/INTERVAL HISTORY:  (reviewed/updated 6/1/2020). per initial evaluation: The patient, Aura Quinonez, is a 50 y.o. WHITE OR  male with a past psychiatric history significant for schizophrenia, who presents at this time with complaints of (and/or evidence of) the following emotional symptoms: delusions and psychotic behavior. Additional symptomatology include poor self care. The above symptoms have been present for 2+ weeks. These symptoms are of moderate to high severity. These symptoms are constant in nature. The patient's condition has been precipitated by psychosocial stressors. Patient's condition made worse by illicit substance use and treatment noncompliance. UDS: THC; BAL=0.      The patient is a fair historian. The patient corroborates the above narrative. The patient contracts for safety on the unit and gives consent for the team to contact collateral. The patient is amenable to initiating treatment while on the unit. The patient is forthcoming about his symptoms and lifestyle since leaving the hospital, he acknowledges recently leaving a hospital and living in a tent recently. 05/28 - patient slept 9 hours overnight, remains odd, but cooperative. He is isolative to room, compliant with Clozaril tolerating titration thus far. Bowel regimen initiated. Patient still with ongoing bizarre delusions but no agitation, self care remains poor. He is ambivalent about disposition stating he intends to return to living in a tent upon discharge.     05/29 - overnight, patient noted to be internally preoccupied, responding to internal stimuli but otherwise calm and cooperative. He is tolerating Clozaril titration, requests nicotine patch. Patient slept 10 hours and got Atarax and Trazodone for anxiety and insomnia. He remains disheveled but is cooperative. No change in his plans for discharge. Counseled on cigarette use and Clozaril interaction. 06/01 - patient slept 6.5 hours overnight, he continues to c/o AVH but has been otherwise calm. Patient now with WBC to 18.2, ANC 13.1. He denies any N/V or headache, but endorses pain in his L foot which is inflamed. Patient denies SI/HI, is isolative to room but out for meals. Tolerating Clozaril titration thus far. SIDE EFFECTS: (reviewed/updated 6/1/2020)  None reported or admitted to. No noted toxicity with use of Clozaril   ALLERGIES:(reviewed/updated 6/1/2020)  Allergies   Allergen Reactions    Haloperidol Other (comments)      MEDICATIONS PRIOR TO ADMISSION:(reviewed/updated 6/1/2020)  No medications prior to admission. PAST MEDICAL HISTORY: Past medical history from the initial psychiatric evaluation has been reviewed (reviewed/updated 6/1/2020) with no additional updates (I asked patient and no additional past medical history provided). Past Medical History:   Diagnosis Date    Back pain     Chronic bronchitis (HCC)     COPD    Elevated WBCs     H/O splenectomy     HTN (hypertension)     Ill-defined condition     constipation    Psychiatric disorder     depression, anxiety     Past Surgical History:   Procedure Laterality Date    HX OTHER SURGICAL Right     two right wrist fractures     HX SPLENECTOMY        SOCIAL HISTORY: Social history from the initial psychiatric evaluation has been reviewed (reviewed/updated 6/1/2020) with no additional updates (I asked patient and no additional social history provided).    Social History     Socioeconomic History    Marital status: SINGLE     Spouse name: Not on file    Number of children: Not on file    Years of education: Not on file    Highest education level: Not on file   Occupational History    Not on file   Social Needs    Financial resource strain: Not on file    Food insecurity     Worry: Not on file     Inability: Not on file    Transportation needs     Medical: Not on file     Non-medical: Not on file   Tobacco Use    Smoking status: Former Smoker    Smokeless tobacco: Never Used   Substance and Sexual Activity    Alcohol use: No    Drug use: No     Comment: \"not for years\"    Sexual activity: Not on file   Lifestyle    Physical activity     Days per week: Not on file     Minutes per session: Not on file    Stress: Not on file   Relationships    Social connections     Talks on phone: Not on file     Gets together: Not on file     Attends Evangelical service: Not on file     Active member of club or organization: Not on file     Attends meetings of clubs or organizations: Not on file     Relationship status: Not on file    Intimate partner violence     Fear of current or ex partner: Not on file     Emotionally abused: Not on file     Physically abused: Not on file     Forced sexual activity: Not on file   Other Topics Concern    Not on file   Social History Narrative    Social History:       Reviewed history from 02/03/2017 and no changes required:          Home: Lives with [place], his girlfriend of 2 years, in a [place] apartment with pet lizard and fish          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 David Plascencia, [facility]                     Smoking History:          Patient currently smokes every day. Patient has been counseled to quit.       FAMILY HISTORY: Family history from the initial psychiatric evaluation has been reviewed (reviewed/updated 6/1/2020) with no additional updates (I asked patient and no additional family history provided). Family History   Problem Relation Age of Onset    No Known Problems Mother     No Known Problems Father     No Known Problems Brother        REVIEW OF SYSTEMS: (reviewed/updated 6/1/2020)  Appetite:improved   Sleep: improved   All other Review of Systems: Negative except foot swelling         [facility] (MSE):    MSE FINDINGS ARE WITHIN NORMAL LIMITS (WNL) UNLESS OTHERWISE STATED BELOW. ( ALL OF THE BELOW CATEGORIES OF THE MSE HAVE BEEN REVIEWED (reviewed 6/1/2020) AND UPDATED AS DEEMED APPROPRIATE )  General Presentation age appropriate, guarded   Orientation oriented to time, place and person   Vital Signs  See below (reviewed 6/1/2020); Vital Signs (BP, Pulse, & Temp) are within normal limits if not listed below.    Gait and Station Stable/steady, no ataxia   Musculoskeletal System No extrapyramidal symptoms (EPS); no abnormal muscular movements or Tardive Dyskinesia (TD); muscle strength and tone are within normal limits   Language No aphasia or dysarthria   Speech:  non-pressured and soft   Thought Processes illogical; normal rate of thoughts; fair abstract reasoning/computation   Thought Associations goal directed   Thought Content auditory hallucinations and visual hallucinations   Suicidal Ideations none   Homicidal Ideations none   Mood:  euthymic   Affect:  constricted and mood-congruent   Memory recent  intact   Memory remote:  intact   Concentration/Attention:  intact   Fund of Knowledge average   Insight:  poor   Reliability fair   Judgment:  poor          VITALS:     Patient Vitals for the past 24 hrs:   Temp Pulse Resp BP SpO2   06/01/20 0751 97.8 °F (36.6 °C) 81 16 97/51 98 %   05/31/20 2006 97.9 °F (36.6 °C) 74 18 112/69 100 %     Wt Readings from Last 3 Encounters:   05/26/20 68 kg (150 lb)   12/02/18 75.8 kg (167 lb)   09/30/18 70.3 kg (155 lb)     Temp Readings from Last 3 Encounters: 06/01/20 97.8 °F (36.6 °C)   02/26/19 97.9 °F (36.6 °C)   12/02/18 97.9 °F (36.6 °C)     BP Readings from Last 3 Encounters:   06/01/20 97/51   02/26/19 123/86   12/02/18 (!) 140/101     Pulse Readings from Last 3 Encounters:   06/01/20 81   02/26/19 96   12/02/18 84            DATA     LABORATORY DATA:(reviewed/updated 6/1/2020)  Recent Results (from the past 24 hour(s))   CBC WITH AUTOMATED DIFF    Collection Time: 06/01/20  5:00 AM   Result Value Ref Range    WBC 18.2 (H) 4.1 - 11.1 K/uL    RBC 4.27 4. 10 - 5.70 M/uL    HGB 13.0 12.1 - 17.0 g/dL    HCT 40.0 36.6 - 50.3 %    MCV 93.7 80.0 - 99.0 FL    MCH 30.4 26.0 - 34.0 PG    MCHC 32.5 30.0 - 36.5 g/dL    RDW 13.2 11.5 - 14.5 %    PLATELET 757 598 - 283 K/uL    MPV 11.3 8.9 - 12.9 FL    NRBC 0.0 0  WBC    ABSOLUTE NRBC 0.00 0.00 - 0.01 K/uL    NEUTROPHILS 76 (H) 32 - 75 %    LYMPHOCYTES 14 12 - 49 %    MONOCYTES 6 5 - 13 %    EOSINOPHILS 3 0 - 7 %    BASOPHILS 1 0 - 1 %    IMMATURE GRANULOCYTES 0 0.0 - 0.5 %    ABS. NEUTROPHILS 13.9 (H) 1.8 - 8.0 K/UL    ABS. LYMPHOCYTES 2.6 0.8 - 3.5 K/UL    ABS. MONOCYTES 1.1 (H) 0.0 - 1.0 K/UL    ABS. EOSINOPHILS 0.5 (H) 0.0 - 0.4 K/UL    ABS. BASOPHILS 0.1 0.0 - 0.1 K/UL    ABS. IMM. GRANS. 0.1 (H) 0.00 - 0.04 K/UL    DF AUTOMATED       No results found for: VALF2, VALAC, VALP, VALPR, DS6, CRBAM, CRBAMP, CARB2, XCRBAM  No results found for: LITHM   RADIOLOGY REPORTS:(reviewed/updated 6/1/2020)  Xr Abd Port  1 V    Result Date: 5/26/2020  EXAM: XR ABD PORT  1 V INDICATION: ? of patient ingesting rocks COMPARISON: 12.2.2018. FINDINGS: A supine radiograph of the abdomen shows a nonspecific abdominal gas pattern. There are 2 radiopaque structures in the right lower quadrant that individually measure 16 and 11 mm in size. In addition, in the distal transverse colon, there is a 12 x 3 mm calcification.      IMPRESSION: Radiopaque structures in the right lower quadrant and transverse colon could be compatible with ingested marylin. No plain film evidence for obstruction          MEDICATIONS     ALL MEDICATIONS:   Current Facility-Administered Medications   Medication Dose Route Frequency    mupirocin (BACTROBAN) 2 % ointment   Topical BID    nicotine (NICODERM CQ) 21 mg/24 hr patch 1 Patch  1 Patch TransDERmal DAILY    cloZAPine (CLOZARIL) tablet 150 mg  150 mg Oral QHS    propranoloL (INDERAL) tablet 20 mg  20 mg Oral BID    senna-docusate (PERICOLACE) 8.6-50 mg per tablet 2 Tab  2 Tab Oral DAILY    montelukast (SINGULAIR) tablet 10 mg  10 mg Oral QHS    OLANZapine (ZyPREXA) tablet 5 mg  5 mg Oral Q6H PRN    benztropine (COGENTIN) tablet 1 mg  1 mg Oral BID PRN    diphenhydrAMINE (BENADRYL) injection 50 mg  50 mg IntraMUSCular BID PRN    hydrOXYzine HCL (ATARAX) tablet 50 mg  50 mg Oral TID PRN    LORazepam (ATIVAN) injection 1 mg  1 mg IntraMUSCular Q4H PRN    traZODone (DESYREL) tablet 50 mg  50 mg Oral QHS PRN    acetaminophen (TYLENOL) tablet 650 mg  650 mg Oral Q4H PRN    magnesium hydroxide (MILK OF MAGNESIA) 400 mg/5 mL oral suspension 30 mL  30 mL Oral DAILY PRN    chlorproMAZINE (THORAZINE) injection 25 mg  25 mg IntraMUSCular Q6H PRN      SCHEDULED MEDICATIONS:   Current Facility-Administered Medications   Medication Dose Route Frequency    mupirocin (BACTROBAN) 2 % ointment   Topical BID    nicotine (NICODERM CQ) 21 mg/24 hr patch 1 Patch  1 Patch TransDERmal DAILY    cloZAPine (CLOZARIL) tablet 150 mg  150 mg Oral QHS    propranoloL (INDERAL) tablet 20 mg  20 mg Oral BID    senna-docusate (PERICOLACE) 8.6-50 mg per tablet 2 Tab  2 Tab Oral DAILY    montelukast (SINGULAIR) tablet 10 mg  10 mg Oral QHS          ASSESSMENT & PLAN     DIAGNOSES REQUIRING ACTIVE TREATMENT AND MONITORING: (reviewed/updated 6/1/2020)  Patient Active Hospital Problem List:    Schizophrenia [facility]) (5/26/2020)    Assessment: patient with worsening self care and ingested foreign body in the setting of medication non-compliance.  He is verbal and amenable to restarting treatment which is an improvement over his previous presentation. Will restart Clozaril, bowel regimen. 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 In summary, Domo Alcantar, is a 50 y.o.  male who presents with a severe exacerbation of the principal diagnosis of Schizophrenia ([facility] [address]    Patient's condition is not improving. Patient requires continued inpatient hospitalization for further stabilization, safety monitoring and medication management. I will continue to coordinate the provision of individual, milieu, occupational, group, and substance abuse therapies to address target symptoms/diagnoses as deemed appropriate for the individual patient. A coordinated, multidisplinary treatment team round was conducted with the patient (this team consists of the nurse, psychiatric unit pharmacist,  and writer). Complete current electronic health record for patient has been reviewed today including consultant notes, ancillary staff notes, nurses and psychiatric tech notes. Suicide risk assessment completed and patient deemed to be of low risk for suicide at this time. The following regarding medications was addressed during rounds with patient:   the risks and benefits of the proposed medication. The patient was given the opportunity to ask questions.  Informed consent given to the use of the above medications. Will continue to adjust psychiatric and non-psychiatric medications (see above \"medication\" section and orders section for details) as deemed appropriate & based upon diagnoses and response to treatment. I will continue to order blood tests/labs and diagnostic tests as deemed appropriate and review results as they become available (see orders for details and above listed lab/test results). I will order psychiatric records from previous [facility] hospitals to further elucidate the nature of patient's psychopathology and review once available. I will gather additional collateral information from friends, family and o/p treatment team to further elucidate the nature of patient's psychopathology and baselline level of psychiatric functioning. I certify that this patient's inpatient psychiatric hospital services furnished since the previous certification were, and continue to be, required for treatment that could reasonably be expected to improve the patient's condition, or for diagnostic study, and that the patient continues to need, on a daily basis, active treatment furnished directly by or requiring the supervision of inpatient psychiatric facility personnel. In addition, the hospital records show that services furnished were intensive treatment services, admission or related services, or equivalent services.     EXPECTED DISCHARGE DATE/DAY: TBD     DISPOSITION: Home       Signed By:   Andre Barrett MD  6/1/2020

## 2020-06-01 NOTE — BH NOTES
Met briefly with patient for an individual session. He reports he is doing well and does not want to talk. Was willing to discuss coping skills briefly and reports enjoying art. He asked for colored pens and more black ink pens. Patient was reminded he already had 2 pens and there there were markers and crayons in the dayroom but not colored pens. He reported frustration but understanding. He denies SI and HI and did not appear to be responding to internal stimuli but had poor eye contact and was observed staring at the floor between his feet when not talking to this writer. Will follow up with patient tomorrow.     [facility]

## 2020-06-01 NOTE — BH NOTES
\"Im feeling better\"    Carolina Sharpe is observed lying in bed. He reports an improved mood. He denies SI/HI/AVH, no medication side effects. He reports L. Foot pain 3/10, states tylenol is effective. Discussed Bactroban ointment and applications to open areas on foot until his wound care consult. He is in agreement.      Plan:  Start bactroban and bandage BID  Continue other treatment

## 2020-06-02 PROBLEM — M79.672 LEFT FOOT PAIN: Status: ACTIVE | Noted: 2020-06-02

## 2020-06-02 LAB
ANION GAP SERPL CALC-SCNC: 10 MMOL/L (ref 5–15)
BASOPHILS # BLD: 0.1 K/UL (ref 0–0.1)
BASOPHILS NFR BLD: 1 % (ref 0–1)
BUN SERPL-MCNC: 36 MG/DL (ref 6–20)
BUN/CREAT SERPL: 30 (ref 12–20)
CALCIUM SERPL-MCNC: 8.6 MG/DL (ref 8.5–10.1)
CHLORIDE SERPL-SCNC: 107 MMOL/L (ref 97–108)
CO2 SERPL-SCNC: 20 MMOL/L (ref 21–32)
CREAT SERPL-MCNC: 1.21 MG/DL (ref 0.7–1.3)
DIFFERENTIAL METHOD BLD: ABNORMAL
EOSINOPHIL # BLD: 0.6 K/UL (ref 0–0.4)
EOSINOPHIL NFR BLD: 4 % (ref 0–7)
ERYTHROCYTE [DISTWIDTH] IN BLOOD BY AUTOMATED COUNT: 13.1 % (ref 11.5–14.5)
GLUCOSE SERPL-MCNC: 89 MG/DL (ref 65–100)
HCT VFR BLD AUTO: 41.1 % (ref 36.6–50.3)
HGB BLD-MCNC: 13.3 G/DL (ref 12.1–17)
IMM GRANULOCYTES # BLD AUTO: 0.1 K/UL (ref 0–0.04)
IMM GRANULOCYTES NFR BLD AUTO: 0 % (ref 0–0.5)
LYMPHOCYTES # BLD: 4.5 K/UL (ref 0.8–3.5)
LYMPHOCYTES NFR BLD: 31 % (ref 12–49)
MAGNESIUM SERPL-MCNC: 2.1 MG/DL (ref 1.6–2.4)
MCH RBC QN AUTO: 30.3 PG (ref 26–34)
MCHC RBC AUTO-ENTMCNC: 32.4 G/DL (ref 30–36.5)
MCV RBC AUTO: 93.6 FL (ref 80–99)
MONOCYTES # BLD: 1 K/UL (ref 0–1)
MONOCYTES NFR BLD: 7 % (ref 5–13)
NEUTS SEG # BLD: 8.2 K/UL (ref 1.8–8)
NEUTS SEG NFR BLD: 57 % (ref 32–75)
NRBC # BLD: 0 K/UL (ref 0–0.01)
NRBC BLD-RTO: 0 PER 100 WBC
PHOSPHATE SERPL-MCNC: 4.7 MG/DL (ref 2.6–4.7)
PLATELET # BLD AUTO: 391 K/UL (ref 150–400)
PMV BLD AUTO: 10.3 FL (ref 8.9–12.9)
POTASSIUM SERPL-SCNC: 4.4 MMOL/L (ref 3.5–5.1)
RBC # BLD AUTO: 4.39 M/UL (ref 4.1–5.7)
SODIUM SERPL-SCNC: 137 MMOL/L (ref 136–145)
WBC # BLD AUTO: 14.5 K/UL (ref 4.1–11.1)

## 2020-06-02 PROCEDURE — 85025 COMPLETE CBC W/AUTO DIFF WBC: CPT

## 2020-06-02 PROCEDURE — 74011250637 HC RX REV CODE- 250/637: Performed by: STUDENT IN AN ORGANIZED HEALTH CARE EDUCATION/TRAINING PROGRAM

## 2020-06-02 PROCEDURE — 74011250637 HC RX REV CODE- 250/637: Performed by: NURSE PRACTITIONER

## 2020-06-02 PROCEDURE — 84100 ASSAY OF PHOSPHORUS: CPT

## 2020-06-02 PROCEDURE — 74011250637 HC RX REV CODE- 250/637: Performed by: PSYCHIATRY & NEUROLOGY

## 2020-06-02 PROCEDURE — 65220000003 HC RM SEMIPRIVATE PSYCH

## 2020-06-02 PROCEDURE — 80048 BASIC METABOLIC PNL TOTAL CA: CPT

## 2020-06-02 PROCEDURE — 36415 COLL VENOUS BLD VENIPUNCTURE: CPT

## 2020-06-02 PROCEDURE — 83735 ASSAY OF MAGNESIUM: CPT

## 2020-06-02 RX ORDER — CLOZAPINE 100 MG/1
200 TABLET ORAL
Status: DISCONTINUED | OUTPATIENT
Start: 2020-06-02 | End: 2020-06-03

## 2020-06-02 RX ADMIN — CEFDINIR 300 MG: 300 CAPSULE ORAL at 08:34

## 2020-06-02 RX ADMIN — CEFDINIR 300 MG: 300 CAPSULE ORAL at 21:39

## 2020-06-02 RX ADMIN — SENNOSIDES AND DOCUSATE SODIUM 2 TABLET: 8.6; 5 TABLET ORAL at 08:34

## 2020-06-02 RX ADMIN — PROPRANOLOL HYDROCHLORIDE 20 MG: 10 TABLET ORAL at 08:34

## 2020-06-02 RX ADMIN — PROPRANOLOL HYDROCHLORIDE 20 MG: 10 TABLET ORAL at 17:13

## 2020-06-02 RX ADMIN — HYDROXYZINE HYDROCHLORIDE 50 MG: 25 TABLET, FILM COATED ORAL at 21:40

## 2020-06-02 RX ADMIN — TRAZODONE HYDROCHLORIDE 50 MG: 50 TABLET ORAL at 21:40

## 2020-06-02 RX ADMIN — MONTELUKAST SODIUM 10 MG: 10 TABLET, FILM COATED ORAL at 21:40

## 2020-06-02 RX ADMIN — ACETAMINOPHEN 650 MG: 325 TABLET, FILM COATED ORAL at 08:34

## 2020-06-02 RX ADMIN — DOXYCYCLINE HYCLATE 100 MG: 100 TABLET, COATED ORAL at 08:34

## 2020-06-02 RX ADMIN — MUPIROCIN: 20 OINTMENT TOPICAL at 08:36

## 2020-06-02 RX ADMIN — HYDROXYZINE HYDROCHLORIDE 50 MG: 25 TABLET, FILM COATED ORAL at 08:35

## 2020-06-02 RX ADMIN — DOXYCYCLINE HYCLATE 100 MG: 100 TABLET, COATED ORAL at 21:40

## 2020-06-02 RX ADMIN — CLOZAPINE 200 MG: 100 TABLET ORAL at 21:40

## 2020-06-02 NOTE — PROGRESS NOTES
Problem: Falls - Risk of  Goal: *Absence of Falls  Description: Document Earma Patel Fall Risk and appropriate interventions in the flowsheet.   Outcome: Progressing Towards Goal  Note: Fall Risk Interventions:       Mentation Interventions: Reorient patient                        Problem: Altered Thought Process (Adult/Pediatric)  Goal: *STG: Participates in treatment plan  Outcome: Progressing Towards Goal  Goal: *STG: Remains safe in hospital  Outcome: Progressing Towards Goal  Goal: *STG: Complies with medication therapy  Outcome: Progressing Towards Goal

## 2020-06-02 NOTE — BH NOTES
Assumed nursing care of Trena Rausch after receiving the 1900 change of shift report. Trena Rausch was quiet,calm and isolative. He stayed in his room or bed so writer had to go there for the initial nursing assessment. He denied all problems including SI, HI and all s/s of psychosis. However, pt can be heard talking to himself in his room when noone is in there with him. He did acknowled anxiety and depression. When asked about the triggers or stressors preceding his depression pt stated, \"I have only wanted one woman in my life, I have seen her three times. When I asked her to be my friend she stated no because you eat your buggers. The next time I saw her I told her that I had quit eating my buggers and she still didn't want to be my friend. It will take a long time to get over her. \"    Providing emotional support and monitoring for mood chgs, behavior and for safety. Encouraged elevation of left foot that is still swollen. Pt was med compliant and no adverse med side effects were noted. Addendum at 0612:  No problems during the shift.   Pt has rested quietly with about 7 hrs sleep

## 2020-06-02 NOTE — PROGRESS NOTES
Clozapine Daily Monitoring  Current regimen:  150 mg PO QHS  Last CBC done AND reported to the registry:  6/2/20  Next CBC due:  6/9/20    DATE ANC (K/uL)   5/26 7.4   6/1 13.9   6/2 8.2     ANC must be >1 to dispense clozapine. If patient experiences ANC <1.5, refer to the Clozapine REMS ANC monitoring algorithm for frequency of ANC monitoring. Visit Vitals  /88 (BP 1 Location: Left arm, BP Patient Position: Sitting)   Pulse 74   Temp 97.7 °F (36.5 °C)   Resp 16   Ht 176.5 cm (69.5\")   Wt 68 kg (150 lb)   SpO2 98%   BMI 21.83 kg/m²       Recommendations/comments: ANC value is elevated, 8.2 K/uL (range 1.8-8.0 K/uL), but reduced from lab yesterday. It is acceptable to continue with clozapine dispensing. ANC value was reported to the Clozapine REMS Program. Next ANC is due in 1 week on 6/9/20.       Thank you,  SHAQ Angelo, [address] [place], [place] [address]e Kristel, [address] [place] [address] [address]: [phone])  Pharmacy: [phone] ([id number])

## 2020-06-02 NOTE — PROGRESS NOTES
Hospitalist Progress Note    NAME: Ari Hyman   :  1975   MRN:  [id number]   Room Number:  703/97  @ [facility]       Interim Hospital Summary: 50 y.o. male whom presented on 2020 with      Assessment / Plan:  Left foot abscess  Leukocytosis d/t infection   Small abscess noted on lateral border of left foot with edema, blanching erythema, tenderness on palpation, warmth of surrounding skin. Xray from  does not show deep abscess, fracture. CBC downtrending.      - C/W doxycyline and cefdinir.   - Trend temp and wbc curve, repeat cbc tomorrow. - General surgery consulted - I have personally communicated with Dr. Callejas Persons who will see the patient in consult today. - Pain control with tylenol as needed      -Psychiatric treatment and management of health issues,Defer to psychiatrist for further management.  -No VTE prophylaxis indicated or warranted at this time. Subjective:     Chief Complaint / Reason for Physician Visit  Feeling ok. .  Discussed with RN events overnight. Review of Systems:  No fevers, chills, appetite change, cough, sputum production, shortness of breath, dyspnea on exertion, nausea, vomitting, diarrhea, constipation, chest pain, leg edema, abdominal pain, joint pain, rash, itching. Tolerating diet. Objective:     VITALS:   Last 24hrs VS reviewed since prior progress note. Most recent are:  Patient Vitals for the past 24 hrs:   Temp Pulse Resp BP SpO2   20 0736 97.7 °F (36.5 °C) 74 16 121/80 98 %   20 98.2 °F (36.8 °C) 89 15 122/88 98 %     No intake or output data in the 24 hours ending 20 1706     PHYSICAL EXAM:  General: WD, WN. Alert, cooperative, no acute distress    EENT:  EOMI. Anicteric sclerae. MMM  Resp:  CTA bilaterally, no wheezing or rales. No accessory muscle use  CV:  Regular  rhythm,  normal S1/S2, no murmurs rubs gallops, No edema  GI:  Soft, Non distended, Non tender.   +Bowel sounds  Neurologic:  Alert and oriented X 3, normal speech,   Psych:   Good insight. Not anxious nor agitated  Skin:  No rashes. No jaundice  Left foot - edema, erythema, tenderness to palpation with 1 cm abscess noted on lateral border of left foot. Reviewed most current lab test results and cultures  YES  Reviewed most current radiology test results   YES  Review and summation of old records today    NO  Reviewed patient's current orders and MAR    YES  PMH/SH reviewed - no change compared to H&P  ________________________________________________________________________  Care Plan discussed with:    Comments   Patient x    Family      RN     Care Manager     Consultant  x                      Multidiciplinary team rounds were held today with , nursing, pharmacist and clinical coordinator. Patient's plan of care was discussed; medications were reviewed and discharge planning was addressed. ________________________________________________________________________  Total NON critical care TIME:  25   Minutes    Total CRITICAL CARE TIME Spent:   Minutes non procedure based      Comments   >50% of visit spent in counseling and coordination of care     ________________________________________________________________________  Sheree Aschoff, MD     Procedures: see electronic medical records for all procedures/Xrays and details which were not copied into this note but were reviewed prior to creation of Plan. LABS:  I reviewed today's most current labs and imaging studies.   Pertinent labs include:  Recent Labs     06/02/20 0545 06/01/20  0500   WBC 14.5* 18.2*   HGB 13.3 13.0   HCT 41.1 40.0    377     Recent Labs     06/02/20 0545 06/01/20  1345    137   K 4.4 4.6    104   CO2 20* 24   GLU 89 98   BUN 36* 31*   CREA 1.21 1.21   CA 8.6 8.8   MG 2.1 2.0   PHOS 4.7 4.7       Signed: Sheree Aschoff, MD

## 2020-06-02 NOTE — BH NOTES
0700  [facility] from [facility] RN. Assumed care of the patient. Pt lying in bed upon assessment. Pt states he is a little anxious and depressed but denies SI/HI/AVH at this time. L foot pain 4/10; offered tylenol and atarax and pt stated yes.   Last bm was yesterday    0815  Pt in room eating breakfast    0835  Pt compliant with am meds; given prn atarax and tylenol    0900  Pt reports prns effective    [id number]  Pt isolative to room    1200  Pt eating lunch in room    [id number]  Pt isolative to room    1645  Pt eating dinner in day room    1713  Pt compliant with evening meds; pt responding to IS, making bizarre statements at this time    1755  Pt compliant with evening medication

## 2020-06-02 NOTE — BH NOTES
Met briefly with Denise Conner. He was sitting on his bed putting snacks into his hospital non-slip socks. He reports he is organizing his collection. Asked patient how is he is feeling and he reports he is good and continued to organize. Patient was focused on organizing his food cups and was mumbling to himself as he did this. He stopped engaging with this writer and continued his task. Will follow up with patient tomorrow.     [facility] Summers [facility]

## 2020-06-02 NOTE — CONSULTS
Hospitalist Admission Note    NAME: Lubertha Boeck   :  1975   MRN:  [id number]   Room Number: 578/09  @ [facility]     Date/Time:  2020 4:57 PM    Patient PCP: None  ______________________________________________________________________  Given the patient's current clinical presentation, I have a high level of concern for decompensation if discharged from the emergency department. Complex decision making was performed, which includes reviewing the patient's available past medical records, laboratory results, and x-ray films. My assessment of this patient's clinical condition and my plan of care is as follows. Assessment / Plan:       Principal Problem:    Schizophrenia ([address] (2020)        Left foot abscess  Leukocytosis d/t infection   Small abscess noted on lateral border of left foot with edema, blanching erythema, tenderness on palpation, warmth of surrounding skin. Xray from  does not show deep abscess, fracture. - Start doxycyline and cefdinir.   - Trend temp and wbc curve, check cbc tomorrow  - General surgery consult for I&D at bedside.   - Pain control with tylenol as needed     -Psychiatric treatment and management of health issues,Defer to psychiatrist for further management.  -No VTE prophylaxis indicated or warranted at this time. Subjective:   Reason for consult : left foot pain. HISTORY OF PRESENT ILLNESS:     Lubertha Boeck is a 50 y.o.  male with PMH of copd, HTN, Depression, anxiety who was admitted to behavioral health unit on  with psychosis and delusions. He was found to living in a tent in the woods and was found to have poor self care and hygiene on presentation. Patient was noted to have left foot pain and swelling on admission, Xray foot at the time did not show any acute abnormality. Medicine was consulted for ongoing left foot pain and swelling along with leukocytosis.  Topical abx were started by psychiatry. Patient currently denies fever, chills. Denies trauma to the foot. Some superficial skin breakdown is noted on dorsum of both feet. Noted to be soaking feet in warm water bath during interview. He is able to weight bear on foot and walk with a steady gait. However, foot is tender to palpation on the left lateral border. He also noted significant swelling and redness of left foot compared to right. He is calm and cooperative with logical thought process. Further in the interview, he is noted to be talking to himself and responding to internal stimuli. Shows his drawings to this writer and talks about the 'love of his life' Rosangela. Patient denies any past medical history except as listed above. Denies fever,chills,chest pain, sob,abd pan,diarrhea,constipation,lighhadedness. No Hx DM. No current medical concerns at this time. Past Medical History:   Diagnosis Date    Back pain     Chronic bronchitis (HCC)     COPD    Elevated WBCs     H/O splenectomy     HTN (hypertension)     Ill-defined condition     constipation    Psychiatric disorder     depression, anxiety        Past Surgical History:   Procedure Laterality Date    HX OTHER SURGICAL Right     two right wrist fractures     HX SPLENECTOMY         Social History     Tobacco Use    Smoking status: Former Smoker    Smokeless tobacco: Never Used   Substance Use Topics    Alcohol use: No        Family History   Problem Relation Age of Onset    No Known Problems Mother     No Known Problems Father     No Known Problems Brother      Allergies   Allergen Reactions    Haloperidol Other (comments)        Prior to Admission medications    Not on File       REVIEW OF SYSTEMS:        I am not able to complete the review of systems because:    The patient is intubated and sedated    The patient has altered mental status due to his acute medical problems    The patient has baseline aphasia from prior stroke(s)    The patient has baseline dementia and is not reliable historian    The patient is in acute medical distress and unable to provide information           Total of 12 systems reviewed as follows:       POSITIVE= underlined text  Negative = text not underlined  General:  fever, chills, sweats, generalized weakness, weight loss/gain,      loss of appetite   Eyes:    blurred vision, eye pain, loss of vision, double vision  ENT:    rhinorrhea, pharyngitis   Respiratory:   cough, sputum production, SOB, HYLTON, wheezing, pleuritic pain   Cardiology:   chest pain, palpitations, orthopnea, PND, edema, syncope   Gastrointestinal:  abdominal pain , N/V, diarrhea, dysphagia, constipation, bleeding   Genitourinary:  frequency, urgency, dysuria, hematuria, incontinence   Muskuloskeletal :  arthralgia, myalgia, back pain. Foot pain and swelling left. Hematology:  easy bruising, nose or gum bleeding, lymphadenopathy   Dermatological: rash, ulceration, pruritis, color change / jaundice  Endocrine:   hot flashes or polydipsia   Neurological:  headache, dizziness, confusion, focal weakness, paresthesia,     Speech difficulties, memory loss, gait difficulty  Psychological: Feelings of anxiety, depression, agitation    Objective:   VITALS:    Visit Vitals  /80 (BP 1 Location: Left arm, BP Patient Position: Supine)   Pulse 74   Temp 97.7 °F (36.5 °C)   Resp 16   Ht 5' 9.5\" (1.765 m)   Wt 68 kg (150 lb)   SpO2 98%   BMI 21.83 kg/m²       PHYSICAL EXAM:    General:    Alert, cooperative, no distress, appears stated age. HEENT: Atraumatic, anicteric sclerae, pink conjunctivae     No oral ulcers, mucosa moist, throat clear, dentition fair  Neck:  Supple, symmetrical,  no JVD, thyroid: non tender  Lungs:   Clear to auscultation bilaterally. No Wheezing or Rhonchi. No rales. Chest wall:  No tenderness  No Accessory muscle use. Heart:   Regular  rhythm,  No  murmur   No edema  Abdomen:   Soft, non-tender. Not distended.   Bowel sounds normal  Extremities: No cyanosis. No clubbing,      Skin turgor normal,Radial dial pulse 2+  Left foot - edematous, with tenderness to palpation of lateral border of left foot with a small 1 cm diameter abscess noted with underlying purulence and exquisite tenderness, blanching erythema of surrounding skin. Skin:     Not pale. Not Jaundiced  No rashes   Psychiatric:   Mood: stable  Affect: constricted  Thoughts: logical  Speech appropriate  Sensorium: Hallucinations present unclear  Safety: no SI/HI    Neurologic: EOMs intact. No facial asymmetry. No aphasia or slurred speech. Symmetrical strength, Sensation grossly intact. Alert and oriented X 4.     ______________________________________________________________________    Care Plan discussed with:  Patient/Family, Nurse and Consultant Dr. Carlos Bartholomew, general surgeon    Expected  Disposition: per primary attending   ________________________________________________________________________  TOTAL TIME:  25 Minutes    Critical Care Provided     Minutes non procedure based      Comments     Reviewed previous records   >50% of visit spent in counseling and coordination of care  Discussion with patient and/or family and questions answered       ________________________________________________________________________  Signed: Sheree Aschoff, MD    Procedures: see electronic medical records for all procedures/Xrays and details which were not copied into this note but were reviewed prior to creation of Plan. LAB DATA REVIEWED:    Recent Results (from the past 24 hour(s))   CBC WITH AUTOMATED DIFF    Collection Time: 06/02/20  5:45 AM   Result Value Ref Range    WBC 14.5 (H) 4.1 - 11.1 K/uL    RBC 4.39 4. 10 - 5.70 M/uL    HGB 13.3 12.1 - 17.0 g/dL    HCT 41.1 36.6 - 50.3 %    MCV 93.6 80.0 - 99.0 FL    MCH 30.3 26.0 - 34.0 PG    MCHC 32.4 30.0 - 36.5 g/dL    RDW 13.1 11.5 - 14.5 %    PLATELET 155 194 - 002 K/uL    MPV 10.3 8.9 - 12.9 FL    NRBC 0.0 0  WBC ABSOLUTE NRBC 0.00 0.00 - 0.01 K/uL    NEUTROPHILS 57 32 - 75 %    LYMPHOCYTES 31 12 - 49 %    MONOCYTES 7 5 - 13 %    EOSINOPHILS 4 0 - 7 %    BASOPHILS 1 0 - 1 %    IMMATURE GRANULOCYTES 0 0.0 - 0.5 %    ABS. NEUTROPHILS 8.2 (H) 1.8 - 8.0 K/UL    ABS. LYMPHOCYTES 4.5 (H) 0.8 - 3.5 K/UL    ABS. MONOCYTES 1.0 0.0 - 1.0 K/UL    ABS. EOSINOPHILS 0.6 (H) 0.0 - 0.4 K/UL    ABS. BASOPHILS 0.1 0.0 - 0.1 K/UL    ABS. IMM.  GRANS. 0.1 (H) 0.00 - 0.04 K/UL    DF AUTOMATED     METABOLIC PANEL, BASIC    Collection Time: 06/02/20  5:45 AM   Result Value Ref Range    Sodium 137 136 - 145 mmol/L    Potassium 4.4 3.5 - 5.1 mmol/L    Chloride 107 97 - 108 mmol/L    CO2 20 (L) 21 - 32 mmol/L    Anion gap 10 5 - 15 mmol/L    Glucose 89 65 - 100 mg/dL    BUN 36 (H) 6 - 20 MG/DL    Creatinine 1.21 0.70 - 1.30 MG/DL    BUN/Creatinine ratio 30 (H) 12 - 20      GFR est AA >60 >60 ml/min/1.73m2    GFR est non-AA >60 >60 ml/min/1.73m2    Calcium 8.6 8.5 - 10.1 MG/DL   MAGNESIUM    Collection Time: 06/02/20  5:45 AM   Result Value Ref Range    Magnesium 2.1 1.6 - 2.4 mg/dL   PHOSPHORUS    Collection Time: 06/02/20  5:45 AM   Result Value Ref Range    Phosphorus 4.7 2.6 - 4.7 MG/DL

## 2020-06-02 NOTE — INTERDISCIPLINARY ROUNDS
Formerly [facility], [facility] Interdisciplinary Rounds Patient Name: Yasir Barnett  Age: 50 y.o. Room/Bed:  310/02 Primary Diagnosis: Schizophrenia ([address] Admission Status: Involuntary Commitment Readmission within 30 days: no 
Power of  in place: no 
Patient requires a blocked bed: no           
Reason for blocked bed: NA but pt is in a private room due to [place] Order for blocked bed obtained: no    
 
Sleep hours: 7 Morning Labs completed per orders:  yes Participation in Care/Groups:  no 
Medication Compliant?: Yes PRNS (last 24 hours): Sleep Aid Restraints (last 24 hours):  no 
Substance Abuse:  Yes, hx of   
24 hour chart check complete: yes Patient goal(s) for today: continue taking medication as prescribed. Treatment team focus/goals:stabilize and dispo planning Progress note: Pt remains calm, cooperative and tolerating Clozaril.  
  
LOS:  7                      Expected LOS: TBD 
  
Financial concerns/prescription coverage: [facility] Medicare Advantage Part A&B with [employer] Extended Coverage and [facility] Medicaid Family contact: None Family requesting physician contact today: No 
Discharge plan:Group home vs tent in woods Access to weapons: None involved. Outpatient provider(s): To be linked Patient's preferred phone number for follow up call:  
  
Participating treatment team members: Mima Francisco MSW, Dr. Maribel Rizzo, Dr. Leticia Xiao.

## 2020-06-02 NOTE — CONSULTS
Patient seen at request of Dr. Usama Matthews. Taina Pete is a 50 y.o. male, h/o schizophrenia, who was recently admitted with delusions and psychotic behavior. He tells me that he has been experiencing pain on the lateral aspect of his left foot for approximately two weeks now. No clear h/o local trauma. He denies fevers or chills. No nausea or vomitting. No purulent appearing drainage from left foot. Found to have an abscess. Started on Omnicef and Doxycycline. He has otherwise been in his usual state of health. Left Foot X-rays - 5/28/2020 - No acute abnormality. Allergies - Haloperidol    Meds - Reviewed. PMH -   Past Medical History:   Diagnosis Date    Back pain     Chronic bronchitis (HCC)     COPD    Elevated WBCs     H/O splenectomy     HTN (hypertension)     Ill-defined condition     constipation    Left foot pain 6/2/2020    Psychiatric disorder     depression, anxiety     PSH -   Past Surgical History:   Procedure Laterality Date    HX OTHER SURGICAL Right     two right wrist fractures     HX SPLENECTOMY       Fam Hx -   Family History   Problem Relation Age of Onset    No Known Problems Mother     No Known Problems Father     No Known Problems Brother      Soc Hx -   Social History     Tobacco Use    Smoking status: Former Smoker    Smokeless tobacco: Never Used   Substance Use Topics    Alcohol use: No     Patient is a well developed, well nourished man in no acute distress. Visit Vitals  /80 (BP 1 Location: Left arm, BP Patient Position: Supine)   Pulse 74   Temp 97.7 °F (36.5 °C)   Resp 16   Ht 5' 9.5\" (1.765 m)   Wt 150 lb (68 kg)   SpO2 98%   BMI 21.83 kg/m²     HEENT: Anicteric. Neck: Supple without palpable lymphadenopathy. Cor: RRR. Lungs: Bilateral breath sounds. Clear to auscultation. Abd: Soft. Non distended. Non tender. No guarding or rebound. Well healed surgical scar.   Left Foot: There is an area of erythema of the lateral aspect of the foot. A fluctuant subcutaneous mass is not appreciated. No tenderness to palpation. Palpable DP and PT pulses. Neuro: Grossly Non focal.     Labs -   Recent Results (from the past 24 hour(s))   CBC WITH AUTOMATED DIFF    Collection Time: 06/02/20  5:45 AM   Result Value Ref Range    WBC 14.5 (H) 4.1 - 11.1 K/uL    RBC 4.39 4. 10 - 5.70 M/uL    HGB 13.3 12.1 - 17.0 g/dL    HCT 41.1 36.6 - 50.3 %    MCV 93.6 80.0 - 99.0 FL    MCH 30.3 26.0 - 34.0 PG    MCHC 32.4 30.0 - 36.5 g/dL    RDW 13.1 11.5 - 14.5 %    PLATELET 953 536 - 849 K/uL    MPV 10.3 8.9 - 12.9 FL    NRBC 0.0 0  WBC    ABSOLUTE NRBC 0.00 0.00 - 0.01 K/uL    NEUTROPHILS 57 32 - 75 %    LYMPHOCYTES 31 12 - 49 %    MONOCYTES 7 5 - 13 %    EOSINOPHILS 4 0 - 7 %    BASOPHILS 1 0 - 1 %    IMMATURE GRANULOCYTES 0 0.0 - 0.5 %    ABS. NEUTROPHILS 8.2 (H) 1.8 - 8.0 K/UL    ABS. LYMPHOCYTES 4.5 (H) 0.8 - 3.5 K/UL    ABS. MONOCYTES 1.0 0.0 - 1.0 K/UL    ABS. EOSINOPHILS 0.6 (H) 0.0 - 0.4 K/UL    ABS. BASOPHILS 0.1 0.0 - 0.1 K/UL    ABS. IMM. GRANS. 0.1 (H) 0.00 - 0.04 K/UL    DF AUTOMATED     METABOLIC PANEL, BASIC    Collection Time: 06/02/20  5:45 AM   Result Value Ref Range    Sodium 137 136 - 145 mmol/L    Potassium 4.4 3.5 - 5.1 mmol/L    Chloride 107 97 - 108 mmol/L    CO2 20 (L) 21 - 32 mmol/L    Anion gap 10 5 - 15 mmol/L    Glucose 89 65 - 100 mg/dL    BUN 36 (H) 6 - 20 MG/DL    Creatinine 1.21 0.70 - 1.30 MG/DL    BUN/Creatinine ratio 30 (H) 12 - 20      GFR est AA >60 >60 ml/min/1.73m2    GFR est non-AA >60 >60 ml/min/1.73m2    Calcium 8.6 8.5 - 10.1 MG/DL   MAGNESIUM    Collection Time: 06/02/20  5:45 AM   Result Value Ref Range    Magnesium 2.1 1.6 - 2.4 mg/dL   PHOSPHORUS    Collection Time: 06/02/20  5:45 AM   Result Value Ref Range    Phosphorus 4.7 2.6 - 4.7 MG/DL     X-rays - Reviewed. Imp: Mr. Garce Hope is a 50 y.o. male with a soft tissue infection of the left foot. Plan: 1.  At this point in time, do not believe that there is an indication for surgical intervention. 2. Continue Omnicef and Doxycyline as ordered. 3. Diet as tolerated. 4. Plans per  's Concepcion Monsalve and Rachana Sánchez. Following.

## 2020-06-02 NOTE — PROGRESS NOTES
Problem: Altered Thought Process (Adult/Pediatric)  Goal: *STG: Remains safe in hospital  Outcome: Progressing Towards Goal  Goal: *STG: Absence of lethality  Outcome: Progressing Towards Goal

## 2020-06-02 NOTE — BH NOTES
PSYCHIATRIC PROGRESS NOTE         Patient Name  Emily Azar   Date of Birth 1975   [facility] [id number]   Medical Record Number  [id number]      Age  50 y.o. PCP None   Admit date:  5/26/2020    Room Number  595/18  @ [facility]   Date of Service  6/2/2020         E & M PROGRESS NOTE:         HISTORY       CC:  \"psychosis\"  HISTORY OF PRESENT ILLNESS/INTERVAL HISTORY:  (reviewed/updated 6/2/2020). per initial evaluation: The patient, Emily Azar, is a 50 y.o. WHITE [place]  male with a past psychiatric history significant for schizophrenia, who presents at this time with complaints of (and/or evidence of) the following emotional symptoms: delusions and psychotic behavior. Additional symptomatology include poor self care. The above symptoms have been present for 2+ weeks. These symptoms are of moderate to high severity. These symptoms are constant in nature. The patient's condition has been precipitated by psychosocial stressors. Patient's condition made worse by illicit substance use and treatment noncompliance. UDS: THC; BAL=0.      The patient is a fair historian. The patient corroborates the above narrative. The patient contracts for safety on the unit and gives consent for the team to contact collateral. The patient is amenable to initiating treatment while on the unit. The patient is forthcoming about his symptoms and lifestyle since leaving the hospital, he acknowledges recently leaving a hospital and living in a tent recently. 05/28 - patient slept 9 hours overnight, remains odd, but cooperative. He is isolative to room, compliant with Clozaril tolerating titration thus far. Bowel regimen initiated. Patient still with ongoing bizarre delusions but no agitation, self care remains poor. He is ambivalent about disposition stating he intends to return to living in a tent upon discharge.     05/29 - overnight, patient noted to be internally preoccupied, responding to internal stimuli but otherwise calm and cooperative. He is tolerating Clozaril titration, requests nicotine patch. Patient slept 10 hours and got Atarax and Trazodone for anxiety and insomnia. He remains disheveled but is cooperative. No change in his plans for discharge. Counseled on cigarette use and Clozaril interaction. 06/01 - patient slept 6.5 hours overnight, he continues to c/o AVH but has been otherwise calm. Patient now with WBC to 18.2, ANC 13.1. He denies any N/V or headache, but endorses pain in his L foot which is inflamed. Patient denies SI/HI, is isolative to room but out for meals. Tolerating Clozaril titration thus far.    06/02 - no acute overnight events. 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 He was seen by medicine for foot abscess, Abx started. Patient slept 7 hours, is discharge focused. SW working on a dispo plan that includes a housing component. SIDE EFFECTS: (reviewed/updated 6/2/2020)  None reported or admitted to. No noted toxicity with use of Clozaril   ALLERGIES:(reviewed/updated 6/2/2020)  Allergies   Allergen Reactions    Haloperidol Other (comments)      MEDICATIONS PRIOR TO ADMISSION:(reviewed/updated 6/2/2020)  No medications prior to admission. PAST MEDICAL HISTORY: Past medical history from the initial psychiatric evaluation has been reviewed (reviewed/updated 6/2/2020) with no additional updates (I asked patient and no additional past medical history provided).    Past Medical History:   Diagnosis Date    Back pain     Chronic bronchitis (HCC)     COPD    Elevated WBCs     H/O splenectomy     HTN (hypertension)     Ill-defined condition     constipation    Psychiatric disorder     depression, anxiety     Past Surgical History:   Procedure Laterality Date    HX OTHER SURGICAL Right     two right wrist fractures     HX SPLENECTOMY        SOCIAL HISTORY: Social history from the initial psychiatric evaluation has been reviewed (reviewed/updated 6/2/2020) with no additional updates (I asked patient and no additional social history provided).    Social History     Socioeconomic History    Marital status: SINGLE     Spouse name: Not on file    Number of children: Not on file    Years of education: Not on file    Highest education level: Not on file   Occupational History    Not on file   Social Needs    Financial resource strain: Not on file    Food insecurity     Worry: Not on file     Inability: Not on file    Transportation needs     Medical: Not on file     Non-medical: Not on file   Tobacco Use    Smoking status: Former Smoker    Smokeless tobacco: Never Used   Substance and Sexual Activity    Alcohol use: No    Drug use: No     Comment: \"not for years\"    Sexual activity: Not on file   Lifestyle    Physical activity     Days per week: Not on file     Minutes per session: Not on file    Stress: Not on file   Relationships    Social connections     Talks on phone: Not on file     Gets together: Not on file     Attends Protestant service: Not on file     Active member of club or organization: Not on file     Attends meetings of clubs or organizations: Not on file     Relationship status: Not on file    Intimate partner violence     Fear of current or ex partner: Not on file     Emotionally abused: Not on file     Physically abused: Not on file     Forced sexual activity: Not on file   Other Topics Concern    Not on file   Social History Narrative    Social History:       Reviewed history from 02/03/2017 and no changes required:          Home: Lives with Leta Perez, his girlfriend of 2 years, in a [place] apartment with pet lizard and fish          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 Kasie Lino, [facility] psychiatry                     Smoking History:          Patient currently smokes every day. Patient has been counseled to quit. FAMILY HISTORY: Family history from the initial psychiatric evaluation has been reviewed (reviewed/updated 6/2/2020) with no additional updates (I asked patient and no additional family history provided). Family History   Problem Relation Age of Onset    No Known Problems Mother     No Known Problems Father     No Known Problems Brother        REVIEW OF SYSTEMS: (reviewed/updated 6/2/2020)  Appetite:improved   Sleep: improved   All other Review of Systems: Negative except foot swelling         [address] [facility] (MSE):    MSE FINDINGS ARE WITHIN NORMAL LIMITS (WNL) UNLESS OTHERWISE STATED BELOW. ( ALL OF THE BELOW CATEGORIES OF THE MSE HAVE BEEN REVIEWED (reviewed 6/2/2020) AND UPDATED AS DEEMED APPROPRIATE )  General Presentation age appropriate, guarded   Orientation oriented to time, place and person   Vital Signs  See below (reviewed 6/2/2020); Vital Signs (BP, Pulse, & Temp) are within normal limits if not listed below.    Gait and Station Stable/steady, no ataxia   Musculoskeletal System No extrapyramidal symptoms (EPS); no abnormal muscular movements or Tardive Dyskinesia (TD); muscle strength and tone are within normal limits   Language No aphasia or dysarthria   Speech:  non-pressured and soft   Thought Processes illogical; normal rate of thoughts; fair abstract reasoning/computation   Thought Associations goal directed   Thought Content auditory hallucinations and visual hallucinations   Suicidal Ideations none   Homicidal Ideations none   Mood:  euthymic   Affect:  constricted and mood-congruent   Memory recent  intact   Memory remote:  intact   Concentration/Attention:  intact   Fund of Knowledge average   Insight:  poor   Reliability fair   Judgment:  poor          VITALS:     Patient Vitals for the past 24 hrs:   Temp Pulse Resp BP SpO2   06/02/20 0736 97.7 °F (36.5 °C) 74 16  98 %   06/01/20 2053 98.2 °F (36.8 °C) 89 15 122/88 98 %     Wt Readings from Last 3 Encounters:   05/26/20 68 kg (150 lb)   12/02/18 75.8 kg (167 lb)   09/30/18 70.3 kg (155 lb)     Temp Readings from Last 3 Encounters:   06/02/20 97.7 °F (36.5 °C)   02/26/19 97.9 °F (36.6 °C)   12/02/18 97.9 °F (36.6 °C)     BP Readings from Last 3 Encounters:   06/01/20 122/88   02/26/19 123/86   12/02/18 (!) 140/101     Pulse Readings from Last 3 Encounters:   06/02/20 74   02/26/19 96   12/02/18 84            DATA     LABORATORY DATA:(reviewed/updated 6/2/2020)  Recent Results (from the past 24 hour(s))   METABOLIC PANEL, BASIC    Collection Time: 06/01/20  1:45 PM   Result Value Ref Range    Sodium 137 136 - 145 mmol/L    Potassium 4.6 3.5 - 5.1 mmol/L    Chloride 104 97 - 108 mmol/L    CO2 24 21 - 32 mmol/L    Anion gap 9 5 - 15 mmol/L    Glucose 98 65 - 100 mg/dL    BUN 31 (H) 6 - 20 MG/DL    Creatinine 1.21 0.70 - 1.30 MG/DL    BUN/Creatinine ratio 26 (H) 12 - 20      GFR est AA >60 >60 ml/min/1.73m2    GFR est non-AA >60 >60 ml/min/1.73m2    Calcium 8.8 8.5 - 10.1 MG/DL   MAGNESIUM    Collection Time: 06/01/20  1:45 PM   Result Value Ref Range    Magnesium 2.0 1.6 - 2.4 mg/dL   PHOSPHORUS    Collection Time: 06/01/20  1:45 PM   Result Value Ref Range    Phosphorus 4.7 2.6 - 4.7 MG/DL   CBC WITH AUTOMATED DIFF    Collection Time: 06/02/20  5:45 AM   Result Value Ref Range    WBC 14.5 (H) 4.1 - 11.1 K/uL    RBC 4.39 4. 10 - 5.70 M/uL    HGB 13.3 12.1 - 17.0 g/dL    HCT 41.1 36.6 - 50.3 %    MCV 93.6 80.0 - 99.0 FL    MCH 30.3 26.0 - 34.0 PG    MCHC 32.4 30.0 - 36.5 g/dL    RDW 13.1 11.5 - 14.5 %    PLATELET 168 749 - 787 K/uL    MPV 10.3 8.9 - 12.9 FL    NRBC 0.0 0  WBC    ABSOLUTE NRBC 0.00 0.00 - 0.01 K/uL    NEUTROPHILS 57 32 - 75 %    LYMPHOCYTES 31 12 - 49 %    MONOCYTES 7 5 - 13 %    EOSINOPHILS 4 0 - 7 %    BASOPHILS 1 0 - 1 %    IMMATURE GRANULOCYTES 0 0.0 - 0.5 %    ABS. NEUTROPHILS 8.2 (H) 1.8 - 8.0 K/UL    ABS. LYMPHOCYTES 4.5 (H) 0.8 - 3.5 K/UL    ABS. MONOCYTES 1.0 0.0 - 1.0 K/UL    ABS. EOSINOPHILS 0.6 (H) 0.0 - 0.4 K/UL    ABS. BASOPHILS 0.1 0.0 - 0.1 K/UL    ABS. IMM. GRANS. 0.1 (H) 0.00 - 0.04 K/UL    DF AUTOMATED     METABOLIC PANEL, BASIC    Collection Time: 06/02/20  5:45 AM   Result Value Ref Range    Sodium 137 136 - 145 mmol/L    Potassium 4.4 3.5 - 5.1 mmol/L    Chloride 107 97 - 108 mmol/L    CO2 20 (L) 21 - 32 mmol/L    Anion gap 10 5 - 15 mmol/L    Glucose 89 65 - 100 mg/dL    BUN 36 (H) 6 - 20 MG/DL    Creatinine 1.21 0.70 - 1.30 MG/DL    BUN/Creatinine ratio 30 (H) 12 - 20      GFR est AA >60 >60 ml/min/1.73m2    GFR est non-AA >60 >60 ml/min/1.73m2    Calcium 8.6 8.5 - 10.1 MG/DL   MAGNESIUM    Collection Time: 06/02/20  5:45 AM   Result Value Ref Range    Magnesium 2.1 1.6 - 2.4 mg/dL   PHOSPHORUS    Collection Time: 06/02/20  5:45 AM   Result Value Ref Range    Phosphorus 4.7 2.6 - 4.7 MG/DL     No results found for: VALF2, VALAC, VALP, VALPR, DS6, CRBAM, CRBAMP, CARB2, XCRBAM  No results found for: LITHM   RADIOLOGY REPORTS:(reviewed/updated 6/2/2020)  Xr Abd Port  1 V    Result Date: 5/26/2020  EXAM: XR ABD PORT  1 V INDICATION: ? of patient ingesting rocks COMPARISON: 12.2.2018. FINDINGS: A supine radiograph of the abdomen shows a nonspecific abdominal gas pattern. There are 2 radiopaque structures in the right lower quadrant that individually measure 16 and 11 mm in size. In addition, in the distal transverse colon, there is a 12 x 3 mm calcification. IMPRESSION: Radiopaque structures in the right lower quadrant and transverse colon could be compatible with ingested rocks.  No plain film evidence for obstruction          MEDICATIONS     ALL MEDICATIONS:   Current Facility-Administered Medications   Medication Dose Route Frequency    doxycycline (VIBRA-TABS) tablet 100 mg  100 mg Oral Q12H    cefdinir (OMNICEF) capsule 300 mg  300 mg Oral Q12H    mupirocin (BACTROBAN) 2 % ointment   Topical BID    nicotine (NICODERM CQ) 21 mg/24 hr patch 1 Patch  1 Patch TransDERmal DAILY    cloZAPine (CLOZARIL) tablet 150 mg  150 mg Oral QHS    propranoloL (INDERAL) tablet 20 mg  20 mg Oral BID    senna-docusate (PERICOLACE) 8.6-50 mg per tablet 2 Tab  2 Tab Oral DAILY    montelukast (SINGULAIR) tablet 10 mg  10 mg Oral QHS    OLANZapine (ZyPREXA) tablet 5 mg  5 mg Oral Q6H PRN    benztropine (COGENTIN) tablet 1 mg  1 mg Oral BID PRN    diphenhydrAMINE (BENADRYL) injection 50 mg  50 mg IntraMUSCular BID PRN    hydrOXYzine HCL (ATARAX) tablet 50 mg  50 mg Oral TID PRN    LORazepam (ATIVAN) injection 1 mg  1 mg IntraMUSCular Q4H PRN    traZODone (DESYREL) tablet 50 mg  50 mg Oral QHS PRN    acetaminophen (TYLENOL) tablet 650 mg  650 mg Oral Q4H PRN    chlorproMAZINE (THORAZINE) injection 25 mg  25 mg IntraMUSCular Q6H PRN      SCHEDULED MEDICATIONS:   Current Facility-Administered Medications   Medication Dose Route Frequency    doxycycline (VIBRA-TABS) tablet 100 mg  100 mg Oral Q12H    cefdinir (OMNICEF) capsule 300 mg  300 mg Oral Q12H    mupirocin (BACTROBAN) 2 % ointment   Topical BID    nicotine (NICODERM CQ) 21 mg/24 hr patch 1 Patch  1 Patch TransDERmal DAILY    cloZAPine (CLOZARIL) tablet 150 mg  150 mg Oral QHS    propranoloL (INDERAL) tablet 20 mg  20 mg Oral BID    senna-docusate (PERICOLACE) 8.6-50 mg per tablet 2 Tab  2 Tab Oral DAILY    montelukast (SINGULAIR) tablet 10 mg  10 mg Oral QHS          ASSESSMENT & PLAN     DIAGNOSES REQUIRING ACTIVE TREATMENT AND MONITORING: (reviewed/updated 6/2/2020)  Patient Active Hospital Problem List:    Schizophrenia [facility]) (5/26/2020)    Assessment: patient with worsening self care and ingested foreign body in the setting of medication non-compliance. He is verbal and amenable to restarting treatment which is an improvement over his previous presentation. Will restart Clozaril, bowel regimen. 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 In summary, Caron Hollins, is a 50 y.o.  male who presents with a severe exacerbation of the principal diagnosis of Schizophrenia ([facility] [address]    Patient's condition is not improving. Patient requires continued inpatient hospitalization for further stabilization, safety monitoring and medication management. I will continue to coordinate the provision of individual, milieu, occupational, group, and substance abuse therapies to address target symptoms/diagnoses as deemed appropriate for the individual patient. A coordinated, multidisplinary treatment team round was conducted with the patient (this team consists of the nurse, psychiatric unit pharmacist,  and writer). Complete current electronic health record for patient has been reviewed today including consultant notes, ancillary staff notes, nurses and psychiatric tech notes. Suicide risk assessment completed and patient deemed to be of low risk for suicide at this time. The following regarding medications was addressed during rounds with patient:   the risks and benefits of the proposed medication. The patient was given the opportunity to ask questions. Informed consent given to the use of the above medications.  Will continue to adjust psychiatric and non-psychiatric medications (see above \"medication\" section and orders section for details) as deemed appropriate & based upon diagnoses and response to treatment. I will continue to order blood tests/labs and diagnostic tests as deemed appropriate and review results as they become available (see orders for details and above listed lab/test results). I will order psychiatric records from previous [facility] hospitals to further elucidate the nature of patient's psychopathology and review once available. I will gather additional collateral information from friends, family and o/p treatment team to further elucidate the nature of patient's psychopathology and baselline level of psychiatric functioning. I certify that this patient's inpatient psychiatric hospital services furnished since the previous certification were, and continue to be, required for treatment that could reasonably be expected to improve the patient's condition, or for diagnostic study, and that the patient continues to need, on a daily basis, active treatment furnished directly by or requiring the supervision of inpatient psychiatric facility personnel. In addition, the hospital records show that services furnished were intensive treatment services, admission or related services, or equivalent services.     EXPECTED DISCHARGE DATE/DAY: TBD     DISPOSITION: Home       Signed By:   Osmany Garcia MD  6/2/2020

## 2020-06-03 PROBLEM — F20.9 SCHIZOPHRENIA (HCC): Status: RESOLVED | Noted: 2020-05-26 | Resolved: 2020-06-03

## 2020-06-03 PROBLEM — L02.612 FOOT ABSCESS, LEFT: Status: ACTIVE | Noted: 2020-06-03

## 2020-06-03 PROBLEM — M79.672 LEFT FOOT PAIN: Status: RESOLVED | Noted: 2020-06-02 | Resolved: 2020-06-03

## 2020-06-03 PROBLEM — F25.9 SCHIZOAFFECTIVE DISORDER (HCC): Status: RESOLVED | Noted: 2018-10-25 | Resolved: 2020-06-03

## 2020-06-03 LAB
BASOPHILS # BLD: 0.2 K/UL (ref 0–0.1)
BASOPHILS NFR BLD: 1 % (ref 0–1)
DIFFERENTIAL METHOD BLD: ABNORMAL
EOSINOPHIL # BLD: 0.4 K/UL (ref 0–0.4)
EOSINOPHIL NFR BLD: 3 % (ref 0–7)
ERYTHROCYTE [DISTWIDTH] IN BLOOD BY AUTOMATED COUNT: 13 % (ref 11.5–14.5)
HCT VFR BLD AUTO: 44.4 % (ref 36.6–50.3)
HGB BLD-MCNC: 14.8 G/DL (ref 12.1–17)
IMM GRANULOCYTES # BLD AUTO: 0.1 K/UL (ref 0–0.04)
IMM GRANULOCYTES NFR BLD AUTO: 1 % (ref 0–0.5)
LYMPHOCYTES # BLD: 3.5 K/UL (ref 0.8–3.5)
LYMPHOCYTES NFR BLD: 27 % (ref 12–49)
MCH RBC QN AUTO: 30.8 PG (ref 26–34)
MCHC RBC AUTO-ENTMCNC: 33.3 G/DL (ref 30–36.5)
MCV RBC AUTO: 92.3 FL (ref 80–99)
MONOCYTES # BLD: 0.7 K/UL (ref 0–1)
MONOCYTES NFR BLD: 5 % (ref 5–13)
NEUTS SEG # BLD: 8.2 K/UL (ref 1.8–8)
NEUTS SEG NFR BLD: 63 % (ref 32–75)
NRBC # BLD: 0 K/UL (ref 0–0.01)
NRBC BLD-RTO: 0 PER 100 WBC
PLATELET # BLD AUTO: 434 K/UL (ref 150–400)
PMV BLD AUTO: 10.2 FL (ref 8.9–12.9)
RBC # BLD AUTO: 4.81 M/UL (ref 4.1–5.7)
WBC # BLD AUTO: 13 K/UL (ref 4.1–11.1)

## 2020-06-03 PROCEDURE — 74011250637 HC RX REV CODE- 250/637: Performed by: STUDENT IN AN ORGANIZED HEALTH CARE EDUCATION/TRAINING PROGRAM

## 2020-06-03 PROCEDURE — 74011250637 HC RX REV CODE- 250/637: Performed by: PSYCHIATRY & NEUROLOGY

## 2020-06-03 PROCEDURE — 85025 COMPLETE CBC W/AUTO DIFF WBC: CPT

## 2020-06-03 PROCEDURE — 36415 COLL VENOUS BLD VENIPUNCTURE: CPT

## 2020-06-03 PROCEDURE — 65220000003 HC RM SEMIPRIVATE PSYCH

## 2020-06-03 RX ADMIN — PROPRANOLOL HYDROCHLORIDE 20 MG: 10 TABLET ORAL at 08:21

## 2020-06-03 RX ADMIN — DOXYCYCLINE HYCLATE 100 MG: 100 TABLET, COATED ORAL at 08:22

## 2020-06-03 RX ADMIN — DOXYCYCLINE HYCLATE 100 MG: 100 TABLET, COATED ORAL at 21:57

## 2020-06-03 RX ADMIN — PROPRANOLOL HYDROCHLORIDE 20 MG: 10 TABLET ORAL at 17:55

## 2020-06-03 RX ADMIN — CEFDINIR 300 MG: 300 CAPSULE ORAL at 08:22

## 2020-06-03 RX ADMIN — MONTELUKAST SODIUM 10 MG: 10 TABLET, FILM COATED ORAL at 21:57

## 2020-06-03 RX ADMIN — SENNOSIDES AND DOCUSATE SODIUM 2 TABLET: 8.6; 5 TABLET ORAL at 08:22

## 2020-06-03 RX ADMIN — CLOZAPINE 250 MG: 100 TABLET ORAL at 21:57

## 2020-06-03 RX ADMIN — CEFDINIR 300 MG: 300 CAPSULE ORAL at 21:56

## 2020-06-03 NOTE — BH NOTES
PSYCHIATRIC PROGRESS NOTE         Patient Name  Lauren Sanz   Date of Birth 1975   [facility] [id number]   Medical Record Number  [id number]      Age  50 y.o. PCP None   Admit date:  5/26/2020    Room Number  314/75  @ [facility]   Date of Service  6/3/2020         E & M PROGRESS NOTE:         HISTORY       CC:  \"psychosis\"  HISTORY OF PRESENT ILLNESS/INTERVAL HISTORY:  (reviewed/updated 6/3/2020). per initial evaluation: The patient, Lauren Sanz, is a 50 y.o. WHITE OR  male with a past psychiatric history significant for schizophrenia, who presents at this time with complaints of (and/or evidence of) the following emotional symptoms: delusions and psychotic behavior. Additional symptomatology include poor self care. The above symptoms have been present for 2+ weeks. These symptoms are of moderate to high severity. These symptoms are constant in nature. The patient's condition has been precipitated by psychosocial stressors. Patient's condition made worse by illicit substance use and treatment noncompliance. UDS: THC; BAL=0.      The patient is a fair historian. The patient corroborates the above narrative. The patient contracts for safety on the unit and gives consent for the team to contact collateral. The patient is amenable to initiating treatment while on the unit. The patient is forthcoming about his symptoms and lifestyle since leaving the hospital, he acknowledges recently leaving a hospital and living in a tent recently. 05/28 - patient slept 9 hours overnight, remains odd, but cooperative. He is isolative to room, compliant with Clozaril tolerating titration thus far. Bowel regimen initiated. Patient still with ongoing bizarre delusions but no agitation, self care remains poor. He is ambivalent about disposition stating he intends to return to living in a tent upon discharge.     05/29 - overnight, patient noted to be internally preoccupied, responding to internal stimuli but otherwise calm and cooperative. He is tolerating Clozaril titration, requests nicotine patch. Patient slept 10 hours and got Atarax and Trazodone for anxiety and insomnia. He remains disheveled but is cooperative. No change in his plans for discharge. Counseled on cigarette use and Clozaril interaction. 06/01 - patient slept 6.5 hours overnight, he continues to c/o AVH but has been otherwise calm. Patient now with WBC to 18.2, ANC 13.1. He denies any N/V or headache, but endorses pain in his L foot which is inflamed. Patient denies SI/HI, is isolative to room but out for meals. Tolerating Clozaril titration thus far.    06/02 - no acute overnight events. 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 He was seen by medicine for foot abscess, Abx started. Patient slept 7 hours, is discharge focused. SW working on a dispo plan that includes a housing component. 06/03 - patient isolative, still noted to be responding to internal stimuli but no agitation. Patient compliant with Clozaril, HR WNL, BP holding. Patient slept 5 hours, was noted to be hoarding food in his room. He denies SI/HI, still wants to live in the woods despite much psycho-education on the high likelihood of psychiatric decompensation. GS evaluated foot, agree with Abx and no indication for surgical intervention currently. SIDE EFFECTS: (reviewed/updated 6/3/2020)  None reported or admitted to. No noted toxicity with use of Clozaril   ALLERGIES:(reviewed/updated 6/3/2020)  Allergies   Allergen Reactions    Haloperidol Other (comments)      MEDICATIONS PRIOR TO ADMISSION:(reviewed/updated 6/3/2020)  No medications prior to admission. PAST MEDICAL HISTORY: Past medical history from the initial psychiatric evaluation has been reviewed (reviewed/updated 6/3/2020) with no additional updates (I asked patient and no additional past medical history provided). Past Medical History:   Diagnosis Date    Back pain     Chronic bronchitis (HCC)     COPD    Elevated WBCs     H/O splenectomy     HTN (hypertension)     Ill-defined condition     constipation    Left foot pain 6/2/2020    Psychiatric disorder     depression, anxiety     Past Surgical History:   Procedure Laterality Date    HX OTHER SURGICAL Right     two right wrist fractures     HX SPLENECTOMY        SOCIAL HISTORY: Social history from the initial psychiatric evaluation has been reviewed (reviewed/updated 6/3/2020) with no additional updates (I asked patient and no additional social history provided).    Social History     Socioeconomic History    Marital status: SINGLE     Spouse name: Not on file    Number of children: Not on file    Years of education: Not on file    Highest education level: Not on file   Occupational History    Not on file   Social Needs    Financial resource strain: Not on file    Food insecurity     Worry: Not on file     Inability: Not on file    Transportation needs     Medical: Not on file     Non-medical: Not on file   Tobacco Use    Smoking status: Former Smoker    Smokeless tobacco: Never Used   Substance and Sexual Activity    Alcohol use: No    Drug use: No     Comment: \"not for years\"    Sexual activity: Not on file   Lifestyle    Physical activity     Days per week: Not on file     Minutes per session: Not on file    Stress: Not on file   Relationships    Social connections     Talks on phone: Not on file     Gets together: Not on file     Attends Adventism service: Not on file     Active member of club or organization: Not on file     Attends meetings of clubs or organizations: Not on file     Relationship status: Not on file    Intimate partner violence     Fear of current or ex partner: Not on file     Emotionally abused: Not on file     Physically abused: Not on file     Forced sexual activity: Not on file   Other Topics Concern    Not on file Social History Narrative    Social History:       Reviewed history from 02/03/2017 and no changes required:          Home: Lives with Connie Lowery, his girlfriend of 2 years, in a [place] apartment with pet lizard and fish          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 Karmen Orta, [employer] psychiatry                     Smoking History:          Patient currently smokes every day. Patient has been counseled to quit. FAMILY HISTORY: Family history from the initial psychiatric evaluation has been reviewed (reviewed/updated 6/3/2020) with no additional updates (I asked patient and no additional family history provided). Family History   Problem Relation Age of Onset    No Known Problems Mother     No Known Problems Father     No Known Problems Brother        REVIEW OF SYSTEMS: (reviewed/updated 6/3/2020)  Appetite:improved   Sleep: improved   All other Review of Systems: Negative except foot swelling         [facility] (MSE):    MSE FINDINGS ARE WITHIN NORMAL LIMITS (WNL) UNLESS OTHERWISE STATED BELOW. ( ALL OF THE BELOW CATEGORIES OF THE MSE HAVE BEEN REVIEWED (reviewed 6/3/2020) AND UPDATED AS DEEMED APPROPRIATE )  General Presentation age appropriate, guarded   Orientation oriented to time, place and person   Vital Signs  See below (reviewed 6/3/2020); Vital Signs (BP, Pulse, & Temp) are within normal limits if not listed below.    Gait and Station Stable/steady, no ataxia   Musculoskeletal System No extrapyramidal symptoms (EPS); no abnormal muscular movements or Tardive Dyskinesia (TD); muscle strength and tone are within normal limits   Language No aphasia or dysarthria   Speech:  non-pressured and soft   Thought Processes illogical; normal rate of thoughts; fair abstract reasoning/computation   Thought Associations goal directed   Thought Content auditory hallucinations and internally preoccupied   Suicidal Ideations none   Homicidal Ideations none   Mood:  euthymic   Affect:  constricted and mood-congruent   Memory recent  intact   Memory remote:  intact   Concentration/Attention:  intact   Fund of Knowledge average   Insight:  poor   Reliability fair   Judgment:  poor          VITALS:     Patient Vitals for the past 24 hrs:   Temp Pulse Resp BP SpO2   06/03/20 0821 98.4 °F (36.9 °C) 90 17 103/73 99 %   06/02/20 2130 98.3 °F (36.8 °C) 99 16 120/76 97 %     Wt Readings from Last 3 Encounters:   05/26/20 68 kg (150 lb)   12/02/18 75.8 kg (167 lb)   09/30/18 70.3 kg (155 lb)     Temp Readings from Last 3 Encounters:   06/03/20 98.4 °F (36.9 °C)   02/26/19 97.9 °F (36.6 °C)   12/02/18 97.9 °F (36.6 °C)     BP Readings from Last 3 Encounters:   06/03/20 103/73   02/26/19 123/86   12/02/18 (!) 140/101     Pulse Readings from Last 3 Encounters:   06/03/20 90   02/26/19 96   12/02/18 84            DATA     LABORATORY DATA:(reviewed/updated 6/3/2020)  Recent Results (from the past 24 hour(s))   CBC WITH AUTOMATED DIFF    Collection Time: 06/03/20 11:01 AM   Result Value Ref Range    WBC 13.0 (H) 4.1 - 11.1 K/uL    RBC 4.81 4.10 - 5.70 M/uL    HGB 14.8 12.1 - 17.0 g/dL    HCT 44.4 36.6 - 50.3 %    MCV 92.3 80.0 - 99.0 FL    MCH 30.8 26.0 - 34.0 PG    MCHC 33.3 30.0 - 36.5 g/dL    RDW 13.0 11.5 - 14.5 %    PLATELET 620 (H) 548 - 400 K/uL    MPV 10.2 8.9 - 12.9 FL    NRBC 0.0 0  WBC    ABSOLUTE NRBC 0.00 0.00 - 0.01 K/uL    NEUTROPHILS 63 32 - 75 %    LYMPHOCYTES 27 12 - 49 %    MONOCYTES 5 5 - 13 %    EOSINOPHILS 3 0 - 7 %    BASOPHILS 1 0 - 1 %    IMMATURE GRANULOCYTES 1 (H) 0.0 - 0.5 %    ABS. NEUTROPHILS 8.2 (H) 1.8 - 8.0 K/UL    ABS. LYMPHOCYTES 3.5 0.8 - 3.5 K/UL    ABS. MONOCYTES 0.7 0.0 - 1.0 K/UL    ABS. EOSINOPHILS 0.4 0.0 - 0.4 K/UL    ABS. BASOPHILS 0.2 (H) 0.0 - 0.1 K/UL    ABS. IMM. GRANS. 0.1 (H) 0.00 - 0.04 K/UL    DF AUTOMATED       No results found for: VALF2, VALAC, VALP, VALPR, DS6, CRBAM, CRBAMP, CARB2, XCRBAM  No results found for: LITHM   RADIOLOGY REPORTS:(reviewed/updated 6/3/2020)  Xr Abd Port  1 V    Result Date: 5/26/2020  EXAM: XR ABD PORT  1 V INDICATION: ? of patient ingesting rocks COMPARISON: 12.2.2018. FINDINGS: A supine radiograph of the abdomen shows a nonspecific abdominal gas pattern. There are 2 radiopaque structures in the right lower quadrant that individually measure 16 and 11 mm in size. In addition, in the distal transverse colon, there is a 12 x 3 mm calcification. IMPRESSION: Radiopaque structures in the right lower quadrant and transverse colon could be compatible with ingested rocks.  No plain film evidence for obstruction          MEDICATIONS     ALL MEDICATIONS:   Current Facility-Administered Medications   Medication Dose Route Frequency    cloZAPine (CLOZARIL) tablet 250 mg  250 mg Oral QHS    doxycycline (VIBRA-TABS) tablet 100 mg  100 mg Oral Q12H    cefdinir (OMNICEF) capsule 300 mg  300 mg Oral Q12H    nicotine (NICODERM CQ) 21 mg/24 hr patch 1 Patch  1 Patch TransDERmal DAILY    propranoloL (INDERAL) tablet 20 mg  20 mg Oral BID    senna-docusate (PERICOLACE) 8.6-50 mg per tablet 2 Tab  2 Tab Oral DAILY    montelukast (SINGULAIR) tablet 10 mg  10 mg Oral QHS    OLANZapine (ZyPREXA) tablet 5 mg  5 mg Oral Q6H PRN    benztropine (COGENTIN) tablet 1 mg  1 mg Oral BID PRN    diphenhydrAMINE (BENADRYL) injection 50 mg  50 mg IntraMUSCular BID PRN    hydrOXYzine HCL (ATARAX) tablet 50 mg  50 mg Oral TID PRN    LORazepam (ATIVAN) injection 1 mg  1 mg IntraMUSCular Q4H PRN    traZODone (DESYREL) tablet 50 mg  50 mg Oral QHS PRN    acetaminophen (TYLENOL) tablet 650 mg  650 mg Oral Q4H PRN    chlorproMAZINE (THORAZINE) injection 25 mg  25 mg IntraMUSCular Q6H PRN      SCHEDULED MEDICATIONS: Current Facility-Administered Medications   Medication Dose Route Frequency    cloZAPine (CLOZARIL) tablet 250 mg  250 mg Oral QHS    doxycycline (VIBRA-TABS) tablet 100 mg  100 mg Oral Q12H    cefdinir (OMNICEF) capsule 300 mg  300 mg Oral Q12H    nicotine (NICODERM CQ) 21 mg/24 hr patch 1 Patch  1 Patch TransDERmal DAILY    propranoloL (INDERAL) tablet 20 mg  20 mg Oral BID    senna-docusate (PERICOLACE) 8.6-50 mg per tablet 2 Tab  2 Tab Oral DAILY    montelukast (SINGULAIR) tablet 10 mg  10 mg Oral QHS          ASSESSMENT & PLAN     DIAGNOSES REQUIRING ACTIVE TREATMENT AND MONITORING: (reviewed/updated 6/3/2020)  Patient Active Hospital Problem List:    Schizophrenia [facility]) (5/26/2020)    Assessment: patient with worsening self care and ingested foreign body in the setting of medication non-compliance. He is verbal and amenable to restarting treatment which is an improvement over his previous presentation. Will restart Clozaril, bowel regimen. Plan:   - CONTINUE Propranolol 20 mg BID for iatrogenic tachycardia, akathisia  - INCREASE Clozaril to 250 mg QHS for treatment resistant psychosis  - CONTINUE Pericolace 2 tablets QDAY for iatrogenic constipation  - IGM therapy as tolerated  - Expand database / obtain collateral  - Dispo planning     I will continue to monitor blood levels (clozapine---a drug with a narrow therapeutic index= NTI) and associated labs for drug therapy implemented that require intense monitoring for toxicity as deemed appropriate based on current medication side effects and pharmacodynamically determined drug 1/2 lives. In summary, Demian Kirk, is a 50 y.o.  male who presents with a severe exacerbation of the principal diagnosis of Schizoaffective disorder, bipolar type without good prognostic features ([facility] [address]    Patient's condition is approaching his baseline.     Patient requires continued inpatient hospitalization for further stabilization, safety monitoring and medication management. I will continue to coordinate the provision of individual, milieu, occupational, group, and substance abuse therapies to address target symptoms/diagnoses as deemed appropriate for the individual patient. A coordinated, multidisplinary treatment team round was conducted with the patient (this team consists of the nurse, psychiatric unit pharmacist,  and writer). Complete current electronic health record for patient has been reviewed today including consultant notes, ancillary staff notes, nurses and psychiatric tech notes. Suicide risk assessment completed and patient deemed to be of low risk for suicide at this time. The following regarding medications was addressed during rounds with patient:   the risks and benefits of the proposed medication. The patient was given the opportunity to ask questions. Informed consent given to the use of the above medications. Will continue to adjust psychiatric and non-psychiatric medications (see above \"medication\" section and orders section for details) as deemed appropriate & based upon diagnoses and response to treatment. I will continue to order blood tests/labs and diagnostic tests as deemed appropriate and review results as they become available (see orders for details and above listed lab/test results). I will order psychiatric records from previous [facility] hospitals to further elucidate the nature of patient's psychopathology and review once available. I will gather additional collateral information from friends, family and o/p treatment team to further elucidate the nature of patient's psychopathology and baselline level of psychiatric functioning.          I certify that this patient's inpatient psychiatric hospital services furnished since the previous certification were, and continue to be, required for treatment that could reasonably be expected to improve the patient's condition, or for diagnostic study, and that the patient continues to need, on a daily basis, active treatment furnished directly by or requiring the supervision of inpatient psychiatric facility personnel. In addition, the hospital records show that services furnished were intensive treatment services, admission or related services, or equivalent services.     EXPECTED DISCHARGE DATE/DAY: TBD     DISPOSITION: Home       Signed By:   Harvinder Fleming MD  6/3/2020

## 2020-06-03 NOTE — BH NOTES
Met with José Luis Zazueta briefly again this morning. He reports doing well and was pleasant but guarded. He shared that he threw away one of his drawing and then took it out of the trash and has it to burn once he leaves the hospital. He denies SI and HI but does not answer this writer about hallucinations instead stating he needs a pen as his pens are no longer working. Explained that this writer would get him a pen but that he needs to take the pen to the nurses station when it stops working and he can be given another one. He reported understanding. He was not forthcoming in discussion and would take his feet in and out of the basin of liquid in his room or stare at the floor/his feet. Will follow up with patient tomorrow.     Lo Smith LPC LSATP CSAC

## 2020-06-03 NOTE — BH NOTES
0700  Received report from [facility], RN. Assumed care of the patient. Pt in bed upon assessment. Pt presents with poor hygiene-dirty feet and hands; this writer has repeatedly encouraged pt to shower; pt states he will but then doesn't. Pt appears to be responding to IS as well. Pt stated he is feeling good this am; rates anxiety as 4/10 and depression as 2/10. Pt denies SI/HI/AVH. Pt reports left foot pain 1/10, but does not want any tylenol this am.  Last bm was yesterday.     0820  Pt in room eating breakfast; pt compliant with am meds    [id number]  Pt continues to be isolative to room    1200  Pt eating lunch in room    [id number]  Pt isolative to room    1700  Pt in room eating dinner    1755  Pt compliant with evening meds; pt shower does not get hot, notified charge RN, took pt down to seclusion/restraint room to shower; pt showered at this time with encouragement, took basin out of pt's room as per Dr. Rajat Ndiaye pt is not supposed to be soaking infected foot, pt is on antibiotics for abscess to L foot     1845  Changed pt's linens and gave him 2 extra pillows to elevate his L foot; also gave pt a heat pack for foot; pt stating he is supposed to be discharged by tomorrow

## 2020-06-03 NOTE — PROGRESS NOTES
Problem: Discharge Planning  Goal: *Discharge to safe environment  Outcome: Progressing Towards Goal  Note: Patient identifies home ([place]ent in the woods) as a safe environment. Patient will return home upon discharge. Goal: *Knowledge of medication management  Outcome: Progressing Towards Goal  Note: Patient verbalizes understanding of medication regimen. Patient is taking medications as prescribed. Goal: *Knowledge of discharge instructions  Outcome: Progressing Towards Goal  Note: Patient verbalizes understanding of goals for treatment and safe discharge.

## 2020-06-03 NOTE — PROGRESS NOTES
Problem: Falls - Risk of  Goal: *Absence of Falls  Description: Document Laretta Luna Fall Risk and appropriate interventions in the flowsheet.   Outcome: Progressing Towards Goal  Note: Fall Risk Interventions:       Mentation Interventions: Reorient patient

## 2020-06-03 NOTE — INTERDISCIPLINARY ROUNDS
[facility] Interdisciplinary Rounds Patient Name: Jacinda Mac  Age: 50 y.o. Room/Bed:  310/02 Primary Diagnosis: Schizophrenia ([facility] [address] Admission Status: Involuntary Commitment Readmission within 30 days: no 
Power of  in place: no 
Patient requires a blocked bed: no           
Reason for blocked bed: na but pt is in a private room due to [place] Order for blocked bed obtained: no    
 
Sleep hours: 5 Morning Labs completed per orders:  na     
Participation in Care/Groups:  no 
Medication Compliant?: Yes PRNS (last 24 hours): Antianxiety and Sleep Aid Restraints (last 24 hours):  no 
Substance Abuse:  yes 24 hour chart check complete:  Yes Patient goal(s) for today: continue taking medication as prescribed.  
Treatment team focus/goals:stabilize and dispo planning  
Progress note: Pt remains calm, cooperative and tolerating Clozaril. Pt is discharge focused with goals to return to tent and work out in community. 
  
LOS:  8                      Expected LOS: TBD 
  
Financial concerns/prescription coverage: [facility] Medicare Advantage Part A&B with B Extended Coverage and [employer] [facility] Medicaid  
Family contact: None  
Family requesting physician contact today: No 
Discharge plan: Pt would like to return to tent in woods  
Access to weapons: None involved.  
Outpatient provider(s): To be linked.  
Patient's preferred phone number for follow up call:  
  
Participating treatment team BESS Meraz, Dr. Bola Tinsley, Dr. Viji Stone.

## 2020-06-04 VITALS
BODY MASS INDEX: 21.47 KG/M2 | RESPIRATION RATE: 20 BRPM | SYSTOLIC BLOOD PRESSURE: 135 MMHG | DIASTOLIC BLOOD PRESSURE: 86 MMHG | HEIGHT: 70 IN | WEIGHT: 150 LBS | OXYGEN SATURATION: 100 % | TEMPERATURE: 98.1 F | HEART RATE: 97 BPM

## 2020-06-04 PROBLEM — L02.91 ABSCESS: Status: ACTIVE | Noted: 2020-06-04

## 2020-06-04 LAB
ERYTHROCYTE [DISTWIDTH] IN BLOOD BY AUTOMATED COUNT: 13.1 % (ref 11.5–14.5)
HCT VFR BLD AUTO: 40.2 % (ref 36.6–50.3)
HGB BLD-MCNC: 13.3 G/DL (ref 12.1–17)
MCH RBC QN AUTO: 30.4 PG (ref 26–34)
MCHC RBC AUTO-ENTMCNC: 33.1 G/DL (ref 30–36.5)
MCV RBC AUTO: 91.8 FL (ref 80–99)
NRBC # BLD: 0 K/UL (ref 0–0.01)
NRBC BLD-RTO: 0 PER 100 WBC
PLATELET # BLD AUTO: 406 K/UL (ref 150–400)
PMV BLD AUTO: 10.3 FL (ref 8.9–12.9)
RBC # BLD AUTO: 4.38 M/UL (ref 4.1–5.7)
WBC # BLD AUTO: 13.1 K/UL (ref 4.1–11.1)

## 2020-06-04 PROCEDURE — 74011250637 HC RX REV CODE- 250/637: Performed by: NURSE PRACTITIONER

## 2020-06-04 PROCEDURE — 65220000003 HC RM SEMIPRIVATE PSYCH

## 2020-06-04 PROCEDURE — 85027 COMPLETE CBC AUTOMATED: CPT

## 2020-06-04 PROCEDURE — 36415 COLL VENOUS BLD VENIPUNCTURE: CPT

## 2020-06-04 PROCEDURE — 74011250637 HC RX REV CODE- 250/637: Performed by: STUDENT IN AN ORGANIZED HEALTH CARE EDUCATION/TRAINING PROGRAM

## 2020-06-04 PROCEDURE — 74011250637 HC RX REV CODE- 250/637: Performed by: PSYCHIATRY & NEUROLOGY

## 2020-06-04 RX ORDER — AMOXICILLIN 250 MG
2 CAPSULE ORAL DAILY
Status: CANCELLED | OUTPATIENT
Start: 2020-06-05

## 2020-06-04 RX ORDER — IBUPROFEN 200 MG
1 TABLET ORAL DAILY
Status: CANCELLED | OUTPATIENT
Start: 2020-06-05

## 2020-06-04 RX ORDER — TRAZODONE HYDROCHLORIDE 50 MG/1
50 TABLET ORAL
Status: CANCELLED | OUTPATIENT
Start: 2020-06-04

## 2020-06-04 RX ORDER — CLOZAPINE 100 MG/1
300 TABLET ORAL
Status: DISCONTINUED | OUTPATIENT
Start: 2020-06-04 | End: 2020-06-05 | Stop reason: HOSPADM

## 2020-06-04 RX ORDER — ACETAMINOPHEN 325 MG/1
650 TABLET ORAL
Status: CANCELLED | OUTPATIENT
Start: 2020-06-04

## 2020-06-04 RX ORDER — PROPRANOLOL HYDROCHLORIDE 10 MG/1
20 TABLET ORAL 2 TIMES DAILY
Status: CANCELLED | OUTPATIENT
Start: 2020-06-05

## 2020-06-04 RX ORDER — LORAZEPAM 2 MG/ML
1 INJECTION INTRAMUSCULAR
Status: CANCELLED | OUTPATIENT
Start: 2020-06-04

## 2020-06-04 RX ORDER — HYDROXYZINE 25 MG/1
50 TABLET, FILM COATED ORAL
Status: CANCELLED | OUTPATIENT
Start: 2020-06-04

## 2020-06-04 RX ORDER — DIPHENHYDRAMINE HYDROCHLORIDE 50 MG/ML
50 INJECTION, SOLUTION INTRAMUSCULAR; INTRAVENOUS
Status: CANCELLED | OUTPATIENT
Start: 2020-06-04

## 2020-06-04 RX ORDER — MONTELUKAST SODIUM 10 MG/1
10 TABLET ORAL
Status: CANCELLED | OUTPATIENT
Start: 2020-06-05

## 2020-06-04 RX ORDER — OLANZAPINE 5 MG/1
5 TABLET ORAL
Status: CANCELLED | OUTPATIENT
Start: 2020-06-04

## 2020-06-04 RX ORDER — CHLORPROMAZINE HYDROCHLORIDE 25 MG/ML
25 INJECTION INTRAMUSCULAR
Status: CANCELLED | OUTPATIENT
Start: 2020-06-04

## 2020-06-04 RX ORDER — CLOZAPINE 100 MG/1
300 TABLET ORAL
Status: CANCELLED | OUTPATIENT
Start: 2020-06-05

## 2020-06-04 RX ORDER — BENZTROPINE MESYLATE 1 MG/1
1 TABLET ORAL
Status: CANCELLED | OUTPATIENT
Start: 2020-06-04

## 2020-06-04 RX ADMIN — SENNOSIDES AND DOCUSATE SODIUM 2 TABLET: 8.6; 5 TABLET ORAL at 08:46

## 2020-06-04 RX ADMIN — MONTELUKAST SODIUM 10 MG: 10 TABLET, FILM COATED ORAL at 21:58

## 2020-06-04 RX ADMIN — CEFDINIR 300 MG: 300 CAPSULE ORAL at 21:58

## 2020-06-04 RX ADMIN — CEFDINIR 300 MG: 300 CAPSULE ORAL at 08:46

## 2020-06-04 RX ADMIN — PROPRANOLOL HYDROCHLORIDE 20 MG: 10 TABLET ORAL at 08:46

## 2020-06-04 RX ADMIN — ACETAMINOPHEN 650 MG: 325 TABLET, FILM COATED ORAL at 23:03

## 2020-06-04 RX ADMIN — DOXYCYCLINE HYCLATE 100 MG: 100 TABLET, COATED ORAL at 08:46

## 2020-06-04 RX ADMIN — CLOZAPINE 300 MG: 100 TABLET ORAL at 21:57

## 2020-06-04 RX ADMIN — DOXYCYCLINE HYCLATE 100 MG: 100 TABLET, COATED ORAL at 21:58

## 2020-06-04 RX ADMIN — PROPRANOLOL HYDROCHLORIDE 20 MG: 10 TABLET ORAL at 17:52

## 2020-06-04 NOTE — PROGRESS NOTES
Clozapine Daily Monitoring  Current regimen:  250 mg PO QHS  Last CBC done AND reported to the registry:  6/3/20; reported on 6/4/20  Next CBC due:  6/10/20    DATE ANC (K/uL)   5/26 7.4   6/1 13.9   6/2 8.2   6/3 8.2     ANC must be >1 to dispense clozapine. If patient experiences ANC <1.5, refer to the Clozapine REMS ANC monitoring algorithm for frequency of ANC monitoring. Visit Vitals  /82 (BP 1 Location: Right arm, BP Patient Position: Sitting)   Pulse 85   Temp 97.9 °F (36.6 °C)   Resp 17   Ht 176.5 cm (69.5\")   Wt 68 kg (150 lb)   SpO2 99%   BMI 21.83 kg/m²       Recommendations/comments: ANC value drawn on 6/3 is elevated, 8.2 K/uL (range 1.8-8.0 K/uL), but consistent with ANC value on 6/2. It is acceptable to continue with clozapine dispensing. ANC value was reported to the Clozapine REMS Program. Next [address] is due in 1 week on 6/10/20.        Thank you,  Audra Lorenz, [facility], [address] [place], [place] [address] Ave, [address]: [phone] ([id number])  Pharmacy: [phone] ([id number])

## 2020-06-04 NOTE — INTERDISCIPLINARY ROUNDS
Behavioral Health Interdisciplinary Rounds Patient Name: Alycia Cook  Age: 50 y.o. Room/Bed:  310/02 Primary Diagnosis: Schizoaffective disorder, bipolar type without good prognostic features ([facility] Admission Status: Involuntary Commitment Readmission within 30 days: no 
Power of  in place: no 
Patient requires a blocked bed: no           
Reason for blocked bed: Patient in private room due to COVID 19 Order for blocked bed obtained: no    
 
Sleep hours: 4.25 Morning Labs completed per orders:  no      
Participation in Care/Groups:  no 
Medication Compliant?: Yes PRNS (last 24 hours): None Restraints (last 24 hours):  no 
Substance Abuse:  no   
24 hour chart check complete: yes Patient goal(s) for today: continue taking medication as prescribed.  
Treatment team focus/goals:stabilize and dispo planning  
Progress note: Pt remains calm, cooperative, still responding to internal stimuli and making nonsensical statements. Pt tolerating Clozaril and in agreement to complete antibiotics for foot infection with potential discharge Monday June 8th.  
  
LOS:  9                     Expected LOS: 6/8/2020 
  
Financial concerns/prescription coverage: [facility] Medicare Advantage Part A&B with B Extended Coverage and [place] Medicaid  
Family contact: None  
Family requesting physician contact today: No 
Discharge plan: Pt would like to return to [facility] in woods  
Access to weapons: None involved.  
Outpatient provider(s): To be linked. Patient's preferred phone number for follow up call:  
  
Participating treatment team BESS Howell, Dr. Melita Nelson, Dr. Zack Briones.

## 2020-06-04 NOTE — BH NOTES
PSYCHIATRIC PROGRESS NOTE         Patient Name  Rock Valentin   Date of Birth 1975   [facility] [id number]   Medical Record Number  [id number]      Age  50 y.o. PCP None   Admit date:  5/26/2020    Room Number  563/00  @ [facility]   Date of Service  6/4/2020         E & M PROGRESS NOTE:         HISTORY       CC:  \"psychosis\"  HISTORY OF PRESENT ILLNESS/INTERVAL HISTORY:  (reviewed/updated 6/4/2020). per initial evaluation: The patient, Rock Valentin, is a 50 y.o. WHITE OR  male with a past psychiatric history significant for schizophrenia, who presents at this time with complaints of (and/or evidence of) the following emotional symptoms: delusions and psychotic behavior. Additional symptomatology include poor self care. The above symptoms have been present for 2+ weeks. These symptoms are of moderate to high severity. These symptoms are constant in nature. The patient's condition has been precipitated by psychosocial stressors. Patient's condition made worse by illicit substance use and treatment noncompliance. UDS: THC; BAL=0.      The patient is a fair historian. The patient corroborates the above narrative. The patient contracts for safety on the unit and gives consent for the team to contact collateral. The patient is amenable to initiating treatment while on the unit. The patient is forthcoming about his symptoms and lifestyle since leaving the hospital, he acknowledges recently leaving a hospital and living in a tent recently. 05/28 - patient slept 9 hours overnight, remains odd, but cooperative. He is isolative to room, compliant with Clozaril tolerating titration thus far. Bowel regimen initiated. Patient still with ongoing bizarre delusions but no agitation, self care remains poor. He is ambivalent about disposition stating he intends to return to living in a tent upon discharge.     05/29 - overnight, patient noted to be internally preoccupied, responding to internal stimuli but otherwise calm and cooperative. He is tolerating Clozaril titration, requests nicotine patch. Patient slept 10 hours and got Atarax and Trazodone for anxiety and insomnia. He remains disheveled but is cooperative. No change in his plans for discharge. Counseled on cigarette use and Clozaril interaction. 06/01 - patient slept 6.5 hours overnight, he continues to c/o AVH but has been otherwise calm. Patient now with WBC to 18.2, ANC 13.1. He denies any N/V or headache, but endorses pain in his L foot which is inflamed. Patient denies SI/HI, is isolative to room but out for meals. Tolerating Clozaril titration thus far.    06/02 - no acute overnight events. 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 He was seen by medicine for foot abscess, Abx started. Patient slept 7 hours, is discharge focused. SW working on a dispo plan that includes a housing component. 06/03 - patient isolative, still noted to be responding to internal stimuli but no agitation. Patient compliant with Clozaril, HR WNL, BP holding. Patient slept 5 hours, was noted to be hoarding food in his room. He denies SI/HI, still wants to live in the woods despite much psycho-education on the high likelihood of psychiatric decompensation. GS evaluated foot, agree with Abx and no indication for surgical intervention currently. 06/04 - overnight, patient slept 4 hours; he remains oddly related, making nonsensical comments but self care has improved. Patient internally preoccupied, working on his art for much of the day and isolative to his room. He denies SI/HI, endorses AH. He is discharge focused, but willing to stay for further adjustment to his Clozaril dose as well as Abx tx for his foot. SIDE EFFECTS: (reviewed/updated 6/4/2020)  None reported or admitted to.   No noted toxicity with use of Clozaril   ALLERGIES:(reviewed/updated 6/4/2020)  Allergies Allergen Reactions    Haloperidol Other (comments)      MEDICATIONS PRIOR TO ADMISSION:(reviewed/updated 6/4/2020)  No medications prior to admission. PAST MEDICAL HISTORY: Past medical history from the initial psychiatric evaluation has been reviewed (reviewed/updated 6/4/2020) with no additional updates (I asked patient and no additional past medical history provided). Past Medical History:   Diagnosis Date    Back pain     Chronic bronchitis (HCC)     COPD    Elevated WBCs     H/O splenectomy     HTN (hypertension)     Ill-defined condition     constipation    Left foot pain 6/2/2020    Psychiatric disorder     depression, anxiety     Past Surgical History:   Procedure Laterality Date    HX OTHER SURGICAL Right     two right wrist fractures     HX SPLENECTOMY        SOCIAL HISTORY: Social history from the initial psychiatric evaluation has been reviewed (reviewed/updated 6/4/2020) with no additional updates (I asked patient and no additional social history provided).    Social History     Socioeconomic History    Marital status: SINGLE     Spouse name: Not on file    Number of children: Not on file    Years of education: Not on file    Highest education level: Not on file   Occupational History    Not on file   Social Needs    Financial resource strain: Not on file    Food insecurity     Worry: Not on file     Inability: Not on file    Transportation needs     Medical: Not on file     Non-medical: Not on file   Tobacco Use    Smoking status: Former Smoker    Smokeless tobacco: Never Used   Substance and Sexual Activity    Alcohol use: No    Drug use: No     Comment: \"not for years\"    Sexual activity: Not on file   Lifestyle    Physical activity     Days per week: Not on file     Minutes per session: Not on file    Stress: Not on file   Relationships    Social connections     Talks on phone: Not on file     Gets together: Not on file     Attends Latter-day service: Not on file Active member of club or organization: Not on file     Attends meetings of clubs or organizations: Not on file     Relationship status: Not on file    Intimate partner violence     Fear of current or ex partner: Not on file     Emotionally abused: Not on file     Physically abused: Not on file     Forced sexual activity: Not on file   Other Topics Concern    Not on file   Social History Narrative    Social History:       Reviewed history from 02/03/2017 and no changes required:          Home: Lives with [place], his girlfriend of 2 years, in a [place] apartment with pet lizard and fish          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 Candido Vidal, [employer] psychiatry                     Smoking History:          Patient currently smokes every day. Patient has been counseled to quit. FAMILY HISTORY: Family history from the initial psychiatric evaluation has been reviewed (reviewed/updated 6/4/2020) with no additional updates (I asked patient and no additional family history provided). Family History   Problem Relation Age of Onset    No Known Problems Mother     No Known Problems Father     No Known Problems Brother        REVIEW OF SYSTEMS: (reviewed/updated 6/4/2020)  Appetite:improved   Sleep: improved   All other Review of Systems: Negative except foot swelling         [address] (MSE):    MSE FINDINGS ARE WITHIN NORMAL LIMITS (WNL) UNLESS OTHERWISE STATED BELOW. ( ALL OF THE BELOW CATEGORIES OF THE MSE HAVE BEEN REVIEWED (reviewed 6/4/2020) AND UPDATED AS DEEMED APPROPRIATE )  General Presentation age appropriate, guarded   Orientation oriented to time, place and person   Vital Signs  See below (reviewed 6/4/2020);  Vital Signs (BP, Pulse, & Temp) are within normal limits if not listed below. Gait and Station Stable/steady, no ataxia   Musculoskeletal System No extrapyramidal symptoms (EPS); no abnormal muscular movements or Tardive Dyskinesia (TD); muscle strength and tone are within normal limits   Language No aphasia or dysarthria   Speech:  non-pressured and soft   Thought Processes illogical; normal rate of thoughts; fair abstract reasoning/computation   Thought Associations goal directed   Thought Content auditory hallucinations and internally preoccupied   Suicidal Ideations none   Homicidal Ideations none   Mood:  euthymic   Affect:  constricted and mood-congruent   Memory recent  intact   Memory remote:  intact   Concentration/Attention:  intact   Fund of Knowledge average   Insight:  poor   Reliability fair   Judgment:  poor          VITALS:     Patient Vitals for the past 24 hrs:   Temp Pulse Resp BP SpO2   06/04/20 0846  100  131/89    06/03/20 2029 97.9 °F (36.6 °C) 85 17 146/82 99 %     Wt Readings from Last 3 Encounters:   05/26/20 68 kg (150 lb)   12/02/18 75.8 kg (167 lb)   09/30/18 70.3 kg (155 lb)     Temp Readings from Last 3 Encounters:   06/03/20 97.9 °F (36.6 °C)   02/26/19 97.9 °F (36.6 °C)   12/02/18 97.9 °F (36.6 °C)     BP Readings from Last 3 Encounters:   06/04/20 131/89   02/26/19 123/86   12/02/18 (!) 140/101     Pulse Readings from Last 3 Encounters:   06/04/20 100   02/26/19 96   12/02/18 84            DATA     LABORATORY DATA:(reviewed/updated 6/4/2020)  No results found for this or any previous visit (from the past 24 hour(s)). No results found for: VALF2, VALAC, VALP, VALPR, DS6, CRBAM, CRBAMP, CARB2, XCRBAM  No results found for: LITHM   RADIOLOGY REPORTS:(reviewed/updated 6/4/2020)  Xr Abd Port  1 V    Result Date: 5/26/2020  EXAM: XR ABD PORT  1 V INDICATION: ? of patient ingesting rocks COMPARISON: 12.2.2018. FINDINGS: A supine radiograph of the abdomen shows a nonspecific abdominal gas pattern.  There are 2 radiopaque structures in the right lower quadrant that individually measure 16 and 11 mm in size. In addition, in the distal transverse colon, there is a 12 x 3 mm calcification. IMPRESSION: Radiopaque structures in the right lower quadrant and transverse colon could be compatible with ingested rocks.  No plain film evidence for obstruction          MEDICATIONS     ALL MEDICATIONS:   Current Facility-Administered Medications   Medication Dose Route Frequency    cloZAPine (CLOZARIL) tablet 300 mg  300 mg Oral QHS    doxycycline (VIBRA-TABS) tablet 100 mg  100 mg Oral Q12H    cefdinir (OMNICEF) capsule 300 mg  300 mg Oral Q12H    nicotine (NICODERM CQ) 21 mg/24 hr patch 1 Patch  1 Patch TransDERmal DAILY    propranoloL (INDERAL) tablet 20 mg  20 mg Oral BID    senna-docusate (PERICOLACE) 8.6-50 mg per tablet 2 Tab  2 Tab Oral DAILY    montelukast (SINGULAIR) tablet 10 mg  10 mg Oral QHS    OLANZapine (ZyPREXA) tablet 5 mg  5 mg Oral Q6H PRN    benztropine (COGENTIN) tablet 1 mg  1 mg Oral BID PRN    diphenhydrAMINE (BENADRYL) injection 50 mg  50 mg IntraMUSCular BID PRN    hydrOXYzine HCL (ATARAX) tablet 50 mg  50 mg Oral TID PRN    LORazepam (ATIVAN) injection 1 mg  1 mg IntraMUSCular Q4H PRN    traZODone (DESYREL) tablet 50 mg  50 mg Oral QHS PRN    acetaminophen (TYLENOL) tablet 650 mg  650 mg Oral Q4H PRN    chlorproMAZINE (THORAZINE) injection 25 mg  25 mg IntraMUSCular Q6H PRN      SCHEDULED MEDICATIONS:   Current Facility-Administered Medications   Medication Dose Route Frequency    cloZAPine (CLOZARIL) tablet 300 mg  300 mg Oral QHS    doxycycline (VIBRA-TABS) tablet 100 mg  100 mg Oral Q12H    cefdinir (OMNICEF) capsule 300 mg  300 mg Oral Q12H    nicotine (NICODERM CQ) 21 mg/24 hr patch 1 Patch  1 Patch TransDERmal DAILY    propranoloL (INDERAL) tablet 20 mg  20 mg Oral BID    senna-docusate (PERICOLACE) 8.6-50 mg per tablet 2 Tab  2 Tab Oral DAILY    montelukast (SINGULAIR) tablet 10 mg  10 mg Oral QHS          ASSESSMENT & PLAN     DIAGNOSES REQUIRING ACTIVE TREATMENT AND MONITORING: (reviewed/updated 6/4/2020)  Patient Active Hospital Problem List:    Schizophrenia [facility]) (5/26/2020)    Assessment: patient with worsening self care and ingested foreign body in the setting of medication non-compliance. He is verbal and amenable to restarting treatment which is an improvement over his previous presentation. Will restart Clozaril, bowel regimen. Plan:   - CONTINUE Propranolol 20 mg BID for iatrogenic tachycardia, akathisia  - INCREASE Clozaril to 300 mg QHS for treatment resistant psychosis  - CONTINUE Pericolace 2 tablets QDAY for iatrogenic constipation  - Foot abscess tx per IM  - IGM therapy as tolerated  - Expand database / obtain collateral  - Dispo planning     I will continue to monitor blood levels (clozapine---a drug with a narrow therapeutic index= NTI) and associated labs for drug therapy implemented that require intense monitoring for toxicity as deemed appropriate based on current medication side effects and pharmacodynamically determined drug 1/2 lives. In summary, Aura Quinonez, is a 50 y.o.  male who presents with a severe exacerbation of the principal diagnosis of Schizoaffective disorder, bipolar type without good prognostic features ([facility] [address]    Patient's condition is approaching his baseline. Patient requires continued inpatient hospitalization for further stabilization, safety monitoring and medication management. I will continue to coordinate the provision of individual, milieu, occupational, group, and substance abuse therapies to address target symptoms/diagnoses as deemed appropriate for the individual patient. A coordinated, multidisplinary treatment team round was conducted with the patient (this team consists of the nurse, psychiatric unit pharmacist,  and writer).      Complete current electronic health record for patient has been reviewed today including consultant notes, ancillary staff notes, nurses and psychiatric tech notes. Suicide risk assessment completed and patient deemed to be of low risk for suicide at this time. The following regarding medications was addressed during rounds with patient:   the risks and benefits of the proposed medication. The patient was given the opportunity to ask questions. Informed consent given to the use of the above medications. Will continue to adjust psychiatric and non-psychiatric medications (see above \"medication\" section and orders section for details) as deemed appropriate & based upon diagnoses and response to treatment. I will continue to order blood tests/labs and diagnostic tests as deemed appropriate and review results as they become available (see orders for details and above listed lab/test results). I will order psychiatric records from previous [facility] hospitals to further elucidate the nature of patient's psychopathology and review once available. I will gather additional collateral information from friends, family and o/p treatment team to further elucidate the nature of patient's psychopathology and baselline level of psychiatric functioning. I certify that this patient's inpatient psychiatric hospital services furnished since the previous certification were, and continue to be, required for treatment that could reasonably be expected to improve the patient's condition, or for diagnostic study, and that the patient continues to need, on a daily basis, active treatment furnished directly by or requiring the supervision of inpatient psychiatric facility personnel. In addition, the hospital records show that services furnished were intensive treatment services, admission or related services, or equivalent services.     EXPECTED DISCHARGE DATE/DAY: TBD     DISPOSITION: Home       Signed By:   Tod Lassiter MD  6/4/2020

## 2020-06-04 NOTE — PROGRESS NOTES
Problem: Altered Thought Process (Adult/Pediatric)  Goal: *STG: Participates in treatment plan  Outcome: Progressing Towards Goal  Goal: *STG: Remains safe in hospital  Outcome: Progressing Towards Goal  Goal: *STG: Complies with medication therapy  Outcome: Progressing Towards Goal  Goal: *STG: Decreased delusional thinking  Outcome: Progressing Towards Goal

## 2020-06-04 NOTE — BH NOTES
Assumed care of patient [id number]    2030- Patient in room resting, patient has been pleasant. He denies SI/HI AVH patient was quiet didn't have much to say after he assessment questions were asked.     Patient ate snack in his room and showered    Hourly Rounds completed, patient slept approx 4.25

## 2020-06-04 NOTE — PROGRESS NOTES
Problem: Altered Thought Process (Adult/Pediatric)  Goal: *STG: Remains safe in hospital  Outcome: Progressing Towards Goal  Goal: *STG: Complies with medication therapy  Outcome: Progressing Towards Goal

## 2020-06-04 NOTE — BH NOTES
Met briefly with Carolina Sharpe. He reports that his foot looks different so he thinks that is a good sign and he wants to leave. 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 He reports that he is doing well and that he has no complaints. He denies SI, HI, and hallucinations and was more focused in discussion today than previous days. Will follow up with patient tomorrow.     Jordan Godinez LPC LSATP [facility]

## 2020-06-05 ENCOUNTER — HOSPITAL ENCOUNTER (OUTPATIENT)
Age: 45
Setting detail: OBSERVATION
Discharge: PSYCHIATRIC OR SUBSTANCE USE FACILITY | End: 2020-06-06
Attending: INTERNAL MEDICINE | Admitting: STUDENT IN AN ORGANIZED HEALTH CARE EDUCATION/TRAINING PROGRAM
Payer: MEDICARE

## 2020-06-05 PROBLEM — L02.619 ABSCESS OF FOOT: Status: ACTIVE | Noted: 2020-06-05

## 2020-06-05 LAB
ALBUMIN SERPL-MCNC: 2.3 G/DL (ref 3.5–5)
ALBUMIN/GLOB SERPL: 0.9 {RATIO} (ref 1.1–2.2)
ALP SERPL-CCNC: 68 U/L (ref 45–117)
ALT SERPL-CCNC: 14 U/L (ref 12–78)
ANION GAP SERPL CALC-SCNC: 10 MMOL/L (ref 5–15)
AST SERPL-CCNC: 9 U/L (ref 15–37)
BILIRUB SERPL-MCNC: 0.2 MG/DL (ref 0.2–1)
BUN SERPL-MCNC: 26 MG/DL (ref 6–20)
BUN/CREAT SERPL: 30 (ref 12–20)
CALCIUM SERPL-MCNC: 6.9 MG/DL (ref 8.5–10.1)
CHLORIDE SERPL-SCNC: 112 MMOL/L (ref 97–108)
CO2 SERPL-SCNC: 19 MMOL/L (ref 21–32)
CREAT SERPL-MCNC: 0.88 MG/DL (ref 0.7–1.3)
ERYTHROCYTE [DISTWIDTH] IN BLOOD BY AUTOMATED COUNT: 13.1 % (ref 11.5–14.5)
GLOBULIN SER CALC-MCNC: 2.6 G/DL (ref 2–4)
GLUCOSE SERPL-MCNC: 78 MG/DL (ref 65–100)
HCT VFR BLD AUTO: 37.7 % (ref 36.6–50.3)
HGB BLD-MCNC: 12.9 G/DL (ref 12.1–17)
LACTATE SERPL-SCNC: 1 MMOL/L (ref 0.4–2)
MCH RBC QN AUTO: 31 PG (ref 26–34)
MCHC RBC AUTO-ENTMCNC: 34.2 G/DL (ref 30–36.5)
MCV RBC AUTO: 90.6 FL (ref 80–99)
NRBC # BLD: 0 K/UL (ref 0–0.01)
NRBC BLD-RTO: 0 PER 100 WBC
PLATELET # BLD AUTO: 402 K/UL (ref 150–400)
PMV BLD AUTO: 10.2 FL (ref 8.9–12.9)
POTASSIUM SERPL-SCNC: 3 MMOL/L (ref 3.5–5.1)
PROT SERPL-MCNC: 4.9 G/DL (ref 6.4–8.2)
RBC # BLD AUTO: 4.16 M/UL (ref 4.1–5.7)
SODIUM SERPL-SCNC: 141 MMOL/L (ref 136–145)
WBC # BLD AUTO: 13 K/UL (ref 4.1–11.1)

## 2020-06-05 PROCEDURE — 96374 THER/PROPH/DIAG INJ IV PUSH: CPT

## 2020-06-05 PROCEDURE — 65270000032 HC RM SEMIPRIVATE

## 2020-06-05 PROCEDURE — 74011000258 HC RX REV CODE- 258: Performed by: INTERNAL MEDICINE

## 2020-06-05 PROCEDURE — 87040 BLOOD CULTURE FOR BACTERIA: CPT

## 2020-06-05 PROCEDURE — 74011250636 HC RX REV CODE- 250/636: Performed by: INTERNAL MEDICINE

## 2020-06-05 PROCEDURE — 80053 COMPREHEN METABOLIC PANEL: CPT

## 2020-06-05 PROCEDURE — 93005 ELECTROCARDIOGRAM TRACING: CPT

## 2020-06-05 PROCEDURE — 99218 HC RM OBSERVATION: CPT

## 2020-06-05 PROCEDURE — 36415 COLL VENOUS BLD VENIPUNCTURE: CPT

## 2020-06-05 PROCEDURE — 83605 ASSAY OF LACTIC ACID: CPT

## 2020-06-05 PROCEDURE — 85027 COMPLETE CBC AUTOMATED: CPT

## 2020-06-05 PROCEDURE — 74011250636 HC RX REV CODE- 250/636: Performed by: HOSPITALIST

## 2020-06-05 PROCEDURE — 10060 I&D ABSCESS SIMPLE/SINGLE: CPT

## 2020-06-05 PROCEDURE — 74011000250 HC RX REV CODE- 250: Performed by: STUDENT IN AN ORGANIZED HEALTH CARE EDUCATION/TRAINING PROGRAM

## 2020-06-05 PROCEDURE — 96376 TX/PRO/DX INJ SAME DRUG ADON: CPT

## 2020-06-05 PROCEDURE — 74011250636 HC RX REV CODE- 250/636: Performed by: STUDENT IN AN ORGANIZED HEALTH CARE EDUCATION/TRAINING PROGRAM

## 2020-06-05 PROCEDURE — 74011250637 HC RX REV CODE- 250/637: Performed by: STUDENT IN AN ORGANIZED HEALTH CARE EDUCATION/TRAINING PROGRAM

## 2020-06-05 PROCEDURE — 96375 TX/PRO/DX INJ NEW DRUG ADDON: CPT

## 2020-06-05 PROCEDURE — 74011000258 HC RX REV CODE- 258: Performed by: STUDENT IN AN ORGANIZED HEALTH CARE EDUCATION/TRAINING PROGRAM

## 2020-06-05 RX ORDER — ACETAMINOPHEN 325 MG/1
650 TABLET ORAL
Status: DISCONTINUED | OUTPATIENT
Start: 2020-06-05 | End: 2020-06-06 | Stop reason: HOSPADM

## 2020-06-05 RX ORDER — METRONIDAZOLE 250 MG/1
500 TABLET ORAL EVERY 12 HOURS
Status: DISCONTINUED | OUTPATIENT
Start: 2020-06-05 | End: 2020-06-05

## 2020-06-05 RX ORDER — TRAZODONE HYDROCHLORIDE 50 MG/1
50 TABLET ORAL
Status: DISCONTINUED | OUTPATIENT
Start: 2020-06-05 | End: 2020-06-06 | Stop reason: HOSPADM

## 2020-06-05 RX ORDER — CLOZAPINE 25 MG/1
50 TABLET ORAL DAILY
Status: DISCONTINUED | OUTPATIENT
Start: 2020-06-06 | End: 2020-06-06 | Stop reason: HOSPADM

## 2020-06-05 RX ORDER — HYDROMORPHONE HYDROCHLORIDE 1 MG/ML
1 INJECTION, SOLUTION INTRAMUSCULAR; INTRAVENOUS; SUBCUTANEOUS ONCE
Status: COMPLETED | OUTPATIENT
Start: 2020-06-05 | End: 2020-06-05

## 2020-06-05 RX ORDER — CLOZAPINE 100 MG/1
300 TABLET ORAL
Status: DISCONTINUED | OUTPATIENT
Start: 2020-06-05 | End: 2020-06-06 | Stop reason: HOSPADM

## 2020-06-05 RX ORDER — OLANZAPINE 5 MG/1
5 TABLET ORAL
Status: DISCONTINUED | OUTPATIENT
Start: 2020-06-05 | End: 2020-06-06 | Stop reason: HOSPADM

## 2020-06-05 RX ORDER — PROPRANOLOL HYDROCHLORIDE 10 MG/1
20 TABLET ORAL 2 TIMES DAILY
Status: DISCONTINUED | OUTPATIENT
Start: 2020-06-05 | End: 2020-06-06 | Stop reason: HOSPADM

## 2020-06-05 RX ORDER — BENZTROPINE MESYLATE 1 MG/1
1 TABLET ORAL
Status: DISCONTINUED | OUTPATIENT
Start: 2020-06-05 | End: 2020-06-06 | Stop reason: HOSPADM

## 2020-06-05 RX ORDER — LORAZEPAM 2 MG/ML
1 INJECTION INTRAMUSCULAR
Status: DISCONTINUED | OUTPATIENT
Start: 2020-06-05 | End: 2020-06-06 | Stop reason: HOSPADM

## 2020-06-05 RX ORDER — CALCIUM CARBONATE 500(1250)
500 TABLET ORAL
Status: DISCONTINUED | OUTPATIENT
Start: 2020-06-05 | End: 2020-06-06 | Stop reason: HOSPADM

## 2020-06-05 RX ORDER — ENOXAPARIN SODIUM 100 MG/ML
40 INJECTION SUBCUTANEOUS EVERY 24 HOURS
Status: DISCONTINUED | OUTPATIENT
Start: 2020-06-05 | End: 2020-06-06 | Stop reason: HOSPADM

## 2020-06-05 RX ORDER — DIPHENHYDRAMINE HYDROCHLORIDE 50 MG/ML
50 INJECTION, SOLUTION INTRAMUSCULAR; INTRAVENOUS
Status: DISCONTINUED | OUTPATIENT
Start: 2020-06-05 | End: 2020-06-06 | Stop reason: HOSPADM

## 2020-06-05 RX ORDER — CHLORPROMAZINE HYDROCHLORIDE 25 MG/ML
25 INJECTION INTRAMUSCULAR
Status: DISCONTINUED | OUTPATIENT
Start: 2020-06-05 | End: 2020-06-06 | Stop reason: HOSPADM

## 2020-06-05 RX ORDER — IBUPROFEN 200 MG
1 TABLET ORAL DAILY
Status: DISCONTINUED | OUTPATIENT
Start: 2020-06-05 | End: 2020-06-06 | Stop reason: HOSPADM

## 2020-06-05 RX ORDER — LIDOCAINE HYDROCHLORIDE AND EPINEPHRINE 10; 10 MG/ML; UG/ML
1.5 INJECTION, SOLUTION INFILTRATION; PERINEURAL ONCE
Status: COMPLETED | OUTPATIENT
Start: 2020-06-05 | End: 2020-06-05

## 2020-06-05 RX ORDER — ACETAMINOPHEN 325 MG/1
650 TABLET ORAL
Status: DISCONTINUED | OUTPATIENT
Start: 2020-06-05 | End: 2020-06-05

## 2020-06-05 RX ORDER — ONDANSETRON 2 MG/ML
4 INJECTION INTRAMUSCULAR; INTRAVENOUS
Status: DISCONTINUED | OUTPATIENT
Start: 2020-06-05 | End: 2020-06-06 | Stop reason: HOSPADM

## 2020-06-05 RX ORDER — DOCUSATE SODIUM 100 MG/1
100 CAPSULE, LIQUID FILLED ORAL
Status: DISCONTINUED | OUTPATIENT
Start: 2020-06-05 | End: 2020-06-05

## 2020-06-05 RX ORDER — LIDOCAINE HYDROCHLORIDE AND EPINEPHRINE 10; 10 MG/ML; UG/ML
INJECTION, SOLUTION INFILTRATION; PERINEURAL
Status: DISPENSED
Start: 2020-06-05 | End: 2020-06-06

## 2020-06-05 RX ORDER — MONTELUKAST SODIUM 10 MG/1
10 TABLET ORAL
Status: DISCONTINUED | OUTPATIENT
Start: 2020-06-05 | End: 2020-06-06 | Stop reason: HOSPADM

## 2020-06-05 RX ORDER — AMOXICILLIN 250 MG
2 CAPSULE ORAL DAILY
Status: DISCONTINUED | OUTPATIENT
Start: 2020-06-05 | End: 2020-06-06 | Stop reason: HOSPADM

## 2020-06-05 RX ORDER — HYDROXYZINE 25 MG/1
50 TABLET, FILM COATED ORAL
Status: DISCONTINUED | OUTPATIENT
Start: 2020-06-05 | End: 2020-06-06 | Stop reason: HOSPADM

## 2020-06-05 RX ADMIN — VANCOMYCIN HYDROCHLORIDE 1000 MG: 1 INJECTION, POWDER, LYOPHILIZED, FOR SOLUTION INTRAVENOUS at 18:02

## 2020-06-05 RX ADMIN — VANCOMYCIN HYDROCHLORIDE 1000 MG: 1 INJECTION, POWDER, LYOPHILIZED, FOR SOLUTION INTRAVENOUS at 07:59

## 2020-06-05 RX ADMIN — ENOXAPARIN SODIUM 40 MG: 100 INJECTION SUBCUTANEOUS at 21:44

## 2020-06-05 RX ADMIN — POTASSIUM BICARBONATE 20 MEQ: 782 TABLET, EFFERVESCENT ORAL at 13:38

## 2020-06-05 RX ADMIN — METRONIDAZOLE 500 MG: 250 TABLET ORAL at 12:03

## 2020-06-05 RX ADMIN — MONTELUKAST 10 MG: 10 TABLET, FILM COATED ORAL at 21:45

## 2020-06-05 RX ADMIN — SENNOSIDES AND DOCUSATE SODIUM 2 TABLET: 8.6; 5 TABLET ORAL at 12:03

## 2020-06-05 RX ADMIN — LIDOCAINE HYDROCHLORIDE AND EPINEPHRINE 15 MG: 10; 10 INJECTION, SOLUTION INFILTRATION; PERINEURAL at 16:35

## 2020-06-05 RX ADMIN — CALCIUM 500 MG: 500 TABLET ORAL at 16:51

## 2020-06-05 RX ADMIN — POTASSIUM BICARBONATE 20 MEQ: 782 TABLET, EFFERVESCENT ORAL at 12:03

## 2020-06-05 RX ADMIN — HYDROMORPHONE HYDROCHLORIDE 0.5 MG: 1 INJECTION, SOLUTION INTRAMUSCULAR; INTRAVENOUS; SUBCUTANEOUS at 16:40

## 2020-06-05 RX ADMIN — CLOZAPINE 300 MG: 100 TABLET ORAL at 21:45

## 2020-06-05 RX ADMIN — CEFTRIAXONE SODIUM 1 G: 1 INJECTION, POWDER, FOR SOLUTION INTRAMUSCULAR; INTRAVENOUS at 12:04

## 2020-06-05 RX ADMIN — PROPRANOLOL HYDROCHLORIDE 20 MG: 10 TABLET ORAL at 18:01

## 2020-06-05 RX ADMIN — VANCOMYCIN 500 MG: 500 INJECTION, SOLUTION INTRAVENOUS at 09:23

## 2020-06-05 RX ADMIN — CALCIUM 500 MG: 500 TABLET ORAL at 12:03

## 2020-06-05 RX ADMIN — PROPRANOLOL HYDROCHLORIDE 20 MG: 10 TABLET ORAL at 12:03

## 2020-06-05 RX ADMIN — POTASSIUM BICARBONATE 20 MEQ: 782 TABLET, EFFERVESCENT ORAL at 15:03

## 2020-06-05 RX ADMIN — POTASSIUM BICARBONATE 20 MEQ: 782 TABLET, EFFERVESCENT ORAL at 11:05

## 2020-06-05 NOTE — PROGRESS NOTES
Pharmacy Automatic Renal Dosing Protocol - Antimicrobials    Indication for Antimicrobials: ssti     Current Regimen of Each Antimicrobial:  Vancomycin 1 gm IV every 12 hr (Start Date ; Day # 1)    Previous Antimicrobial Therapy:   (Start Date ; Day    Goal Level: VANCOMYCIN TROUGH GOAL RANGE    Vancomycin Trough: 10 - 15 mcg/mL  (AUC: 400 - 600 mg/hr/Liter/day)     Date Dose & Interval Measured (mcg/mL) Extrapolated (mcg/mL)                       Date & time of next level:     Significant Cultures:       Radiology / Imaging results: (X-ray, CT scan or MRI):     Paralysis, amputations, malnutrition:     Labs:  Recent Labs     20  2203 20  1101 20  0545   CREA  --   --  1.21   BUN  --   --  36*   WBC 13.1* 13.0* 14.5*     Temp (24hrs), Av.9 °F (36.6 °C), Min:97.6 °F (36.4 °C), Max:98.1 °F (36.7 °C)    Creatinine Clearance (mL/min) or Dialysis: 72    Impression/Plan:   Vancomycin dosed for trough ~ 15  Will give 1000 mg now and then give additional 1000 mg at 0800 on  to make up for incomplete loading dose (trough at 0800 dose calculated to be around 13). Antimicrobial stop date 5 days     Pharmacy will follow daily and adjust medications as appropriate for renal function and/or serum levels. Thank you,  Lila Molina, PHARMD    Recommended duration of therapy  http://[facility]/Pharmacy/Clinical%20Companion/[email or web address]%[email or web address]. docx    Renal Dosing  http://[facility]/[place]/[facility]/Pharmacy/Clinical%20Companion/Renal%[id number]. pdf

## 2020-06-05 NOTE — PROGRESS NOTES
Problem: General Infection Care Plan (Adult and Pediatric)  Goal: *Control of acute pain  Outcome: Progressing Towards Goal  Goal: Improvement in signs and symptoms of infection  Outcome: Progressing Towards Goal  Goal: *Infected Wound: Prevention of further infection and promotion of healing  Outcome: Progressing Towards Goal  Goal: Knowledge - Infection Control  Outcome: Progressing Towards Goal     Problem: Pain - Acute  Goal: *Control of acute pain  Outcome: Progressing Towards Goal  Goal: *Optimal pain control at patient's stated goal  Outcome: Progressing Towards Goal  Goal: *Verbalizes understanding of type and use of pain medication  Outcome: Progressing Towards Goal

## 2020-06-05 NOTE — PROGRESS NOTES
Mr. Oksana Hope reports pain in his left foot today. Purulent drainage was noted from the lateral aspect of the left foot. Tm 97.3 HR: 102 BP: 126/79 Resp Rate: 16 100% sat on room air. No intake or output data in the 24 hours ending 06/05/20 1645   Exam: Cor: RRR. Lungs: Bilateral breath sounds. Clear to auscultation. Abd: Soft. Non tender. No guarding or rebound. Non distended. Left Foot: There is a fluctuant subcutaneous mass over the lateral aspect of the foot. Minimal purulent drainage is noted. Clinically, this is c/w an abscess. Labs:   Recent Results (from the past 12 hour(s))   CBC W/O DIFF    Collection Time: 06/05/20  5:01 AM   Result Value Ref Range    WBC 13.0 (H) 4.1 - 11.1 K/uL    RBC 4.16 4.10 - 5.70 M/uL    HGB 12.9 12.1 - 17.0 g/dL    HCT 37.7 36.6 - 50.3 %    MCV 90.6 80.0 - 99.0 FL    MCH 31.0 26.0 - 34.0 PG    MCHC 34.2 30.0 - 36.5 g/dL    RDW 13.1 11.5 - 14.5 %    PLATELET 330 (H) 799 - 400 K/uL    MPV 10.2 8.9 - 12.9 FL    NRBC 0.0 0  WBC    ABSOLUTE NRBC 0.00 0.00 - 6.30 K/uL   METABOLIC PANEL, COMPREHENSIVE    Collection Time: 06/05/20  5:01 AM   Result Value Ref Range    Sodium 141 136 - 145 mmol/L    Potassium 3.0 (L) 3.5 - 5.1 mmol/L    Chloride 112 (H) 97 - 108 mmol/L    CO2 19 (L) 21 - 32 mmol/L    Anion gap 10 5 - 15 mmol/L    Glucose 78 65 - 100 mg/dL    BUN 26 (H) 6 - 20 MG/DL    Creatinine 0.88 0.70 - 1.30 MG/DL    BUN/Creatinine ratio 30 (H) 12 - 20      GFR est AA >60 >60 ml/min/1.73m2    GFR est non-AA >60 >60 ml/min/1.73m2    Calcium 6.9 (L) 8.5 - 10.1 MG/DL    Bilirubin, total 0.2 0.2 - 1.0 MG/DL    ALT (SGPT) 14 12 - 78 U/L    AST (SGOT) 9 (L) 15 - 37 U/L    Alk.  phosphatase 68 45 - 117 U/L    Protein, total 4.9 (L) 6.4 - 8.2 g/dL    Albumin 2.3 (L) 3.5 - 5.0 g/dL    Globulin 2.6 2.0 - 4.0 g/dL    A-G Ratio 0.9 (L) 1.1 - 2.2     LACTIC ACID    Collection Time: 06/05/20  5:01 AM   Result Value Ref Range    Lactic acid 1.0 0.4 - 2.0 MMOL/L   Will plan on drainage of the abscess. Discussed procedure with Mr. Rosa Chester including risks of bleeding, further infection, need for further surgery. He understands and wishes to proceed. Consent on chart. Will need local wound care following drainage of the abscess. Continue IV abx - Rocephin and Vancomycin. Can probably switch back to oral antibiotics in a day or so. Elevate LLE. Pain medication and anti-emetics as needed. Diet as tolerated. Plans per Dr. Huynh Rater. Following. Procedure:   Left foot prepped with betadine and draped as a field. Local anesthetic infiltrated. Incision over abscess opened sharply. A minimal amount of purulent material was evacuated. Loculations broken up. Wound irrigated with saline. Wound packed with saline soaked nu-gauze. Dry dressing applied. Can remove packing tomorrow and get open wound wet. Dry dressing over wound daily. Elevate LLE.

## 2020-06-05 NOTE — PROGRESS NOTES
[facility]: MED RECONCILIATION    Comments/Recommendations:   Patient transferred from [facility]. Medication history completed upon admission to [facility]. Please see pharmacist note from 5/27/20 for details.     Thank you,  Laurie Rajan, PharmD, BCPS  [phone]

## 2020-06-05 NOTE — PROGRESS NOTES
**Consult Information**  Member Facility: [address] [place] [address] Rd., [address] MRN: [id number]  Facility Time Zone: ET  Date and Time of Request: 06/05/2020 04:24:07 AM  Requesting Clinician: . Patient Name: Dieter Mac  YOB: 1975  Gender: Male    **Problem/Plan**  Left foot cellulitis/abscess: Transferred to medical floor 2ndary to worsening left foot cellulitis/abscess while on PO antibiotics. Will start on IV Vancomycin and IV Zosyn and check labs including lactic acid. Blood cultures have already been ordered. May need to be seen again by surgery or podiatry for possible I & D. Schizophrenia: with psychosis, c/w prescribed regimen    **General**  Code Status: Full Code  History of Present Illness: 49 y/o male pmhx of tobacco abuse, HTN and schizophrenia who has been hospitalized in the behavioral unit since May 27th evaluated for worsening left foot cellulitis. Patient was transferred to the medical floor 2ndary to worsening left foot cellulitis with concern for developing abscess. He has been treated with Ceftin and PO Doxycyline for the past 3 days. He admits to left foot swelling and pain. He denies any trauma to his foot. He admits to left foot pain with walking. He denies any fevers or chills. He admits to some fatigue. He denies any chest pain, shortness of breath, cough or abdominal pain. He denies any nausea, vomiting, diarrhea or urinary symptoms. His lab work earlier this evening showed a WBC count of 13.1. He will be started on IV Vancomycin and IV Zosyn. Review of Systems: All systems reviewed and found to be negative except as per HPI. Past Medical History: HTN  Schizophrenia  Tobacco abuse  ? Asthma  Surgical History: Splenectomy  Social History: Smokes cigarettes (1 PPD)  Drinks ETOH daily    Family History: Reviewed and found to be non-pertinent    **Allergies and Medications**  Allergies: Haldol  Current Medications: Cogentin  Thiorazine  Clozaril  Benadryl  Atarax  Ativan  Singulair  Zyprexa  Inderal  Colace  Trazodone    **Vital Signs**  Temperature: Afebrile  Blood Pressure (mmHg): 121/57  Heart Rate (bpm): 78  Respiration Rate (bpm): 16  O2 Sat (%): 100  Oxygen Delivery Method: Room Air  Vitals Entry Time: 06/05/2020 04:56:12 AM    **Exam**  Exam: Gen: NAD, flat affect  HEENT: PERRLA  CVS: S1 and S2 audible  Lungs: Clear to auscultation bilaterally  GI: Positive bowel sounds, no tenderness  EXT: Swelling and erythema along lateral border of left foot (5th metatarsal) with fluctuance   Neuro: Alert to person, place and time    **Labs and Diagnostic Studies**  Labs: WBC count 13.1    **Quality Measures**  GI Prophylaxis Ordered: No  DVT Prophylaxis Ordered: Yes  Delirium: Not Present  Is there a problem with skin integrity?: No  Central Line: Not Present  Nutritional Consult Needed?: No    **Attestation**  Interaction Mode: Video & Phone  Phone Duration (mins): 5  Time of Call : 06/05/2020 04:59 AM  Video Duration (mins): 10  Time of Video : 06/05/2020 04:59 AM  Interaction Attestation: Clinical telemedicine services delivered using HIPAA-compliant interactive video audio telecommunications while the patient and the rendering provider were not in the same physical location. A written summary report was provided to the requesting/treating provider at the originating site.   Evaluation Duration (mins): 29    **Physician Signature**  This document was electronically signed by: Genna Odell MD  06/05/2020 05:00 AM

## 2020-06-05 NOTE — H&P
Telemedicine hospitalist   called from psych floor for patient with left foot absces , worse , draining pus   he was stated on po doxy and cefdinir     transfer to floor   start IV vanc and zosyn   consider surgery consult  Resume med   Wound cx  Blood cx

## 2020-06-05 NOTE — PROGRESS NOTES
TRANSFER - IN REPORT:    Verbal report received from Talita RN on [facility] [place]  being received from [facility] for medical level care of abscess on left foot. Report consisted of patients Situation, Background, Assessment and   Recommendations(SBAR). Information from the following SBAR report was reviewed with the receiving nurse. Opportunity for questions and clarification was provided. Assessment completed upon patients arrival to unit and care assumed.

## 2020-06-05 NOTE — BH NOTES
Call placed to ;house supervisor regarding change in condition to pt L foot; advised to call psych on call for orders to have hospitalist STAT d/t infection in L foot with significant change over last two days when patient was seen; area very tender to touch with a large abscess and purulent drainage pushing outward, tight and painful when pt resting and worsening when he walks, making it difficult to move around. Updated RN to look from previous night; said that it was significantly worse and that pocket of drainage was not there the day before. Upon reviewing notes from Dr. Amina Coello MD/Dr. Tenzin Roac MD, this area has significantly changed. Orders received Iggy Dowling NP) for hospitalist to look at STAT and she also ordered CBC w/diff to be done as well. Orders received and carried. Call received from hospitalist (Wili Jorge MD); updated nursing supervisor; they discussed on phone here. Hospitalist will be putting orders in for transfer to second floor medical as there is no one that can come to the floor and see him. Supervisor updating Dr. Arron Ryan as orders needed to be put in as discharge/Readmit orders. Per call back, correct orders have been received and she will update me when we can transfer and give report.

## 2020-06-05 NOTE — PROGRESS NOTES
[facility] Dosing Services: Antimicrobial Stewardship Progress Note  Consult for dosing of vancomycin by Dr. Cecilio Herbert  Pharmacist reviewed antibiotic appropriateness for 50year old male for SSTI/abscess  Day of Therapy: 1    Plan:  Already given loading dose of vancomycin 1500 mg IV x 1. Follow with maintenance dose of vancomycin 1000 mg IV every 12 hours. Dose calculated to approximate a therapeutic trough of 10-15 mcg/mL. Plan for level: Prior to 4th dose (not ordered). Noted order for contrast x 1 6/5/20. Pharmacy to follow daily and will make changes to dose and/or frequency based on clinical status. Other Antimicrobial  (not dosed by pharmacist)   Ceftriaxone 1 g IV every 24 hours - Day 1  Metronidazole 500 mg PO every 12 hours - Day 1  Cefdinir and doxycycline during [facility] admission 6/4-6/5   Cultures     6/5 blood: In process   Serum Creatinine     Lab Results   Component Value Date/Time    Creatinine 0.88 06/05/2020 05:01 AM       Creatinine Clearance Estimated Creatinine Clearance: 99.8 mL/min (based on SCr of 0.88 mg/dL).      Procalcitonin  No results found for: PCT     Temp   97.3 °F (36.3 °C)    WBC   Lab Results   Component Value Date/Time    WBC 13.0 (H) 06/05/2020 05:01 AM       H/H   Lab Results   Component Value Date/Time    HGB 12.9 06/05/2020 05:01 AM      Platelets Lab Results   Component Value Date/Time    PLATELET 075 (H) 84/97/0637 05:01 AM          Thank you,  Carolann Hennessy, PharmD, BCPS  [phone]

## 2020-06-05 NOTE — PROGRESS NOTES
Care Plan Reviewed     Problem: General Infection Care Plan (Adult and Pediatric)  Goal: *Control of acute pain  Outcome: Progressing Towards Goal  Goal: Improvement in signs and symptoms of infection  Outcome: Progressing Towards Goal  Goal: *Infected Wound: Prevention of further infection and promotion of healing  Outcome: Progressing Towards Goal  Goal: Knowledge - Infection Control  Outcome: Progressing Towards Goal     Problem: Pain - Acute  Goal: *Control of acute pain  Outcome: Progressing Towards Goal  Goal: *Optimal pain control at patient's stated goal  Outcome: Progressing Towards Goal  Goal: *Verbalizes understanding of type and use of pain medication  Outcome: Progressing Towards Goal     Problem: General Medical Care Plan  Goal: *Vital signs within specified parameters  Outcome: Progressing Towards Goal  Goal: *Labs within defined limits  Outcome: Progressing Towards Goal  Goal: *Absence of infection signs and symptoms  Outcome: Progressing Towards Goal  Goal: *Optimal pain control at patient's stated goal  Outcome: Progressing Towards Goal  Goal: *Skin integrity maintained  Outcome: Progressing Towards Goal  Goal: *Fluid volume balance  Outcome: Progressing Towards Goal  Goal: *Optimize nutritional status  Outcome: Progressing Towards Goal  Goal: *Anxiety reduced or absent  Outcome: Progressing Towards Goal  Goal: *Progressive mobility and function (eg: ADL's)  Outcome: Progressing Towards Goal

## 2020-06-05 NOTE — H&P
Hospitalist Admission Note    NAME: Deedee Maloney   :  1975   MRN:  [id number]   Room Number: 146/17  @ [facility]     Date/Time:  2020 3:41 PM    Patient PCP: None  ______________________________________________________________________  Given the patient's current clinical presentation, I have a high level of concern for decompensation if discharged from the emergency department. Complex decision making was performed, which includes reviewing the patient's available past medical records, laboratory results, and x-ray films. My assessment of this patient's clinical condition and my plan of care is as follows. Assessment / Plan: Active Problems:    Abscess of foot (2020)      Cellulitis and abscess, left foot POA  Incision and Drainage of abscess of left foot    Leukocytosis   Continues to have cellulitis despite IV Abx. Superficial abscess noted on lateral border of left foot. - IV Vancomycin, Ceftriaxone for now. Will switch to oral Abx tomorrow. - I&D performed at bedside by this writer. Total time 30 minutes. - Repeat I&D performed by Dr. Bridgette Ramesh with small amount of bleeding, loculations opened up, cavity packed. - Wound care per general surgery       Hypokalemia   EKG ordered and reviewed - normal sinus rhythm, narrow QRS, No ST-T changes, normal RI, QRS,QTc.     - Oral supplementation   - Recheck Chem 10 tomorrow. Hypocalcemia  Corrected calcium 8.3.   - Administer oral supplementation. Schizophrenia :   - consult to psychiatry  - Continue psychotropic medications he was receiving on behavioral health unit  Clozapine, PRN Thorazine,hydroxyzine. Tobacco dependence  - nicotine patch     Body mass index is 21.83 kg/m².      Code Status: full     DVT Prophylaxis: Lovenox  GI Prophylaxis: not indicated    Baseline: ambulatory independently        Subjective:   CHIEF COMPLAINT: transferred for worsening cellulitis despite oral abx    HISTORY OF PRESENT ILLNESS:     50 y.o.  male with PMH of copd, HTN, Depression, anxiety who was admitted to behavioral health unit on 5/26 with psychosis and delusions. He was found to living in a tent in the woods and was found to have poor self care and hygiene on presentation. Patient was noted to have left foot pain and swelling on admission, Xray foot at the time did not show any acute abnormality. Medicine was consulted for ongoing left foot pain and swelling along with leukocytosis. Topical abx were started by psychiatry. Patient currently denies fever, chills. Denies trauma to the foot. Some superficial skin breakdown is noted on dorsum of both feet. Noted to be soaking feet in warm water bath during interview. He is able to weight bear on foot and walk with a steady gait. However, foot is tender to palpation on the left lateral border. He also noted significant swelling and redness of left foot compared to right.      He is calm and cooperative with logical thought process. Further in the interview, he is noted to be talking to himself and responding to internal stimuli. Shows his drawings to this writer and talks about the 'love of his life' Rosangela.      Patient denies any past medical history except as listed above. Denies fever,chills,chest pain, sob,abd pan,diarrhea,constipation,lighhadedness. No Hx DM. No current medical concerns at this time.       Past Medical History:   Diagnosis Date    Back pain     Chronic bronchitis (HCC)     COPD    Elevated WBCs     H/O splenectomy     HTN (hypertension)     Ill-defined condition     constipation    Left foot pain 6/2/2020    Psychiatric disorder     depression, anxiety        Past Surgical History:   Procedure Laterality Date    HX OTHER SURGICAL Right     two right wrist fractures     HX SPLENECTOMY         Social History     Tobacco Use    Smoking status: Former Smoker    Smokeless tobacco: Never Used   Substance Use Topics    Alcohol use: No        Family History   Problem Relation Age of Onset    No Known Problems Mother     No Known Problems Father     No Known Problems Brother      Allergies   Allergen Reactions    Haloperidol Other (comments)        Prior to Admission medications    Not on File       REVIEW OF SYSTEMS:     I am not able to complete the review of systems because:    The patient is intubated and sedated    The patient has altered mental status due to his acute medical problems    The patient has baseline aphasia from prior stroke(s)    The patient has baseline dementia and is not reliable historian    The patient is in acute medical distress and unable to provide information           Total of 12 systems reviewed as follows:       POSITIVE= underlined text  Negative = text not underlined  General:  fever, chills, sweats, generalized weakness, weight loss/gain,      loss of appetite   Eyes:    blurred vision, eye pain, loss of vision, double vision  ENT:    rhinorrhea, pharyngitis   Respiratory:   cough, sputum production, SOB, HYLTON, wheezing, pleuritic pain   Cardiology:   chest pain, palpitations, orthopnea, PND, edema, syncope   Gastrointestinal:  abdominal pain , N/V, diarrhea, dysphagia, constipation, bleeding   Genitourinary:  frequency, urgency, dysuria, hematuria, incontinence   Muskuloskeletal :  arthralgia, myalgia, back pain  Hematology:  easy bruising, nose or gum bleeding, lymphadenopathy   Dermatological: rash, ulceration, pruritis, color change / jaundice  Endocrine:   hot flashes or polydipsia   Neurological:  headache, dizziness, confusion, focal weakness, paresthesia,     Speech difficulties, memory loss, gait difficulty  Psychological: Feelings of anxiety, depression, agitation    Objective:   VITALS:    Visit Vitals  /79 (BP 1 Location: Right arm, BP Patient Position: At rest)   Pulse (!) 102   Temp 97.3 °F (36.3 °C)   Resp 16   Ht 5' 9.49\" (1.765 m)   Wt 68 kg (149 lb 14.6 oz)   SpO2 100% BMI 21.83 kg/m²       PHYSICAL EXAM:    General:    Alert, cooperative, no distress, appears stated age. HEENT: Atraumatic, anicteric sclerae, pink conjunctivae     No oral ulcers, mucosa moist, throat clear, dentition fair  Neck:  Supple, symmetrical,  thyroid: non tender  Lungs:   Clear to auscultation bilaterally. No Wheezing or Rhonchi. No rales. Chest wall:  No tenderness  No Accessory muscle use. Heart:   Regular  rhythm,  No  murmur   No edema  Abdomen:   Soft, non-tender. Not distended. Bowel sounds normal  Extremities: No cyanosis. No clubbing,      Skin turgor normal, Capillary refill normal, Radial dial pulse 2+  Skin:     Not pale. Not Jaundiced  No rashes   Psych:  Good insight. Not depressed. Not anxious or agitated. Neurologic: EOMs intact. No facial asymmetry. No aphasia or slurred speech. Symmetrical strength, Sensation grossly intact. Alert and oriented X 4. Small abscess about 1.5 cm in diameter noted on lateral border of left foot with erythema, tenderness and mild edema of surrounding skin, warm to touch. I&D performed at bedside. Please see separate procedure note.     ______________________________________________________________________    Care Plan discussed with:  Patient/Family, Nurse,  and Consultant Dr. Lincoln Cooper. Expected  Disposition:  Inpatient psych   ________________________________________________________________________  TOTAL TIME:  70 Minutes    Critical Care Provided     Minutes non procedure based      Comments     Reviewed previous records   >50% of visit spent in counseling and coordination of care  Discussion with patient and/or family and questions answered       ________________________________________________________________________  Signed: Aric Hand MD    Procedures: see electronic medical records for all procedures/Xrays and details which were not copied into this note but were reviewed prior to creation of Plan.     LAB DATA REVIEWED:    Recent Results (from the past 24 hour(s))   CBC W/O DIFF    Collection Time: 06/04/20 10:03 PM   Result Value Ref Range    WBC 13.1 (H) 4.1 - 11.1 K/uL    RBC 4.38 4.10 - 5.70 M/uL    HGB 13.3 12.1 - 17.0 g/dL    HCT 40.2 36.6 - 50.3 %    MCV 91.8 80.0 - 99.0 FL    MCH 30.4 26.0 - 34.0 PG    MCHC 33.1 30.0 - 36.5 g/dL    RDW 13.1 11.5 - 14.5 %    PLATELET 338 (H) 462 - 400 K/uL    MPV 10.3 8.9 - 12.9 FL    NRBC 0.0 0  WBC    ABSOLUTE NRBC 0.00 0.00 - 0.01 K/uL   CULTURE, BLOOD    Collection Time: 06/05/20  2:29 AM   Result Value Ref Range    Special Requests: NO SPECIAL REQUESTS      Culture result: NO GROWTH AFTER 3 HOURS     CBC W/O DIFF    Collection Time: 06/05/20  5:01 AM   Result Value Ref Range    WBC 13.0 (H) 4.1 - 11.1 K/uL    RBC 4.16 4.10 - 5.70 M/uL    HGB 12.9 12.1 - 17.0 g/dL    HCT 37.7 36.6 - 50.3 %    MCV 90.6 80.0 - 99.0 FL    MCH 31.0 26.0 - 34.0 PG    MCHC 34.2 30.0 - 36.5 g/dL    RDW 13.1 11.5 - 14.5 %    PLATELET 350 (H) 876 - 400 K/uL    MPV 10.2 8.9 - 12.9 FL    NRBC 0.0 0  WBC    ABSOLUTE NRBC 0.00 0.00 - 1.14 K/uL   METABOLIC PANEL, COMPREHENSIVE    Collection Time: 06/05/20  5:01 AM   Result Value Ref Range    Sodium 141 136 - 145 mmol/L    Potassium 3.0 (L) 3.5 - 5.1 mmol/L    Chloride 112 (H) 97 - 108 mmol/L    CO2 19 (L) 21 - 32 mmol/L    Anion gap 10 5 - 15 mmol/L    Glucose 78 65 - 100 mg/dL    BUN 26 (H) 6 - 20 MG/DL    Creatinine 0.88 0.70 - 1.30 MG/DL    BUN/Creatinine ratio 30 (H) 12 - 20      GFR est AA >60 >60 ml/min/1.73m2    GFR est non-AA >60 >60 ml/min/1.73m2    Calcium 6.9 (L) 8.5 - 10.1 MG/DL    Bilirubin, total 0.2 0.2 - 1.0 MG/DL    ALT (SGPT) 14 12 - 78 U/L    AST (SGOT) 9 (L) 15 - 37 U/L    Alk.  phosphatase 68 45 - 117 U/L    Protein, total 4.9 (L) 6.4 - 8.2 g/dL    Albumin 2.3 (L) 3.5 - 5.0 g/dL    Globulin 2.6 2.0 - 4.0 g/dL    A-G Ratio 0.9 (L) 1.1 - 2.2     LACTIC ACID    Collection Time: 06/05/20  5:01 AM   Result Value Ref Range Lactic acid 1.0 0.4 - 2.0 MMOL/L

## 2020-06-05 NOTE — BH NOTES
Assumed care of patient and given report by off going nurse, SAMI Costa RN. Pt was in his room when I came to speak with him. He continues to be flat, is a bit odd at times, sometimes doesn't make sense when speaking to him at length. He was sitting on bed at one point holding out this hands and flexing them all and holding them out and he said he was doing \"isolative exercises. \"  He is still noted to have very dirty feet; and the abscess on his L foot is now full of puss and tight and tender to the touch. Pt reports pain 2/10 if he is just sitting there but he said it is painful to walk on. Pt states is aches, throbs all the time. His care is somewhat improved from admission but he still needs to take a shower but refuses in his room d/t water not being warm enough. Pt has multiple marks spots and scratches on his skin and is homeless and is still wanting to return to the woods. He reports \"I just want to get out of here. \"  He remains quiet at times, isolative to his room and likes drawing. He does still seem internally preoccupied but denied everything though today he did report to the doctor that he was still having some auditory hallucinations. He has been on clozaril and has been taking his medications as prescribed. He did soak his foot for me tonight to help clean it more. He continues with oral ATB for his foot. Pt has no SI/HI or AVH as stated above. He does though continue to endorse some anxiety/depression mostly over having to be here but does say staff is good to him. He continues on 15 min safety checks. Will continue to monitor and treat. Pt is waiting transfer to the second floor for medical tx for abscess to his L foot. CBC w/diff ordered earlier was drawn and sent to lab. Will update nurse on transfer to see when it results.

## 2020-06-05 NOTE — DISCHARGE SUMMARY
PSYCHIATRIC DISCHARGE SUMMARY         IDENTIFICATION:    Patient Name  Aura Quinonez   Date of Birth 1975   [facility] [id number]   Medical Record Number  [id number]      Age  50 y.o. PCP None   Admit date:  5/26/2020    Discharge date: 6/5/2020   Room Number  200/12  @ [address]   Date of Service  6/5/2020            TYPE OF DISCHARGE: TRANSFER TO MEDICINE               CONDITION AT DISCHARGE: improved       PROVISIONAL & DISCHARGE DIAGNOSES:    Problem List  Date Reviewed: 6/2/2020          Codes Class    Abscess of foot ICD-10-CM: L02.619  ICD-9-CM: 126. 7         Abscess ICD-10-CM: L02.91  ICD-9-CM: 682.9         Foot abscess, left ICD-10-CM: L02.612  ICD-9-CM: 682.7         Dizziness ICD-10-CM: R42  ICD-9-CM: 780.4         Hyperlipidemia ICD-10-CM: E78.5  ICD-9-CM: 272.4         Leukocytosis ICD-10-CM: D72.829  ICD-9-CM: 288.60         Lactose intolerance ICD-10-CM: E73.9  ICD-9-CM: 271.3         Impaired glucose tolerance ICD-10-CM: R73.02  ICD-9-CM: 790.22         * (Principal) Schizoaffective disorder, bipolar type without good prognostic features (HCC) ICD-10-CM: F25.0  ICD-9-CM: 295.70         Onychomycosis ICD-10-CM: B35.1  ICD-9-CM: 110.1         Smoker ICD-10-CM: F17.200  ICD-9-CM: 305.1         Back pain ICD-10-CM: M54.9  ICD-9-CM: 724.5         Chronic obstructive pulmonary disease (HCC) ICD-10-CM: J44.9  ICD-9-CM: 496         Herniation of intervertebral disc ICD-10-CM: DSH3143  ICD-9-CM: 722.2         Hypertension ICD-10-CM: I10  ICD-9-CM: 401.9         Insomnia ICD-10-CM: G47.00  ICD-9-CM: 780.52               Active Hospital Problems    Abscess      Foot abscess, left      *Schizoaffective disorder, bipolar type without good prognostic features (HCC)        DISCHARGE DIAGNOSIS:   Axis I:  SEE ABOVE  Axis II: SEE ABOVE  Axis III: SEE ABOVE  Axis IV:  lack of structure  Axis V:  20 on admission, 40 on discharge     CC & HISTORY OF PRESENT ILLNESS:  \"psychosis\"    The Blanca Greene, is V 42 y.o.  WHITE OR  male with a past psychiatric history significant for schizoaffective disorder, who presents at this time with complaints of (and/or evidence of) the following emotional symptoms: delusions and psychotic behavior.  Additional symptomatology include poor self care.  The above symptoms have been present for 2+ weeks. These symptoms are of moderate to high severity. These symptoms are constant in nature.  The patient's condition has been precipitated by psychosocial stressors.  Patient's condition made worse by illicit substance use and treatment noncompliance. UDS: +THC; BAL=0.      The patient is a fair historian. The patient corroborates the above narrative. The patient contracts for safety on the unit and gives consent for the team to contact collateral. The patient is amenable to initiating treatment while on the unit. The patient is forthcoming about his symptoms and lifestyle since leaving the hospital, he acknowledges recently leaving a hospital and living in a tent recently.     05/28 - patient slept 9 hours overnight, remains odd, but cooperative. He is isolative to room, compliant with Clozaril tolerating titration thus far. Bowel regimen initiated. Patient still with ongoing bizarre delusions but no agitation, self care remains poor. He is ambivalent about disposition stating he intends to return to living in a tent upon discharge.     05/29 - overnight, patient noted to be internally preoccupied, responding to internal stimuli but otherwise calm and cooperative. He is tolerating Clozaril titration, requests nicotine patch. Patient slept 10 hours and got Atarax and Trazodone for anxiety and insomnia. He remains disheveled but is cooperative. No change in his plans for discharge. Counseled on cigarette use and Clozaril interaction.     06/01 - patient slept 6.5 hours overnight, he continues to c/o AVH but has been otherwise calm.  Patient now with WBC to 18.2, ANC 13.1. He denies any N/V or headache, but endorses pain in his L foot which is inflamed. Patient denies SI/HI, is isolative to room but out for meals. Tolerating Clozaril titration thus far.     06/02 - no acute overnight events. 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 He was seen by medicine for foot abscess, Abx started. Patient slept 7 hours, is discharge focused. SW working on a dispo plan that includes a housing component.     06/03 - patient isolative, still noted to be responding to internal stimuli but no agitation. Patient compliant with Clozaril, HR WNL, BP holding. Patient slept 5 hours, was noted to be hoarding food in his room. He denies SI/HI, still wants to live in the woods despite much psycho-education on the high likelihood of psychiatric decompensation. GS evaluated foot, agree with Abx and no indication for surgical intervention currently.      06/04 - overnight, patient slept 4 hours; he remains oddly related, making nonsensical comments but self care has improved. Patient internally preoccupied, working on his art for much of the day and isolative to his room. He denies SI/HI, endorses AH. He is discharge focused, but willing to stay for further adjustment to his Clozaril dose as well as Abx tx for his foot.      SOCIAL HISTORY:    Social History     Socioeconomic History    Marital status: SINGLE     Spouse name: Not on file    Number of children: Not on file    Years of education: Not on file    Highest education level: Not on file   Occupational History    Not on file   Social Needs    Financial resource strain: Not on file    Food insecurity     Worry: Not on file     Inability: Not on file    Transportation needs     Medical: Not on file     Non-medical: Not on file   Tobacco Use    Smoking status: Former Smoker    Smokeless tobacco: Never Used   Substance and Sexual Activity    Alcohol use: No    Drug use: No     Comment: \"not for years\"    Sexual activity: Not on file   Lifestyle    Physical activity     Days per week: Not on file     Minutes per session: Not on file    Stress: Not on file   Relationships    Social connections     Talks on phone: Not on file     Gets together: Not on file     Attends Congregation service: Not on file     Active member of club or organization: Not on file     Attends meetings of clubs or organizations: Not on file     Relationship status: Not on file    Intimate partner violence     Fear of current or ex partner: Not on file     Emotionally abused: Not on file     Physically abused: Not on file     Forced sexual activity: Not on file   Other Topics Concern    Not on file   Social History Narrative    Social History:       Reviewed history from 02/03/2017 and no changes required:          Home: Lives with [place], his girlfriend of 2 years, in a [place] apartment with pet lizard and fish          Work: Maintenance 12h/wk at [employer]          Yarsanism Preference: No          Alcohol: None, sober since 2014          Smoke: 1 PPD          Drugs: None          For fun: Fishing, crafts, pencil drawing          Taylor Bond,           Dr. Antonette Arana, [employer] psychiatry                     Smoking History:          Patient currently smokes every day. Patient has been counseled to quit. FAMILY HISTORY:   Family History   Problem Relation Age of Onset    No Known Problems Mother     No Known Problems Father     No Known Problems Brother              HOSPITALIZATION COURSE:    Madelaine Severe was admitted to the inpatient psychiatric unit [facility] for acute psychiatric stabilization in regards to symptomatology as described in the HPI above. The differential diagnosis at time of admission included: schizophrenia vs schizoaffective disorder.   While on the unit Madelaine Severe was involved in individual, group, occupational and milieu therapy. Psychiatric medications were adjusted during this hospitalization including Clozaril. Jacinda Mac demonstrated a slow, but progressive improvement in overall condition. Much of patient's initial presentation appeared to be related to situational stressors, effects of medication non-compliance, drugs of abuse, and psychological factors. Please see individual progress notes for more specific details regarding patient's hospitalization course. Patient started on Clozaril and a bowel regimen, re titrated to a dose of 300 mg QHS. He was noted to have a foot abscess and a white count of 18; patient started on PO Abx but found to be draining purulent fluid and subsequently transferred to medical unit for IV antibiotics. Psychiatry will continue to follow patient on medical unit for stabilization and disposition planning. LABS AND IMAGAING:    Labs Reviewed   CBC WITH AUTOMATED DIFF - Abnormal; Notable for the following components:       Result Value    WBC 12.9 (*)     RBC 3.92 (*)     HGB 12.0 (*)     HCT 35.5 (*)     ABS. LYMPHOCYTES 3.7 (*)     ABS.  MONOCYTES 1.3 (*)     All other components within normal limits   METABOLIC PANEL, COMPREHENSIVE - Abnormal; Notable for the following components:    Potassium 3.3 (*)     Glucose 122 (*)     Calcium 8.1 (*)     Protein, total 6.2 (*)     Albumin 2.8 (*)     A-G Ratio 0.8 (*)     All other components within normal limits   ACETAMINOPHEN - Abnormal; Notable for the following components:    Acetaminophen level 2 (*)     All other components within normal limits   DRUG SCREEN, URINE - Abnormal; Notable for the following components:    THC (TH-CANNABINOL) Positive (*)     All other components within normal limits   URINALYSIS W/ REFLEX CULTURE - Abnormal; Notable for the following components:    Appearance CLOUDY (*)     Protein >300 (*)     Ketone TRACE (*)     Blood LARGE (*)     All other components within normal limits   CBC WITH AUTOMATED DIFF - Abnormal; Notable for the following components:    WBC 18.2 (*)     NEUTROPHILS 76 (*)     ABS. NEUTROPHILS 13.9 (*)     ABS. MONOCYTES 1.1 (*)     ABS. EOSINOPHILS 0.5 (*)     ABS. IMM. GRANS. 0.1 (*)     All other components within normal limits   METABOLIC PANEL, BASIC - Abnormal; Notable for the following components:    BUN 31 (*)     BUN/Creatinine ratio 26 (*)     All other components within normal limits   CBC WITH AUTOMATED DIFF - Abnormal; Notable for the following components:    WBC 14.5 (*)     ABS. NEUTROPHILS 8.2 (*)     ABS. LYMPHOCYTES 4.5 (*)     ABS. EOSINOPHILS 0.6 (*)     ABS. IMM. GRANS. 0.1 (*)     All other components within normal limits   METABOLIC PANEL, BASIC - Abnormal; Notable for the following components:    CO2 20 (*)     BUN 36 (*)     BUN/Creatinine ratio 30 (*)     All other components within normal limits   CBC WITH AUTOMATED DIFF - Abnormal; Notable for the following components:    WBC 13.0 (*)     PLATELET 147 (*)     IMMATURE GRANULOCYTES 1 (*)     ABS. NEUTROPHILS 8.2 (*)     ABS. BASOPHILS 0.2 (*)     ABS. IMM.  GRANS. 0.1 (*)     All other components within normal limits   CBC W/O DIFF - Abnormal; Notable for the following components:    WBC 13.1 (*)     PLATELET 294 (*)     All other components within normal limits   SALICYLATE   ETHYL ALCOHOL   BILIRUBIN, CONFIRM   MAGNESIUM   PHOSPHORUS   MAGNESIUM   PHOSPHORUS     No results found for: DS35, PHEN, PHENO, PHENT, DILF, DS39, PHENY, PTN, VALF2, VALAC, VALP, VALPR, DS6, CRBAM, CRBAMP, CARB2, XCRBAM  Admission on 06/05/2020   Component Date Value Ref Range Status    Special Requests: 06/05/2020 NO SPECIAL REQUESTS    Preliminary    Culture result: 06/05/2020 NO GROWTH AFTER 3 HOURS    Preliminary    WBC 06/05/2020 13.0* 4.1 - 11.1 K/uL Final    RBC 06/05/2020 4.16  4.10 - 5.70 M/uL Final    HGB 06/05/2020 12.9  12.1 - 17.0 g/dL Final    HCT 06/05/2020 37.7  36.6 - 50.3 % Final    MCV 06/05/2020 90.6  80.0 - 99.0 FL Final    MCH 06/05/2020 31.0  26.0 - 34.0 PG Final    MCHC 06/05/2020 34.2  30.0 - 36.5 g/dL Final    RDW 06/05/2020 13.1  11.5 - 14.5 % Final    PLATELET 54/59/1385 538* 150 - 400 K/uL Final    MPV 06/05/2020 10.2  8.9 - 12.9 FL Final    NRBC 06/05/2020 0.0  0  WBC Final    ABSOLUTE NRBC 06/05/2020 0.00  0.00 - 0.01 K/uL Final    Sodium 06/05/2020 141  136 - 145 mmol/L Final    Potassium 06/05/2020 3.0* 3.5 - 5.1 mmol/L Final    Chloride 06/05/2020 112* 97 - 108 mmol/L Final    CO2 06/05/2020 19* 21 - 32 mmol/L Final    Anion gap 06/05/2020 10  5 - 15 mmol/L Final    Glucose 06/05/2020 78  65 - 100 mg/dL Final    BUN 06/05/2020 26* 6 - 20 MG/DL Final    Creatinine 06/05/2020 0.88  0.70 - 1.30 MG/DL Final    BUN/Creatinine ratio 06/05/2020 30* 12 - 20   Final    GFR est AA 06/05/2020 >60  >60 ml/min/1.73m2 Final    GFR est non-AA 06/05/2020 >60  >60 ml/min/1.73m2 Final    Calcium 06/05/2020 6.9* 8.5 - 10.1 MG/DL Final    Bilirubin, total 06/05/2020 0.2  0.2 - 1.0 MG/DL Final    ALT (SGPT) 06/05/2020 14  12 - 78 U/L Final    AST (SGOT) 06/05/2020 9* 15 - 37 U/L Final    Alk.  phosphatase 06/05/2020 68  45 - 117 U/L Final    Protein, total 06/05/2020 4.9* 6.4 - 8.2 g/dL Final    Albumin 06/05/2020 2.3* 3.5 - 5.0 g/dL Final    Globulin 06/05/2020 2.6  2.0 - 4.0 g/dL Final    A-G Ratio 06/05/2020 0.9* 1.1 - 2.2   Final    Lactic acid 06/05/2020 1.0  0.4 - 2.0 MMOL/L Final   Admission on 05/26/2020, Discharged on 06/05/2020   Component Date Value Ref Range Status    WBC 05/26/2020 12.9* 4.1 - 11.1 K/uL Final    RBC 05/26/2020 3.92* 4.10 - 5.70 M/uL Final    HGB 05/26/2020 12.0* 12.1 - 17.0 g/dL Final    HCT 05/26/2020 35.5* 36.6 - 50.3 % Final    MCV 05/26/2020 90.6  80.0 - 99.0 FL Final    MCH 05/26/2020 30.6  26.0 - 34.0 PG Final    MCHC 05/26/2020 33.8  30.0 - 36.5 g/dL Final    RDW 05/26/2020 13.0  11.5 - 14.5 % Final    PLATELET 73/88/0427 013  150 - 400 K/uL Final    MPV 05/26/2020 10.2  8.9 - 12.9 FL Final    NRBC 05/26/2020 0.0  0  WBC Final    ABSOLUTE NRBC 05/26/2020 0.00  0.00 - 0.01 K/uL Final    NEUTROPHILS 05/26/2020 58  32 - 75 % Final    LYMPHOCYTES 05/26/2020 28  12 - 49 % Final    MONOCYTES 05/26/2020 10  5 - 13 % Final    EOSINOPHILS 05/26/2020 3  0 - 7 % Final    BASOPHILS 05/26/2020 1  0 - 1 % Final    IMMATURE GRANULOCYTES 05/26/2020 0  0.0 - 0.5 % Final    ABS. NEUTROPHILS 05/26/2020 7.4  1.8 - 8.0 K/UL Final    ABS. LYMPHOCYTES 05/26/2020 3.7* 0.8 - 3.5 K/UL Final    ABS. MONOCYTES 05/26/2020 1.3* 0.0 - 1.0 K/UL Final    ABS. EOSINOPHILS 05/26/2020 0.4  0.0 - 0.4 K/UL Final    ABS. BASOPHILS 05/26/2020 0.1  0.0 - 0.1 K/UL Final    ABS. IMM. GRANS. 05/26/2020 0.0  0.00 - 0.04 K/UL Final    DF 05/26/2020 AUTOMATED    Final    Sodium 05/26/2020 141  136 - 145 mmol/L Final    Potassium 05/26/2020 3.3* 3.5 - 5.1 mmol/L Final    Chloride 05/26/2020 106  97 - 108 mmol/L Final    CO2 05/26/2020 27  21 - 32 mmol/L Final    Anion gap 05/26/2020 8  5 - 15 mmol/L Final    Glucose 05/26/2020 122* 65 - 100 mg/dL Final    BUN 05/26/2020 17  6 - 20 MG/DL Final    Creatinine 05/26/2020 1.09  0.70 - 1.30 MG/DL Final    BUN/Creatinine ratio 05/26/2020 16  12 - 20   Final    GFR est AA 05/26/2020 >60  >60 ml/min/1.73m2 Final    GFR est non-AA 05/26/2020 >60  >60 ml/min/1.73m2 Final    Calcium 05/26/2020 8.1* 8.5 - 10.1 MG/DL Final    Bilirubin, total 05/26/2020 0.2  0.2 - 1.0 MG/DL Final    ALT (SGPT) 05/26/2020 18  12 - 78 U/L Final    AST (SGOT) 05/26/2020 17  15 - 37 U/L Final    Alk.  phosphatase 05/26/2020 88  45 - 117 U/L Final    Protein, total 05/26/2020 6.2* 6.4 - 8.2 g/dL Final    Albumin 05/26/2020 2.8* 3.5 - 5.0 g/dL Final    Globulin 05/26/2020 3.4  2.0 - 4.0 g/dL Final    A-G Ratio 05/26/2020 0.8* 1.1 - 2.2   Final    Acetaminophen level 05/26/2020 2* 10 - 30 ug/mL Final    Salicylate level 11/24/2192 3.2  2.8 - 20.0 MG/DL Final    ALCOHOL(ETHYL),SERUM 05/26/2020 <10  <10 MG/DL Final    AMPHETAMINES 05/26/2020 Negative  NEG   Final    BARBITURATES 05/26/2020 Negative  NEG   Final    BENZODIAZEPINES 05/26/2020 Negative  NEG   Final    COCAINE 05/26/2020 Negative  NEG   Final    METHADONE 05/26/2020 Negative  NEG   Final    OPIATES 05/26/2020 Negative  NEG   Final    PCP(PHENCYCLIDINE) 05/26/2020 Negative  NEG   Final    THC (TH-CANNABINOL) 05/26/2020 Positive* NEG   Final    Drug screen comment 05/26/2020 (NOTE)   Final    Color 05/26/2020 YELLOW/STRAW    Final    Appearance 05/26/2020 CLOUDY* CLEAR   Final    Specific gravity 05/26/2020 1.025  1.003 - 1.030   Final    pH (UA) 05/26/2020 6.0  5.0 - 8.0   Final    Protein 05/26/2020 >300* NEG mg/dL Final    Glucose 05/26/2020 Negative  NEG mg/dL Final    Ketone 05/26/2020 TRACE* NEG mg/dL Final    Blood 05/26/2020 LARGE* NEG   Final    Urobilinogen 05/26/2020 1.0  0.2 - 1.0 EU/dL Final    Nitrites 05/26/2020 Negative  NEG   Final    Leukocyte Esterase 05/26/2020 Negative  NEG   Final    WBC 05/26/2020 0-4  0 - 4 /hpf Final    RBC 05/26/2020 10-20  0 - 5 /hpf Final    Epithelial cells 05/26/2020 FEW  FEW /lpf Final    Bacteria 05/26/2020 Negative  NEG /hpf Final    UA:UC IF INDICATED 05/26/2020 CULTURE NOT INDICATED BY UA RESULT  CNI   Final    Bilirubin UA, confirm 05/26/2020 Negative  NEG   Final    WBC 06/01/2020 18.2* 4.1 - 11.1 K/uL Final    RBC 06/01/2020 4.27  4. 10 - 5.70 M/uL Final    HGB 06/01/2020 13.0  12.1 - 17.0 g/dL Final    HCT 06/01/2020 40.0  36.6 - 50.3 % Final    MCV 06/01/2020 93.7  80.0 - 99.0 FL Final    MCH 06/01/2020 30.4  26.0 - 34.0 PG Final    MCHC 06/01/2020 32.5  30.0 - 36.5 g/dL Final    RDW 06/01/2020 13.2  11.5 - 14.5 % Final    PLATELET 99/67/5307 591  150 - 400 K/uL Final    MPV 06/01/2020 11.3  8.9 - 12.9 FL Final    NRBC 06/01/2020 0.0  0  WBC Final    ABSOLUTE NRBC 06/01/2020 0.00  0.00 - 0.01 K/uL Final    NEUTROPHILS 06/01/2020 76* 32 - 75 % Final    LYMPHOCYTES 06/01/2020 14  12 - 49 % Final    MONOCYTES 06/01/2020 6  5 - 13 % Final    EOSINOPHILS 06/01/2020 3  0 - 7 % Final    BASOPHILS 06/01/2020 1  0 - 1 % Final    IMMATURE GRANULOCYTES 06/01/2020 0  0.0 - 0.5 % Final    ABS. NEUTROPHILS 06/01/2020 13.9* 1.8 - 8.0 K/UL Final    ABS. LYMPHOCYTES 06/01/2020 2.6  0.8 - 3.5 K/UL Final    ABS. MONOCYTES 06/01/2020 1.1* 0.0 - 1.0 K/UL Final    ABS. EOSINOPHILS 06/01/2020 0.5* 0.0 - 0.4 K/UL Final    ABS. BASOPHILS 06/01/2020 0.1  0.0 - 0.1 K/UL Final    ABS. IMM. GRANS. 06/01/2020 0.1* 0.00 - 0.04 K/UL Final    DF 06/01/2020 AUTOMATED    Final    Sodium 06/01/2020 137  136 - 145 mmol/L Final    Potassium 06/01/2020 4.6  3.5 - 5.1 mmol/L Final    Chloride 06/01/2020 104  97 - 108 mmol/L Final    CO2 06/01/2020 24  21 - 32 mmol/L Final    Anion gap 06/01/2020 9  5 - 15 mmol/L Final    Glucose 06/01/2020 98  65 - 100 mg/dL Final    BUN 06/01/2020 31* 6 - 20 MG/DL Final    Creatinine 06/01/2020 1.21  0.70 - 1.30 MG/DL Final    BUN/Creatinine ratio 06/01/2020 26* 12 - 20   Final    GFR est AA 06/01/2020 >60  >60 ml/min/1.73m2 Final    GFR est non-AA 06/01/2020 >60  >60 ml/min/1.73m2 Final    Calcium 06/01/2020 8.8  8.5 - 10.1 MG/DL Final    Magnesium 06/01/2020 2.0  1.6 - 2.4 mg/dL Final    Phosphorus 06/01/2020 4.7  2.6 - 4.7 MG/DL Final    WBC 06/02/2020 14.5* 4.1 - 11.1 K/uL Final    RBC 06/02/2020 4.39  4. 10 - 5.70 M/uL Final    HGB 06/02/2020 13.3  12.1 - 17.0 g/dL Final    HCT 06/02/2020 41.1  36.6 - 50.3 % Final    MCV 06/02/2020 93.6  80.0 - 99.0 FL Final    MCH 06/02/2020 30.3  26.0 - 34.0 PG Final    MCHC 06/02/2020 32.4  30.0 - 36.5 g/dL Final    RDW 06/02/2020 13.1  11.5 - 14.5 % Final    PLATELET 25/78/3483 765  150 - 400 K/uL Final    MPV 06/02/2020 10.3  8.9 - 12.9 FL Final    NRBC 06/02/2020 0.0  0  WBC Final    ABSOLUTE NRBC 06/02/2020 0.00  0.00 - 0.01 K/uL Final    NEUTROPHILS 06/02/2020 57  32 - 75 % Final    LYMPHOCYTES 06/02/2020 31  12 - 49 % Final    MONOCYTES 06/02/2020 7  5 - 13 % Final    EOSINOPHILS 06/02/2020 4  0 - 7 % Final    BASOPHILS 06/02/2020 1  0 - 1 % Final    IMMATURE GRANULOCYTES 06/02/2020 0  0.0 - 0.5 % Final    ABS. NEUTROPHILS 06/02/2020 8.2* 1.8 - 8.0 K/UL Final    ABS. LYMPHOCYTES 06/02/2020 4.5* 0.8 - 3.5 K/UL Final    ABS. MONOCYTES 06/02/2020 1.0  0.0 - 1.0 K/UL Final    ABS. EOSINOPHILS 06/02/2020 0.6* 0.0 - 0.4 K/UL Final    ABS. BASOPHILS 06/02/2020 0.1  0.0 - 0.1 K/UL Final    ABS. IMM.  GRANS. 06/02/2020 0.1* 0.00 - 0.04 K/UL Final    DF 06/02/2020 AUTOMATED    Final    Sodium 06/02/2020 137  136 - 145 mmol/L Final    Potassium 06/02/2020 4.4  3.5 - 5.1 mmol/L Final    Chloride 06/02/2020 107  97 - 108 mmol/L Final    CO2 06/02/2020 20* 21 - 32 mmol/L Final    Anion gap 06/02/2020 10  5 - 15 mmol/L Final    Glucose 06/02/2020 89  65 - 100 mg/dL Final    BUN 06/02/2020 36* 6 - 20 MG/DL Final    Creatinine 06/02/2020 1.21  0.70 - 1.30 MG/DL Final    BUN/Creatinine ratio 06/02/2020 30* 12 - 20   Final    GFR est AA 06/02/2020 >60  >60 ml/min/1.73m2 Final    GFR est non-AA 06/02/2020 >60  >60 ml/min/1.73m2 Final    Calcium 06/02/2020 8.6  8.5 - 10.1 MG/DL Final    Magnesium 06/02/2020 2.1  1.6 - 2.4 mg/dL Final    Phosphorus 06/02/2020 4.7  2.6 - 4.7 MG/DL Final    WBC 06/03/2020 13.0* 4.1 - 11.1 K/uL Final    RBC 06/03/2020 4.81  4.10 - 5.70 M/uL Final    HGB 06/03/2020 14.8  12.1 - 17.0 g/dL Final    HCT 06/03/2020 44.4  36.6 - 50.3 % Final    MCV 06/03/2020 92.3  80.0 - 99.0 FL Final    MCH 06/03/2020 30.8  26.0 - 34.0 PG Final    MCHC 06/03/2020 33.3  30.0 - 36.5 g/dL Final    RDW 06/03/2020 13.0  11.5 - 14.5 % Final    PLATELET 25/72/0908 630* 150 - 400 K/uL Final    MPV 06/03/2020 10.2  8.9 - 12.9 FL Final    NRBC 06/03/2020 0.0  0  WBC Final    ABSOLUTE NRBC 06/03/2020 0.00 0.00 - 0.01 K/uL Final    NEUTROPHILS 06/03/2020 63  32 - 75 % Final    LYMPHOCYTES 06/03/2020 27  12 - 49 % Final    MONOCYTES 06/03/2020 5  5 - 13 % Final    EOSINOPHILS 06/03/2020 3  0 - 7 % Final    BASOPHILS 06/03/2020 1  0 - 1 % Final    IMMATURE GRANULOCYTES 06/03/2020 1* 0.0 - 0.5 % Final    ABS. NEUTROPHILS 06/03/2020 8.2* 1.8 - 8.0 K/UL Final    ABS. LYMPHOCYTES 06/03/2020 3.5  0.8 - 3.5 K/UL Final    ABS. MONOCYTES 06/03/2020 0.7  0.0 - 1.0 K/UL Final    ABS. EOSINOPHILS 06/03/2020 0.4  0.0 - 0.4 K/UL Final    ABS. BASOPHILS 06/03/2020 0.2* 0.0 - 0.1 K/UL Final    ABS. IMM. GRANS. 06/03/2020 0.1* 0.00 - 0.04 K/UL Final    DF 06/03/2020 AUTOMATED    Final    WBC 06/04/2020 13.1* 4.1 - 11.1 K/uL Final    RBC 06/04/2020 4.38  4.10 - 5.70 M/uL Final    HGB 06/04/2020 13.3  12.1 - 17.0 g/dL Final    HCT 06/04/2020 40.2  36.6 - 50.3 % Final    MCV 06/04/2020 91.8  80.0 - 99.0 FL Final    MCH 06/04/2020 30.4  26.0 - 34.0 PG Final    MCHC 06/04/2020 33.1  30.0 - 36.5 g/dL Final    RDW 06/04/2020 13.1  11.5 - 14.5 % Final    PLATELET 82/60/7695 687* 150 - 400 K/uL Final    MPV 06/04/2020 10.3  8.9 - 12.9 FL Final    NRBC 06/04/2020 0.0  0  WBC Final    ABSOLUTE NRBC 06/04/2020 0.00  0.00 - 0.01 K/uL Final     Xr Foot Lt Min 3 V    Result Date: 5/28/2020  EXAM: XR FOOT LT MIN 3 V INDICATION: swelling, pain, evaluate for foreign body. Patient describes object lodged in his foot, history somewhat unclear. Site of object not specified. COMPARISON: None. FINDINGS: Three views of the left foot demonstrate no fracture or other acute osseous or articular abnormality. The soft tissues are within normal limits. IMPRESSION: No acute abnormality. Xr Abd Port  1 V    Result Date: 5/26/2020  EXAM: XR ABD PORT  1 V INDICATION: ? of patient ingesting rocks COMPARISON: 12.2.2018. FINDINGS: A supine radiograph of the abdomen shows a nonspecific abdominal gas pattern.  There are 2 radiopaque structures in the right lower quadrant that individually measure 16 and 11 mm in size. In addition, in the distal transverse colon, there is a 12 x 3 mm calcification. IMPRESSION: Radiopaque structures in the right lower quadrant and transverse colon could be compatible with ingested rocks. No plain film evidence for obstruction                   PLAN:      - DISCHARGE to medical unit  - INCREASE Clozaril to 50 mg QDAY and 300 mg QHS for treatment resistant schizophrenia  - CONTINUE Pericolace 2 tablet QDAY for clozapine bowel regimen  - CONTINUE Propranolol 20 mg BID for iatrongenic tachycardia  - Foot abscess tx per IM, Gen Surg  - Psychiatry will follow  - Disposition to home or back to psychiatric unit when medically stable              A coordinated, multidisplinary treatment team round was conducted with Mainor Helms is done daily here at [facility]. This team consists of the nurse, psychiatric unit pharmacist,  and [address] [place] [address]. I have spent greater than 35 minutes on discharge work.     Signed:  Aleja Arrington MD  6/5/2020

## 2020-06-05 NOTE — PROCEDURES
Procedure Note - Incision and Drainage:   Performed by Biakna Barth MD .     Consent was obtained. Immediately prior to the procedure, the patient was reevaluated and found suitable for the planned procedure and any planned medications. Immediately prior to the procedure a time out was called to verify the correct patient, procedure, equipment, staff, and marking as appropriate. The site prepped with ChloraPrep and Sterile draping. Anesthesia was obtained via local infiltration with 1% lidocaine and with epinephrine. A 1 cm incision was made using a #11 scalpel blade to incise the abscess cavity. Small amount of purulent drainage - 5 cc was expressed. Wound irrigated with Saline and covered with Gauze. The procedure was tolerated well. Procedure Note - Procedure Time  My total time at bedside, performing this procedure was 30 minutes.

## 2020-06-06 ENCOUNTER — HOSPITAL ENCOUNTER (INPATIENT)
Age: 45
LOS: 5 days | Discharge: HOME | DRG: 885 | End: 2020-06-11
Attending: PSYCHIATRY & NEUROLOGY | Admitting: PSYCHIATRY & NEUROLOGY
Payer: MEDICARE

## 2020-06-06 VITALS
SYSTOLIC BLOOD PRESSURE: 156 MMHG | OXYGEN SATURATION: 96 % | HEIGHT: 69 IN | WEIGHT: 149.91 LBS | RESPIRATION RATE: 20 BRPM | TEMPERATURE: 99 F | DIASTOLIC BLOOD PRESSURE: 80 MMHG | BODY MASS INDEX: 22.2 KG/M2 | HEART RATE: 107 BPM

## 2020-06-06 LAB
ANION GAP SERPL CALC-SCNC: 5 MMOL/L (ref 5–15)
BASOPHILS # BLD: 0.2 K/UL (ref 0–0.1)
BASOPHILS NFR BLD: 1 % (ref 0–1)
BUN SERPL-MCNC: 26 MG/DL (ref 6–20)
BUN/CREAT SERPL: 25 (ref 12–20)
CALCIUM SERPL-MCNC: 8.5 MG/DL (ref 8.5–10.1)
CHLORIDE SERPL-SCNC: 105 MMOL/L (ref 97–108)
CO2 SERPL-SCNC: 27 MMOL/L (ref 21–32)
CREAT SERPL-MCNC: 1.03 MG/DL (ref 0.7–1.3)
DIFFERENTIAL METHOD BLD: ABNORMAL
EOSINOPHIL # BLD: 0.5 K/UL (ref 0–0.4)
EOSINOPHIL NFR BLD: 5 % (ref 0–7)
ERYTHROCYTE [DISTWIDTH] IN BLOOD BY AUTOMATED COUNT: 13.2 % (ref 11.5–14.5)
GLUCOSE SERPL-MCNC: 92 MG/DL (ref 65–100)
HCT VFR BLD AUTO: 38.8 % (ref 36.6–50.3)
HGB BLD-MCNC: 13.1 G/DL (ref 12.1–17)
IMM GRANULOCYTES # BLD AUTO: 0 K/UL (ref 0–0.04)
IMM GRANULOCYTES NFR BLD AUTO: 0 % (ref 0–0.5)
LYMPHOCYTES # BLD: 5.2 K/UL (ref 0.8–3.5)
LYMPHOCYTES NFR BLD: 45 % (ref 12–49)
MAGNESIUM SERPL-MCNC: 1.9 MG/DL (ref 1.6–2.4)
MCH RBC QN AUTO: 30.5 PG (ref 26–34)
MCHC RBC AUTO-ENTMCNC: 33.8 G/DL (ref 30–36.5)
MCV RBC AUTO: 90.2 FL (ref 80–99)
MONOCYTES # BLD: 0.9 K/UL (ref 0–1)
MONOCYTES NFR BLD: 7 % (ref 5–13)
NEUTS SEG # BLD: 4.8 K/UL (ref 1.8–8)
NEUTS SEG NFR BLD: 42 % (ref 32–75)
NRBC # BLD: 0 K/UL (ref 0–0.01)
NRBC BLD-RTO: 0 PER 100 WBC
PHOSPHATE SERPL-MCNC: 2.8 MG/DL (ref 2.6–4.7)
PLATELET # BLD AUTO: 375 K/UL (ref 150–400)
PMV BLD AUTO: 11.1 FL (ref 8.9–12.9)
POTASSIUM SERPL-SCNC: 4 MMOL/L (ref 3.5–5.1)
RBC # BLD AUTO: 4.3 M/UL (ref 4.1–5.7)
SODIUM SERPL-SCNC: 137 MMOL/L (ref 136–145)
WBC # BLD AUTO: 11.6 K/UL (ref 4.1–11.1)

## 2020-06-06 PROCEDURE — 90715 TDAP VACCINE 7 YRS/> IM: CPT | Performed by: STUDENT IN AN ORGANIZED HEALTH CARE EDUCATION/TRAINING PROGRAM

## 2020-06-06 PROCEDURE — 99218 HC RM OBSERVATION: CPT

## 2020-06-06 PROCEDURE — 65220000003 HC RM SEMIPRIVATE PSYCH

## 2020-06-06 PROCEDURE — 74011250636 HC RX REV CODE- 250/636: Performed by: HOSPITALIST

## 2020-06-06 PROCEDURE — 80048 BASIC METABOLIC PNL TOTAL CA: CPT

## 2020-06-06 PROCEDURE — 85025 COMPLETE CBC W/AUTO DIFF WBC: CPT

## 2020-06-06 PROCEDURE — 36415 COLL VENOUS BLD VENIPUNCTURE: CPT

## 2020-06-06 PROCEDURE — 74011250636 HC RX REV CODE- 250/636: Performed by: STUDENT IN AN ORGANIZED HEALTH CARE EDUCATION/TRAINING PROGRAM

## 2020-06-06 PROCEDURE — 90471 IMMUNIZATION ADMIN: CPT

## 2020-06-06 PROCEDURE — 83735 ASSAY OF MAGNESIUM: CPT

## 2020-06-06 PROCEDURE — 74011250637 HC RX REV CODE- 250/637: Performed by: PSYCHIATRY & NEUROLOGY

## 2020-06-06 PROCEDURE — 96376 TX/PRO/DX INJ SAME DRUG ADON: CPT

## 2020-06-06 PROCEDURE — 74011000258 HC RX REV CODE- 258: Performed by: STUDENT IN AN ORGANIZED HEALTH CARE EDUCATION/TRAINING PROGRAM

## 2020-06-06 PROCEDURE — 74011250637 HC RX REV CODE- 250/637: Performed by: STUDENT IN AN ORGANIZED HEALTH CARE EDUCATION/TRAINING PROGRAM

## 2020-06-06 PROCEDURE — 84100 ASSAY OF PHOSPHORUS: CPT

## 2020-06-06 PROCEDURE — 74011250637 HC RX REV CODE- 250/637: Performed by: NURSE PRACTITIONER

## 2020-06-06 RX ORDER — TRAZODONE HYDROCHLORIDE 50 MG/1
50 TABLET ORAL
Status: CANCELLED | OUTPATIENT
Start: 2020-06-06

## 2020-06-06 RX ORDER — IBUPROFEN 200 MG
1 TABLET ORAL DAILY
Status: DISCONTINUED | OUTPATIENT
Start: 2020-06-07 | End: 2020-06-11 | Stop reason: HOSPADM

## 2020-06-06 RX ORDER — DIPHENHYDRAMINE HYDROCHLORIDE 50 MG/ML
50 INJECTION, SOLUTION INTRAMUSCULAR; INTRAVENOUS
Status: CANCELLED | OUTPATIENT
Start: 2020-06-06

## 2020-06-06 RX ORDER — MONTELUKAST SODIUM 10 MG/1
10 TABLET ORAL
Status: CANCELLED | OUTPATIENT
Start: 2020-06-06

## 2020-06-06 RX ORDER — BENZTROPINE MESYLATE 1 MG/1
1 TABLET ORAL
Status: DISCONTINUED | OUTPATIENT
Start: 2020-06-06 | End: 2020-06-11 | Stop reason: HOSPADM

## 2020-06-06 RX ORDER — PROPRANOLOL HYDROCHLORIDE 10 MG/1
20 TABLET ORAL 2 TIMES DAILY
Status: DISCONTINUED | OUTPATIENT
Start: 2020-06-07 | End: 2020-06-11 | Stop reason: HOSPADM

## 2020-06-06 RX ORDER — DIPHENHYDRAMINE HYDROCHLORIDE 50 MG/ML
50 INJECTION, SOLUTION INTRAMUSCULAR; INTRAVENOUS
Status: DISCONTINUED | OUTPATIENT
Start: 2020-06-06 | End: 2020-06-11 | Stop reason: HOSPADM

## 2020-06-06 RX ORDER — ENOXAPARIN SODIUM 100 MG/ML
40 INJECTION SUBCUTANEOUS EVERY 24 HOURS
Status: CANCELLED | OUTPATIENT
Start: 2020-06-06

## 2020-06-06 RX ORDER — CHLORPROMAZINE HYDROCHLORIDE 25 MG/ML
50 INJECTION INTRAMUSCULAR
Status: DISCONTINUED | OUTPATIENT
Start: 2020-06-06 | End: 2020-06-11 | Stop reason: HOSPADM

## 2020-06-06 RX ORDER — ACETAMINOPHEN 325 MG/1
650 TABLET ORAL
Status: CANCELLED | OUTPATIENT
Start: 2020-06-06

## 2020-06-06 RX ORDER — CLOZAPINE 100 MG/1
300 TABLET ORAL
Status: DISCONTINUED | OUTPATIENT
Start: 2020-06-06 | End: 2020-06-11 | Stop reason: HOSPADM

## 2020-06-06 RX ORDER — IBUPROFEN 200 MG
1 TABLET ORAL DAILY
Status: DISCONTINUED | OUTPATIENT
Start: 2020-06-07 | End: 2020-06-06 | Stop reason: SDUPTHER

## 2020-06-06 RX ORDER — AMOXICILLIN 250 MG
1 CAPSULE ORAL DAILY
Status: DISCONTINUED | OUTPATIENT
Start: 2020-06-07 | End: 2020-06-08

## 2020-06-06 RX ORDER — ACETAMINOPHEN 325 MG/1
650 TABLET ORAL
Status: DISCONTINUED | OUTPATIENT
Start: 2020-06-06 | End: 2020-06-11 | Stop reason: HOSPADM

## 2020-06-06 RX ORDER — PROPRANOLOL HYDROCHLORIDE 10 MG/1
20 TABLET ORAL 2 TIMES DAILY
Status: CANCELLED | OUTPATIENT
Start: 2020-06-06

## 2020-06-06 RX ORDER — OLANZAPINE 5 MG/1
5 TABLET ORAL
Status: DISCONTINUED | OUTPATIENT
Start: 2020-06-06 | End: 2020-06-11 | Stop reason: HOSPADM

## 2020-06-06 RX ORDER — ADHESIVE BANDAGE
30 BANDAGE TOPICAL DAILY PRN
Status: DISCONTINUED | OUTPATIENT
Start: 2020-06-06 | End: 2020-06-11 | Stop reason: HOSPADM

## 2020-06-06 RX ORDER — HYDROXYZINE 25 MG/1
50 TABLET, FILM COATED ORAL
Status: DISCONTINUED | OUTPATIENT
Start: 2020-06-06 | End: 2020-06-11 | Stop reason: HOSPADM

## 2020-06-06 RX ORDER — TRAZODONE HYDROCHLORIDE 50 MG/1
50 TABLET ORAL
Status: DISCONTINUED | OUTPATIENT
Start: 2020-06-06 | End: 2020-06-11 | Stop reason: HOSPADM

## 2020-06-06 RX ORDER — LORAZEPAM 2 MG/ML
1 INJECTION INTRAMUSCULAR
Status: DISCONTINUED | OUTPATIENT
Start: 2020-06-06 | End: 2020-06-11 | Stop reason: HOSPADM

## 2020-06-06 RX ORDER — AMOXICILLIN 250 MG
2 CAPSULE ORAL DAILY
Status: CANCELLED | OUTPATIENT
Start: 2020-06-07

## 2020-06-06 RX ORDER — LORAZEPAM 2 MG/ML
1 INJECTION INTRAMUSCULAR
Status: CANCELLED | OUTPATIENT
Start: 2020-06-06

## 2020-06-06 RX ORDER — DOXYCYCLINE HYCLATE 100 MG
100 TABLET ORAL EVERY 12 HOURS
Status: CANCELLED | OUTPATIENT
Start: 2020-06-06

## 2020-06-06 RX ORDER — CALCIUM CARBONATE 500(1250)
500 TABLET ORAL
Status: CANCELLED | OUTPATIENT
Start: 2020-06-06

## 2020-06-06 RX ORDER — CLOZAPINE 100 MG/1
300 TABLET ORAL
Status: CANCELLED | OUTPATIENT
Start: 2020-06-06

## 2020-06-06 RX ORDER — CHLORPROMAZINE HYDROCHLORIDE 50 MG/1
50 TABLET, FILM COATED ORAL
Status: DISCONTINUED | OUTPATIENT
Start: 2020-06-06 | End: 2020-06-07

## 2020-06-06 RX ORDER — CLOZAPINE 25 MG/1
50 TABLET ORAL DAILY
Status: CANCELLED | OUTPATIENT
Start: 2020-06-07

## 2020-06-06 RX ORDER — CLOZAPINE 25 MG/1
50 TABLET ORAL
Status: DISCONTINUED | OUTPATIENT
Start: 2020-06-07 | End: 2020-06-07

## 2020-06-06 RX ORDER — CHLORPROMAZINE HYDROCHLORIDE 25 MG/ML
25 INJECTION INTRAMUSCULAR
Status: CANCELLED | OUTPATIENT
Start: 2020-06-06

## 2020-06-06 RX ORDER — CLOZAPINE 100 MG/1
100 TABLET ORAL DAILY
Status: CANCELLED | OUTPATIENT
Start: 2020-06-07

## 2020-06-06 RX ORDER — HYDROXYZINE 25 MG/1
50 TABLET, FILM COATED ORAL
Status: CANCELLED | OUTPATIENT
Start: 2020-06-06

## 2020-06-06 RX ORDER — CEFDINIR 300 MG/1
300 CAPSULE ORAL EVERY 12 HOURS
Status: CANCELLED | OUTPATIENT
Start: 2020-06-06

## 2020-06-06 RX ORDER — OLANZAPINE 5 MG/1
5 TABLET ORAL
Status: CANCELLED | OUTPATIENT
Start: 2020-06-06

## 2020-06-06 RX ORDER — BENZTROPINE MESYLATE 1 MG/1
1 TABLET ORAL
Status: CANCELLED | OUTPATIENT
Start: 2020-06-06

## 2020-06-06 RX ORDER — IBUPROFEN 200 MG
1 TABLET ORAL DAILY
Status: CANCELLED | OUTPATIENT
Start: 2020-06-07

## 2020-06-06 RX ADMIN — TETANUS TOXOID, REDUCED DIPHTHERIA TOXOID AND ACELLULAR PERTUSSIS VACCINE, ADSORBED 0.5 ML: 5; 2.5; 8; 8; 2.5 SUSPENSION INTRAMUSCULAR at 11:28

## 2020-06-06 RX ADMIN — SENNOSIDES AND DOCUSATE SODIUM 2 TABLET: 8.6; 5 TABLET ORAL at 09:24

## 2020-06-06 RX ADMIN — CLOZAPINE 300 MG: 100 TABLET ORAL at 21:41

## 2020-06-06 RX ADMIN — VANCOMYCIN HYDROCHLORIDE 1000 MG: 1 INJECTION, POWDER, LYOPHILIZED, FOR SOLUTION INTRAVENOUS at 05:51

## 2020-06-06 RX ADMIN — CEFTRIAXONE SODIUM 1 G: 1 INJECTION, POWDER, FOR SOLUTION INTRAMUSCULAR; INTRAVENOUS at 11:31

## 2020-06-06 RX ADMIN — PROPRANOLOL HYDROCHLORIDE 20 MG: 10 TABLET ORAL at 09:24

## 2020-06-06 RX ADMIN — CLOZAPINE 50 MG: 25 TABLET ORAL at 09:25

## 2020-06-06 RX ADMIN — CALCIUM 500 MG: 500 TABLET ORAL at 12:57

## 2020-06-06 RX ADMIN — PROPRANOLOL HYDROCHLORIDE 20 MG: 10 TABLET ORAL at 17:50

## 2020-06-06 RX ADMIN — CALCIUM 500 MG: 500 TABLET ORAL at 09:24

## 2020-06-06 RX ADMIN — CALCIUM 500 MG: 500 TABLET ORAL at 17:50

## 2020-06-06 NOTE — PROGRESS NOTES
Bedside shift change report given to HEIDY Wooten (oncoming nurse) by German Feldman (offgoing nurse). Report included the following information SBAR, Kardex, Intake/Output, MAR and Recent Results.

## 2020-06-06 NOTE — PROGRESS NOTES
Patient discharge and admitted to [facility] floor. SW on [address] [place] [address] to follow-up with patient for discharge planning.     [address] [place] [address]  [phone]

## 2020-06-06 NOTE — DISCHARGE SUMMARY
Hospitalist Discharge Summary     Patient ID:  Domo Alcantar  [id number]  50 y.o.  1975    PCP on record: None    Admit date: 6/5/2020  Discharge date and time: 6/6/2020      Admission Diagnoses: Abscess of foot [L02.619]    Discharge Diagnoses: Active Problems:    Abscess of foot (6/5/2020)           Hospital Course:   Cellulitis and abscess, left foot POA  Incision and Drainage of abscess of left foot    Leukocytosis   Continues to have cellulitis despite IV Abx. Superficial abscess noted on lateral border of left foot. Received 1 day of Vancomycin/Ceftriaxone, I&D performed at bedside by this writer and General Surgeon Dr. Bethany Sommer with no post op complications. Wound care with dry dressing. Complete course of Oral Doxycycline and Cefdinir. TDaP administered prior to transfer to [facility].           Hypokalemia POA, resolved   EKG ordered and reviewed - normal sinus rhythm, narrow QRS, No ST-T changes, normal NC, QRS,QTc. Resolved with Oral supplementation           Hypocalcemia POA resolved  Corrected calcium 8.3. Administered oral supplementation.         Schizophrenia :   Continue psychotropic medications he was receiving on behavioral health unit  Clozapine, PRN Thorazine,hydroxyzine. Transfer to Behavioral Health Unit.      Tobacco dependence  nicotine patch         CONSULTATIONS:  IP CONSULT TO GENERAL SURGERY  IP CONSULT TO GENERAL SURGERY  IP CONSULT TO PSYCHIATRY    Excerpted HPI from H&P of Caroline Gurrola MD:  50 y.o.   male with PMH of copd, HTN, Depression, anxiety who was admitted to behavioral health unit on 5/26 with psychosis and delusions. He was found to living in a tent in the woods and was found to have poor self care and hygiene on presentation. Patient was noted to have left foot pain and swelling on admission, Xray foot at the time did not show any acute abnormality. Medicine was consulted for ongoing left foot pain and swelling along with leukocytosis.  Topical abx were started by psychiatry. Patient currently denies fever, chills. Denies trauma to the foot. Some superficial skin breakdown is noted on dorsum of both feet. Noted to be soaking feet in warm water bath during interview. He is able to weight bear on foot and walk with a steady gait. However, foot is tender to palpation on the left lateral border. He also noted significant swelling and redness of left foot compared to right.      He is calm and cooperative with logical thought process. Further in the interview, he is noted to be talking to himself and responding to internal stimuli. Shows his drawings to this writer and talks about the 'love of his life' Rosangela.      Patient denies any past medical history except as listed above. Denies fever,chills,chest pain, sob,abd pan,diarrhea,constipation,lighhadedness. No Hx DM. No current medical concerns at this time. ______________________________________________________________________  DISCHARGE SUMMARY/HOSPITAL COURSE:  for full details see H&P, daily progress notes, labs, consult notes. Visit Vitals  /85   Pulse 98   Temp 98 °F (36.7 °C)   Resp 20   Ht 5' 9.49\" (1.765 m)   Wt 68 kg (149 lb 14.6 oz)   SpO2 97%   BMI 21.83 kg/m²       Patient seen and examined by me on discharge day. Pertinent Findings:  Gen:    Not in distress  Chest: Clear lungs  CVS:   Regular rhythm. No edema  Abd:  Soft, not distended, not tender  Neuro:  Alert with good insight. Oriented to person, place, and time   _______________________________________________________________________  DISCHARGE MEDICATIONS:   There are no discharge medications for this patient. My Recommended Diet, Activity, Wound Care, and follow-up labs are listed in the patient's Discharge Insturctions which I have personally completed and reviewed.     _______________________________________________________________________  DISPOSITION:     Home with Family:    Home with HH/PT/OT/RN:    SNF/LTC: [facility]:    OTHER: Behavioral Health Unit       Condition at Discharge:  Stable  _______________________________________________________________________  Follow up with:   PCP : None  Follow-up Information     Follow up With Specialties Details Why Contact Info    None    None ([phone]) Patient stated that they have no PCP                Total time in minutes spent coordinating this discharge (includes going over instructions, follow-up, prescriptions, and preparing report for sign off to her PCP) :  25 minutes    Signed:  Edwige Payne MD

## 2020-06-06 NOTE — PROGRESS NOTES
Pt being discharged back to [facility], report given to Prince Ashkan MOODY, SBAR with opportunity for questions.

## 2020-06-06 NOTE — PROGRESS NOTES
Received in shift change report that patient gets agitated when he is awaken. Will cluster care and obtain vital signs, lab work and administer antibiotic at 0600.

## 2020-06-06 NOTE — CONSULTS
PSYCHIATRIC CONSULTATION                         IDENTIFICATION:    Patient Name  Reg Burnett   Date of Birth 1975   [facility] [id number]   Medical Record Number  [id number]      Age  50 y.o. PCP None   Admit date:  6/5/2020    Room Number  211/01  @ [facility]   Date of Service  6/6/2020            HISTORY         REASON FOR HOSPITALIZATION/CONSULTATION:  A psychiatric consultation was requested by Ronald Koyanagi, MD to evaluate or provide advice/opinion related to evaluating psychosis  CC: \"auditory hallucinations\"    HISTORY OF PRESENT ILLNESS:    The patient, Reg Burnett, is a 50 y.o. WHITE [place]  male with a past psychiatric history significant for schizoaffective disorder, who was admitted to the medical floor for the treatment of a foot abscess. Patient reports/evidences the following emotional symptoms:  psychosis and hallucinations. The above symptoms have been present for 2+ weeks. These symptoms are of moderate severity. The symptoms are constant in nature. Additional symptomatology include concern about health problems . The patient's condition has been precipitated by psychosocial stressors (stress of hospitalization and medical illness). Patient transferred to medical unit for treatment of abscess; prior to transfer he was started on Clozaril titrated to 300 mg QHS for treatment resistant schizophrenia. Patient seen on medical unit, he remains calm and cooperative, lying in bed, auditory hallucinations persist.     ALLERGIES:  Allergies   Allergen Reactions    Haloperidol Other (comments)      MEDICATIONS PRIOR TO ADMISSION:  No medications prior to admission.       PAST MEDICAL HISTORY:  Past Medical History:   Diagnosis Date    Back pain     Chronic bronchitis (HCC)     COPD    Elevated WBCs     H/O splenectomy     HTN (hypertension)     Ill-defined condition     constipation    Left foot pain 6/2/2020    Psychiatric disorder     depression, anxiety Past Surgical History:   Procedure Laterality Date    HX OTHER SURGICAL Right     two right wrist fractures     HX SPLENECTOMY        SOCIAL HISTORY:   Social History     Socioeconomic History    Marital status: SINGLE     Spouse name: Not on file    Number of children: Not on file    Years of education: Not on file    Highest education level: Not on file   Occupational History    Not on file   Social Needs    Financial resource strain: Not on file    Food insecurity     Worry: Not on file     Inability: Not on file    Transportation needs     Medical: Not on file     Non-medical: Not on file   Tobacco Use    Smoking status: Former Smoker    Smokeless tobacco: Never Used   Substance and Sexual Activity    Alcohol use: No    Drug use: No     Comment: \"not for years\"    Sexual activity: Not on file   Lifestyle    Physical activity     Days per week: Not on file     Minutes per session: Not on file    Stress: Not on file   Relationships    Social connections     Talks on phone: Not on file     Gets together: Not on file     Attends Yazidism service: Not on file     Active member of club or organization: Not on file     Attends meetings of clubs or organizations: Not on file     Relationship status: Not on file    Intimate partner violence     Fear of current or ex partner: Not on file     Emotionally abused: Not on file     Physically abused: Not on file     Forced sexual activity: Not on file   Other Topics Concern    Not on file   Social History Narrative    Social History:       Reviewed history from 02/03/2017 and no changes required:          Home: Lives with Ridge Palacios, his girlfriend of 2 years, in a [address] apartment with pet lizard and fish          Work: [place] 12h[place]wk [place] [employer] [place] [address] [employer]          Rastafari Preference: No          Alcohol: None, sober since 2014          Smoke: 1 PPD          Drugs: None          For fun: Fishing, crafts, pencil drawing          [facility], [facility] worker          Dr. June Mclaughlin, [employer] psychiatry                     Smoking History:          Patient currently smokes every day. Patient has been counseled to quit. FAMILY HISTORY: History reviewed. No pertinent family history. Family History   Problem Relation Age of Onset    No Known Problems Mother     No Known Problems Father     No Known Problems Brother        REVIEW of SYSTEMS:   Negative except foot pain per HPI  Pertinent items are noted in the History of Present Illness. All other Systems reviewed and are considered negative. MENTAL STATUS EXAM & VITALS       MENTAL STATUS EXAM (MSE):    MSE FINDINGS ARE WITHIN NORMAL LIMITS (WNL) UNLESS OTHERWISE STATED BELOW. Orientation oriented to time, place and person   Vital Signs (BP,Pulse, Temp) See below (reviewed 6/6/2020); vital signs are WNL if not listed below.    Gait and Station Stable, no ataxia   Abnormal Muscular Movements/Tone/Behavior No EPS, no Tardive Dyskinesia, no abnormal muscular movements; wnl tone   Relations passive   General Appearance:  bearded and disheveled   Language No aphasia or dysarthria   Speech:  normal volume and non-pressured   Thought Processes Coherent, wnl rate of thoughts, good abstract reasoning and computation   Thought Associations within normal limits   Thought Content hallucinations and preoccupations   Suicidal Ideations none   Homicidal Ideations none   Mood:  euthymic   Affect:  constricted and mood-congruent   Memory recent  adequate   Memory remote:  adequate   Concentration/Attention:  adequate   Fund of Knowledge average   Insight:  fair   Reliability fair   Judgment:  limited            VITALS:     Patient Vitals for the past 24 hrs:   Temp Pulse Resp BP SpO2   06/06/20 0800 98 °F (36.7 °C) 98 20 141/85 97 %   06/06/20 0549 97.7 °F (36.5 °C) 92 18 122/77 96 %   06/05/20 2018 99.1 °F (37.3 °C) 90 18 146/84 99 %   06/05/20 1645 97.5 °F (36.4 °C) 100 18 (!) 150/93 98 %              DATA LABORATORY DATA:  Labs Reviewed   CBC W/O DIFF - Abnormal; Notable for the following components:       Result Value    WBC 13.0 (*)     PLATELET 945 (*)     All other components within normal limits   METABOLIC PANEL, COMPREHENSIVE - Abnormal; Notable for the following components:    Potassium 3.0 (*)     Chloride 112 (*)     CO2 19 (*)     BUN 26 (*)     BUN/Creatinine ratio 30 (*)     Calcium 6.9 (*)     AST (SGOT) 9 (*)     Protein, total 4.9 (*)     Albumin 2.3 (*)     A-G Ratio 0.9 (*)     All other components within normal limits   CBC WITH AUTOMATED DIFF - Abnormal; Notable for the following components:    WBC 11.6 (*)     ABS. LYMPHOCYTES 5.2 (*)     ABS. EOSINOPHILS 0.5 (*)     ABS.  BASOPHILS 0.2 (*)     All other components within normal limits   METABOLIC PANEL, BASIC - Abnormal; Notable for the following components:    BUN 26 (*)     BUN/Creatinine ratio 25 (*)     All other components within normal limits   CULTURE, BLOOD   LACTIC ACID   MAGNESIUM   PHOSPHORUS     Admission on 06/05/2020   Component Date Value Ref Range Status    Special Requests: 06/05/2020 NO SPECIAL REQUESTS    Preliminary    Culture result: 06/05/2020 NO GROWTH AFTER 22 HOURS    Preliminary    WBC 06/05/2020 13.0* 4.1 - 11.1 K/uL Final    RBC 06/05/2020 4.16  4.10 - 5.70 M/uL Final    HGB 06/05/2020 12.9  12.1 - 17.0 g/dL Final    HCT 06/05/2020 37.7  36.6 - 50.3 % Final    MCV 06/05/2020 90.6  80.0 - 99.0 FL Final    MCH 06/05/2020 31.0  26.0 - 34.0 PG Final    MCHC 06/05/2020 34.2  30.0 - 36.5 g/dL Final    RDW 06/05/2020 13.1  11.5 - 14.5 % Final    PLATELET 76/58/5352 245* 150 - 400 K/uL Final    MPV 06/05/2020 10.2  8.9 - 12.9 FL Final    NRBC 06/05/2020 0.0  0  WBC Final    ABSOLUTE NRBC 06/05/2020 0.00  0.00 - 0.01 K/uL Final    Sodium 06/05/2020 141  136 - 145 mmol/L Final    Potassium 06/05/2020 3.0* 3.5 - 5.1 mmol/L Final    Chloride 06/05/2020 112* 97 - 108 mmol/L Final    CO2 06/05/2020 19* 21 - 32 mmol/L Final    Anion gap 06/05/2020 10  5 - 15 mmol/L Final    Glucose 06/05/2020 78  65 - 100 mg/dL Final    BUN 06/05/2020 26* 6 - 20 MG/DL Final    Creatinine 06/05/2020 0.88  0.70 - 1.30 MG/DL Final    BUN/Creatinine ratio 06/05/2020 30* 12 - 20   Final    GFR est AA 06/05/2020 >60  >60 ml/min/1.73m2 Final    GFR est non-AA 06/05/2020 >60  >60 ml/min/1.73m2 Final    Calcium 06/05/2020 6.9* 8.5 - 10.1 MG/DL Final    Bilirubin, total 06/05/2020 0.2  0.2 - 1.0 MG/DL Final    ALT (SGPT) 06/05/2020 14  12 - 78 U/L Final    AST (SGOT) 06/05/2020 9* 15 - 37 U/L Final    Alk. phosphatase 06/05/2020 68  45 - 117 U/L Final    Protein, total 06/05/2020 4.9* 6.4 - 8.2 g/dL Final    Albumin 06/05/2020 2.3* 3.5 - 5.0 g/dL Final    Globulin 06/05/2020 2.6  2.0 - 4.0 g/dL Final    A-G Ratio 06/05/2020 0.9* 1.1 - 2.2   Final    Lactic acid 06/05/2020 1.0  0.4 - 2.0 MMOL/L Final    Ventricular Rate 06/05/2020 97  BPM Preliminary    Atrial Rate 06/05/2020 97  BPM Preliminary    P-R Interval 06/05/2020 148  ms Preliminary    QRS Duration 06/05/2020 86  ms Preliminary    Q-T Interval 06/05/2020 350  ms Preliminary    QTC Calculation (Bezet) 06/05/2020 444  ms Preliminary    Calculated P Axis 06/05/2020 58  degrees Preliminary    Calculated R Axis 06/05/2020 58  degrees Preliminary    Calculated T Axis 06/05/2020 35  degrees Preliminary    Diagnosis 06/05/2020    Preliminary                    Value:Normal sinus rhythm  Possible Left atrial enlargement  Borderline ECG  When compared with ECG of 27-SEP-2018 15:36,  No significant change was found      WBC 06/06/2020 11.6* 4.1 - 11.1 K/uL Final    RBC 06/06/2020 4.30  4. 10 - 5.70 M/uL Final    HGB 06/06/2020 13.1  12.1 - 17.0 g/dL Final    HCT 06/06/2020 38.8  36.6 - 50.3 % Final    MCV 06/06/2020 90.2  80.0 - 99.0 FL Final    MCH 06/06/2020 30.5  26.0 - 34.0 PG Final    MCHC 06/06/2020 33.8  30.0 - 36.5 g/dL Final    RDW 06/06/2020 13.2 11.5 - 14.5 % Final    PLATELET 16/47/4423 021  150 - 400 K/uL Final    MPV 06/06/2020 11.1  8.9 - 12.9 FL Final    NRBC 06/06/2020 0.0  0  WBC Final    ABSOLUTE NRBC 06/06/2020 0.00  0.00 - 0.01 K/uL Final    NEUTROPHILS 06/06/2020 42  32 - 75 % Final    LYMPHOCYTES 06/06/2020 45  12 - 49 % Final    MONOCYTES 06/06/2020 7  5 - 13 % Final    EOSINOPHILS 06/06/2020 5  0 - 7 % Final    BASOPHILS 06/06/2020 1  0 - 1 % Final    IMMATURE GRANULOCYTES 06/06/2020 0  0.0 - 0.5 % Final    ABS. NEUTROPHILS 06/06/2020 4.8  1.8 - 8.0 K/UL Final    ABS. LYMPHOCYTES 06/06/2020 5.2* 0.8 - 3.5 K/UL Final    ABS. MONOCYTES 06/06/2020 0.9  0.0 - 1.0 K/UL Final    ABS. EOSINOPHILS 06/06/2020 0.5* 0.0 - 0.4 K/UL Final    ABS. BASOPHILS 06/06/2020 0.2* 0.0 - 0.1 K/UL Final    ABS. IMM.  GRANS. 06/06/2020 0.0  0.00 - 0.04 K/UL Final    DF 06/06/2020 AUTOMATED    Final    Sodium 06/06/2020 137  136 - 145 mmol/L Final    Potassium 06/06/2020 4.0  3.5 - 5.1 mmol/L Final    Chloride 06/06/2020 105  97 - 108 mmol/L Final    CO2 06/06/2020 27  21 - 32 mmol/L Final    Anion gap 06/06/2020 5  5 - 15 mmol/L Final    Glucose 06/06/2020 92  65 - 100 mg/dL Final    BUN 06/06/2020 26* 6 - 20 MG/DL Final    Creatinine 06/06/2020 1.03  0.70 - 1.30 MG/DL Final    BUN/Creatinine ratio 06/06/2020 25* 12 - 20   Final    GFR est AA 06/06/2020 >60  >60 ml/min/1.73m2 Final    GFR est non-AA 06/06/2020 >60  >60 ml/min/1.73m2 Final    Calcium 06/06/2020 8.5  8.5 - 10.1 MG/DL Final    Magnesium 06/06/2020 1.9  1.6 - 2.4 mg/dL Final    Phosphorus 06/06/2020 2.8  2.6 - 4.7 MG/DL Final   Admission on 05/26/2020, Discharged on 06/05/2020   Component Date Value Ref Range Status    WBC 05/26/2020 12.9* 4.1 - 11.1 K/uL Final    RBC 05/26/2020 3.92* 4.10 - 5.70 M/uL Final    HGB 05/26/2020 12.0* 12.1 - 17.0 g/dL Final    HCT 05/26/2020 35.5* 36.6 - 50.3 % Final    MCV 05/26/2020 90.6  80.0 - 99.0 FL Final    MCH 05/26/2020 30.6 26.0 - 34.0 PG Final    MCHC 05/26/2020 33.8  30.0 - 36.5 g/dL Final    RDW 05/26/2020 13.0  11.5 - 14.5 % Final    PLATELET 72/20/9868 637  150 - 400 K/uL Final    MPV 05/26/2020 10.2  8.9 - 12.9 FL Final    NRBC 05/26/2020 0.0  0  WBC Final    ABSOLUTE NRBC 05/26/2020 0.00  0.00 - 0.01 K/uL Final    NEUTROPHILS 05/26/2020 58  32 - 75 % Final    LYMPHOCYTES 05/26/2020 28  12 - 49 % Final    MONOCYTES 05/26/2020 10  5 - 13 % Final    EOSINOPHILS 05/26/2020 3  0 - 7 % Final    BASOPHILS 05/26/2020 1  0 - 1 % Final    IMMATURE GRANULOCYTES 05/26/2020 0  0.0 - 0.5 % Final    ABS. NEUTROPHILS 05/26/2020 7.4  1.8 - 8.0 K/UL Final    ABS. LYMPHOCYTES 05/26/2020 3.7* 0.8 - 3.5 K/UL Final    ABS. MONOCYTES 05/26/2020 1.3* 0.0 - 1.0 K/UL Final    ABS. EOSINOPHILS 05/26/2020 0.4  0.0 - 0.4 K/UL Final    ABS. BASOPHILS 05/26/2020 0.1  0.0 - 0.1 K/UL Final    ABS. IMM. GRANS. 05/26/2020 0.0  0.00 - 0.04 K/UL Final    DF 05/26/2020 AUTOMATED    Final    Sodium 05/26/2020 141  136 - 145 mmol/L Final    Potassium 05/26/2020 3.3* 3.5 - 5.1 mmol/L Final    Chloride 05/26/2020 106  97 - 108 mmol/L Final    CO2 05/26/2020 27  21 - 32 mmol/L Final    Anion gap 05/26/2020 8  5 - 15 mmol/L Final    Glucose 05/26/2020 122* 65 - 100 mg/dL Final    BUN 05/26/2020 17  6 - 20 MG/DL Final    Creatinine 05/26/2020 1.09  0.70 - 1.30 MG/DL Final    BUN/Creatinine ratio 05/26/2020 16  12 - 20   Final    GFR est AA 05/26/2020 >60  >60 ml/min/1.73m2 Final    GFR est non-AA 05/26/2020 >60  >60 ml/min/1.73m2 Final    Calcium 05/26/2020 8.1* 8.5 - 10.1 MG/DL Final    Bilirubin, total 05/26/2020 0.2  0.2 - 1.0 MG/DL Final    ALT (SGPT) 05/26/2020 18  12 - 78 U/L Final    AST (SGOT) 05/26/2020 17  15 - 37 U/L Final    Alk.  phosphatase 05/26/2020 88  45 - 117 U/L Final    Protein, total 05/26/2020 6.2* 6.4 - 8.2 g/dL Final    Albumin 05/26/2020 2.8* 3.5 - 5.0 g/dL Final    Globulin 05/26/2020 3.4  2.0 - 4.0 g/dL Final    A-G Ratio 05/26/2020 0.8* 1.1 - 2.2   Final    Acetaminophen level 05/26/2020 2* 10 - 30 ug/mL Final    Salicylate level 18/22/9656 3.2  2.8 - 20.0 MG/DL Final    ALCOHOL(ETHYL),SERUM 05/26/2020 <10  <10 MG/DL Final    AMPHETAMINES 05/26/2020 Negative  NEG   Final    BARBITURATES 05/26/2020 Negative  NEG   Final    BENZODIAZEPINES 05/26/2020 Negative  NEG   Final    COCAINE 05/26/2020 Negative  NEG   Final    METHADONE 05/26/2020 Negative  NEG   Final    OPIATES 05/26/2020 Negative  NEG   Final    PCP(PHENCYCLIDINE) 05/26/2020 Negative  NEG   Final    THC (TH-CANNABINOL) 05/26/2020 Positive* NEG   Final    Drug screen comment 05/26/2020 (NOTE)   Final    Color 05/26/2020 YELLOW/STRAW    Final    Appearance 05/26/2020 CLOUDY* CLEAR   Final    Specific gravity 05/26/2020 1.025  1.003 - 1.030   Final    pH (UA) 05/26/2020 6.0  5.0 - 8.0   Final    Protein 05/26/2020 >300* NEG mg/dL Final    Glucose 05/26/2020 Negative  NEG mg/dL Final    Ketone 05/26/2020 TRACE* NEG mg/dL Final    Blood 05/26/2020 LARGE* NEG   Final    Urobilinogen 05/26/2020 1.0  0.2 - 1.0 EU/dL Final    Nitrites 05/26/2020 Negative  NEG   Final    Leukocyte Esterase 05/26/2020 Negative  NEG   Final    WBC 05/26/2020 0-4  0 - 4 /hpf Final    RBC 05/26/2020 10-20  0 - 5 /hpf Final    Epithelial cells 05/26/2020 FEW  FEW /lpf Final    Bacteria 05/26/2020 Negative  NEG /hpf Final    UA:UC IF INDICATED 05/26/2020 CULTURE NOT INDICATED BY UA RESULT  CNI   Final    Bilirubin UA, confirm 05/26/2020 Negative  NEG   Final    WBC 06/01/2020 18.2* 4.1 - 11.1 K/uL Final    RBC 06/01/2020 4.27  4. 10 - 5.70 M/uL Final    HGB 06/01/2020 13.0  12.1 - 17.0 g/dL Final    HCT 06/01/2020 40.0  36.6 - 50.3 % Final    MCV 06/01/2020 93.7  80.0 - 99.0 FL Final    MCH 06/01/2020 30.4  26.0 - 34.0 PG Final    MCHC 06/01/2020 32.5  30.0 - 36.5 g/dL Final    RDW 06/01/2020 13.2  11.5 - 14.5 % Final    PLATELET 16/25/1630 416 150 - 400 K/uL Final    MPV 06/01/2020 11.3  8.9 - 12.9 FL Final    NRBC 06/01/2020 0.0  0  WBC Final    ABSOLUTE NRBC 06/01/2020 0.00  0.00 - 0.01 K/uL Final    NEUTROPHILS 06/01/2020 76* 32 - 75 % Final    LYMPHOCYTES 06/01/2020 14  12 - 49 % Final    MONOCYTES 06/01/2020 6  5 - 13 % Final    EOSINOPHILS 06/01/2020 3  0 - 7 % Final    BASOPHILS 06/01/2020 1  0 - 1 % Final    IMMATURE GRANULOCYTES 06/01/2020 0  0.0 - 0.5 % Final    ABS. NEUTROPHILS 06/01/2020 13.9* 1.8 - 8.0 K/UL Final    ABS. LYMPHOCYTES 06/01/2020 2.6  0.8 - 3.5 K/UL Final    ABS. MONOCYTES 06/01/2020 1.1* 0.0 - 1.0 K/UL Final    ABS. EOSINOPHILS 06/01/2020 0.5* 0.0 - 0.4 K/UL Final    ABS. BASOPHILS 06/01/2020 0.1  0.0 - 0.1 K/UL Final    ABS. IMM. GRANS. 06/01/2020 0.1* 0.00 - 0.04 K/UL Final    DF 06/01/2020 AUTOMATED    Final    Sodium 06/01/2020 137  136 - 145 mmol/L Final    Potassium 06/01/2020 4.6  3.5 - 5.1 mmol/L Final    Chloride 06/01/2020 104  97 - 108 mmol/L Final    CO2 06/01/2020 24  21 - 32 mmol/L Final    Anion gap 06/01/2020 9  5 - 15 mmol/L Final    Glucose 06/01/2020 98  65 - 100 mg/dL Final    BUN 06/01/2020 31* 6 - 20 MG/DL Final    Creatinine 06/01/2020 1.21  0.70 - 1.30 MG/DL Final    BUN/Creatinine ratio 06/01/2020 26* 12 - 20   Final    GFR est AA 06/01/2020 >60  >60 ml/min/1.73m2 Final    GFR est non-AA 06/01/2020 >60  >60 ml/min/1.73m2 Final    Calcium 06/01/2020 8.8  8.5 - 10.1 MG/DL Final    Magnesium 06/01/2020 2.0  1.6 - 2.4 mg/dL Final    Phosphorus 06/01/2020 4.7  2.6 - 4.7 MG/DL Final    WBC 06/02/2020 14.5* 4.1 - 11.1 K/uL Final    RBC 06/02/2020 4.39  4. 10 - 5.70 M/uL Final    HGB 06/02/2020 13.3  12.1 - 17.0 g/dL Final    HCT 06/02/2020 41.1  36.6 - 50.3 % Final    MCV 06/02/2020 93.6  80.0 - 99.0 FL Final    MCH 06/02/2020 30.3  26.0 - 34.0 PG Final    MCHC 06/02/2020 32.4  30.0 - 36.5 g/dL Final    RDW 06/02/2020 13.1  11.5 - 14.5 % Final    PLATELET 43/71/4517 391  150 - 400 K/uL Final    MPV 06/02/2020 10.3  8.9 - 12.9 FL Final    NRBC 06/02/2020 0.0  0  WBC Final    ABSOLUTE NRBC 06/02/2020 0.00  0.00 - 0.01 K/uL Final    NEUTROPHILS 06/02/2020 57  32 - 75 % Final    LYMPHOCYTES 06/02/2020 31  12 - 49 % Final    MONOCYTES 06/02/2020 7  5 - 13 % Final    EOSINOPHILS 06/02/2020 4  0 - 7 % Final    BASOPHILS 06/02/2020 1  0 - 1 % Final    IMMATURE GRANULOCYTES 06/02/2020 0  0.0 - 0.5 % Final    ABS. NEUTROPHILS 06/02/2020 8.2* 1.8 - 8.0 K/UL Final    ABS. LYMPHOCYTES 06/02/2020 4.5* 0.8 - 3.5 K/UL Final    ABS. MONOCYTES 06/02/2020 1.0  0.0 - 1.0 K/UL Final    ABS. EOSINOPHILS 06/02/2020 0.6* 0.0 - 0.4 K/UL Final    ABS. BASOPHILS 06/02/2020 0.1  0.0 - 0.1 K/UL Final    ABS. IMM.  GRANS. 06/02/2020 0.1* 0.00 - 0.04 K/UL Final    DF 06/02/2020 AUTOMATED    Final    Sodium 06/02/2020 137  136 - 145 mmol/L Final    Potassium 06/02/2020 4.4  3.5 - 5.1 mmol/L Final    Chloride 06/02/2020 107  97 - 108 mmol/L Final    CO2 06/02/2020 20* 21 - 32 mmol/L Final    Anion gap 06/02/2020 10  5 - 15 mmol/L Final    Glucose 06/02/2020 89  65 - 100 mg/dL Final    BUN 06/02/2020 36* 6 - 20 MG/DL Final    Creatinine 06/02/2020 1.21  0.70 - 1.30 MG/DL Final    BUN/Creatinine ratio 06/02/2020 30* 12 - 20   Final    GFR est AA 06/02/2020 >60  >60 ml/min/1.73m2 Final    GFR est non-AA 06/02/2020 >60  >60 ml/min/1.73m2 Final    Calcium 06/02/2020 8.6  8.5 - 10.1 MG/DL Final    Magnesium 06/02/2020 2.1  1.6 - 2.4 mg/dL Final    Phosphorus 06/02/2020 4.7  2.6 - 4.7 MG/DL Final    WBC 06/03/2020 13.0* 4.1 - 11.1 K/uL Final    RBC 06/03/2020 4.81  4.10 - 5.70 M/uL Final    HGB 06/03/2020 14.8  12.1 - 17.0 g/dL Final    HCT 06/03/2020 44.4  36.6 - 50.3 % Final    MCV 06/03/2020 92.3  80.0 - 99.0 FL Final    MCH 06/03/2020 30.8  26.0 - 34.0 PG Final    MCHC 06/03/2020 33.3  30.0 - 36.5 g/dL Final    RDW 06/03/2020 13.0  11.5 - 14.5 % Final    PLATELET 06/03/2020 434* 150 - 400 K/uL Final    MPV 06/03/2020 10.2  8.9 - 12.9 FL Final    NRBC 06/03/2020 0.0  0  WBC Final    ABSOLUTE NRBC 06/03/2020 0.00  0.00 - 0.01 K/uL Final    NEUTROPHILS 06/03/2020 63  32 - 75 % Final    LYMPHOCYTES 06/03/2020 27  12 - 49 % Final    MONOCYTES 06/03/2020 5  5 - 13 % Final    EOSINOPHILS 06/03/2020 3  0 - 7 % Final    BASOPHILS 06/03/2020 1  0 - 1 % Final    IMMATURE GRANULOCYTES 06/03/2020 1* 0.0 - 0.5 % Final    ABS. NEUTROPHILS 06/03/2020 8.2* 1.8 - 8.0 K/UL Final    ABS. LYMPHOCYTES 06/03/2020 3.5  0.8 - 3.5 K/UL Final    ABS. MONOCYTES 06/03/2020 0.7  0.0 - 1.0 K/UL Final    ABS. EOSINOPHILS 06/03/2020 0.4  0.0 - 0.4 K/UL Final    ABS. BASOPHILS 06/03/2020 0.2* 0.0 - 0.1 K/UL Final    ABS. IMM. GRANS. 06/03/2020 0.1* 0.00 - 0.04 K/UL Final    DF 06/03/2020 AUTOMATED    Final    WBC 06/04/2020 13.1* 4.1 - 11.1 K/uL Final    RBC 06/04/2020 4.38  4.10 - 5.70 M/uL Final    HGB 06/04/2020 13.3  12.1 - 17.0 g/dL Final    HCT 06/04/2020 40.2  36.6 - 50.3 % Final    MCV 06/04/2020 91.8  80.0 - 99.0 FL Final    MCH 06/04/2020 30.4  26.0 - 34.0 PG Final    MCHC 06/04/2020 33.1  30.0 - 36.5 g/dL Final    RDW 06/04/2020 13.1  11.5 - 14.5 % Final    PLATELET 02/80/6426 455* 150 - 400 K/uL Final    MPV 06/04/2020 10.3  8.9 - 12.9 FL Final    NRBC 06/04/2020 0.0  0  WBC Final    ABSOLUTE NRBC 06/04/2020 0.00  0.00 - 0.01 K/uL Final        RADIOLOGY REPORTS:  Results from Hospital Encounter encounter on 05/26/20   XR FOOT LT MIN 3 V    Narrative EXAM: XR FOOT LT MIN 3 V    INDICATION: swelling, pain, evaluate for foreign body. Patient describes object  lodged in his foot, history somewhat unclear. Site of object not specified. COMPARISON: None. FINDINGS: Three views of the left foot demonstrate no fracture or other acute  osseous or articular abnormality. The soft tissues are within normal limits.       Impression IMPRESSION: No acute abnormality. Xr Foot Lt Min 3 V    Result Date: 5/28/2020  EXAM: XR FOOT LT MIN 3 V INDICATION: swelling, pain, evaluate for foreign body. Patient describes object lodged in his foot, history somewhat unclear. Site of object not specified. COMPARISON: None. FINDINGS: Three views of the left foot demonstrate no fracture or other acute osseous or articular abnormality. The soft tissues are within normal limits. IMPRESSION: No acute abnormality. Xr Abd Port  1 V    Result Date: 5/26/2020  EXAM: XR ABD PORT  1 V INDICATION: ? of patient ingesting rocks COMPARISON: 12.2.2018. FINDINGS: A supine radiograph of the abdomen shows a nonspecific abdominal gas pattern. There are 2 radiopaque structures in the right lower quadrant that individually measure 16 and 11 mm in size. In addition, in the distal transverse colon, there is a 12 x 3 mm calcification. IMPRESSION: Radiopaque structures in the right lower quadrant and transverse colon could be compatible with ingested rocks.  No plain film evidence for obstruction              MEDICATIONS       ALL MEDICATIONS  Current Facility-Administered Medications   Medication Dose Route Frequency    acetaminophen (TYLENOL) tablet 650 mg  650 mg Oral Q6H PRN    ondansetron (ZOFRAN) injection 4 mg  4 mg IntraVENous Q6H PRN    cefTRIAXone (ROCEPHIN) 1 g in 0.9% sodium chloride (MBP/ADV) 50 mL  1 g IntraVENous Q24H    benztropine (COGENTIN) tablet 1 mg  1 mg Oral BID PRN    diphenhydrAMINE (BENADRYL) injection 50 mg  50 mg IntraMUSCular BID PRN    hydrOXYzine HCL (ATARAX) tablet 50 mg  50 mg Oral TID PRN    LORazepam (ATIVAN) injection 1 mg  1 mg IntraMUSCular Q4H PRN    OLANZapine (ZyPREXA) tablet 5 mg  5 mg Oral Q6H PRN    traZODone (DESYREL) tablet 50 mg  50 mg Oral QHS PRN    chlorproMAZINE (THORAZINE) injection 25 mg  25 mg IntraMUSCular Q6H PRN    cloZAPine (CLOZARIL) tablet 300 mg  300 mg Oral QHS    montelukast (SINGULAIR) tablet 10 mg  10 mg Oral QHS    nicotine (NICODERM CQ) 21 mg/24 hr patch 1 Patch  1 Patch TransDERmal DAILY    propranoloL (INDERAL) tablet 20 mg  20 mg Oral BID    senna-docusate (PERICOLACE) 8.6-50 mg per tablet 2 Tab  2 Tab Oral DAILY    calcium carbonate (OS-REJI) tablet 500 mg [elemental]  500 mg Oral TID WITH MEALS    vancomycin (VANCOCIN) 1,000 mg in 0.9% sodium chloride (MBP/ADV) 250 mL  1,000 mg IntraVENous Q12H    enoxaparin (LOVENOX) injection 40 mg  40 mg SubCUTAneous Q24H    cloZAPine (CLOZARIL) tablet 50 mg  50 mg Oral DAILY      SCHEDULED MEDICATIONS  Current Facility-Administered Medications   Medication Dose Route Frequency    cefTRIAXone (ROCEPHIN) 1 g in 0.9% sodium chloride (MBP/ADV) 50 mL  1 g IntraVENous Q24H    cloZAPine (CLOZARIL) tablet 300 mg  300 mg Oral QHS    montelukast (SINGULAIR) tablet 10 mg  10 mg Oral QHS    nicotine (NICODERM CQ) 21 mg/24 hr patch 1 Patch  1 Patch TransDERmal DAILY    propranoloL (INDERAL) tablet 20 mg  20 mg Oral BID    senna-docusate (PERICOLACE) 8.6-50 mg per tablet 2 Tab  2 Tab Oral DAILY    calcium carbonate (OS-REJI) tablet 500 mg [elemental]  500 mg Oral TID WITH MEALS    vancomycin (VANCOCIN) 1,000 mg in 0.9% sodium chloride (MBP/ADV) 250 mL  1,000 mg IntraVENous Q12H    enoxaparin (LOVENOX) injection 40 mg  40 mg SubCUTAneous Q24H    cloZAPine (CLOZARIL) tablet 50 mg  50 mg Oral DAILY                ASSESSMENT & PLAN        The patient Lubertha Boeck is a 50 y.o.  male who presents at this time for treatment of the following diagnoses:  Patient Active Hospital Problem List:   Schizoaffective disorder    Assessment: patient improving on Clozaril, AH appears to be approaching baseline, will complete titration and likely discharge to shelter.     Plan:  - INCREASE Clozaril to 100 mg QDAY and 300 mg QHS for treatment resistant psychosis  - Clozaril level at next blood draw  - CONTINUE Pericolace 2 tablet QDAY for clozapine bowel regimen  - CONTINUE Propranolol 20 mg BID for iatrongenic tachycardia  - Foot abscess tx per IM, Gen Surg  - Psychiatry will follow  - Transfer to psychiatric unit when medically stable     Thank you very much for the opportunity to participate in the care of your patient. I will continue to follow up with patient as deemed appropriate. A coordinated, multidisplinary treatment team round was conducted with the patient; that includes the nurse, unit pharmcist,  and writer all present. The following regarding medications was addressed during rounds with patient:   the risks and benefits of the proposed medication. The patient was given the opportunity to ask questions. Informed consent given to the use of the above medications. I will continue to adjust psychiatric and non-psychiatric medications (see above \"medication\" section and orders section for details) as deemed appropriate & based upon diagnoses and response to treatment. I have reviewed admission (and previous/old) labs and medical tests in the EHR and or transferring hospital documents. I will continue to order blood tests/labs and diagnostic tests as deemed appropriate and review results as they become available (see orders for details). I have reviewed old psychiatric and medical records available in the EHR. I Will order additional psychiatric records from other institutions to further elucidate the nature of patient's psychopathology and review once available. I will gather additional collateral information from friends, family and o/p treatment team to further elucidate the nature of patient's psychopathology and baselline level of psychiatric functioning.                      SIGNED:    Andre Barrett MD  6/6/2020

## 2020-06-07 PROBLEM — F25.9 SCHIZOAFFECTIVE DISORDER (HCC): Status: RESOLVED | Noted: 2020-06-07 | Resolved: 2020-06-07

## 2020-06-07 PROBLEM — F25.9 SCHIZOAFFECTIVE DISORDER (HCC): Status: ACTIVE | Noted: 2020-06-07

## 2020-06-07 PROCEDURE — 74011250637 HC RX REV CODE- 250/637: Performed by: NURSE PRACTITIONER

## 2020-06-07 PROCEDURE — 65220000003 HC RM SEMIPRIVATE PSYCH

## 2020-06-07 PROCEDURE — 74011250637 HC RX REV CODE- 250/637: Performed by: PSYCHIATRY & NEUROLOGY

## 2020-06-07 RX ORDER — CLOZAPINE 100 MG/1
100 TABLET ORAL DAILY
Status: DISCONTINUED | OUTPATIENT
Start: 2020-06-07 | End: 2020-06-07

## 2020-06-07 RX ORDER — DIPHENHYDRAMINE HYDROCHLORIDE 50 MG/ML
50 INJECTION, SOLUTION INTRAMUSCULAR; INTRAVENOUS
Status: DISCONTINUED | OUTPATIENT
Start: 2020-06-07 | End: 2020-06-07

## 2020-06-07 RX ORDER — CHLORPROMAZINE HYDROCHLORIDE 25 MG/ML
25 INJECTION INTRAMUSCULAR
Status: DISCONTINUED | OUTPATIENT
Start: 2020-06-07 | End: 2020-06-07

## 2020-06-07 RX ORDER — LORAZEPAM 2 MG/ML
1 INJECTION INTRAMUSCULAR
Status: DISCONTINUED | OUTPATIENT
Start: 2020-06-07 | End: 2020-06-07

## 2020-06-07 RX ORDER — CLOZAPINE 100 MG/1
100 TABLET ORAL DAILY
Status: DISCONTINUED | OUTPATIENT
Start: 2020-06-08 | End: 2020-06-09

## 2020-06-07 RX ORDER — MONTELUKAST SODIUM 10 MG/1
10 TABLET ORAL
Status: DISCONTINUED | OUTPATIENT
Start: 2020-06-07 | End: 2020-06-11 | Stop reason: HOSPADM

## 2020-06-07 RX ORDER — TRAZODONE HYDROCHLORIDE 50 MG/1
50 TABLET ORAL
Status: DISCONTINUED | OUTPATIENT
Start: 2020-06-07 | End: 2020-06-07

## 2020-06-07 RX ORDER — AMOXICILLIN 250 MG
2 CAPSULE ORAL DAILY
Status: DISCONTINUED | OUTPATIENT
Start: 2020-06-07 | End: 2020-06-07

## 2020-06-07 RX ORDER — OLANZAPINE 5 MG/1
5 TABLET ORAL
Status: DISCONTINUED | OUTPATIENT
Start: 2020-06-07 | End: 2020-06-07

## 2020-06-07 RX ORDER — IBUPROFEN 200 MG
1 TABLET ORAL DAILY
Status: DISCONTINUED | OUTPATIENT
Start: 2020-06-07 | End: 2020-06-07

## 2020-06-07 RX ORDER — CALCIUM CARBONATE 500(1250)
500 TABLET ORAL
Status: DISCONTINUED | OUTPATIENT
Start: 2020-06-07 | End: 2020-06-11 | Stop reason: HOSPADM

## 2020-06-07 RX ORDER — DOXYCYCLINE HYCLATE 100 MG
100 TABLET ORAL EVERY 12 HOURS
Status: DISCONTINUED | OUTPATIENT
Start: 2020-06-07 | End: 2020-06-11 | Stop reason: HOSPADM

## 2020-06-07 RX ORDER — BENZTROPINE MESYLATE 1 MG/1
1 TABLET ORAL
Status: DISCONTINUED | OUTPATIENT
Start: 2020-06-07 | End: 2020-06-07

## 2020-06-07 RX ORDER — CLOZAPINE 100 MG/1
300 TABLET ORAL
Status: DISCONTINUED | OUTPATIENT
Start: 2020-06-07 | End: 2020-06-07

## 2020-06-07 RX ORDER — ENOXAPARIN SODIUM 100 MG/ML
40 INJECTION SUBCUTANEOUS EVERY 24 HOURS
Status: DISCONTINUED | OUTPATIENT
Start: 2020-06-07 | End: 2020-06-07

## 2020-06-07 RX ORDER — CEFDINIR 300 MG/1
300 CAPSULE ORAL EVERY 12 HOURS
Status: COMPLETED | OUTPATIENT
Start: 2020-06-07 | End: 2020-06-09

## 2020-06-07 RX ORDER — ACETAMINOPHEN 325 MG/1
650 TABLET ORAL
Status: DISCONTINUED | OUTPATIENT
Start: 2020-06-07 | End: 2020-06-07

## 2020-06-07 RX ORDER — HYDROXYZINE 25 MG/1
50 TABLET, FILM COATED ORAL
Status: DISCONTINUED | OUTPATIENT
Start: 2020-06-07 | End: 2020-06-07

## 2020-06-07 RX ORDER — PROPRANOLOL HYDROCHLORIDE 10 MG/1
20 TABLET ORAL 2 TIMES DAILY
Status: DISCONTINUED | OUTPATIENT
Start: 2020-06-07 | End: 2020-06-07

## 2020-06-07 RX ADMIN — CEFDINIR 300 MG: 300 CAPSULE ORAL at 21:34

## 2020-06-07 RX ADMIN — PROPRANOLOL HYDROCHLORIDE 20 MG: 10 TABLET ORAL at 08:32

## 2020-06-07 RX ADMIN — CALCIUM 500 MG: 500 TABLET ORAL at 18:05

## 2020-06-07 RX ADMIN — ACETAMINOPHEN 650 MG: 325 TABLET, FILM COATED ORAL at 09:01

## 2020-06-07 RX ADMIN — DOCUSATE SODIUM 50 MG AND SENNOSIDES 8.6 MG 1 TABLET: 8.6; 5 TABLET, FILM COATED ORAL at 08:32

## 2020-06-07 RX ADMIN — MONTELUKAST SODIUM 10 MG: 10 TABLET, FILM COATED ORAL at 21:31

## 2020-06-07 RX ADMIN — CALCIUM 500 MG: 500 TABLET ORAL at 15:08

## 2020-06-07 RX ADMIN — PROPRANOLOL HYDROCHLORIDE 20 MG: 10 TABLET ORAL at 17:00

## 2020-06-07 RX ADMIN — CLOZAPINE 50 MG: 25 TABLET ORAL at 08:32

## 2020-06-07 RX ADMIN — DOXYCYCLINE HYCLATE 100 MG: 100 TABLET, COATED ORAL at 21:31

## 2020-06-07 RX ADMIN — ACETAMINOPHEN 650 MG: 325 TABLET, FILM COATED ORAL at 18:14

## 2020-06-07 RX ADMIN — DOXYCYCLINE HYCLATE 100 MG: 100 TABLET, COATED ORAL at 15:09

## 2020-06-07 RX ADMIN — CEFDINIR 300 MG: 300 CAPSULE ORAL at 15:08

## 2020-06-07 RX ADMIN — CLOZAPINE 300 MG: 100 TABLET ORAL at 21:35

## 2020-06-07 NOTE — BH NOTES
Assumed care of patient and given report by off going nurse as well as admission assessment on paper; admission still needs to be entered in computer per nurse Alycia Hearn RN. Pt also had no orders entered. All information was collected by above nurse; entering information for admission. Pt was transferred back to [facility] after getting tx to the abscess to his L foot; area is wrapped with gauze and was on IV ATB for infection. He is a readmit. Orders received from Brain Becerra NP on last orders he left unit with other day; Clozarl 50mg po in AM; 300mg po at HS; Pericolace 2mg po daily; Propanolol 20mg po BID; Nicotine patch 21mcg topically q AM; replace haldol (d/t allergies) with Thorazine 50mg po q6 prn and 50mg IM q6 prn. Orders received and carried. VS on arrival 154/104, p110, t98.3, 98%ra, r18; on recheck; 156/80 p 107. Pt is voluntary with allergies to Haldol. He has a hx of Schizoaffective d/o and is homeless. He continues on Clozaril 50mg po in AM and 300mg po at HS and was restarted per orders tonight. He continues to have AH though he will deny at times, still seen responding to stimuli. Pt denies VH as well as SI/HI but does still have some anxiety and depression. Pt has L foot in bandage and wound was drained and cleaned. When asking pt about it he states \"They tore me up good and it really hurt. \"  Pt then stated \"I have it starting in the other foot now, it will be 10 years now before that one starts. \"  Pt denied pain currently. He had scheduled medication and no prn's so far this shift. He has been in his room drawing. He had evening snack. Pt continues on 15 min safety checks. Will continue to monitor and treat.

## 2020-06-07 NOTE — BH NOTES
Rounding has been completed. Resident is alert and oriented to person, place, and time. He reports that he does have auditory hallucinations present. He denies visual hallucinations, feelings of depression, and anxiety. Med and meal compliant he confirms pain of the left foot where he recently had surgery. He was medicated with Tylenol for pain. Dressing to right foot was changed after it was examined by Dr. Umair Cortes. Per her order wound ws cleaned, no packing was inserted, and a dry dressing was applied without any complications. Will continue to monitor for changes.

## 2020-06-07 NOTE — BH NOTES
Behavioral Health Treatment Team Note     Patient goal(s) for today: continue taking medication as prescribed.   Treatment team focus/goals:stabilize and dispo planning   Progress note: Pt remains calm, cooperative, still responding to internal stimuli \"spirits\" that torment him at times.      LOS:  12                  Expected LOS: 6/8/2020     Financial concerns/prescription coverage: [facility] Medicare Advantage Part A&B with [employer] Extended Coverage and [facility] Medicaid   Family contact: None   Family requesting physician contact today: No  Discharge plan: Pt would like to return to [facility] [place]   Access to weapons: None involved.   Outpatient provider(s): To be linked. Patient's preferred phone number for follow up call:      Participating treatment team BESS Howell, Dr. Melita Nelson, Dr. Zack Briones.

## 2020-06-07 NOTE — PROGRESS NOTES
Problem: Altered Thought Process (Adult/Pediatric)  Goal: *STG: Remains safe in hospital  Outcome: Progressing Towards Goal  Goal: *STG: Complies with medication therapy  Outcome: Progressing Towards Goal  Goal: *STG: Decreased delusional thinking  Outcome: Progressing Towards Goal  Goal: *STG: Decreased hallucinations  Outcome: Not Progressing Towards Goal

## 2020-06-07 NOTE — PROGRESS NOTES
[facility]: MED RECONCILIATION     Comments/Recommendations:   Patient transferred from [address] [place] [address] to [facility] inpatient, and back to [address] [place] [address]. Medication history completed upon first admission to [facility]. Please see pharmacist note from 5/27/20 for details.      Thank you,  Dorita Mac, PharmD, BCPS  [phone]

## 2020-06-07 NOTE — INTERDISCIPLINARY ROUNDS
Behavioral Health Interdisciplinary Rounds     Patient Name: Olive Heath    Age: 50 y.o. Room/Bed:  321/01  Primary Diagnosis: <principal problem not specified>   Admission Status: Voluntary     Readmission within 30 days: No  Power of  in place: No  Patient requires a blocked bed: Yes           Reason for blocked bed: Covid  Order for blocked bed obtained:No       Sleep hours: 8   Morning Labs completed per orders:  None}       Participation in Care/Groups:  No  Medication Compliant?: Yes  PRNS (last 24 hours): None    Restraints (last 24 hours):  No  Substance Abuse:  Yes - Marijuana    24 hour chart check complete:  Yes

## 2020-06-07 NOTE — CONSULTS
Hospitalist Admission Note    NAME: Halima Lee   :  1975   MRN:  [id number]   Room Number: 560/03  @ [facility]     Date/Time:  2020 11:28 AM    Patient PCP: None  ______________________________________________________________________  Given the patient's current clinical presentation, I have a high level of concern for decompensation if discharged from the emergency department. Complex decision making was performed, which includes reviewing the patient's available past medical records, laboratory results, and x-ray films. My assessment of this patient's clinical condition and my plan of care is as follows. Assessment / Plan:       Principal Problem:    Schizoaffective disorder, bipolar type without good prognostic features ([facility] [address] (2014)      Cellulitis and abscess, left foot   Medical Clearance for psychiatric admission  Tobacco dependence    -Psychiatric treatment and management of health issues,Defer to psychiatrist for further management.  -Continue Doxycycline (total 7 days) and Cefdinir (3 days). - Wound care - dry dressing daily. Packing removed today. - TDaP administered .   -Medically stable at this time. We will follow up on a p.r.n. basis.  -No VTE prophylaxis indicated or warranted at this time. Subjective:   Reason for consult : Cellulitis left foot     HISTORY OF PRESENT ILLNESS:     Halima Lee is a 50 y.o.  male with PMH of copd, HTN, Depression, anxiety who was admitted to behavioral health unit on  with psychosis and delusions. He was found to living [place] a [place] the woods and was found to have poor self care and hygiene on presentation. Patient was noted to have left foot pain and swelling on admission, Xray foot at the time did not show any acute abnormality. Topical abx were started by psychiatry. Switched to Oral by Medicine. General Surgery did not recommend surgical I&D at the time.  Patient's abscess continued to expand with surrounding cellulitis, he was transferred to medicine on 6/5/2020 for IV Abx, I&D performed by this writer and Gen Surg on 6/5, switched to oral abx and transferred back to [facility] on 6/6. Patient reports significant improvement in pain, able to ambulate without pain. I&D site is healing well. Denies fever,chills,chest pain, sob,abd pan,diarrhea,constipation,lighhadedness. No current medical concerns at this time. Past Medical History:   Diagnosis Date    Back pain     Chronic bronchitis (HCC)     COPD    Elevated WBCs     H/O splenectomy     HTN (hypertension)     Ill-defined condition     constipation    Left foot pain 6/2/2020    Psychiatric disorder     depression, anxiety        Past Surgical History:   Procedure Laterality Date    HX OTHER SURGICAL Right     two right wrist fractures     HX SPLENECTOMY         Social History     Tobacco Use    Smoking status: Former Smoker    Smokeless tobacco: Never Used   Substance Use Topics    Alcohol use: No        Family History   Problem Relation Age of Onset    No Known Problems Mother     No Known Problems Father     No Known Problems Brother      Allergies   Allergen Reactions    Haloperidol Other (comments)        Prior to Admission medications    Not on File       REVIEW OF SYSTEMS:        I am not able to complete the review of systems because:    The patient is intubated and sedated    The patient has altered mental status due to his acute medical problems    The patient has baseline aphasia from prior stroke(s)    The patient has baseline dementia and is not reliable historian    The patient is in acute medical distress and unable to provide information           Total of 12 systems reviewed as follows:       POSITIVE= underlined text  Negative = text not underlined  General:  fever, chills, sweats, generalized weakness, weight loss/gain,      loss of appetite   Eyes:    blurred vision, eye pain, loss of vision, double vision  ENT:    rhinorrhea, pharyngitis   Respiratory:   cough, sputum production, SOB, HYLTON, wheezing, pleuritic pain   Cardiology:   chest pain, palpitations, orthopnea, PND, edema, syncope   Gastrointestinal:  abdominal pain , N/V, diarrhea, dysphagia, constipation, bleeding   Genitourinary:  frequency, urgency, dysuria, hematuria, incontinence   Muskuloskeletal :  arthralgia, myalgia, back pain  Hematology:  easy bruising, nose or gum bleeding, lymphadenopathy   Dermatological: rash, ulceration, pruritis, color change / jaundice  Endocrine:   hot flashes or polydipsia   Neurological:  headache, dizziness, confusion, focal weakness, paresthesia,     Speech difficulties, memory loss, gait difficulty  Psychological: Feelings of anxiety, depression, agitation    Objective:   VITALS:    Visit Vitals  BP (!) 146/93   Pulse 87   Temp 97.6 °F (36.4 °C)   Resp 18   Ht 5' 9\" (1.753 m)   Wt 67.6 kg (149 lb)   SpO2 98%   BMI 22.00 kg/m²       PHYSICAL EXAM:    General:    Alert, cooperative, no distress, appears stated age. HEENT: Atraumatic, anicteric sclerae, pink conjunctivae     No oral ulcers, mucosa moist, throat clear, dentition fair  Neck:  Supple, symmetrical,  no JVD, thyroid: non tender  Lungs:   Clear to auscultation bilaterally. No Wheezing or Rhonchi. No rales. Chest wall:  No tenderness  No Accessory muscle use. Heart:   Regular  rhythm,  No  murmur   No edema  Abdomen:   Soft, non-tender. Not distended. Bowel sounds normal  Extremities: No cyanosis. No clubbing,      Skin turgor normal, Capillary refill normal, Radial dial pulse 2+  Left foot : post I&D wound noted on lateral border of left foot, minimal scabbing, packing removed, dry dressing to be applied per surgery, surrounding erythema is minimal, non tender to palpation, no edema. Skin:     Not pale. Not Jaundiced  No rashes   Psychiatric:  Stable mood, constricted affect, continues to respond to internal stimuli, no SI/HI. Neurologic: EOMs intact. No facial asymmetry. No aphasia or slurred speech. Symmetrical strength, Sensation grossly intact. Alert and oriented X 4.     ______________________________________________________________________    Care Plan discussed with:  Patient/Family and Nurse    Expected  Disposition: per primary attending   ________________________________________________________________________  TOTAL TIME:  25 Minutes    Critical Care Provided     Minutes non procedure based      Comments     Reviewed previous records   >50% of visit spent in counseling and coordination of care  Discussion with patient and/or family and questions answered       ________________________________________________________________________  Signed: Sammi Reynolds MD    Procedures: see electronic medical records for all procedures/Xrays and details which were not copied into this note but were reviewed prior to creation of Plan. LAB DATA REVIEWED:    No results found for this or any previous visit (from the past 24 hour(s)).

## 2020-06-07 NOTE — H&P
INITIAL PSYCHIATRIC EVALUATION            IDENTIFICATION:    Patient Name  Lieutenant Veloz   Date of Birth 1975   [facility] [id number]   Medical Record Number  [id number]      Age  50 y.o. PCP None   Admit date:  6/6/2020    Room Number  321/01  @ [facility]   Date of Service  6/7/2020            HISTORY         REASON FOR HOSPITALIZATION:  CC: \"psychosis\". Pt admitted under a voluntary basis for severe psychosis proving to be an imminent danger to self and an inability to care for self. HISTORY OF PRESENT ILLNESS:     The patient, Lieutenant Veloz, is a 50 y.o. WHITE [place]  male with a past psychiatric history significant for schizoaffective disorder, who was admitted to the medical floor for the treatment of a foot abscess. Patient reports/evidences the following emotional symptoms:  psychosis and hallucinations. The above symptoms have been present for 2+ weeks. These symptoms are of moderate severity. The symptoms are constant in nature. Additional symptomatology include concern about health problems . The patient's condition has been precipitated by psychosocial stressors (stress of hospitalization and medical illness). Patient transferred back to psychiatry following IV Abx for a foot abscess. Per IM, he will need to remain on PO Abx with wound care (daily dry dressings) at this time, but can resume psychiatric treatment primary. Patient with on going [address], stating that she bother him at times, but overall more pleasant. Tolerating Clozaril titration. Patient agreeable with the plan to continue treatment, still ambivalent about disposition options.      ALLERGIES:   Allergies   Allergen Reactions    Haloperidol Other (comments)      MEDICATIONS PRIOR TO ADMISSION:   No medications prior to admission.       PAST MEDICAL HISTORY:   Past Medical History:   Diagnosis Date    Back pain     Chronic bronchitis (HCC)     COPD    Elevated WBCs     H/O splenectomy     HTN (hypertension)     Ill-defined condition     constipation    Left foot pain 6/2/2020    Psychiatric disorder     depression, anxiety     Past Surgical History:   Procedure Laterality Date    HX OTHER SURGICAL Right     two right wrist fractures     HX SPLENECTOMY        SOCIAL HISTORY:   Social History     Socioeconomic History    Marital status: SINGLE     Spouse name: Not on file    Number of children: Not on file    Years of education: Not on file    Highest education level: Not on file   Occupational History    Not on file   Social Needs    Financial resource strain: Not on file    Food insecurity     Worry: Not on file     Inability: Not on file    Transportation needs     Medical: Not on file     Non-medical: Not on file   Tobacco Use    Smoking status: Former Smoker    Smokeless tobacco: Never Used   Substance and Sexual Activity    Alcohol use: No    Drug use: No     Comment: \"not for years\"    Sexual activity: Not on file   Lifestyle    Physical activity     Days per week: Not on file     Minutes per session: Not on file    Stress: Not on file   Relationships    Social connections     Talks on phone: Not on file     Gets together: Not on file     Attends Taoist service: Not on file     Active member of club or organization: Not on file     Attends meetings of clubs or organizations: Not on file     Relationship status: Not on file    Intimate partner violence     Fear of current or ex partner: Not on file     Emotionally abused: Not on file     Physically abused: Not on file     Forced sexual activity: Not on file   Other Topics Concern    Not on file   Social History Narrative    Social History:       Reviewed history from 02/03/2017 and no changes required:          Home: Lives with Carlos Jones, his girlfriend of 2 years, in a [place] apartment with pet lizard and fish          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 Blas Calixto, [facility] psychiatry                     Smoking History:          Patient currently smokes every day. Patient has been counseled to quit. FAMILY HISTORY: History reviewed, pertinent family history as below:   Family History   Problem Relation Age of Onset    No Known Problems Mother     No Known Problems Father     No Known Problems Brother        REVIEW OF SYSTEMS:   Pertinent items are noted in the History of Present Illness. All other Systems reviewed and are considered negative. MENTAL STATUS EXAM & VITALS     MENTAL STATUS EXAM (MSE):    MSE FINDINGS ARE WITHIN NORMAL LIMITS (WNL) UNLESS OTHERWISE STATED BELOW. ( ALL OF THE BELOW CATEGORIES OF THE MSE HAVE BEEN REVIEWED (reviewed 6/7/2020) AND UPDATED AS DEEMED APPROPRIATE )  General Presentation age appropriate, bearded and disheveled, guarded   Orientation oriented to time, place and person   Vital Signs  See below (reviewed 6/7/2020); Vital Signs (BP, Pulse, & Temp) are within normal limits if not listed below.    Gait and Station Stable/steady, no ataxia   Musculoskeletal System No extrapyramidal symptoms (EPS); no abnormal muscular movements or Tardive Dyskinesia (TD); muscle strength and tone are within normal limits   Language No aphasia or dysarthria   Speech:  normal volume and non-pressured   Thought Processes concrete; slow rate of thoughts; fair abstract reasoning/computation   Thought Associations blocked  and tangential   Thought Content auditory hallucinations and internally preoccupied   Suicidal Ideations contracts for safety   Homicidal Ideations none   Mood:  anhedonia   Affect:  constricted   Memory recent  fair   Memory remote:  intact   Concentration/Attention:  intact   Fund of Knowledge average   Insight:  limited   Reliability fair   Judgment: improving          VITALS:     Patient Vitals for the past 24 hrs:   Temp Pulse Resp BP SpO2   06/07/20 0908 97.6 °F (36.4 °C) 87 18 (!) 146/93 98 %   06/06/20 1915 -- 98 -- -- --     Wt Readings from Last 3 Encounters:   06/06/20 67.6 kg (149 lb)   06/05/20 68 kg (149 lb 14.6 oz)   05/26/20 68 kg (150 lb)     Temp Readings from Last 3 Encounters:   06/07/20 97.6 °F (36.4 °C)   06/06/20 99 °F (37.2 °C)   06/04/20 98.1 °F (36.7 °C)     BP Readings from Last 3 Encounters:   06/07/20 (!) 146/93   06/06/20 156/80   06/04/20 135/86     Pulse Readings from Last 3 Encounters:   06/07/20 87   06/06/20 (!) 107   06/04/20 97            DATA     LABORATORY DATA:  Labs Reviewed - No data to display  Admission on 06/05/2020, Discharged on 06/06/2020   Component Date Value Ref Range Status    Special Requests: 06/05/2020 NO SPECIAL REQUESTS    Preliminary    Culture result: 06/05/2020 NO GROWTH 2 DAYS    Preliminary    WBC 06/05/2020 13.0* 4.1 - 11.1 K/uL Final    RBC 06/05/2020 4.16  4.10 - 5.70 M/uL Final    HGB 06/05/2020 12.9  12.1 - 17.0 g/dL Final    HCT 06/05/2020 37.7  36.6 - 50.3 % Final    MCV 06/05/2020 90.6  80.0 - 99.0 FL Final    MCH 06/05/2020 31.0  26.0 - 34.0 PG Final    MCHC 06/05/2020 34.2  30.0 - 36.5 g/dL Final    RDW 06/05/2020 13.1  11.5 - 14.5 % Final    PLATELET 46/77/0822 691* 150 - 400 K/uL Final    MPV 06/05/2020 10.2  8.9 - 12.9 FL Final    NRBC 06/05/2020 0.0  0  WBC Final    ABSOLUTE NRBC 06/05/2020 0.00  0.00 - 0.01 K/uL Final    Sodium 06/05/2020 141  136 - 145 mmol/L Final    Potassium 06/05/2020 3.0* 3.5 - 5.1 mmol/L Final    Chloride 06/05/2020 112* 97 - 108 mmol/L Final    CO2 06/05/2020 19* 21 - 32 mmol/L Final    Anion gap 06/05/2020 10  5 - 15 mmol/L Final    Glucose 06/05/2020 78  65 - 100 mg/dL Final    BUN 06/05/2020 26* 6 - 20 MG/DL Final    Creatinine 06/05/2020 0.88  0.70 - 1.30 MG/DL Final    BUN/Creatinine ratio 06/05/2020 30* 12 - 20   Final    GFR est AA 06/05/2020 >60  >60 ml/min/1.73m2 Final    GFR est non-AA 06/05/2020 >60  >60 ml/min/1.73m2 Final    Calcium 06/05/2020 6.9* 8.5 - 10.1 MG/DL Final    Bilirubin, total 06/05/2020 0.2  0.2 - 1.0 MG/DL Final    ALT (SGPT) 06/05/2020 14  12 - 78 U/L Final    AST (SGOT) 06/05/2020 9* 15 - 37 U/L Final    Alk. phosphatase 06/05/2020 68  45 - 117 U/L Final    Protein, total 06/05/2020 4.9* 6.4 - 8.2 g/dL Final    Albumin 06/05/2020 2.3* 3.5 - 5.0 g/dL Final    Globulin 06/05/2020 2.6  2.0 - 4.0 g/dL Final    A-G Ratio 06/05/2020 0.9* 1.1 - 2.2   Final    Lactic acid 06/05/2020 1.0  0.4 - 2.0 MMOL/L Final    Ventricular Rate 06/05/2020 97  BPM Preliminary    Atrial Rate 06/05/2020 97  BPM Preliminary    P-R Interval 06/05/2020 148  ms Preliminary    QRS Duration 06/05/2020 86  ms Preliminary    Q-T Interval 06/05/2020 350  ms Preliminary    QTC Calculation (Bezet) 06/05/2020 444  ms Preliminary    Calculated P Axis 06/05/2020 58  degrees Preliminary    Calculated R Axis 06/05/2020 58  degrees Preliminary    Calculated T Axis 06/05/2020 35  degrees Preliminary    Diagnosis 06/05/2020    Preliminary                    Value:Normal sinus rhythm  Possible Left atrial enlargement  Borderline ECG  When compared with ECG of 27-SEP-2018 15:36,  No significant change was found      WBC 06/06/2020 11.6* 4.1 - 11.1 K/uL Final    RBC 06/06/2020 4.30  4. 10 - 5.70 M/uL Final    HGB 06/06/2020 13.1  12.1 - 17.0 g/dL Final    HCT 06/06/2020 38.8  36.6 - 50.3 % Final    MCV 06/06/2020 90.2  80.0 - 99.0 FL Final    MCH 06/06/2020 30.5  26.0 - 34.0 PG Final    MCHC 06/06/2020 33.8  30.0 - 36.5 g/dL Final    RDW 06/06/2020 13.2  11.5 - 14.5 % Final    PLATELET 95/91/5259 577  150 - 400 K/uL Final    MPV 06/06/2020 11.1  8.9 - 12.9 FL Final    NRBC 06/06/2020 0.0  0  WBC Final    ABSOLUTE NRBC 06/06/2020 0.00  0.00 - 0.01 K/uL Final    NEUTROPHILS 06/06/2020 42  32 - 75 % Final    LYMPHOCYTES 06/06/2020 45  12 - 49 % Final    MONOCYTES 06/06/2020 7  5 - 13 % Final    EOSINOPHILS 06/06/2020 5  0 - 7 % Final    BASOPHILS 06/06/2020 1  0 - 1 % Final    IMMATURE GRANULOCYTES 06/06/2020 0  0.0 - 0.5 % Final    ABS. NEUTROPHILS 06/06/2020 4.8  1.8 - 8.0 K/UL Final    ABS. LYMPHOCYTES 06/06/2020 5.2* 0.8 - 3.5 K/UL Final    ABS. MONOCYTES 06/06/2020 0.9  0.0 - 1.0 K/UL Final    ABS. EOSINOPHILS 06/06/2020 0.5* 0.0 - 0.4 K/UL Final    ABS. BASOPHILS 06/06/2020 0.2* 0.0 - 0.1 K/UL Final    ABS. IMM. GRANS. 06/06/2020 0.0  0.00 - 0.04 K/UL Final    DF 06/06/2020 AUTOMATED    Final    Sodium 06/06/2020 137  136 - 145 mmol/L Final    Potassium 06/06/2020 4.0  3.5 - 5.1 mmol/L Final    Chloride 06/06/2020 105  97 - 108 mmol/L Final    CO2 06/06/2020 27  21 - 32 mmol/L Final    Anion gap 06/06/2020 5  5 - 15 mmol/L Final    Glucose 06/06/2020 92  65 - 100 mg/dL Final    BUN 06/06/2020 26* 6 - 20 MG/DL Final    Creatinine 06/06/2020 1.03  0.70 - 1.30 MG/DL Final    BUN/Creatinine ratio 06/06/2020 25* 12 - 20   Final    GFR est AA 06/06/2020 >60  >60 ml/min/1.73m2 Final    GFR est non-AA 06/06/2020 >60  >60 ml/min/1.73m2 Final    Calcium 06/06/2020 8.5  8.5 - 10.1 MG/DL Final    Magnesium 06/06/2020 1.9  1.6 - 2.4 mg/dL Final    Phosphorus 06/06/2020 2.8  2.6 - 4.7 MG/DL Final        RADIOLOGY REPORTS:  Results from Hospital Encounter encounter on 05/26/20   XR FOOT LT MIN 3 V    Narrative EXAM: XR FOOT LT MIN 3 V    INDICATION: swelling, pain, evaluate for foreign body. Patient describes object  lodged in his foot, history somewhat unclear. Site of object not specified. COMPARISON: None. FINDINGS: Three views of the left foot demonstrate no fracture or other acute  osseous or articular abnormality. The soft tissues are within normal limits. Impression IMPRESSION: No acute abnormality.    Xr Foot Lt Min 3 V    Result Date: 5/28/2020  EXAM: XR FOOT LT MIN 3 V INDICATION: swelling, pain, evaluate for foreign body. Patient describes object lodged in his foot, history somewhat unclear. Site of object not specified. COMPARISON: None. FINDINGS: Three views of the left foot demonstrate no fracture or other acute osseous or articular abnormality. The soft tissues are within normal limits. IMPRESSION: No acute abnormality. Xr Abd Port  1 V    Result Date: 5/26/2020  EXAM: XR ABD PORT  1 V INDICATION: ? of patient ingesting rocks COMPARISON: 12.2.2018. FINDINGS: A supine radiograph of the abdomen shows a nonspecific abdominal gas pattern. There are 2 radiopaque structures in the right lower quadrant that individually measure 16 and 11 mm in size. In addition, in the distal transverse colon, there is a 12 x 3 mm calcification. IMPRESSION: Radiopaque structures in the right lower quadrant and transverse colon could be compatible with ingested rocks.  No plain film evidence for obstruction              MEDICATIONS       ALL MEDICATIONS  Current Facility-Administered Medications   Medication Dose Route Frequency    calcium carbonate (OS-REJI) tablet 500 mg [elemental]  500 mg Oral TID WITH MEALS    cefdinir (OMNICEF) capsule 300 mg  300 mg Oral Q12H    doxycycline (VIBRA-TABS) tablet 100 mg  100 mg Oral Q12H    montelukast (SINGULAIR) tablet 10 mg  10 mg Oral QHS    [START ON 6/8/2020] cloZAPine (CLOZARIL) tablet 100 mg  100 mg Oral DAILY    cloZAPine (CLOZARIL) tablet 300 mg  300 mg Oral QHS    senna-docusate (PERICOLACE) 8.6-50 mg per tablet 1 Tab  1 Tab Oral DAILY    propranoloL (INDERAL) tablet 20 mg  20 mg Oral BID    nicotine (NICODERM CQ) 21 mg/24 hr patch 1 Patch  1 Patch TransDERmal DAILY    OLANZapine (ZyPREXA) tablet 5 mg  5 mg Oral Q6H PRN    benztropine (COGENTIN) tablet 1 mg  1 mg Oral BID PRN    diphenhydrAMINE (BENADRYL) injection 50 mg  50 mg IntraMUSCular BID PRN    hydrOXYzine HCL (ATARAX) tablet 50 mg  50 mg Oral TID PRN    LORazepam (ATIVAN) injection 1 mg  1 mg IntraMUSCular Q4H PRN    traZODone (DESYREL) tablet 50 mg  50 mg Oral QHS PRN    acetaminophen (TYLENOL) tablet 650 mg  650 mg Oral Q4H PRN    magnesium hydroxide (MILK OF MAGNESIA) 400 mg/5 mL oral suspension 30 mL  30 mL Oral DAILY PRN    chlorproMAZINE (THORAZINE) injection 50 mg  50 mg IntraMUSCular Q6H PRN      SCHEDULED MEDICATIONS  Current Facility-Administered Medications   Medication Dose Route Frequency    calcium carbonate (OS-REJI) tablet 500 mg [elemental]  500 mg Oral TID WITH MEALS    cefdinir (OMNICEF) capsule 300 mg  300 mg Oral Q12H    doxycycline (VIBRA-TABS) tablet 100 mg  100 mg Oral Q12H    montelukast (SINGULAIR) tablet 10 mg  10 mg Oral QHS    [START ON 6/8/2020] cloZAPine (CLOZARIL) tablet 100 mg  100 mg Oral DAILY    cloZAPine (CLOZARIL) tablet 300 mg  300 mg Oral QHS    senna-docusate (PERICOLACE) 8.6-50 mg per tablet 1 Tab  1 Tab Oral DAILY    propranoloL (INDERAL) tablet 20 mg  20 mg Oral BID    nicotine (NICODERM CQ) 21 mg/24 hr patch 1 Patch  1 Patch TransDERmal DAILY                ASSESSMENT & PLAN        The patient, Lauren Sanz, is a 50 y.o.  male who presents at this time for treatment of the following diagnoses:  Patient Active Hospital Problem List:   Schizoaffective disorder    Assessment: patient improving on Clozaril, AH appears to be approaching baseline, will complete titration and likely discharge to shelter.     Plan:  - INCREASE Clozaril to 100 mg QDAY and 300 mg QHS for treatment resistant psychosis  - Clozaril level at next blood draw  - CONTINUE Pericolace 2 tablet QDAY for clozapine bowel regimen  - CONTINUE Propranolol 20 mg BID for iatrongenic tachycardia  - Foot abscess tx per IM, Gen Surg recs  - IGM therapy as tolerated  - Expand database / obtain collateral  - Dispo planning    I will continue to monitor blood levels (clozapine---a drug with a narrow therapeutic index= NTI) and associated labs for drug therapy implemented that require intense monitoring for toxicity as deemed appropriate based on current medication side effects and pharmacodynamically determined drug 1/2 lives. A coordinated, multidisplinary treatment team (includes the nurse, unit pharmacist,  and writer) round was conducted for this initial evaluation with the patient present. The following regarding medications was addressed during rounds with patient: the risks and benefits of the proposed medication. The patient was given the opportunity to ask questions. Informed consent given to the use of the above medications. I will continue to adjust psychiatric and non-psychiatric medications (see above \"medication\" section and orders section for details) as deemed appropriate & based upon diagnoses and response to treatment. I have reviewed admission (and previous/old) labs and medical tests in the EHR and or transferring hospital documents. I will continue to order blood tests/labs and diagnostic tests as deemed appropriate and review results as they become available (see orders for details). I have reviewed old psychiatric and medical records available in the EHR. I Will order additional psychiatric records from other institutions to further elucidate the nature of patient's psychopathology and review once available. I will gather additional collateral information from friends, family and o/p treatment team to further elucidate the nature of patient's psychopathology and baselline level of psychiatric functioning.       ESTIMATED LENGTH OF STAY:  4-5 days       STRENGTHS:  Exercising self-direction/Resourceful and Motivated and ready for change                                        SIGNED:    Carol Mesa MD  6/7/2020

## 2020-06-08 LAB
ATRIAL RATE: 97 BPM
CALCULATED P AXIS, ECG09: 58 DEGREES
CALCULATED R AXIS, ECG10: 58 DEGREES
CALCULATED T AXIS, ECG11: 35 DEGREES
DIAGNOSIS, 93000: NORMAL
P-R INTERVAL, ECG05: 148 MS
Q-T INTERVAL, ECG07: 350 MS
QRS DURATION, ECG06: 86 MS
QTC CALCULATION (BEZET), ECG08: 444 MS
VENTRICULAR RATE, ECG03: 97 BPM

## 2020-06-08 PROCEDURE — 65220000003 HC RM SEMIPRIVATE PSYCH

## 2020-06-08 PROCEDURE — 74011250637 HC RX REV CODE- 250/637: Performed by: NURSE PRACTITIONER

## 2020-06-08 PROCEDURE — 74011250637 HC RX REV CODE- 250/637: Performed by: PSYCHIATRY & NEUROLOGY

## 2020-06-08 RX ORDER — AMOXICILLIN 250 MG
2 CAPSULE ORAL DAILY
Status: DISCONTINUED | OUTPATIENT
Start: 2020-06-09 | End: 2020-06-11 | Stop reason: HOSPADM

## 2020-06-08 RX ORDER — IBUPROFEN 400 MG/1
400 TABLET ORAL
Status: DISCONTINUED | OUTPATIENT
Start: 2020-06-08 | End: 2020-06-11 | Stop reason: HOSPADM

## 2020-06-08 RX ADMIN — PROPRANOLOL HYDROCHLORIDE 20 MG: 10 TABLET ORAL at 08:44

## 2020-06-08 RX ADMIN — CALCIUM 500 MG: 500 TABLET ORAL at 08:45

## 2020-06-08 RX ADMIN — DOCUSATE SODIUM 50 MG AND SENNOSIDES 8.6 MG 1 TABLET: 8.6; 5 TABLET, FILM COATED ORAL at 08:45

## 2020-06-08 RX ADMIN — MONTELUKAST SODIUM 10 MG: 10 TABLET, FILM COATED ORAL at 21:09

## 2020-06-08 RX ADMIN — CEFDINIR 300 MG: 300 CAPSULE ORAL at 08:45

## 2020-06-08 RX ADMIN — OLANZAPINE 5 MG: 5 TABLET, FILM COATED ORAL at 01:30

## 2020-06-08 RX ADMIN — HYDROXYZINE HYDROCHLORIDE 50 MG: 25 TABLET, FILM COATED ORAL at 01:30

## 2020-06-08 RX ADMIN — CLOZAPINE 300 MG: 100 TABLET ORAL at 21:09

## 2020-06-08 RX ADMIN — DOXYCYCLINE HYCLATE 100 MG: 100 TABLET, COATED ORAL at 09:24

## 2020-06-08 RX ADMIN — ACETAMINOPHEN 650 MG: 325 TABLET, FILM COATED ORAL at 08:49

## 2020-06-08 RX ADMIN — CEFDINIR 300 MG: 300 CAPSULE ORAL at 21:09

## 2020-06-08 RX ADMIN — CALCIUM 500 MG: 500 TABLET ORAL at 17:11

## 2020-06-08 RX ADMIN — TRAZODONE HYDROCHLORIDE 50 MG: 50 TABLET ORAL at 23:06

## 2020-06-08 RX ADMIN — HYDROXYZINE HYDROCHLORIDE 50 MG: 25 TABLET, FILM COATED ORAL at 18:48

## 2020-06-08 RX ADMIN — PROPRANOLOL HYDROCHLORIDE 20 MG: 10 TABLET ORAL at 17:11

## 2020-06-08 RX ADMIN — DOXYCYCLINE HYCLATE 100 MG: 100 TABLET, COATED ORAL at 21:09

## 2020-06-08 RX ADMIN — CALCIUM 500 MG: 500 TABLET ORAL at 12:22

## 2020-06-08 RX ADMIN — CLOZAPINE 100 MG: 100 TABLET ORAL at 08:45

## 2020-06-08 RX ADMIN — OLANZAPINE 5 MG: 5 TABLET, FILM COATED ORAL at 18:48

## 2020-06-08 NOTE — PROGRESS NOTES
Hospitalist Progress Note    NAME: Emily Azar   :  1975   MRN:  [id number]   Room Number:  812/45  @ [facility]       Interim Hospital Summary: 50 y.o. male whom presented on 2020 with      Assessment / Plan:  Schizoaffective disorder, bipolar type  Cellulitis and abscess, left foot   Medical Clearance for psychiatric admission  Tobacco dependence     -Psychiatric treatment and management of health issues,Defer to psychiatrist for further management.  -Continue Doxycycline (total 7 days) and Cefdinir (3 days). - Wound care - dry dressing daily. Packing removed today. - TDaP administered .   -No VTE prophylaxis indicated or warranted at this time. Medicine will sign off. Subjective:     Chief Complaint / Reason for Physician Visit  \"pain is much less\". Discussed with RN events overnight. Review of Systems:  No fevers, chills, appetite change, cough, sputum production, shortness of breath, dyspnea on exertion, nausea, vomitting, diarrhea, constipation, chest pain, leg edema, abdominal pain, joint pain, rash, itching. Tolerating diet. Objective:     VITALS:   Last 24hrs VS reviewed since prior progress note. Most recent are:  Patient Vitals for the past 24 hrs:   Temp Pulse Resp BP SpO2   20 0748 97.7 °F (36.5 °C) (!) 102 18 (!) 145/97 100 %   20 98.7 °F (37.1 °C) 81 16 133/83 100 %     No intake or output data in the 24 hours ending 20 1339     PHYSICAL EXAM:  General: WD, WN. Alert, cooperative, no acute distress    EENT:  EOMI. Anicteric sclerae. MMM  Resp:  CTA bilaterally, no wheezing or rales. No accessory muscle use  CV:  Regular  rhythm,  normal S1/S2, no murmurs rubs gallops, No edema  GI:  Soft, Non distended, Non tender. +Bowel sounds  Neurologic:  Alert and oriented X 3, normal speech,   Psych:   Good insight. Not anxious nor agitated  Skin:  No rashes.   No jaundice  Wound on left foot examined   :   Scabbing noted, minimal erythema of surrounding skin, non tender to palpation  Reviewed most current lab test results and cultures  YES  Reviewed most current radiology test results   YES  Review and summation of old records today    NO  Reviewed patient's current orders and MAR    YES  PMH/SH reviewed - no change compared to H&P  ________________________________________________________________________  Care Plan discussed with:    Comments   Patient x    Family      RN     Care Manager     Consultant                        Multidiciplinary team rounds were held today with , nursing, pharmacist and clinical coordinator. Patient's plan of care was discussed; medications were reviewed and discharge planning was addressed. ________________________________________________________________________  Total NON critical care TIME:  20   Minutes    Total CRITICAL CARE TIME Spent:   Minutes non procedure based      Comments   >50% of visit spent in counseling and coordination of care     ________________________________________________________________________  Ted Larios MD     Procedures: see electronic medical records for all procedures/Xrays and details which were not copied into this note but were reviewed prior to creation of Plan. LABS:  I reviewed today's most current labs and imaging studies.   Pertinent labs include:  Recent Labs     06/06/20  0611   WBC 11.6*   HGB 13.1   HCT 38.8        Recent Labs     06/06/20  0611      K 4.0      CO2 27   GLU 92   BUN 26*   CREA 1.03   CA 8.5   MG 1.9   PHOS 2.8       Signed: Ted Larios MD

## 2020-06-08 NOTE — BH NOTES
Met with Juan Dennis for an individual session. He reports feeling goo and states \"I'm ready to leave. \" Reminded patient to talk to his treatment team. He denies SI and HI and reports that he has always heard voices. He reports if angels are real then they are hallucinations and that could be why they never go away. Will continue to work with patient daily while admitted.     Kaitlin Ramesh LPC [facility]

## 2020-06-08 NOTE — BH NOTES
Assumed care of the patient. Patient was visible in the milieu. He appeared withdrawn but was cooperative with the assessments. He denied S.I/H.I/VH and stated that he hears voices from the spirits, talking about the same thing. He also said that he was a little bit sad and depressed . He said, \"I don't deserve to be here. I will appreciate if I will be out by Monday. \"     Patient was interactive. He talked about his paintings and also showed me some of them. Dressing in left foot was intact. Patient was med and medicine compliant. Will continue to monitor. Patient was pacing in the hallway around 1 am . He appeared restless. He told me he was hearing voices too. He received PRN PO Atarax and Zyprexa at 0130. Will continue to monitor. Patient slept for about 6 hours.

## 2020-06-08 NOTE — INTERDISCIPLINARY ROUNDS
Behavioral Health Interdisciplinary Rounds      Patient Name: Artie Sheldon                       Age: 50 y.o. Room/Bed:  310/02  Primary Diagnosis: Schizophrenia ([facility] [address]           Admission Status: ICommittment                            Readmission within 30 days: No  Power of  in place: Unknown  Patient requires a blocked bed: No           Reason for blocked bed:  Patient is in a private room due to [address] for blocked bed obtained: No      Sleep hours: About 6 hours. Morning Labs completed per orders: None ordered. Participation in Care/Groups: NA  Medication Compliant?: Yes  PRNS (last 24 hours): Atarax, Zyprexa                  Restraints (last 24 hours): No  Substance Abuse: Yes                        24 hour chart check complete:      Patient goal(s) for today: continue taking medication as prescribed.   Treatment team focus/goals:stabilize and dispo planning   Progress note: Pt remains calm, cooperative and discharge focused at this time. Pt refusing all other housing options and [place]      LOS:  13                  Expected LOS: 6/10/2020     Financial concerns/prescription coverage: [employer] Medicare Advantage Part A&B with B Extended Coverage and [facility] Medicaid   Family contact: None   Family requesting physician contact today: No  Discharge plan: Pt would like to return to [facility] in [place]   Access to weapons: None involved.   Outpatient provider(s): To be linked. Patient's preferred phone number for follow up call:      Participating treatment team BESS Holbrook, Dr. Abdelrahman Rico, Dr. Emiliana Peterson.

## 2020-06-08 NOTE — PROGRESS NOTES
Pt is AxO x3 He is not oriented to situation. He denies SI, HI, and AVH. Nurse notices pt talking to himself and having auditory hallucinations. Dry Dressing change on his foot. He received PRN Atarax and Zyprexa for increased auditory hallucinations.

## 2020-06-08 NOTE — PROGRESS NOTES
Problem: Altered Thought Process (Adult/Pediatric)  Goal: *STG: Participates in treatment plan  Outcome: Progressing Towards Goal  Goal: *STG: Remains safe in hospital  Outcome: Progressing Towards Goal  Goal: *STG: Complies with medication therapy  Outcome: Progressing Towards Goal  Goal: *STG: Decreased hallucinations  Outcome: Not Progressing Towards Goal

## 2020-06-08 NOTE — BH NOTES
PSYCHIATRIC PROGRESS NOTE         Patient Name  Vikram Guerrier   Date of Birth 1975   [facility] [id number]   Medical Record Number  [id number]      Age  50 y.o. PCP None   Admit date:  6/6/2020    Room Number  321/01  @ [facility]   Date of Service  6/8/2020         E & M PROGRESS NOTE:         HISTORY       CC:  \"psychosis\"  HISTORY OF PRESENT ILLNESS/INTERVAL HISTORY:  (reviewed/updated 6/8/2020). per initial evaluation: The Gunjan Del Castillo, is F 69 y.o. WHITE OR  male with a past psychiatric history significant for schizoaffective disorder, who was admitted to the medical floor for the treatment of a foot abscess. Patient reports/evidences the following emotional symptoms:  psychosis and hallucinations.  The above symptoms have been present for 2+ weeks. These symptoms are of moderate severity. The symptoms are constant in nature.  Additional symptomatology include concern about health problems . The patient's condition has been precipitated by psychosocial stressors (stress of hospitalization and medical illness).     Patient transferred back to psychiatry following IV Abx for a foot abscess. Per IM, he will need to remain on PO Abx with wound care (daily dry dressings) at this time, but can resume psychiatric treatment primary. Patient with on going [address], stating that she bother him at times, but overall more pleasant. Tolerating Clozaril titration. Patient agreeable with the plan to continue treatment, still ambivalent about disposition options. 06/08 - patient compliant with medication, has been isolative but out for meals and coherent. Patient discharge focused, reported hearing \"spirits\" and got Zyprexa PRN. Clozaril titrated to 400 mg daily. He denies SI/HI, still with AH as above.  Patient reported pain in his foot to nursing but denies to team. Disposition options are tenuous as he still states he [place] upon discharge but will continue making medication if it is available. SIDE EFFECTS: (reviewed/updated 6/8/2020)  None reported or admitted to. No noted toxicity with use of Clozaril   ALLERGIES:(reviewed/updated 6/8/2020)  Allergies   Allergen Reactions    Haloperidol Other (comments)      MEDICATIONS PRIOR TO ADMISSION:(reviewed/updated 6/8/2020)  No medications prior to admission. PAST MEDICAL HISTORY: Past medical history from the initial psychiatric evaluation has been reviewed (reviewed/updated 6/8/2020) with no additional updates (I asked patient and no additional past medical history provided). Past Medical History:   Diagnosis Date    Back pain     Chronic bronchitis (HCC)     COPD    Elevated WBCs     H/O splenectomy     HTN (hypertension)     Ill-defined condition     constipation    Left foot pain 6/2/2020    Psychiatric disorder     depression, anxiety     Past Surgical History:   Procedure Laterality Date    HX OTHER SURGICAL Right     two right wrist fractures     HX SPLENECTOMY        SOCIAL HISTORY: Social history from the initial psychiatric evaluation has been reviewed (reviewed/updated 6/8/2020) with no additional updates (I asked patient and no additional social history provided).  Social History     Socioeconomic History    Marital status: SINGLE     Spouse name: Not on file    Number of children: Not on file    Years of education: Not on file    Highest education level: Not on file   Occupational History    Not on file   Social Needs    Financial resource strain: Not on file    Food insecurity     Worry: Not on file     Inability: Not on file    Transportation needs     Medical: Not on file     Non-medical: Not on file   Tobacco Use    Smoking status: Former Smoker    Smokeless tobacco: Never Used   Substance and Sexual Activity    Alcohol use: No    Drug use: No     Comment: \"not for years\"    Sexual activity: Not on file   Lifestyle    Physical activity     Days per week: Not on file Minutes per session: Not on file    Stress: Not on file   Relationships    Social connections     Talks on phone: Not on file     Gets together: Not on file     Attends [employer] service: Not on file     Active member of club or organization: Not on file     Attends meetings of clubs or organizations: Not on file     Relationship status: Not on file    Intimate partner violence     Fear of current or ex partner: Not on file     Emotionally abused: Not on file     Physically abused: Not on file     Forced sexual activity: Not on file   Other Topics Concern    Not on file   Social History Narrative    Social History:       Reviewed history from 02/03/2017 and no changes required:          Home: Lives with Campos Hanley, his girlfriend of 2 years, in a [place] apartment with pet lizard and fish          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 Josias Barron, [employer] psychiatry                     Smoking History:          Patient currently smokes every day. Patient has been counseled to quit. FAMILY HISTORY: Family history from the initial psychiatric evaluation has been reviewed (reviewed/updated 6/8/2020) with no additional updates (I asked patient and no additional family history provided).  Family History   Problem Relation Age of Onset    No Known Problems Mother     No Known Problems Father     No Known Problems Brother        REVIEW OF SYSTEMS: (reviewed/updated 6/8/2020)  Appetite:no change from normal   Sleep: improved   All other Review of Systems: Negative except foot pain per HPI         [facility] (MSE):    MSE FINDINGS ARE WITHIN NORMAL LIMITS (WNL) UNLESS OTHERWISE STATED BELOW. ( ALL OF THE BELOW CATEGORIES OF THE MSE HAVE BEEN REVIEWED (reviewed 6/8/2020) AND UPDATED AS DEEMED APPROPRIATE )  General Presentation age appropriate, cooperative and guarded   Orientation oriented to time, place and person   Vital Signs  See below (reviewed 6/8/2020); Vital Signs (BP, Pulse, & Temp) are within normal limits if not listed below. Gait and Station Stable/steady, no ataxia   Musculoskeletal System No extrapyramidal symptoms (EPS); no abnormal muscular movements or Tardive Dyskinesia (TD); muscle strength and tone are within normal limits   Language No aphasia or dysarthria   Speech:  normal volume and non-pressured   Thought Processes illogical; normal rate of thoughts; fair abstract reasoning/computation   Thought Associations goal directed   Thought Content auditory hallucinations and internally preoccupied   Suicidal Ideations none   Homicidal Ideations none   Mood:  euthymic   Affect:  blunted and mood-congruent   Memory recent  intact   Memory remote:  intact   Concentration/Attention:  intact   Fund of Knowledge average   Insight:  limited   Reliability fair   Judgment:  poor          VITALS:     Patient Vitals for the past 24 hrs:   Temp Pulse Resp BP SpO2   06/08/20 0748 97.7 °F (36.5 °C) (!) 102 18 (!) 145/97 100 %   06/07/20 2010 98.7 °F (37.1 °C) 81 16 133/83 100 %     Wt Readings from Last 3 Encounters:   06/06/20 67.6 kg (149 lb)   06/05/20 68 kg (149 lb 14.6 oz)   05/26/20 68 kg (150 lb)     Temp Readings from Last 3 Encounters:   06/08/20 97.7 °F (36.5 °C)   06/06/20 99 °F (37.2 °C)   06/04/20 98.1 °F (36.7 °C)     BP Readings from Last 3 Encounters:   06/08/20 (!) 145/97   06/06/20 156/80   06/04/20 135/86     Pulse Readings from Last 3 Encounters:   06/08/20 (!) 102   06/06/20 (!) 107   06/04/20 97            DATA     LABORATORY DATA:(reviewed/updated 6/8/2020)  No results found for this or any previous visit (from the past 24 hour(s)).   No results found for: VALF2, VALAC, VALP, VALPR, DS6, CRBAM, CRBAMP, CARB2, XCRBAM  No results found for: [facility]   RADIOLOGY REPORTS:(reviewed/updated 6/8/2020)  Xr Foot Lt Min 3 V    Result Date: 5/28/2020  EXAM: XR FOOT LT MIN 3 V INDICATION: swelling, pain, evaluate for foreign body. Patient describes object lodged in his foot, history somewhat unclear. Site of object not specified. COMPARISON: None. FINDINGS: Three views of the left foot demonstrate no fracture or other acute osseous or articular abnormality. The soft tissues are within normal limits. IMPRESSION: No acute abnormality. Xr Abd Port  1 V    Result Date: 5/26/2020  EXAM: XR ABD PORT  1 V INDICATION: ? of patient ingesting rocks COMPARISON: 12.2.2018. FINDINGS: A supine radiograph of the abdomen shows a nonspecific abdominal gas pattern. There are 2 radiopaque structures in the right lower quadrant that individually measure 16 and 11 mm in size. In addition, in the distal transverse colon, there is a 12 x 3 mm calcification. IMPRESSION: Radiopaque structures in the right lower quadrant and transverse colon could be compatible with ingested rocks.  No plain film evidence for obstruction          MEDICATIONS     ALL MEDICATIONS:   Current Facility-Administered Medications   Medication Dose Route Frequency    [START ON 6/9/2020] senna-docusate (PERICOLACE) 8.6-50 mg per tablet 2 Tab  2 Tab Oral DAILY    ibuprofen (MOTRIN) tablet 400 mg  400 mg Oral Q6H PRN    calcium carbonate (OS-REJI) tablet 500 mg [elemental]  500 mg Oral TID WITH MEALS    cefdinir (OMNICEF) capsule 300 mg  300 mg Oral Q12H    doxycycline (VIBRA-TABS) tablet 100 mg  100 mg Oral Q12H    montelukast (SINGULAIR) tablet 10 mg  10 mg Oral QHS    cloZAPine (CLOZARIL) tablet 100 mg  100 mg Oral DAILY    cloZAPine (CLOZARIL) tablet 300 mg  300 mg Oral QHS    propranoloL (INDERAL) tablet 20 mg  20 mg Oral BID    nicotine (NICODERM CQ) 21 mg/24 hr patch 1 Patch  1 Patch TransDERmal DAILY    OLANZapine (ZyPREXA) tablet 5 mg  5 mg Oral Q6H PRN    benztropine (COGENTIN) tablet 1 mg  1 mg Oral BID PRN    diphenhydrAMINE (BENADRYL) injection 50 mg  50 mg IntraMUSCular BID PRN    hydrOXYzine HCL (ATARAX) tablet 50 mg  50 mg Oral TID PRN    LORazepam (ATIVAN) injection 1 mg  1 mg IntraMUSCular Q4H PRN    traZODone (DESYREL) tablet 50 mg  50 mg Oral QHS PRN    acetaminophen (TYLENOL) tablet 650 mg  650 mg Oral Q4H PRN    magnesium hydroxide (MILK OF MAGNESIA) 400 mg/5 mL oral suspension 30 mL  30 mL Oral DAILY PRN    chlorproMAZINE (THORAZINE) injection 50 mg  50 mg IntraMUSCular Q6H PRN      SCHEDULED MEDICATIONS:   Current Facility-Administered Medications   Medication Dose Route Frequency    [START ON 6/9/2020] senna-docusate (PERICOLACE) 8.6-50 mg per tablet 2 Tab  2 Tab Oral DAILY    calcium carbonate (OS-REJI) tablet 500 mg [elemental]  500 mg Oral TID WITH MEALS    cefdinir (OMNICEF) capsule 300 mg  300 mg Oral Q12H    doxycycline (VIBRA-TABS) tablet 100 mg  100 mg Oral Q12H    montelukast (SINGULAIR) tablet 10 mg  10 mg Oral QHS    cloZAPine (CLOZARIL) tablet 100 mg  100 mg Oral DAILY    cloZAPine (CLOZARIL) tablet 300 mg  300 mg Oral QHS    propranoloL (INDERAL) tablet 20 mg  20 mg Oral BID    nicotine (NICODERM CQ) 21 mg/24 hr patch 1 Patch  1 Patch TransDERmal DAILY          ASSESSMENT & PLAN     DIAGNOSES REQUIRING ACTIVE TREATMENT AND MONITORING: (reviewed/updated 6/8/2020)  Patient Active Hospital Problem List:  Schizoaffective disorder    Assessment: patient improving on Clozaril, AH appears to be approaching baseline, will complete titration and likely discharge to shelter.    Plan:  - INCREASE Clozaril to 150 mg QDAY and 300 mg QHS for treatment resistant psychosis  - Clozaril level at next blood draw  - CONTINUE Pericolace 2 tablet QDAY for clozapine bowel regimen  - CONTINUE Propranolol 20 mg BID for iatrongenic tachycardia  - Foot abscess tx per IM, Gen Surg recs  - IGM therapy as tolerated  - Expand database / obtain collateral  - Dispo planning     I will continue to monitor blood levels (clozapine---a drug with a narrow therapeutic index= NTI) and associated labs for drug therapy implemented that require intense monitoring for toxicity as deemed appropriate based on current medication side effects and pharmacodynamically determined drug 1/2 lives. In summary, Olive Heath, is a 50 y.o.  male who presents with a severe exacerbation of the principal diagnosis of Schizoaffective disorder, bipolar type without good prognostic features ([address]    Patient's condition is improving. Patient requires continued inpatient hospitalization for further stabilization, safety monitoring and medication management. I will continue to coordinate the provision of individual, milieu, occupational, group, and substance abuse therapies to address target symptoms/diagnoses as deemed appropriate for the individual patient. A coordinated, multidisplinary treatment team round was conducted with the patient (this team consists of the nurse, psychiatric unit pharmacist,  and writer). Complete current electronic health record for patient has been reviewed today including consultant notes, ancillary staff notes, nurses and psychiatric tech notes. Suicide risk assessment completed and patient deemed to be of low risk for suicide at this time. The following regarding medications was addressed during rounds with patient:   the risks and benefits of the proposed medication. The patient was given the opportunity to ask questions. Informed consent given to the use of the above medications. Will continue to adjust psychiatric and non-psychiatric medications (see above \"medication\" section and orders section for details) as deemed appropriate & based upon diagnoses and response to treatment.      I will continue to order blood tests/labs and diagnostic tests as deemed appropriate and review results as they become available (see orders for details and above listed lab/test results). I will order psychiatric records from previous [facility] hospitals to further elucidate the nature of patient's psychopathology and review once available. I will gather additional collateral information from friends, family and o/p treatment team to further elucidate the nature of patient's psychopathology and baselline level of psychiatric functioning. I certify that this patient's inpatient psychiatric hospital services furnished since the previous certification were, and continue to be, required for treatment that could reasonably be expected to improve the patient's condition, or for diagnostic study, and that the patient continues to need, on a daily basis, active treatment furnished directly by or requiring the supervision of inpatient psychiatric facility personnel. In addition, the hospital records show that services furnished were intensive treatment services, admission or related services, or equivalent services.     EXPECTED DISCHARGE DATE/DAY: 6/11/2020     DISPOSITION: Shelter       Signed By:   Dea Monterroso MD  6/8/2020

## 2020-06-08 NOTE — PHYSICIAN ADVISORY
Letter of Status Determination:  
Recommend hospitalization status upgraded from INPATIENT  To OBSERVATION  Status Pt Name:  Domo Alcantar MR#  
MEREDITH # [id number] / 
[id number] Payor: Camille Goodell / Plan: [email or web address] / Product Type: Managed Care Medicare /   
KALA#  [id number] Room and Hospital  211/01  @ [address]  
Hospitalization date  6/5/2020  1:18 AM  
Current Attending Physician  No att. providers found Principal diagnosis  Abscess of foot [L94.112] Clinicals  50 y.o. y.o  male hospitalized with above diagnosis Schizoaffective disorder, bipolar type Cellulitis and abscess, left foot  
Medical Clearance for psychiatric admission Tobacco dependence 
  
-Psychiatric treatment and management of health issues,Defer to psychiatrist for further management. 
-Continue Doxycycline (total 7 days) and Cefdinir (3 days). - Wound care - dry dressing daily. Packing removed today. - TDaP administered 6/7.  
-No VTE prophylaxis indicated or warranted at this time. 
   
[place] (MCG) criteria Does  NOT apply STATUS DETERMINATION   
  OBSERVATION Additional comments Payor: Camille Goodell / Plan: [employer] / Product Type: [employer] Medicare /   
  
 
 
Camden Castillo [id number] [place] [address] T: [phone]    [email or web address]

## 2020-06-08 NOTE — PROGRESS NOTES
Problem: Altered Thought Process (Adult/Pediatric)  Goal: *STG: Participates in treatment plan  Outcome: Progressing Towards Goal  Goal: *STG: Remains safe in hospital  Outcome: Progressing Towards Goal  Goal: *STG: Complies with medication therapy  Outcome: Progressing Towards Goal

## 2020-06-09 PROCEDURE — 74011250637 HC RX REV CODE- 250/637: Performed by: PSYCHIATRY & NEUROLOGY

## 2020-06-09 PROCEDURE — 65220000003 HC RM SEMIPRIVATE PSYCH

## 2020-06-09 PROCEDURE — 74011250637 HC RX REV CODE- 250/637: Performed by: NURSE PRACTITIONER

## 2020-06-09 RX ORDER — CLOZAPINE 100 MG/1
200 TABLET ORAL DAILY
Status: DISCONTINUED | OUTPATIENT
Start: 2020-06-11 | End: 2020-06-11 | Stop reason: HOSPADM

## 2020-06-09 RX ORDER — DOXYCYCLINE HYCLATE 100 MG
100 TABLET ORAL EVERY 12 HOURS
Qty: 1 TAB | Refills: 0 | Status: SHIPPED | OUTPATIENT
Start: 2020-06-09 | End: 2020-06-10 | Stop reason: SDUPTHER

## 2020-06-09 RX ORDER — CLOZAPINE 100 MG/1
200 TABLET ORAL DAILY
Status: DISCONTINUED | OUTPATIENT
Start: 2020-06-10 | End: 2020-06-09

## 2020-06-09 RX ADMIN — CLOZAPINE 100 MG: 100 TABLET ORAL at 09:06

## 2020-06-09 RX ADMIN — CALCIUM 500 MG: 500 TABLET ORAL at 09:37

## 2020-06-09 RX ADMIN — OLANZAPINE 5 MG: 5 TABLET, FILM COATED ORAL at 10:38

## 2020-06-09 RX ADMIN — ACETAMINOPHEN 650 MG: 325 TABLET, FILM COATED ORAL at 14:41

## 2020-06-09 RX ADMIN — CEFDINIR 300 MG: 300 CAPSULE ORAL at 21:14

## 2020-06-09 RX ADMIN — CALCIUM 500 MG: 500 TABLET ORAL at 17:48

## 2020-06-09 RX ADMIN — PROPRANOLOL HYDROCHLORIDE 20 MG: 10 TABLET ORAL at 09:06

## 2020-06-09 RX ADMIN — DOXYCYCLINE HYCLATE 100 MG: 100 TABLET, COATED ORAL at 09:37

## 2020-06-09 RX ADMIN — DOCUSATE SODIUM 50 MG AND SENNOSIDES 8.6 MG 2 TABLET: 8.6; 5 TABLET, FILM COATED ORAL at 09:06

## 2020-06-09 RX ADMIN — MONTELUKAST SODIUM 10 MG: 10 TABLET, FILM COATED ORAL at 21:14

## 2020-06-09 RX ADMIN — PROPRANOLOL HYDROCHLORIDE 20 MG: 10 TABLET ORAL at 17:49

## 2020-06-09 RX ADMIN — HYDROXYZINE HYDROCHLORIDE 50 MG: 25 TABLET, FILM COATED ORAL at 10:38

## 2020-06-09 RX ADMIN — DOXYCYCLINE HYCLATE 100 MG: 100 TABLET, COATED ORAL at 21:15

## 2020-06-09 RX ADMIN — IBUPROFEN 400 MG: 400 TABLET ORAL at 14:41

## 2020-06-09 RX ADMIN — CEFDINIR 300 MG: 300 CAPSULE ORAL at 09:05

## 2020-06-09 RX ADMIN — TRAZODONE HYDROCHLORIDE 50 MG: 50 TABLET ORAL at 21:14

## 2020-06-09 RX ADMIN — CLOZAPINE 300 MG: 100 TABLET ORAL at 21:14

## 2020-06-09 RX ADMIN — CALCIUM 500 MG: 500 TABLET ORAL at 12:48

## 2020-06-09 NOTE — PROGRESS NOTES
Problem: Altered Thought Process (Adult/Pediatric)  Goal: *STG: Participates in treatment plan  Outcome: Progressing Towards Goal  Goal: *STG: Remains safe in hospital  Outcome: Progressing Towards Goal  Goal: *STG: Complies with medication therapy  Outcome: Progressing Towards Goal     0710 Report received from outgoing nurse. Patient alert, oriented to person, place. Patient cooperative, compliant with medications and meals. Patient denies anxiety, denies depression, denies SI, HI, AH, VH. Patient states he wants to get out to go hunting. Patient out of room pacing in hallway frequently. Patient brought waded balls of paper to nurse and said it was magical and could be used for growing plants or smoking. Patient spoke about his fire and the rocks he burns and LSD. Often difficult to follow conversation, nonsensical. Patient pleasant and calm but delusion.

## 2020-06-09 NOTE — INTERDISCIPLINARY ROUNDS
Behavioral Health Interdisciplinary Rounds     Patient Name: Aura Quinonez    Age: 50 y.o. Room/Bed:  321/01  Primary Diagnosis: Schizoaffective disorder, bipolar type without good prognostic features [facility]     Admission Status: VOL    Readmission within 30 days: No  Power of  in place: No  Patient requires a blocked bed: Yes            Reason for blocked bed: COVID  Order for blocked bed obtained: N/A        Sleep hours: 6.5   Morning Labs completed per orders:  N/A  Participation in Care/Groups: Yes  Medication Compliant?: Yes  PRNS (last 24 hours): Trazadone, Zyprexa x1, Atarax x1      Restraints (last 24 hours): No  Substance Abuse: No    24 hour chart check complete: Yes    Patient goal(s) for today: continue taking medication as prescribed.   Treatment team focus/goals:stabilize and dispo planning   Progress note: Pt remains calm, cooperative and discharge focused at this time. Pt refusing all other housing options and states [place] the woods. SW awaiting return call from insurance company about questions of pt following with [facility] from tent situation.      LOS:  14                  Expected LOS: 6/11/2020     Financial concerns/prescription coverage: [employer] Premier Medicare Advantage Part A&B with [employer] Extended Coverage and [facility]   Family contact: None   Family requesting physician contact today: No  Discharge plan: Pt would like to return to [facility] in woods   Access to weapons: None involved.   Outpatient provider(s): To be linked. Patient's preferred phone number for follow up call:      Participating treatment team BESS Rodriguez, Dr. Christy Nieto, Dr. Lazaro Mars.

## 2020-06-09 NOTE — BH NOTES
Attempted to meet with patient for an individual session while he was walking in the [place]. He does not acknowledge this writer and continues walking stating \"Abilify\" \"Abilify\" \"Abilify. \" and does not look at this writer despite greeting him by name. Will attempt to meet with patient tomorrow.

## 2020-06-09 NOTE — BH NOTES
Patient has been observed pacing the hallway, responding to internal stimuli. Patient laughing inappropriately. Currently denies SI/HI/AVH. Patient is med compliant. PRN Trazodone given due to patient having trouble sleeping. Will continue to monitor for safety,location and behavior.

## 2020-06-10 LAB
BACTERIA SPEC CULT: NORMAL
SERVICE CMNT-IMP: NORMAL

## 2020-06-10 PROCEDURE — 74011250637 HC RX REV CODE- 250/637: Performed by: NURSE PRACTITIONER

## 2020-06-10 PROCEDURE — 65220000003 HC RM SEMIPRIVATE PSYCH

## 2020-06-10 PROCEDURE — 74011250637 HC RX REV CODE- 250/637: Performed by: PSYCHIATRY & NEUROLOGY

## 2020-06-10 RX ORDER — CLOZAPINE 200 MG/1
200 TABLET ORAL EVERY 12 HOURS
Qty: 14 TAB | Refills: 0 | Status: SHIPPED | OUTPATIENT
Start: 2020-06-10

## 2020-06-10 RX ORDER — DOXYCYCLINE HYCLATE 100 MG
100 TABLET ORAL
Qty: 1 TAB | Refills: 0 | Status: SHIPPED | OUTPATIENT
Start: 2020-06-10

## 2020-06-10 RX ORDER — CLOZAPINE 100 MG/1
100 TABLET ORAL
Qty: 7 TAB | Refills: 0 | Status: SHIPPED | OUTPATIENT
Start: 2020-06-10

## 2020-06-10 RX ORDER — PROPRANOLOL HYDROCHLORIDE 20 MG/1
20 TABLET ORAL 2 TIMES DAILY
Qty: 60 TAB | Refills: 1 | Status: SHIPPED | OUTPATIENT
Start: 2020-06-10

## 2020-06-10 RX ORDER — MONTELUKAST SODIUM 10 MG/1
10 TABLET ORAL
Qty: 30 TAB | Refills: 1 | Status: SHIPPED | OUTPATIENT
Start: 2020-06-10

## 2020-06-10 RX ORDER — AMOXICILLIN 250 MG
2 CAPSULE ORAL DAILY
Qty: 60 TAB | Refills: 1 | Status: SHIPPED | OUTPATIENT
Start: 2020-06-11

## 2020-06-10 RX ADMIN — TRAZODONE HYDROCHLORIDE 50 MG: 50 TABLET ORAL at 21:35

## 2020-06-10 RX ADMIN — DOXYCYCLINE HYCLATE 100 MG: 100 TABLET, COATED ORAL at 21:35

## 2020-06-10 RX ADMIN — CLOZAPINE 300 MG: 100 TABLET ORAL at 21:35

## 2020-06-10 RX ADMIN — MONTELUKAST SODIUM 10 MG: 10 TABLET, FILM COATED ORAL at 21:35

## 2020-06-10 RX ADMIN — CLOZAPINE 150 MG: 100 TABLET ORAL at 08:35

## 2020-06-10 RX ADMIN — CALCIUM 500 MG: 500 TABLET ORAL at 11:31

## 2020-06-10 RX ADMIN — CALCIUM 500 MG: 500 TABLET ORAL at 08:35

## 2020-06-10 RX ADMIN — ACETAMINOPHEN 650 MG: 325 TABLET, FILM COATED ORAL at 16:28

## 2020-06-10 RX ADMIN — DOXYCYCLINE HYCLATE 100 MG: 100 TABLET, COATED ORAL at 09:43

## 2020-06-10 RX ADMIN — PROPRANOLOL HYDROCHLORIDE 20 MG: 10 TABLET ORAL at 16:28

## 2020-06-10 RX ADMIN — DOCUSATE SODIUM 50 MG AND SENNOSIDES 8.6 MG 2 TABLET: 8.6; 5 TABLET, FILM COATED ORAL at 08:35

## 2020-06-10 RX ADMIN — PROPRANOLOL HYDROCHLORIDE 20 MG: 10 TABLET ORAL at 08:35

## 2020-06-10 RX ADMIN — CALCIUM 500 MG: 500 TABLET ORAL at 16:28

## 2020-06-10 NOTE — BH NOTES
PSYCHIATRIC PROGRESS NOTE         Patient Name  Madelaine Severe   Date of Birth 1975   [facility] [id number]   Medical Record Number  [id number]      Age  50 y.o. PCP None   Admit date:  6/6/2020    Room Number  321/01  @ [facility] (formerly [facility])[3]   Date of Service  6/10/2020         E & M PROGRESS NOTE:         HISTORY       CC:  \"psychosis\"  HISTORY OF PRESENT ILLNESS/INTERVAL HISTORY:  (reviewed/updated 6/10/2020). per initial evaluation: The Coni Lee, is V 16 y.o. WHITE OR  male with a past psychiatric history significant for schizoaffective disorder, who was admitted to the medical floor for the treatment of a foot abscess. Patient reports/evidences the following emotional symptoms:  psychosis and hallucinations.  The above symptoms have been present for 2+ weeks. These symptoms are of moderate severity. The symptoms are constant in nature.  Additional symptomatology include concern about health problems . The patient's condition has been precipitated by psychosocial stressors (stress of hospitalization and medical illness).     Patient transferred back to psychiatry following IV Abx for a foot abscess. Per IM, he will need to remain on PO Abx with wound care (daily dry dressings) at this time, but can resume psychiatric treatment primary. Patient with on going [address], stating that she bother him at times, but overall more pleasant. Tolerating Clozaril titration. Patient agreeable with the plan to continue treatment, still ambivalent about disposition options. 06/08 - patient compliant with medication, has been isolative but out for meals and coherent. Patient discharge focused, reported hearing \"spirits\" and got Zyprexa PRN. Clozaril titrated to 400 mg daily. He denies SI/HI, still with AH as above.  Patient reported pain in his foot to nursing but denies to team. Disposition options are tenuous as he still states [place] upon [place] but will continue making medication if it is available. 06/09 - no acute overnight events. patient has been visible, odd, slept 8 hours, reports no worsening of his AH. 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 Foot is draining serous fluid, but he states the pain is improved. 06/10 - patient slept 6.5 hours overnight, has been calm, cooperative, still with AH and appears to be responding to internal stimuli, making nonsensical statements but redirectable. Patient discharge focused, states his foot is much better today. Plan to discharge tomorrow AM with medications filled at hospital pharmacy to improve chances of compliance. Patient counseled on the need to f/u with [facility], intake happened today via Zoom. SIDE EFFECTS: (reviewed/updated 6/10/2020)  None reported or admitted to. No noted toxicity with use of Clozaril   ALLERGIES:(reviewed/updated 6/10/2020)  Allergies   Allergen Reactions    Haloperidol Other (comments)      MEDICATIONS PRIOR TO ADMISSION:(reviewed/updated 6/10/2020)  No medications prior to admission. PAST MEDICAL HISTORY: Past medical history from the initial psychiatric evaluation has been reviewed (reviewed/updated 6/10/2020) with no additional updates (I asked patient and no additional past medical history provided).    Past Medical History:   Diagnosis Date    Back pain     Chronic bronchitis (HCC)     COPD    Elevated WBCs     H/O splenectomy     HTN (hypertension)     Ill-defined condition     constipation    Left foot pain 6/2/2020    Psychiatric disorder     depression, anxiety     Past Surgical History:   Procedure Laterality Date    HX OTHER SURGICAL Right     two right wrist fractures     HX SPLENECTOMY        SOCIAL HISTORY: Social history from the initial psychiatric evaluation has been reviewed (reviewed/updated 6/10/2020) with no additional updates (I asked patient and no additional social history provided). Social History     Socioeconomic History    Marital status: SINGLE     Spouse name: Not on file    Number of children: Not on file    Years of education: Not on file    Highest education level: Not on file   Occupational History    Not on file   Social Needs    Financial resource strain: Not on file    Food insecurity     Worry: Not on file     Inability: Not on file    Transportation needs     Medical: Not on file     Non-medical: Not on file   Tobacco Use    Smoking status: Former Smoker    Smokeless tobacco: Never Used   Substance and Sexual Activity    Alcohol use: No    Drug use: No     Comment: \"not for years\"    Sexual activity: Not on file   Lifestyle    Physical activity     Days per week: Not on file     Minutes per session: Not on file    Stress: Not on file   Relationships    Social connections     Talks on phone: Not on file     Gets together: Not on file     Attends Restorationism service: Not on file     Active member of club or organization: Not on file     Attends meetings of clubs or organizations: Not on file     Relationship status: Not on file    Intimate partner violence     Fear of current or ex partner: Not on file     Emotionally abused: Not on file     Physically abused: Not on file     Forced sexual activity: Not on file   Other Topics Concern    Not on file   Social History Narrative    Social History:       Reviewed history from 02/03/2017 and no changes required:          Home: Lives with [place], his girlfriend of 2 years, in a [place] apartment with pet lizard and fish          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 Gaurav Dowd, [employer] psychiatry                     Smoking History:          Patient currently smokes every day. Patient has been counseled to quit. FAMILY HISTORY: Family history from the initial psychiatric evaluation has been reviewed (reviewed/updated 6/10/2020) with no additional updates (I asked patient and no additional family history provided). Family History   Problem Relation Age of Onset    No Known Problems Mother     No Known Problems Father     No Known Problems Brother        REVIEW OF SYSTEMS: (reviewed/updated 6/10/2020)  Appetite:no change from normal   Sleep: improved   All other Review of Systems: Negative except foot pain per HPI         [facility] (MSE):    MSE FINDINGS ARE WITHIN NORMAL LIMITS (WNL) UNLESS OTHERWISE STATED BELOW. ( ALL OF THE BELOW CATEGORIES OF THE MSE HAVE BEEN REVIEWED (reviewed 6/10/2020) AND UPDATED AS DEEMED APPROPRIATE )  General Presentation age appropriate, cooperative and guarded   Orientation oriented to time, place and person   Vital Signs  See below (reviewed 6/10/2020); Vital Signs (BP, Pulse, & Temp) are within normal limits if not listed below.    Gait and Station Stable/steady, no ataxia   Musculoskeletal System No extrapyramidal symptoms (EPS); no abnormal muscular movements or Tardive Dyskinesia (TD); muscle strength and tone are within normal limits   Language No aphasia or dysarthria   Speech:  normal volume and non-pressured   Thought Processes coherent normal rate of thoughts; fair abstract reasoning/computation   Thought Associations goal directed   Thought Content auditory hallucinations and internally preoccupied   Suicidal Ideations none   Homicidal Ideations none   Mood:  euthymic   Affect:  blunted and mood-congruent   Memory recent  intact   Memory remote:  intact   Concentration/Attention:  intact   Fund of Knowledge average   Insight:  limited   Reliability good   Judgment:  poor          VITALS:     Patient Vitals for the past 24 hrs:   Temp Pulse Resp BP SpO2   06/10/20 0750 97.4 °F (36.3 °C) 72 18 (!) 135/99 100 %   06/09/20 2016 98 °F (36.7 °C) 86 18 131/77 100 %     Wt Readings from Last 3 Encounters:   06/06/20 67.6 kg (149 lb)   06/05/20 68 kg (149 lb 14.6 oz)   05/26/20 68 kg (150 lb)     Temp Readings from Last 3 Encounters:   06/10/20 97.4 °F (36.3 °C)   06/06/20 99 °F (37.2 °C)   06/04/20 98.1 °F (36.7 °C)     BP Readings from Last 3 Encounters:   06/10/20 (!) 135/99   06/06/20 156/80   06/04/20 135/86     Pulse Readings from Last 3 Encounters:   06/10/20 72   06/06/20 (!) 107   06/04/20 97            DATA     LABORATORY DATA:(reviewed/updated 6/10/2020)  No results found for this or any previous visit (from the past 24 hour(s)). No results found for: VALF2, VALAC, VALP, VALPR, DS6, CRBAM, CRBAMP, CARB2, XCRBAM  No results found for: LITHM   RADIOLOGY REPORTS:(reviewed/updated 6/10/2020)  Xr Foot Lt Min 3 V    Result Date: 5/28/2020  EXAM: XR FOOT LT MIN 3 V INDICATION: swelling, pain, evaluate for foreign body. Patient describes object lodged in his foot, history somewhat unclear. Site of object not specified. COMPARISON: None. FINDINGS: Three views of the left foot demonstrate no fracture or other acute osseous or articular abnormality. The soft tissues are within normal limits. IMPRESSION: No acute abnormality. Xr Abd Port  1 V    Result Date: 5/26/2020  EXAM: XR ABD PORT  1 V INDICATION: ? of patient ingesting rocks COMPARISON: 12.2.2018. FINDINGS: A supine radiograph of the abdomen shows a nonspecific abdominal gas pattern. There are 2 radiopaque structures in the right lower quadrant that individually measure 16 and 11 mm in size. In addition, in the distal transverse colon, there is a 12 x 3 mm calcification. IMPRESSION: Radiopaque structures in the right lower quadrant and transverse colon could be compatible with ingested rocks.  No plain film evidence for obstruction          MEDICATIONS     ALL MEDICATIONS:   Current Facility-Administered Medications   Medication Dose Route Frequency  [START ON 6/11/2020] cloZAPine (CLOZARIL) tablet 200 mg  200 mg Oral DAILY    senna-docusate (PERICOLACE) 8.6-50 mg per tablet 2 Tab  2 Tab Oral DAILY    ibuprofen (MOTRIN) tablet 400 mg  400 mg Oral Q6H PRN    calcium carbonate (OS-REJI) tablet 500 mg [elemental]  500 mg Oral TID WITH MEALS    doxycycline (VIBRA-TABS) tablet 100 mg  100 mg Oral Q12H    montelukast (SINGULAIR) tablet 10 mg  10 mg Oral QHS    cloZAPine (CLOZARIL) tablet 300 mg  300 mg Oral QHS    propranoloL (INDERAL) tablet 20 mg  20 mg Oral BID    nicotine (NICODERM CQ) 21 mg/24 hr patch 1 Patch  1 Patch TransDERmal DAILY    OLANZapine (ZyPREXA) tablet 5 mg  5 mg Oral Q6H PRN    benztropine (COGENTIN) tablet 1 mg  1 mg Oral BID PRN    diphenhydrAMINE (BENADRYL) injection 50 mg  50 mg IntraMUSCular BID PRN    hydrOXYzine HCL (ATARAX) tablet 50 mg  50 mg Oral TID PRN    LORazepam (ATIVAN) injection 1 mg  1 mg IntraMUSCular Q4H PRN    traZODone (DESYREL) tablet 50 mg  50 mg Oral QHS PRN    acetaminophen (TYLENOL) tablet 650 mg  650 mg Oral Q4H PRN    magnesium hydroxide (MILK OF MAGNESIA) 400 mg/5 mL oral suspension 30 mL  30 mL Oral DAILY PRN    chlorproMAZINE (THORAZINE) injection 50 mg  50 mg IntraMUSCular Q6H PRN      SCHEDULED MEDICATIONS:   Current Facility-Administered Medications   Medication Dose Route Frequency    [START ON 6/11/2020] cloZAPine (CLOZARIL) tablet 200 mg  200 mg Oral DAILY    senna-docusate (PERICOLACE) 8.6-50 mg per tablet 2 Tab  2 Tab Oral DAILY    calcium carbonate (OS-REJI) tablet 500 mg [elemental]  500 mg Oral TID WITH MEALS    doxycycline (VIBRA-TABS) tablet 100 mg  100 mg Oral Q12H    montelukast (SINGULAIR) tablet 10 mg  10 mg Oral QHS    cloZAPine (CLOZARIL) tablet 300 mg  300 mg Oral QHS    propranoloL (INDERAL) tablet 20 mg  20 mg Oral BID    nicotine (NICODERM CQ) 21 mg/24 hr patch 1 Patch  1 Patch TransDERmal DAILY          ASSESSMENT & PLAN     DIAGNOSES REQUIRING ACTIVE TREATMENT AND MONITORING: (reviewed/updated 6/10/2020)  Patient Active Hospital Problem List:  Schizoaffective disorder    Assessment: patient improving on Clozaril, AH appears to be approaching baseline, will complete titration and likely discharge to shelter.    Plan:  - CONTINUE Clozaril 200 mg QDAY and 300 mg QHS for treatment resistant psychosis  - Clozaril level at next blood draw  - CONTINUE Pericolace 2 tablet QDAY for clozapine bowel regimen  - CONTINUE Propranolol 20 mg BID for iatrongenic tachycardia  - Foot abscess tx per IM, Gen Surg recs  - IGM therapy as tolerated  - Expand database / obtain collateral  - Dispo planning     I will continue to monitor blood levels (clozapine---a drug with a narrow therapeutic index= NTI) and associated labs for drug therapy implemented that require intense monitoring for toxicity as deemed appropriate based on current medication side effects and pharmacodynamically determined drug 1/2 lives. In summary, Taina Pete, is a 50 y.o.  male who presents with a severe exacerbation of the principal diagnosis of Schizoaffective disorder, bipolar type without good prognostic features ([facility] [address]    Patient's condition is improving. Patient requires continued inpatient hospitalization for further stabilization, safety monitoring and medication management. I will continue to coordinate the provision of individual, milieu, occupational, group, and substance abuse therapies to address target symptoms/diagnoses as deemed appropriate for the individual patient. A coordinated, multidisplinary treatment team round was conducted with the patient (this team consists of the nurse, psychiatric unit pharmacist,  and writer). Complete current electronic health record for patient has been reviewed today including consultant notes, ancillary staff notes, nurses and psychiatric tech notes. Suicide risk assessment completed and patient deemed to be of low risk for suicide at this time. The following regarding medications was addressed during rounds with patient:   the risks and benefits of the proposed medication. The patient was given the opportunity to ask questions. Informed consent given to the use of the above medications. Will continue to adjust psychiatric and non-psychiatric medications (see above \"medication\" section and orders section for details) as deemed appropriate & based upon diagnoses and response to treatment. I will continue to order blood tests/labs and diagnostic tests as deemed appropriate and review results as they become available (see orders for details and above listed lab/test results). I will order psychiatric records from previous [facility] hospitals to further elucidate the nature of patient's psychopathology and review once available. I will gather additional collateral information from friends, family and o/p treatment team to further elucidate the nature of patient's psychopathology and baselline level of psychiatric functioning. I certify that this patient's inpatient psychiatric hospital services furnished since the previous certification were, and continue to be, required for treatment that could reasonably be expected to improve the patient's condition, or for diagnostic study, and that the patient continues to need, on a daily basis, active treatment furnished directly by or requiring the supervision of inpatient psychiatric facility personnel. In addition, the hospital records show that services furnished were intensive treatment services, admission or related services, or equivalent services.     EXPECTED DISCHARGE DATE/DAY: 6/11/2020     DISPOSITION: Shelter       Signed By:   Karina uCba MD  6/10/2020

## 2020-06-10 NOTE — BH NOTES
Behavioral Health Treatment Team Note     Patient goal(s) for today: continue taking medication as prescribed.   Treatment team focus/goals:stabilize and dispo planning   Progress note: Pt remains calm, cooperative and discharge focused at this time. SW assisted pt in intake assessment for services through [address] [place] [address]. Pt's case to be expedited into ICT services. No skill building services from other agencies available due to no home address.      LOS:  15                 Expected LOS: 6/11/2020     Financial concerns/prescription coverage: [facility] Medicare Advantage Part A&B with [facility] Extended Coverage and [facility] [employer] [employer] Medicaid   Family contact: None   Family requesting physician contact today: No  Discharge plan: Pt would like to return to [facility] [place]   Access to weapons: None involved.   Outpatient provider(s): To be linked. Patient's preferred phone number for follow up call:      Participating treatment team BESS Cruz, Dr. Kevin Fay, Dr. Trever Roland.

## 2020-06-10 NOTE — BH NOTES
Attempted to meet with patient today. He was laying in bed with the blankets over his head. Upon knocking and entering patient took the blankets down, said something that was incomprehensible, and put the blankets back over his head. Again attempted to engage and he stated he did not want to talk. Will continue to engage in session with patient tomorrow.     Krishna Dia LPC [facility]

## 2020-06-10 NOTE — BH NOTES
Assumed care for the patient following night shift at 1930. Patient has been pacing the hallway during the shift when they are not laying down. Patient is guarded during conversation and stated \"I am just waiting til they discharge me on Thursday so I can go back [place] my tent [place] the woods. \" Patient was compliant with their medications during this shift and did not voice any complaints. Patient slept a total of 7 hours this shift.

## 2020-06-11 VITALS
HEIGHT: 69 IN | SYSTOLIC BLOOD PRESSURE: 126 MMHG | BODY MASS INDEX: 22.07 KG/M2 | DIASTOLIC BLOOD PRESSURE: 77 MMHG | OXYGEN SATURATION: 99 % | HEART RATE: 99 BPM | RESPIRATION RATE: 16 BRPM | TEMPERATURE: 97.3 F | WEIGHT: 149 LBS

## 2020-06-11 LAB
BASOPHILS # BLD: 0.3 K/UL (ref 0–0.1)
BASOPHILS NFR BLD: 2 % (ref 0–1)
DIFFERENTIAL METHOD BLD: ABNORMAL
EOSINOPHIL # BLD: 1.4 K/UL (ref 0–0.4)
EOSINOPHIL NFR BLD: 10 % (ref 0–7)
ERYTHROCYTE [DISTWIDTH] IN BLOOD BY AUTOMATED COUNT: 13.1 % (ref 11.5–14.5)
HCT VFR BLD AUTO: 39.6 % (ref 36.6–50.3)
HGB BLD-MCNC: 13.3 G/DL (ref 12.1–17)
IMM GRANULOCYTES # BLD AUTO: 0 K/UL
IMM GRANULOCYTES NFR BLD AUTO: 0 %
LYMPHOCYTES # BLD: 6.3 K/UL (ref 0.8–3.5)
LYMPHOCYTES NFR BLD: 44 % (ref 12–49)
MCH RBC QN AUTO: 30.9 PG (ref 26–34)
MCHC RBC AUTO-ENTMCNC: 33.6 G/DL (ref 30–36.5)
MCV RBC AUTO: 92.1 FL (ref 80–99)
MONOCYTES # BLD: 1.1 K/UL (ref 0–1)
MONOCYTES NFR BLD: 8 % (ref 5–13)
NEUTS SEG # BLD: 5.1 K/UL (ref 1.8–8)
NEUTS SEG NFR BLD: 36 % (ref 32–75)
NRBC # BLD: 0 K/UL (ref 0–0.01)
NRBC BLD-RTO: 0 PER 100 WBC
PLATELET # BLD AUTO: 425 K/UL (ref 150–400)
PMV BLD AUTO: 10.3 FL (ref 8.9–12.9)
RBC # BLD AUTO: 4.3 M/UL (ref 4.1–5.7)
RBC MORPH BLD: ABNORMAL
WBC # BLD AUTO: 14.2 K/UL (ref 4.1–11.1)

## 2020-06-11 PROCEDURE — 85025 COMPLETE CBC W/AUTO DIFF WBC: CPT

## 2020-06-11 PROCEDURE — 36415 COLL VENOUS BLD VENIPUNCTURE: CPT

## 2020-06-11 PROCEDURE — 74011250637 HC RX REV CODE- 250/637: Performed by: PSYCHIATRY & NEUROLOGY

## 2020-06-11 PROCEDURE — 74011250637 HC RX REV CODE- 250/637: Performed by: NURSE PRACTITIONER

## 2020-06-11 PROCEDURE — 80159 DRUG ASSAY CLOZAPINE: CPT

## 2020-06-11 RX ADMIN — DOCUSATE SODIUM 50 MG AND SENNOSIDES 8.6 MG 2 TABLET: 8.6; 5 TABLET, FILM COATED ORAL at 09:10

## 2020-06-11 RX ADMIN — CALCIUM 500 MG: 500 TABLET ORAL at 13:09

## 2020-06-11 RX ADMIN — DOXYCYCLINE HYCLATE 100 MG: 100 TABLET, COATED ORAL at 10:44

## 2020-06-11 RX ADMIN — CALCIUM 500 MG: 500 TABLET ORAL at 09:10

## 2020-06-11 RX ADMIN — CLOZAPINE 200 MG: 100 TABLET ORAL at 09:10

## 2020-06-11 RX ADMIN — PROPRANOLOL HYDROCHLORIDE 20 MG: 10 TABLET ORAL at 09:10

## 2020-06-11 NOTE — PROGRESS NOTES
Discharge instructions and prescriptions given to patient and patient verbalized understanding. Patient given pants, shirt, socks, shoes prior to discharge. Patient escorted down to [facility] vehicle for discharge to [facility] and will be meeting with  upon arrival to [facility] to assist in continuity of care.

## 2020-06-11 NOTE — DISCHARGE INSTRUCTIONS
Patient Education   If I feel I am at risk of hurting myself or others, I will call the crisis office and speak with a crisis worker who will assist me during my crisis. [address]  [phone]  [address]  [phone]       Learning About Mood Disorders  What are mood disorders? Mood disorders are medical problems that affect how you feel. They can impact your moods, thoughts, and actions. Mood disorders include:  · Depression. This causes you to feel sad or hopeless for much of the time. · Bipolar disorder. This causes extreme mood changes from manic episodes of very high energy to extreme lows of depression. · Seasonal affective disorder (SAD). This is a type of depression that affects you during the same season each year. Most often people experience SAD during the fall and winter months when days are shorter and there is less light. What are the symptoms? Depression  You may:  · Feel sad or hopeless nearly every day. · Lose interest in or not get pleasure from most daily activities. You feel this way nearly every day. · Have low energy, changes in your appetite, or changes in how well you sleep. · Have trouble concentrating. · Think about death and suicide. Keep the numbers for these national suicide hotlines: 2-192-435-TALK (4-158.433.8287) and 3-282-IGKNNVS (4-531.633.2766). If you or someone you know talks about suicide or feeling hopeless, get help right away. Bipolar disorder  Symptoms depend on your mood swings. You may:  · Feel very happy, energetic, or on edge. · Feel like you need very little sleep. · Feel overly self-confident. · Do impulsive things, such as spending a lot of money. · Feel sad or hopeless. · Have racing thoughts or trouble thinking and making decisions.   · Lose interest in things you have enjoyed in the past.  · Think about death and suicide. Keep the numbers for these national suicide hotlines: 0-702-229-TALK (1-762.612.1306) and 8-490-SIKFCTE (5-412.109.2997). If you or someone you know talks about suicide or feeling hopeless, get help right away. Seasonal affective disorder (SAD)  Symptoms come and go at about the same time each year. For most people with SAD, symptoms come during the winter when there is less daylight. You may:  · Feel sad, grumpy, ramsay, or anxious. · Lose interest in your usual activities. · Eat more and crave carbohydrates, such as bread and pasta. · Gain weight. · Sleep more and feel drowsy during the daytime. How are mood disorders treated? Mood disorders can be treated with medicines or counseling, or a combination of both. Medicines for depression and SAD may include antidepressants. Medicines for bipolar disorder may include:  · Mood stabilizers. · Antipsychotics. · Benzodiazepines. Counseling may involve cognitive-behavioral therapy. It teaches you how to change the ways you think and behave. This can help you stop thinking bad thoughts about yourself and your life. Light therapy is the main treatment for SAD. This therapy uses a special kind of lamp. You let the lamp shine on you at certain times, usually in the morning. This may help your symptoms during the months when there is less sunlight. Healthy lifestyle  Healthy lifestyle changes may help you feel better. · Be active often. You might try walking or strength training. · Eat a healthy diet. Include fruits, vegetables, lean proteins, and whole grains in your diet each day. · Keep a regular sleep schedule. Try for 8 hours of sleep a night. · Find ways to manage stress, such as relaxation exercises. · Avoid alcohol and illegal drugs. Follow-up care is a key part of your treatment and safety. Be sure to make and go to all appointments, and call your doctor if you are having problems.  It's also a good idea to know your test results and keep a list of the medicines you take. Where can you learn more? Go to http://holli-luiz.info/  Enter Z126 in the search box to learn more about \"Learning About Mood Disorders. \"  Current as of: January 31, 2020               Content Version: 12.5  © 8139-5704 Healthwise, Incorporated. Care instructions adapted under license by [employer] (which disclaims liability or warranty for this information). If you have questions about a medical condition or this instruction, always ask your healthcare professional. [address] any warranty or liability for your use of this information.

## 2020-06-11 NOTE — PROGRESS NOTES
Problem: Altered Thought Process (Adult/Pediatric)  Goal: *STG: Participates in treatment plan  Outcome: Progressing Towards Goal  Goal: *STG: Complies with medication therapy  Outcome: Progressing Towards Goal

## 2020-06-11 NOTE — DISCHARGE SUMMARY
PSYCHIATRIC DISCHARGE SUMMARY         IDENTIFICATION:    Patient Name  Paxton Tijerina   Date of Birth 1975   [facility] [id number]   Medical Record Number  [id number]      Age  50 y.o. PCP None   Admit date:  6/6/2020    Discharge date: 6/11/2020   Room Number  321/01  @ [facility]   Date of Service  6/11/2020            TYPE OF DISCHARGE: REGULAR               CONDITION AT DISCHARGE: improved and fair       PROVISIONAL & DISCHARGE DIAGNOSES:    Problem List  Date Reviewed: 6/2/2020          Codes Class    Abscess of foot ICD-10-CM: L02.619  ICD-9-CM: 016. 7         Abscess ICD-10-CM: L02.91  ICD-9-CM: 682.9         Foot abscess, left ICD-10-CM: L02.612  ICD-9-CM: 682.7         Dizziness ICD-10-CM: R42  ICD-9-CM: 780.4         Hyperlipidemia ICD-10-CM: E78.5  ICD-9-CM: 272.4         Leukocytosis ICD-10-CM: D72.829  ICD-9-CM: 288.60         Lactose intolerance ICD-10-CM: E73.9  ICD-9-CM: 271.3         Impaired glucose tolerance ICD-10-CM: R73.02  ICD-9-CM: 790.22         * (Principal) Schizoaffective disorder, bipolar type without good prognostic features (HCC) ICD-10-CM: F25.0  ICD-9-CM: 295.70         Onychomycosis ICD-10-CM: B35.1  ICD-9-CM: 110.1         Smoker ICD-10-CM: F17.200  ICD-9-CM: 305.1         Back pain ICD-10-CM: M54.9  ICD-9-CM: 724.5         Chronic obstructive pulmonary disease (HCC) ICD-10-CM: J44.9  ICD-9-CM: 496         Herniation of intervertebral disc ICD-10-CM: [id number]  ICD-9-CM: 722.2         Hypertension ICD-10-CM: I10  ICD-9-CM: 401.9         Insomnia ICD-10-CM: G47.00  ICD-9-CM: 780.52               Active Hospital Problems    *Schizoaffective disorder, bipolar type without good prognostic features [facility]        DISCHARGE DIAGNOSIS:   Axis I:  SEE ABOVE  Axis II: SEE ABOVE  Axis III: SEE ABOVE  Axis IV:  lack of structure  Axis V:  20 on admission, 70 on discharge     CC & HISTORY OF PRESENT ILLNESS:  \"psychosis\"    [facility], is K 89 y.o. WHITE OR José Tinajero a past psychiatric history significant for schizoaffective disorder, who was admitted to the medical floor for the treatment of a foot abscess. Patient reports/evidences the following emotional symptoms:  psychosis and hallucinations.  The above symptoms have been present for 2+ weeks. These symptoms are of moderate severity. The symptoms are constant in nature.  Additional symptomatology include concern about health problems . The patient's condition has been precipitated by psychosocial stressors (stress of hospitalization and medical illness).     Patient transferred back to psychiatry following IV Abx for a foot abscess. Per IM, he will need to remain on PO Abx with wound care (daily dry dressings) at this time, but can resume psychiatric treatment primary. Patient with on going [address], stating that she bother him at times, but overall more pleasant. Tolerating Clozaril titration. Patient agreeable with the plan to continue treatment, still ambivalent about disposition options.     06/08 - patient compliant with medication, has been isolative but out for meals and coherent. Patient discharge focused, reported hearing \"spirits\" and got Zyprexa PRN. Clozaril titrated to 400 mg daily. He denies SI/HI, still with AH as above. Patient reported pain in his foot to nursing but denies to team. Disposition options are tenuous as he still [place] but will continue making medication if it is available.     06/09 - no acute overnight events. patient has been visible, odd, slept 8 hours, reports no worsening of his AH. 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  Foot is draining serous fluid, but he states the pain is improved.     06/10 - patient slept 6.5 hours overnight, has been calm, cooperative, still with AH and appears to be responding to internal stimuli, making nonsensical statements but redirectable. Patient discharge focused, states his foot is much better today. Plan to discharge tomorrow AM with medications filled at hospital pharmacy to improve chances of compliance. Patient counseled on the need to f/u with [facility], intake happened today via Zoom.         SOCIAL HISTORY:    Social History     Socioeconomic History    Marital status: SINGLE     Spouse name: Not on file    Number of children: Not on file    Years of education: Not on file    Highest education level: Not on file   Occupational History    Not on file   Social Needs    Financial resource strain: Not on file    Food insecurity     Worry: Not on file     Inability: Not on file    Transportation needs     Medical: Not on file     Non-medical: Not on file   Tobacco Use    Smoking status: Former Smoker    Smokeless tobacco: Never Used   Substance and Sexual Activity    Alcohol use: No    Drug use: No     Comment: \"not for years\"    Sexual activity: Not on file   Lifestyle    Physical activity     Days per week: Not on file     Minutes per session: Not on file    Stress: Not on file   Relationships    Social connections     Talks on phone: Not on file     Gets together: Not on file     Attends [employer] service: Not on file     Active member of club or organization: Not on file     Attends meetings of clubs or organizations: Not on file     Relationship status: Not on file    Intimate partner violence     Fear of current or ex partner: Not on file     Emotionally abused: Not on file     Physically abused: Not on file     Forced sexual activity: Not on file   Other Topics Concern    Not on file   Social History Narrative    Social History:       Reviewed history from 02/03/2017 and no changes required:          Home: Lives with [place], his girlfriend of 2 years, in a [place] apartment with pet lizard and fish          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 Altagracia Verduzco, [address] [facility]                     Smoking History:          Patient currently smokes every day. Patient has been counseled to quit. FAMILY HISTORY:   Family History   Problem Relation Age of Onset    No Known Problems Mother     No Known Problems Father     No Known Problems Brother              HOSPITALIZATION COURSE:    Emily Azar was admitted to the inpatient psychiatric unit [facility] for acute psychiatric stabilization in regards to symptomatology as described in the HPI above. The differential diagnosis at time of admission included: schizophrenia vs schizoaffective disorder. While on the unit Emily Azar was involved in individual, group, occupational and milieu therapy. Psychiatric medications were adjusted during this hospitalization including Clozaril and Propranolol. Emily Azar demonstrated a slow, but progressive improvement in overall condition. Much of patient's initial presentation appeared to be related to situational stressors, effects of medication non-compliance, drugs of abuse, and psychological factors. Please see individual progress notes for more specific details regarding patient's hospitalization course. Patient with request for discharge today. There are no grounds to seek a TDO. At time of discharge, Emily Azar is without significant problems of depression, psychosis, or jailene. Patient free of suicidal and homicidal ideations (appears to be at very low risk of suicide or homicide) and reports many positive predictive factors in terms of not attempting suicide or homicide. Overall presentation at time of discharge is most consistent with the diagnosis of schizoaffective disorder.     Patient has maximized benefit to be derived from acute inpatient psychiatric treatment. All members of the treatment team concur with each other in regards to plans for discharge today. Patient and family are aware and in agreement with discharge and discharge plan. LABS AND IMAGAING:    Labs Reviewed   CBC WITH AUTOMATED DIFF - Abnormal; Notable for the following components:       Result Value    WBC 14.2 (*)     PLATELET 769 (*)     EOSINOPHILS 10 (*)     BASOPHILS 2 (*)     ABS. LYMPHOCYTES 6.3 (*)     ABS. MONOCYTES 1.1 (*)     ABS. EOSINOPHILS 1.4 (*)     ABS. BASOPHILS 0.3 (*)     All other components within normal limits   CLOZAPINE     No results found for: DS35, PHEN, PHENO, PHENT, DILF, DS39, PHENY, PTN, VALF2, VALAC, VALP, VALPR, DS6, CRBAM, CRBAMP, CARB2, XCRBAM  Admission on 06/06/2020   Component Date Value Ref Range Status    WBC 06/11/2020 14.2* 4.1 - 11.1 K/uL Final    RBC 06/11/2020 4.30  4. 10 - 5.70 M/uL Final    HGB 06/11/2020 13.3  12.1 - 17.0 g/dL Final    HCT 06/11/2020 39.6  36.6 - 50.3 % Final    MCV 06/11/2020 92.1  80.0 - 99.0 FL Final    MCH 06/11/2020 30.9  26.0 - 34.0 PG Final    MCHC 06/11/2020 33.6  30.0 - 36.5 g/dL Final    RDW 06/11/2020 13.1  11.5 - 14.5 % Final    PLATELET 99/28/8461 738* 150 - 400 K/uL Final    MPV 06/11/2020 10.3  8.9 - 12.9 FL Final    NRBC 06/11/2020 0.0  0  WBC Final    ABSOLUTE NRBC 06/11/2020 0.00  0.00 - 0.01 K/uL Final    NEUTROPHILS 06/11/2020 36  32 - 75 % Final    LYMPHOCYTES 06/11/2020 44  12 - 49 % Final    MONOCYTES 06/11/2020 8  5 - 13 % Final    EOSINOPHILS 06/11/2020 10* 0 - 7 % Final    BASOPHILS 06/11/2020 2* 0 - 1 % Final    IMMATURE GRANULOCYTES 06/11/2020 0  % Final    ABS. NEUTROPHILS 06/11/2020 5.1  1.8 - 8.0 K/UL Final    ABS. LYMPHOCYTES 06/11/2020 6.3* 0.8 - 3.5 K/UL Final    ABS. MONOCYTES 06/11/2020 1.1* 0.0 - 1.0 K/UL Final    ABS. EOSINOPHILS 06/11/2020 1.4* 0.0 - 0.4 K/UL Final    ABS. BASOPHILS 06/11/2020 0.3* 0.0 - 0.1 K/UL Final    ABS. IMM. GRANS. 06/11/2020 0.0  K/UL Final    DF 06/11/2020 MANUAL    Final    RBC COMMENTS 06/11/2020 NORMOCYTIC, NORMOCHROMIC    Final   Admission on 06/05/2020, Discharged on 06/06/2020   Component Date Value Ref Range Status    Special Requests: 06/05/2020 NO SPECIAL REQUESTS    Final    Culture result: 06/05/2020 NO GROWTH 5 DAYS    Final    WBC 06/05/2020 13.0* 4.1 - 11.1 K/uL Final    RBC 06/05/2020 4.16  4.10 - 5.70 M/uL Final    HGB 06/05/2020 12.9  12.1 - 17.0 g/dL Final    HCT 06/05/2020 37.7  36.6 - 50.3 % Final    MCV 06/05/2020 90.6  80.0 - 99.0 FL Final    MCH 06/05/2020 31.0  26.0 - 34.0 PG Final    MCHC 06/05/2020 34.2  30.0 - 36.5 g/dL Final    RDW 06/05/2020 13.1  11.5 - 14.5 % Final    PLATELET 01/14/2498 942* 150 - 400 K/uL Final    MPV 06/05/2020 10.2  8.9 - 12.9 FL Final    NRBC 06/05/2020 0.0  0  WBC Final    ABSOLUTE NRBC 06/05/2020 0.00  0.00 - 0.01 K/uL Final    Sodium 06/05/2020 141  136 - 145 mmol/L Final    Potassium 06/05/2020 3.0* 3.5 - 5.1 mmol/L Final    Chloride 06/05/2020 112* 97 - 108 mmol/L Final    CO2 06/05/2020 19* 21 - 32 mmol/L Final    Anion gap 06/05/2020 10  5 - 15 mmol/L Final    Glucose 06/05/2020 78  65 - 100 mg/dL Final    BUN 06/05/2020 26* 6 - 20 MG/DL Final    Creatinine 06/05/2020 0.88  0.70 - 1.30 MG/DL Final    BUN/Creatinine ratio 06/05/2020 30* 12 - 20   Final    GFR est AA 06/05/2020 >60  >60 ml/min/1.73m2 Final    GFR est non-AA 06/05/2020 >60  >60 ml/min/1.73m2 Final    Calcium 06/05/2020 6.9* 8.5 - 10.1 MG/DL Final    Bilirubin, total 06/05/2020 0.2  0.2 - 1.0 MG/DL Final    ALT (SGPT) 06/05/2020 14  12 - 78 U/L Final    AST (SGOT) 06/05/2020 9* 15 - 37 U/L Final    Alk.  phosphatase 06/05/2020 68  45 - 117 U/L Final    Protein, total 06/05/2020 4.9* 6.4 - 8.2 g/dL Final    Albumin 06/05/2020 2.3* 3.5 - 5.0 g/dL Final    Globulin 06/05/2020 2.6  2.0 - 4.0 g/dL Final    A-G Ratio 06/05/2020 0.9* 1.1 - 2.2   Final    Lactic acid 06/05/2020 1.0  0.4 - 2.0 MMOL/L Final    Ventricular Rate 06/05/2020 97  BPM Final    Atrial Rate 06/05/2020 97  BPM Final    P-R Interval 06/05/2020 148  ms Final    QRS Duration 06/05/2020 86  ms Final    Q-T Interval 06/05/2020 350  ms Final    QTC Calculation (Bezet) 06/05/2020 444  ms Final    Calculated P Axis 06/05/2020 58  degrees Final    Calculated R Axis 06/05/2020 58  degrees Final    Calculated T Axis 06/05/2020 35  degrees Final    Diagnosis 06/05/2020    Final                    Value:Normal sinus rhythm  Possible Left atrial enlargement  Borderline ECG  When compared with ECG of 27-SEP-2018 15:36,  No significant change was found  Confirmed by Alden Ayala M.D., Daniel Allen ([id number]) on 6/8/2020 7:10:17 AM      WBC 06/06/2020 11.6* 4.1 - 11.1 K/uL Final    RBC 06/06/2020 4.30  4. 10 - 5.70 M/uL Final    HGB 06/06/2020 13.1  12.1 - 17.0 g/dL Final    HCT 06/06/2020 38.8  36.6 - 50.3 % Final    MCV 06/06/2020 90.2  80.0 - 99.0 FL Final    MCH 06/06/2020 30.5  26.0 - 34.0 PG Final    MCHC 06/06/2020 33.8  30.0 - 36.5 g/dL Final    RDW 06/06/2020 13.2  11.5 - 14.5 % Final    PLATELET 69/48/1419 694  150 - 400 K/uL Final    MPV 06/06/2020 11.1  8.9 - 12.9 FL Final    NRBC 06/06/2020 0.0  0  WBC Final    ABSOLUTE NRBC 06/06/2020 0.00  0.00 - 0.01 K/uL Final    NEUTROPHILS 06/06/2020 42  32 - 75 % Final    LYMPHOCYTES 06/06/2020 45  12 - 49 % Final    MONOCYTES 06/06/2020 7  5 - 13 % Final    EOSINOPHILS 06/06/2020 5  0 - 7 % Final    BASOPHILS 06/06/2020 1  0 - 1 % Final    IMMATURE GRANULOCYTES 06/06/2020 0  0.0 - 0.5 % Final    ABS. NEUTROPHILS 06/06/2020 4.8  1.8 - 8.0 K/UL Final    ABS. LYMPHOCYTES 06/06/2020 5.2* 0.8 - 3.5 K/UL Final    ABS. MONOCYTES 06/06/2020 0.9  0.0 - 1.0 K/UL Final    ABS. EOSINOPHILS 06/06/2020 0.5* 0.0 - 0.4 K/UL Final    ABS. BASOPHILS 06/06/2020 0.2* 0.0 - 0.1 K/UL Final    ABS. IMM.  GRANS. 06/06/2020 0.0  0.00 - 0.04 K/UL Final    DF 06/06/2020 AUTOMATED    Final    Sodium 06/06/2020 137  136 - 145 mmol/L Final    Potassium 06/06/2020 4.0  3.5 - 5.1 mmol/L Final    Chloride 06/06/2020 105  97 - 108 mmol/L Final    CO2 06/06/2020 27  21 - 32 mmol/L Final    Anion gap 06/06/2020 5  5 - 15 mmol/L Final    Glucose 06/06/2020 92  65 - 100 mg/dL Final    BUN 06/06/2020 26* 6 - 20 MG/DL Final    Creatinine 06/06/2020 1.03  0.70 - 1.30 MG/DL Final    BUN/Creatinine ratio 06/06/2020 25* 12 - 20   Final    GFR est AA 06/06/2020 >60  >60 ml/min/1.73m2 Final    GFR est non-AA 06/06/2020 >60  >60 ml/min/1.73m2 Final    Calcium 06/06/2020 8.5  8.5 - 10.1 MG/DL Final    Magnesium 06/06/2020 1.9  1.6 - 2.4 mg/dL Final    Phosphorus 06/06/2020 2.8  2.6 - 4.7 MG/DL Final   Admission on 05/26/2020, Discharged on 06/05/2020   Component Date Value Ref Range Status    WBC 05/26/2020 12.9* 4.1 - 11.1 K/uL Final    RBC 05/26/2020 3.92* 4.10 - 5.70 M/uL Final    HGB 05/26/2020 12.0* 12.1 - 17.0 g/dL Final    HCT 05/26/2020 35.5* 36.6 - 50.3 % Final    MCV 05/26/2020 90.6  80.0 - 99.0 FL Final    MCH 05/26/2020 30.6  26.0 - 34.0 PG Final    MCHC 05/26/2020 33.8  30.0 - 36.5 g/dL Final    RDW 05/26/2020 13.0  11.5 - 14.5 % Final    PLATELET 23/57/8349 381  150 - 400 K/uL Final    MPV 05/26/2020 10.2  8.9 - 12.9 FL Final    NRBC 05/26/2020 0.0  0  WBC Final    ABSOLUTE NRBC 05/26/2020 0.00  0.00 - 0.01 K/uL Final    NEUTROPHILS 05/26/2020 58  32 - 75 % Final    LYMPHOCYTES 05/26/2020 28  12 - 49 % Final    MONOCYTES 05/26/2020 10  5 - 13 % Final    EOSINOPHILS 05/26/2020 3  0 - 7 % Final    BASOPHILS 05/26/2020 1  0 - 1 % Final    IMMATURE GRANULOCYTES 05/26/2020 0  0.0 - 0.5 % Final    ABS. NEUTROPHILS 05/26/2020 7.4  1.8 - 8.0 K/UL Final    ABS. LYMPHOCYTES 05/26/2020 3.7* 0.8 - 3.5 K/UL Final    ABS. MONOCYTES 05/26/2020 1.3* 0.0 - 1.0 K/UL Final    ABS. EOSINOPHILS 05/26/2020 0.4  0.0 - 0.4 K/UL Final    ABS.  BASOPHILS 05/26/2020 0.1  0.0 - 0.1 K/UL Final    ABS. IMM. GRANS. 05/26/2020 0.0  0.00 - 0.04 K/UL Final    DF 05/26/2020 AUTOMATED    Final    Sodium 05/26/2020 141  136 - 145 mmol/L Final    Potassium 05/26/2020 3.3* 3.5 - 5.1 mmol/L Final    Chloride 05/26/2020 106  97 - 108 mmol/L Final    CO2 05/26/2020 27  21 - 32 mmol/L Final    Anion gap 05/26/2020 8  5 - 15 mmol/L Final    Glucose 05/26/2020 122* 65 - 100 mg/dL Final    BUN 05/26/2020 17  6 - 20 MG/DL Final    Creatinine 05/26/2020 1.09  0.70 - 1.30 MG/DL Final    BUN/Creatinine ratio 05/26/2020 16  12 - 20   Final    GFR est AA 05/26/2020 >60  >60 ml/min/1.73m2 Final    GFR est non-AA 05/26/2020 >60  >60 ml/min/1.73m2 Final    Calcium 05/26/2020 8.1* 8.5 - 10.1 MG/DL Final    Bilirubin, total 05/26/2020 0.2  0.2 - 1.0 MG/DL Final    ALT (SGPT) 05/26/2020 18  12 - 78 U/L Final    AST (SGOT) 05/26/2020 17  15 - 37 U/L Final    Alk.  phosphatase 05/26/2020 88  45 - 117 U/L Final    Protein, total 05/26/2020 6.2* 6.4 - 8.2 g/dL Final    Albumin 05/26/2020 2.8* 3.5 - 5.0 g/dL Final    Globulin 05/26/2020 3.4  2.0 - 4.0 g/dL Final    A-G Ratio 05/26/2020 0.8* 1.1 - 2.2   Final    Acetaminophen level 05/26/2020 2* 10 - 30 ug/mL Final    Salicylate level 39/47/1617 3.2  2.8 - 20.0 MG/DL Final    ALCOHOL(ETHYL),SERUM 05/26/2020 <10  <10 MG/DL Final    AMPHETAMINES 05/26/2020 Negative  NEG   Final    BARBITURATES 05/26/2020 Negative  NEG   Final    BENZODIAZEPINES 05/26/2020 Negative  NEG   Final    COCAINE 05/26/2020 Negative  NEG   Final    METHADONE 05/26/2020 Negative  NEG   Final    OPIATES 05/26/2020 Negative  NEG   Final    PCP(PHENCYCLIDINE) 05/26/2020 Negative  NEG   Final    THC (TH-CANNABINOL) 05/26/2020 Positive* NEG   Final    Drug screen comment 05/26/2020 (NOTE)   Final    Color 05/26/2020 YELLOW/STRAW    Final    Appearance 05/26/2020 CLOUDY* CLEAR   Final    Specific gravity 05/26/2020 1.025  1.003 - 1.030   Final    pH (UA) 05/26/2020 6.0  5.0 - 8.0   Final    Protein 05/26/2020 >300* NEG mg/dL Final    Glucose 05/26/2020 Negative  NEG mg/dL Final    Ketone 05/26/2020 TRACE* NEG mg/dL Final    Blood 05/26/2020 LARGE* NEG   Final    Urobilinogen 05/26/2020 1.0  0.2 - 1.0 EU/dL Final    Nitrites 05/26/2020 Negative  NEG   Final    Leukocyte Esterase 05/26/2020 Negative  NEG   Final    WBC 05/26/2020 0-4  0 - 4 /hpf Final    RBC 05/26/2020 10-20  0 - 5 /hpf Final    Epithelial cells 05/26/2020 FEW  FEW /lpf Final    Bacteria 05/26/2020 Negative  NEG /hpf Final    UA:UC IF INDICATED 05/26/2020 CULTURE NOT INDICATED BY UA RESULT  CNI   Final    Bilirubin UA, confirm 05/26/2020 Negative  NEG   Final    WBC 06/01/2020 18.2* 4.1 - 11.1 K/uL Final    RBC 06/01/2020 4.27  4. 10 - 5.70 M/uL Final    HGB 06/01/2020 13.0  12.1 - 17.0 g/dL Final    HCT 06/01/2020 40.0  36.6 - 50.3 % Final    MCV 06/01/2020 93.7  80.0 - 99.0 FL Final    MCH 06/01/2020 30.4  26.0 - 34.0 PG Final    MCHC 06/01/2020 32.5  30.0 - 36.5 g/dL Final    RDW 06/01/2020 13.2  11.5 - 14.5 % Final    PLATELET 69/50/2857 110  150 - 400 K/uL Final    MPV 06/01/2020 11.3  8.9 - 12.9 FL Final    NRBC 06/01/2020 0.0  0  WBC Final    ABSOLUTE NRBC 06/01/2020 0.00  0.00 - 0.01 K/uL Final    NEUTROPHILS 06/01/2020 76* 32 - 75 % Final    LYMPHOCYTES 06/01/2020 14  12 - 49 % Final    MONOCYTES 06/01/2020 6  5 - 13 % Final    EOSINOPHILS 06/01/2020 3  0 - 7 % Final    BASOPHILS 06/01/2020 1  0 - 1 % Final    IMMATURE GRANULOCYTES 06/01/2020 0  0.0 - 0.5 % Final    ABS. NEUTROPHILS 06/01/2020 13.9* 1.8 - 8.0 K/UL Final    ABS. LYMPHOCYTES 06/01/2020 2.6  0.8 - 3.5 K/UL Final    ABS. MONOCYTES 06/01/2020 1.1* 0.0 - 1.0 K/UL Final    ABS. EOSINOPHILS 06/01/2020 0.5* 0.0 - 0.4 K/UL Final    ABS. BASOPHILS 06/01/2020 0.1  0.0 - 0.1 K/UL Final    ABS. IMM.  GRANS. 06/01/2020 0.1* 0.00 - 0.04 K/UL Final    DF 06/01/2020 AUTOMATED    Final    Sodium 06/01/2020 137  136 - 145 mmol/L Final    Potassium 06/01/2020 4.6  3.5 - 5.1 mmol/L Final    Chloride 06/01/2020 104  97 - 108 mmol/L Final    CO2 06/01/2020 24  21 - 32 mmol/L Final    Anion gap 06/01/2020 9  5 - 15 mmol/L Final    Glucose 06/01/2020 98  65 - 100 mg/dL Final    BUN 06/01/2020 31* 6 - 20 MG/DL Final    Creatinine 06/01/2020 1.21  0.70 - 1.30 MG/DL Final    BUN/Creatinine ratio 06/01/2020 26* 12 - 20   Final    GFR est AA 06/01/2020 >60  >60 ml/min/1.73m2 Final    GFR est non-AA 06/01/2020 >60  >60 ml/min/1.73m2 Final    Calcium 06/01/2020 8.8  8.5 - 10.1 MG/DL Final    Magnesium 06/01/2020 2.0  1.6 - 2.4 mg/dL Final    Phosphorus 06/01/2020 4.7  2.6 - 4.7 MG/DL Final    WBC 06/02/2020 14.5* 4.1 - 11.1 K/uL Final    RBC 06/02/2020 4.39  4. 10 - 5.70 M/uL Final    HGB 06/02/2020 13.3  12.1 - 17.0 g/dL Final    HCT 06/02/2020 41.1  36.6 - 50.3 % Final    MCV 06/02/2020 93.6  80.0 - 99.0 FL Final    MCH 06/02/2020 30.3  26.0 - 34.0 PG Final    MCHC 06/02/2020 32.4  30.0 - 36.5 g/dL Final    RDW 06/02/2020 13.1  11.5 - 14.5 % Final    PLATELET 28/66/6459 324  150 - 400 K/uL Final    MPV 06/02/2020 10.3  8.9 - 12.9 FL Final    NRBC 06/02/2020 0.0  0  WBC Final    ABSOLUTE NRBC 06/02/2020 0.00  0.00 - 0.01 K/uL Final    NEUTROPHILS 06/02/2020 57  32 - 75 % Final    LYMPHOCYTES 06/02/2020 31  12 - 49 % Final    MONOCYTES 06/02/2020 7  5 - 13 % Final    EOSINOPHILS 06/02/2020 4  0 - 7 % Final    BASOPHILS 06/02/2020 1  0 - 1 % Final    IMMATURE GRANULOCYTES 06/02/2020 0  0.0 - 0.5 % Final    ABS. NEUTROPHILS 06/02/2020 8.2* 1.8 - 8.0 K/UL Final    ABS. LYMPHOCYTES 06/02/2020 4.5* 0.8 - 3.5 K/UL Final    ABS. MONOCYTES 06/02/2020 1.0  0.0 - 1.0 K/UL Final    ABS. EOSINOPHILS 06/02/2020 0.6* 0.0 - 0.4 K/UL Final    ABS. BASOPHILS 06/02/2020 0.1  0.0 - 0.1 K/UL Final    ABS. IMM.  GRANS. 06/02/2020 0.1* 0.00 - 0.04 K/UL Final    DF 06/02/2020 AUTOMATED    Final    Sodium 06/02/2020 137  136 - 145 mmol/L Final    Potassium 06/02/2020 4.4  3.5 - 5.1 mmol/L Final    Chloride 06/02/2020 107  97 - 108 mmol/L Final    CO2 06/02/2020 20* 21 - 32 mmol/L Final    Anion gap 06/02/2020 10  5 - 15 mmol/L Final    Glucose 06/02/2020 89  65 - 100 mg/dL Final    BUN 06/02/2020 36* 6 - 20 MG/DL Final    Creatinine 06/02/2020 1.21  0.70 - 1.30 MG/DL Final    BUN/Creatinine ratio 06/02/2020 30* 12 - 20   Final    GFR est AA 06/02/2020 >60  >60 ml/min/1.73m2 Final    GFR est non-AA 06/02/2020 >60  >60 ml/min/1.73m2 Final    Calcium 06/02/2020 8.6  8.5 - 10.1 MG/DL Final    Magnesium 06/02/2020 2.1  1.6 - 2.4 mg/dL Final    Phosphorus 06/02/2020 4.7  2.6 - 4.7 MG/DL Final    WBC 06/03/2020 13.0* 4.1 - 11.1 K/uL Final    RBC 06/03/2020 4.81  4.10 - 5.70 M/uL Final    HGB 06/03/2020 14.8  12.1 - 17.0 g/dL Final    HCT 06/03/2020 44.4  36.6 - 50.3 % Final    MCV 06/03/2020 92.3  80.0 - 99.0 FL Final    MCH 06/03/2020 30.8  26.0 - 34.0 PG Final    MCHC 06/03/2020 33.3  30.0 - 36.5 g/dL Final    RDW 06/03/2020 13.0  11.5 - 14.5 % Final    PLATELET 12/03/5489 829* 150 - 400 K/uL Final    MPV 06/03/2020 10.2  8.9 - 12.9 FL Final    NRBC 06/03/2020 0.0  0  WBC Final    ABSOLUTE NRBC 06/03/2020 0.00  0.00 - 0.01 K/uL Final    NEUTROPHILS 06/03/2020 63  32 - 75 % Final    LYMPHOCYTES 06/03/2020 27  12 - 49 % Final    MONOCYTES 06/03/2020 5  5 - 13 % Final    EOSINOPHILS 06/03/2020 3  0 - 7 % Final    BASOPHILS 06/03/2020 1  0 - 1 % Final    IMMATURE GRANULOCYTES 06/03/2020 1* 0.0 - 0.5 % Final    ABS. NEUTROPHILS 06/03/2020 8.2* 1.8 - 8.0 K/UL Final    ABS. LYMPHOCYTES 06/03/2020 3.5  0.8 - 3.5 K/UL Final    ABS. MONOCYTES 06/03/2020 0.7  0.0 - 1.0 K/UL Final    ABS. EOSINOPHILS 06/03/2020 0.4  0.0 - 0.4 K/UL Final    ABS. BASOPHILS 06/03/2020 0.2* 0.0 - 0.1 K/UL Final    ABS. IMM.  GRANS. 06/03/2020 0.1* 0.00 - 0.04 K/UL Final    DF 06/03/2020 AUTOMATED Final    WBC 06/04/2020 13.1* 4.1 - 11.1 K/uL Final    RBC 06/04/2020 4.38  4.10 - 5.70 M/uL Final    HGB 06/04/2020 13.3  12.1 - 17.0 g/dL Final    HCT 06/04/2020 40.2  36.6 - 50.3 % Final    MCV 06/04/2020 91.8  80.0 - 99.0 FL Final    MCH 06/04/2020 30.4  26.0 - 34.0 PG Final    MCHC 06/04/2020 33.1  30.0 - 36.5 g/dL Final    RDW 06/04/2020 13.1  11.5 - 14.5 % Final    PLATELET 35/88/2684 450* 150 - 400 K/uL Final    MPV 06/04/2020 10.3  8.9 - 12.9 FL Final    NRBC 06/04/2020 0.0  0  WBC Final    ABSOLUTE NRBC 06/04/2020 0.00  0.00 - 0.01 K/uL Final     Xr Foot Lt Min 3 V    Result Date: 5/28/2020  EXAM: XR FOOT LT MIN 3 V INDICATION: swelling, pain, evaluate for foreign body. Patient describes object lodged in his foot, history somewhat unclear. Site of object not specified. COMPARISON: None. FINDINGS: Three views of the left foot demonstrate no fracture or other acute osseous or articular abnormality. The soft tissues are within normal limits. IMPRESSION: No acute abnormality. Xr Abd Port  1 V    Result Date: 5/26/2020  EXAM: XR ABD PORT  1 V INDICATION: ? of patient ingesting rocks COMPARISON: 12.2.2018. FINDINGS: A supine radiograph of the abdomen shows a nonspecific abdominal gas pattern. There are 2 radiopaque structures in the right lower quadrant that individually measure 16 and 11 mm in size. In addition, in the distal transverse colon, there is a 12 x 3 mm calcification. IMPRESSION: Radiopaque structures in the right lower quadrant and transverse colon could be compatible with ingested rocks. No plain film evidence for obstruction                   DISPOSITION:    Home. Patient to f/u with psychiatric appointments. Patient is to f/u with internist as directed. FOLLOW-UP CARE:    Activity as tolerated  Regular diet  Wound Care: none needed.   Follow-up Information     Follow up With Specialties Details Why Contact Info    Labwork for clozapine  On 6/18/2020 Labwork for clozapine is due in 1 week on 6/18/20. Michael Allen 2468   Today A  from the intensive outpatient team Ophelia Wolfe will meet with you near [facility] today at 2:00PM.  Address: [address]  [place], [address]   Phone: [phone]  Fax:    [phone]    None    None (395) Patient stated that they have no PCP                   PROGNOSIS:   Guarded ---- based on nature of patient's pathology/ies and treatment compliance issues. Prognosis is greatly dependent upon patient's ability to remain sober and to follow up with scheduled appointments as well as to comply with psychiatric medications as prescribed. DISCHARGE MEDICATIONS:     Informed consent given for the use of following psychotropic medications:  Current Discharge Medication List      START taking these medications    Details   !! cloZAPine (CLOZARIL) 200 mg tablet Take 1 Tab by mouth every twelve (12) hours every twelve (12) hours. Indications: treatment-resistant schizophrenia  Qty: 14 Tab, Refills: 0      !! cloZAPine (CLOZARIL) 100 mg tablet Take 1 Tab by mouth nightly. Total evening dose: 300 mg  Indications: treatment-resistant schizophrenia  Qty: 7 Tab, Refills: 0      montelukast (SINGULAIR) 10 mg tablet Take 1 Tab by mouth nightly. Indications: seasonal runny nose  Qty: 30 Tab, Refills: 1      propranoloL (INDERAL) 20 mg tablet Take 1 Tab by mouth two (2) times a day. Indications: tachycardia  Qty: 60 Tab, Refills: 1      senna-docusate (PERICOLACE) 8.6-50 mg per tablet Take 2 Tabs by mouth daily. Indications: constipation  Qty: 60 Tab, Refills: 1      doxycycline (VIBRA-TABS) 100 mg tablet Take 1 Tab by mouth nightly. Indications: Foot abscess  Qty: 1 Tab, Refills: 0       !! - Potential duplicate medications found. Please discuss with provider.                  A coordinated, multidisplinary treatment team round was conducted with Deny Ng is done daily here at [facility]. This team consists of the nurse, psychiatric unit pharmacist,  and Meño Vega. I have spent greater than 35 minutes on discharge work.     Signed:  Tonia Livingston MD  6/11/2020

## 2020-06-11 NOTE — PROGRESS NOTES
Diet as tolerated. Double portions ordered. Pt noted to be meal compliant. Hx notable for HTN, COPD.   Ht: 5'9\"  Wt: 149 lb  BMI: 22 kg/(m^2) c/w normal weight  Est energy needs: 1950 kcal, 70 g protein, 1 mL/kcal fluids  Pt will consume > 75% of meals at follow up 7-10 day  LOS

## 2020-06-11 NOTE — BH NOTES
Assumed nursing care of Karey An after receiving the  change of shift report. Karey An was visible in the milieu prior to going to bed. He mostly sat in the dayroom watching TV. When approached for a nursing assessment he denied SI, HI and AV hallucinations. During the course of conversation pt would wander from subject to subject before completing a train of thought. He also spoke of seeing and hearing spirits and he stated, \"I am going back to my park, the spirits with lead. Some of the spirits, he stated, are dogs. He spoke of a spirit telling him a friend had recently . Pt was compliant with his HS meds and no adverse med side effects were noted. Monitoring closely for mood chgs, behavior and for safety. 7922:  Lab was drawn x 1 attempt. Pt tolerated well.

## 2020-06-11 NOTE — INTERDISCIPLINARY ROUNDS
[facility] Interdisciplinary Rounds     Patient Name: Mikael Vieyra  Age: 50 y.o.   Room/Bed:  321/01  Primary Diagnosis: Schizoaffective disorder, bipolar type without good prognostic features [facility]   Admission Status: Voluntary     Readmission within 30 days: Yes, transferred back from medical floor on 6/6/ 20  Power of  in place: unk  Patient requires a blocked bed: no            Reason for blocked bed: Pt is in a private room due to [employer] for blocked bed obtained: no       Sleep hours: 7 hrs   Morning Labs completed per orders:  yes       Participation in Care/Groups:  yes  Medication Compliant?: Yes  PRNS (last 24 hours): Sleep Aid    Restraints (last 24 hours):  no  Substance Abuse:  no    24 hour chart check complete: yes

## 2020-06-11 NOTE — BH NOTES
Attempted to meet with Evelin Teran. Initially he was walking in the halls and did not respond to this writer. He then went into his room and laid down and reported that \"I;m good. \" He asked for juice and his lunch and was reminded that lunch would be arriving in about 20 minutes. He reported he could wait. He denied any issues or concerns about leaving today. He denied SI, HI, and hallucinations and was not responding to internal stimuli during conversation.     [facility]

## 2020-06-11 NOTE — PROGRESS NOTES
Clozapine Daily Monitoring  Current regimen:  clozapine 200 mg PO daily and 300 mg PO QHS  Last CBC done AND reported to the registry:  6/11/20  Next CBC due:  6/18/20    DATE ANC (K/uL)   5/26 7.4   6/1 13.9   6/2 8.2   6/3 8.2   6/6 4.8   6/11 5.1     ANC must be >1 to dispense clozapine. If patient experiences ANC <1.5, refer to the Clozapine REMS ANC monitoring algorithm for frequency of ANC monitoring. Visit Vitals  /77 (BP 1 Location: Right arm, BP Patient Position: Sitting)   Pulse 99   Temp 97.3 °F (36.3 °C)   Resp 16   Ht 175.3 cm (69\")   Wt 67.6 kg (149 lb)   SpO2 99%   BMI 22.00 kg/m²       Recommendations/comments: ANC value drawn on 6/11 is within therapeutic limits, 5.1 K/uL, and is acceptable to continue with clozapine dispensing. ANC value was reported to the Clozapine REMS Program.  Next [address] is due in 1 week on 6/18/20.     Thank you,  Michi Colindres, PHARMD, [facility], [address] [place] [address] [address]: [phone])  Pharmacy: [phone] ([id number])

## 2020-06-13 NOTE — BH NOTES
Behavioral Health Transition Record to Provider    Patient Name: Paxton Tijerina  YOB: 1975  Medical Record Number: [id number]  Date of Admission: 6/6/2020  Date of Discharge: 6/11/2020    Attending Arslan Goyal MD  Discharging Provider: Valeriy Claudio MD  To contact this individual call  [phone] and ask the  to page. If unavailable, ask to be transferred to [facility] Provider on call. [facility] Provider will be available on call 24/7 and during holidays. Primary Care Provider: None    Allergies   Allergen Reactions    Haloperidol Other (comments)       Reason for Admission: psychosis     Admission Diagnosis: Schizoaffective disorder ([facility] [F25.9]    * No surgery found *    Results for orders placed or performed during the hospital encounter of 06/06/20   CBC WITH AUTOMATED DIFF   Result Value Ref Range    WBC 14.2 (H) 4.1 - 11.1 K/uL    RBC 4.30 4. 10 - 5.70 M/uL    HGB 13.3 12.1 - 17.0 g/dL    HCT 39.6 36.6 - 50.3 %    MCV 92.1 80.0 - 99.0 FL    MCH 30.9 26.0 - 34.0 PG    MCHC 33.6 30.0 - 36.5 g/dL    RDW 13.1 11.5 - 14.5 %    PLATELET 493 (H) 054 - 400 K/uL    MPV 10.3 8.9 - 12.9 FL    NRBC 0.0 0  WBC    ABSOLUTE NRBC 0.00 0.00 - 0.01 K/uL    NEUTROPHILS 36 32 - 75 %    LYMPHOCYTES 44 12 - 49 %    MONOCYTES 8 5 - 13 %    EOSINOPHILS 10 (H) 0 - 7 %    BASOPHILS 2 (H) 0 - 1 %    IMMATURE GRANULOCYTES 0 %    ABS. NEUTROPHILS 5.1 1.8 - 8.0 K/UL    ABS. LYMPHOCYTES 6.3 (H) 0.8 - 3.5 K/UL    ABS. MONOCYTES 1.1 (H) 0.0 - 1.0 K/UL    ABS. EOSINOPHILS 1.4 (H) 0.0 - 0.4 K/UL    ABS. BASOPHILS 0.3 (H) 0.0 - 0.1 K/UL    ABS. IMM.  GRANS. 0.0 K/UL    DF MANUAL      RBC COMMENTS NORMOCYTIC, NORMOCHROMIC         Immunizations administered during this encounter:   Immunization History   Administered Date(s) Administered    Influenza Vaccine 10/07/2014, 01/07/2016    Influenza Vaccine (Quad) PF 10/01/2018    Pneumococcal Vaccine (Unspecified Type) 01/07/2016    TB Skin Test (PPD) 09/22/2014    Tdap 06/06/2020       Screening for Metabolic Disorders for Patients on Antipsychotic Medications  (Data obtained from the EMR)    Estimated Body Mass Index  Estimated body mass index is 22 kg/m² as calculated from the following:    Height as of this encounter: 5' 9\" (1.753 m). Weight as of this encounter: 67.6 kg (149 lb). Vital Signs/Blood Pressure  Visit Vitals  /77 (BP 1 Location: Right arm, BP Patient Position: Sitting)   Pulse 99   Temp 97.3 °F (36.3 °C)   Resp 16   Ht 5' 9\" (1.753 m)   Wt 67.6 kg (149 lb)   SpO2 99%   BMI 22.00 kg/m²       Blood Glucose/Hemoglobin A1c  Lab Results   Component Value Date/Time    Glucose 92 06/06/2020 06:11 AM    Glucose (POC) 97 09/27/2018 03:40 PM       Lab Results   Component Value Date/Time    Hemoglobin A1c 5.3 09/30/2018 04:52 AM    Hemoglobin A1c, External 5.2 06/30/2017        Lipid Panel  Lab Results   Component Value Date/Time    Cholesterol, total 107 09/30/2018 04:52 AM    HDL Cholesterol 23 09/30/2018 04:52 AM    LDL, calculated 59.2 09/30/2018 04:52 AM    Triglyceride 124 09/30/2018 04:52 AM    CHOL/HDL Ratio 4.7 09/30/2018 04:52 AM        Discharge Diagnosis: Please refer to physician's discharge summary. Discharge Plan: Pt is close to baseline level of functioning. Denies SI/HI and hears \"spirits\" at baseline. Pt was to meet Alejandrina Qivivo workers in community near [place] where he lives to come up with a plan for medication administration and weekly lab blood draws. Pt was in agreement with discharge plan. Discharge Medication List and Instructions:   Discharge Medication List as of 6/11/2020  1:55 PM      START taking these medications    Details   !! cloZAPine (CLOZARIL) 200 mg tablet Take 1 Tab by mouth every twelve (12) hours every twelve (12) hours.  Indications: treatment-resistant schizophrenia, Normal, Disp-14 Tab, R-0      !! cloZAPine (CLOZARIL) 100 mg tablet Take 1 Tab by mouth nightly. Total evening dose: 300 mg  Indications: treatment-resistant schizophrenia, Normal, Disp-7 Tab, R-0      montelukast (SINGULAIR) 10 mg tablet Take 1 Tab by mouth nightly. Indications: seasonal runny nose, Normal, Disp-30 Tab, R-1      propranoloL (INDERAL) 20 mg tablet Take 1 Tab by mouth two (2) times a day. Indications: tachycardia, Normal, Disp-60 Tab, R-1      senna-docusate (PERICOLACE) 8.6-50 mg per tablet Take 2 Tabs by mouth daily. Indications: constipation, Normal, Disp-60 Tab, R-1       !! - Potential duplicate medications found. Please discuss with provider. CONTINUE these medications which have CHANGED    Details   doxycycline (VIBRA-TABS) 100 mg tablet Take 1 Tab by mouth nightly. Indications: Foot abscess, Normal, Disp-1 Tab, R-0             Unresulted Labs (24h ago, onward)    None        To obtain results of studies pending at discharge, please contact N/A. Follow-up Information     Follow up With Specialties Details Why Contact Info    Labwork for clozapine  On 6/18/2020 Labwork for clozapine is due in 1 week on 6/18/20. Michael Allen 1778   Today A  from the intensive outpatient team Lanie Seymour will meet with you near [facility] today at 2:00PM.  Address: [address] [place] [address], [facility] [address] [facility]   Phone: [phone]  Fax:    [phone]    None    None (395) Patient stated that they have no PCP            Advanced Directive:   Does the patient have an appointed surrogate decision maker? Unknown   Does the patient have a Medical Advance Directive? Unknown   Does the patient have a Psychiatric Advance Directive? Unknown   If the patient does not have a surrogate or Medical Advance Directive AND Psychiatric Advance Directive, the patient was offered information on these advance directives. Unknown     Patient Instructions: Please continue all medications until otherwise directed by physician.       Tobacco Cessation Discharge Plan:   Is the patient a smoker and needs referral for smoking cessation? No  Patient referred to the following for smoking cessation with an appointment? No   Patient was offered medication to assist with smoking cessation at discharge? No  Was education for smoking cessation added to the discharge instructions? No     Alcohol/Substance Abuse Discharge Plan:   Does the patient have a history of substance/alcohol abuse and requires a referral for treatment? Yes  Patient referred to the following for substance/alcohol abuse treatment with an appointment? Yes  Patient was offered medication to assist with alcohol cessation at discharge? No  Was education for substance/alcohol abuse added to discharge instructions? Yes     Patient discharged to Home; provided to the patient/caregiver either in hard copy or electronically. Continuing care paperwork was faxed to community mental health providers.

## 2020-06-15 LAB
CLOZAPINE SERPL-MCNC: 221 NG/ML (ref 350–650)
CLOZAPINE+NOR SERPL-MCNC: 335 NG/ML
NORCLOZAPINE SERPL-MCNC: 114 NG/ML

## 2020-11-30 NOTE — PROGRESS NOTES
Fall Risk Factors  Unsteady gait    Fall Prevention Strategies   Bed in lowest position, Call light within reach, Risk for fall identifier placed and Side rails up x 2    [facility] Fall Risk Score  2 (11/29/20 6845)   Problem: Falls - Risk of  Goal: *Absence of Falls  Description: Document Aruna Hull Fall Risk and appropriate interventions in the flowsheet.   Outcome: Progressing Towards Goal  Note: Fall Risk Interventions:       Mentation Interventions: Reorient patient                        Problem: Altered Thought Process (Adult/Pediatric)  Goal: *STG: Participates in treatment plan  Outcome: Progressing Towards Goal  Goal: *STG: Remains safe in hospital  Outcome: Progressing Towards Goal  Goal: *STG: Complies with medication therapy  Outcome: Progressing Towards Goal

## 2022-02-04 NOTE — PROGRESS NOTES
Problem: Altered Thought Process (Adult/Pediatric)  Goal: Interventions  Outcome: Progressing Towards Goal  Pt has been med and diet compliant this am. Pt was able to verbalize all medications that he was taking and there uses. Denied SI, HI, hallucinations, and delusions. No complaints of pain or discomfort. pmd

## 2022-03-18 PROBLEM — L02.619 ABSCESS OF FOOT: Status: ACTIVE | Noted: 2020-06-05

## 2022-03-18 PROBLEM — L02.612 FOOT ABSCESS, LEFT: Status: ACTIVE | Noted: 2020-06-03

## 2022-03-19 PROBLEM — R42 DIZZINESS: Status: ACTIVE | Noted: 2017-06-30

## 2022-03-19 PROBLEM — L02.91 ABSCESS: Status: ACTIVE | Noted: 2020-06-04

## 2022-04-01 NOTE — PROGRESS NOTES
Problem: Altered Thought Process (Adult/Pediatric)  Goal: *STG: Remains safe in hospital  6/8/2020 2233 by Som Christiansen  Outcome: Progressing Towards Goal  6/8/2020 2233 by Som Christiansen  Outcome: Progressing Towards Goal 36.4

## 2022-05-16 ENCOUNTER — HOSPITAL ENCOUNTER (EMERGENCY)
Age: 47
Discharge: HOME | End: 2022-05-16
Attending: EMERGENCY MEDICINE
Payer: MEDICARE

## 2022-05-16 VITALS
TEMPERATURE: 98.2 F | HEART RATE: 86 BPM | RESPIRATION RATE: 18 BRPM | SYSTOLIC BLOOD PRESSURE: 142 MMHG | HEIGHT: 69 IN | WEIGHT: 180 LBS | BODY MASS INDEX: 26.66 KG/M2 | OXYGEN SATURATION: 96 % | DIASTOLIC BLOOD PRESSURE: 82 MMHG

## 2022-05-16 DIAGNOSIS — B02.8 HERPES ZOSTER WITH OTHER COMPLICATION: Primary | ICD-10-CM

## 2022-05-16 PROCEDURE — 99283 EMERGENCY DEPT VISIT LOW MDM: CPT

## 2022-05-16 RX ORDER — IBUPROFEN 600 MG/1
600 TABLET ORAL
Qty: 20 TABLET | Refills: 0 | Status: SHIPPED | OUTPATIENT
Start: 2022-05-16

## 2022-05-16 RX ORDER — ACETAMINOPHEN 325 MG/1
650 TABLET ORAL
Qty: 20 TABLET | Refills: 0 | Status: SHIPPED | OUTPATIENT
Start: 2022-05-16

## 2022-05-16 NOTE — ED NOTES
RN attempted to contact [facility] for the pt. RN unable to leave voicemail.  RN spoke to pt and would rather go straight home than back to center

## 2022-05-16 NOTE — ED PROVIDER NOTES
EMERGENCY DEPARTMENT HISTORY AND PHYSICAL EXAM      Date: 5/16/2022  Patient Name: Daryl Rivera    History of Presenting Illness     Chief Complaint   Patient presents with    Shingles       History Provided By: Patient    HPI: Daryl Rivera, 50 y.o. male with past medical history of schizophrenia and hypertension who presents the emergency department from an adult group home with a rash. For the past week and a half, he has had a painful rash on his right flank. He noticed a few blisters a few days ago, but otherwise no new or worsening rash or lesions. He denies any symptoms otherwise. He did have chickenpox as a kid but denies any previous history of shingles. He has not been taking anything for this. .     There are no other complaints, changes, or physical findings at this time. PCP: None    No current facility-administered medications on file prior to encounter. Current Outpatient Medications on File Prior to Encounter   Medication Sig Dispense Refill    cloZAPine (CLOZARIL) 200 mg tablet Take 1 Tab by mouth every twelve (12) hours every twelve (12) hours. Indications: treatment-resistant schizophrenia 14 Tab 0    cloZAPine (CLOZARIL) 100 mg tablet Take 1 Tab by mouth nightly. Total evening dose: 300 mg  Indications: treatment-resistant schizophrenia 7 Tab 0    montelukast (SINGULAIR) 10 mg tablet Take 1 Tab by mouth nightly. Indications: seasonal runny nose (Patient not taking: Reported on 5/16/2022) 30 Tab 1    propranoloL (INDERAL) 20 mg tablet Take 1 Tab by mouth two (2) times a day. Indications: tachycardia 60 Tab 1    senna-docusate (PERICOLACE) 8.6-50 mg per tablet Take 2 Tabs by mouth daily. Indications: constipation (Patient not taking: Reported on 5/16/2022) 60 Tab 1    doxycycline (VIBRA-TABS) 100 mg tablet Take 1 Tab by mouth nightly. Indications:  Foot abscess 1 Tab 0       Past History     Past Medical History:  Past Medical History:   Diagnosis Date    Back pain     Chronic bronchitis (HCC)     COPD    Elevated WBCs     H/O splenectomy     HTN (hypertension)     Ill-defined condition     constipation    Left foot pain 6/2/2020    Psychiatric disorder     depression, anxiety    Psychiatric disorder     schizophrenia       Past Surgical History:  Past Surgical History:   Procedure Laterality Date    HX OTHER SURGICAL Right     two right wrist fractures     HX SPLENECTOMY         Family History:  Family History   Problem Relation Age of Onset    No Known Problems Mother     No Known Problems Father     No Known Problems Brother        Social History:  Social History     Tobacco Use    Smoking status: Current Every Day Smoker     Packs/day: 0.50    Smokeless tobacco: Never Used   Vaping Use    Vaping Use: Never used   Substance Use Topics    Alcohol use: No    Drug use: No     Comment: \"not for years\"       Allergies: Allergies   Allergen Reactions    Haloperidol Other (comments)         Review of Systems   Review of Systems   Constitutional: Negative for chills and fever. HENT: Negative for congestion and sore throat. Respiratory: Negative for cough and shortness of breath. Cardiovascular: Negative for chest pain. Gastrointestinal: Negative for abdominal pain, diarrhea, nausea and vomiting. Genitourinary: Negative for dysuria and hematuria. Musculoskeletal: Negative for myalgias. Skin: Positive for rash. Neurological: Negative for headaches. All other systems reviewed and are negative. Physical Exam   Physical Exam  Vitals and nursing note reviewed. Constitutional:       General: He is not in acute distress. Appearance: He is not toxic-appearing. HENT:      Head: Atraumatic. Right Ear: External ear normal.      Left Ear: External ear normal.      Nose: Nose normal.      Mouth/Throat:      Mouth: Mucous membranes are moist.   Eyes:      Extraocular Movements: Extraocular movements intact.       Conjunctiva/sclera: Conjunctivae normal. Cardiovascular:      Rate and Rhythm: Normal rate and regular rhythm. Pulses: Normal pulses. Heart sounds: Normal heart sounds. No murmur heard. No friction rub. No gallop. Pulmonary:      Effort: Pulmonary effort is normal. No respiratory distress. Breath sounds: Normal breath sounds. No stridor. No wheezing, rhonchi or rales. Abdominal:      General: Abdomen is flat. There is no distension. Palpations: Abdomen is soft. Tenderness: There is no abdominal tenderness. There is no guarding or rebound. Musculoskeletal:         General: No swelling. Cervical back: Neck supple. Skin:     General: Skin is warm. Comments: + Scattered vesicular rash on right flank in a dermatomal pattern. Does not extend beyond midline. Mildly painful to touch. Does not appear to have lesions in different stages of development/healing. No cellulitic changes. No abscess or pain out of proportion. .    Neurological:      Mental Status: He is alert and oriented to person, place, and time. Psychiatric:         Mood and Affect: Mood normal.         Behavior: Behavior normal.         Thought Content: Thought content normal.         Judgment: Judgment normal.         Diagnostic Study Results     Labs -   No results found for this or any previous visit (from the past 12 hour(s)). Radiologic Studies -   No orders to display     CT Results  (Last 48 hours)    None        CXR Results  (Last 48 hours)    None            Medical Decision Making   I am the first provider for this patient. I reviewed the vital signs, available nursing notes, past medical history, past surgical history, family history and social history. Vital Signs-Reviewed the patient's vital signs.   Patient Vitals for the past 12 hrs:   Temp Pulse Resp BP SpO2   05/16/22 1121 98.2 °F (36.8 °C) 86 18 (!) 142/82 96 %       Records Reviewed: Nursing Notes and Old Medical Records    Provider Notes (Medical Decision Making): Patient's rash consistent with shingles. It is beyond the 72 window for treatment and he does not appear to be having any new viral shedding. No signs of overlying cellulitis or indication for antibiotics. Patient provided analgesia and counseled on follow-up as needed. Patient advised on return precautions to the emergency department. Patient expressed understanding agreement the discharge instructions and treatment plan. ED Course:   Initial assessment performed. The patients presenting problems have been discussed, and they are in agreement with the care plan formulated and outlined with them. I have encouraged them to ask questions as they arise throughout their visit. Critical Care Time: None    Disposition:  discharged    PLAN:  1. Current Discharge Medication List      START taking these medications    Details   acetaminophen (TYLENOL) 325 mg tablet Take 2 Tablets by mouth every six (6) hours as needed for Pain. Qty: 20 Tablet, Refills: 0  Start date: 5/16/2022      ibuprofen (MOTRIN) 600 mg tablet Take 1 Tablet by mouth every six (6) hours as needed for Pain. Qty: 20 Tablet, Refills: 0  Start date: 5/16/2022           2. Follow-up Information     Follow up With Specialties Details [address] Primary Care Primary Care Schedule an appointment as soon as possible for a visit in 1 week As needed [facility] [address] [place] [address] - [place] Emergency Medicine  If symptoms worsen [place]  [phone]        Return to ED if worse     Diagnosis     Clinical Impression:   1. Herpes zoster with other complication          Please note that this dictation was completed with [employer], the computer voice recognition software. Quite often unanticipated grammatical, syntax, homophones, and other interpretive errors are inadvertently transcribed by the computer software.  Please disregards these errors. Please excuse any errors that have escaped final proofreading.

## 2022-05-16 NOTE — DISCHARGE INSTRUCTIONS
For your pain, take scheduled medications: motrin every 8 hours with food and water, and tylenol between meals ever 4-6 hours. Do not take more than 3000mg tylenol daily. Do not take motrin if you have any history of gastrointestinal bleeding, peptic ulcers or if you have been told not to by your PCP.

## 2022-05-16 NOTE — ED TRIAGE NOTES
Pt arrives to ED via EMS. Pt comes from an adult day care with shingles on his R flank for about a week and a half.

## 2022-05-16 NOTE — ED NOTES
Emergency Department Nursing Plan of Care       The Nursing Plan of Care is developed from the Nursing assessment and Emergency Department Attending provider initial evaluation. The plan of care may be reviewed in the ED Provider note.     The Plan of Care was developed with the following considerations:   Patient / Family readiness to learn indicated by:verbalized understanding  Persons(s) to be included in education: patient  Barriers to Learning/Limitations:No    Signed     Carole Pandya RN    5/16/2022   11:32 AM

## 2023-03-05 ENCOUNTER — APPOINTMENT (OUTPATIENT)
Dept: GENERAL RADIOLOGY | Age: 48
End: 2023-03-05
Attending: NURSE PRACTITIONER
Payer: MEDICARE

## 2023-03-05 ENCOUNTER — HOSPITAL ENCOUNTER (EMERGENCY)
Age: 48
Discharge: HOME | End: 2023-03-05
Attending: EMERGENCY MEDICINE
Payer: MEDICARE

## 2023-03-05 VITALS
OXYGEN SATURATION: 95 % | HEART RATE: 101 BPM | SYSTOLIC BLOOD PRESSURE: 162 MMHG | WEIGHT: 208.56 LBS | TEMPERATURE: 99.4 F | HEIGHT: 70 IN | BODY MASS INDEX: 29.86 KG/M2 | RESPIRATION RATE: 15 BRPM | DIASTOLIC BLOOD PRESSURE: 109 MMHG

## 2023-03-05 DIAGNOSIS — K59.00 CONSTIPATION, UNSPECIFIED CONSTIPATION TYPE: Primary | ICD-10-CM

## 2023-03-05 PROCEDURE — 74018 RADEX ABDOMEN 1 VIEW: CPT

## 2023-03-05 PROCEDURE — 99283 EMERGENCY DEPT VISIT LOW MDM: CPT

## 2023-03-05 RX ORDER — DOCUSATE SODIUM 100 MG/1
CAPSULE, LIQUID FILLED ORAL
Qty: 60 CAPSULE | Refills: 0 | Status: SHIPPED | OUTPATIENT
Start: 2023-03-05

## 2023-03-05 RX ORDER — POLYETHYLENE GLYCOL 3350 17 G/17G
17 POWDER, FOR SOLUTION ORAL
Qty: 116 G | Refills: 0 | Status: SHIPPED | OUTPATIENT
Start: 2023-03-05

## 2023-03-05 NOTE — ED TRIAGE NOTES
Patient comes to the ED for treatment of constipation with last BM 5 days ago.   Stated out of BP meds

## 2023-03-05 NOTE — ED PROVIDER NOTES
Former[facility] EMERGENCY DEPT  EMERGENCY DEPARTMENT ENCOUNTER       Pt Name: Elsy Vega  MRN: [id number]  [place] 1975  Date of evaluation: 3/5/2023  Provider: Chan Abernathy NP   PCP: None  Note Started: 9:09 AM 3/5/23     CHIEF COMPLAINT       Chief Complaint   Patient presents with    Constipation        HISTORY OF PRESENT ILLNESS: 1 or more elements      History From: Patient  HPI Limitations : None     Elsy Vega is a 50 y.o. male who presents ambulatory to the emergency department. Patient states she has not had a bowel movement in 5 days. Patient reports history of constipation. He denies nausea vomiting. Denies abdominal pain. He denies chest pain shortness of breath. Nursing Notes were all reviewed and agreed with or any disagreements were addressed in the HPI. REVIEW OF SYSTEMS      Review of Systems   Constitutional:  Negative for chills, fatigue and fever. HENT:  Negative for congestion and sore throat. Eyes:  Negative for redness. Respiratory:  Negative for cough, chest tightness and wheezing. Cardiovascular:  Negative for chest pain. Gastrointestinal:  Positive for constipation. Negative for abdominal pain. Genitourinary:  Negative for dysuria. Musculoskeletal:  Negative for arthralgias, back pain, myalgias, neck pain and neck stiffness. Skin:  Negative for rash. Neurological:  Negative for dizziness, syncope, weakness, light-headedness, numbness and headaches. All other systems reviewed and are negative. Positives and Pertinent negatives as per HPI.     PAST HISTORY     Past Medical History:  Past Medical History:   Diagnosis Date    Back pain     Chronic bronchitis (HCC)     COPD    Elevated WBCs     H/O splenectomy     HTN (hypertension)     Ill-defined condition     constipation    Left foot pain 6/2/2020    Psychiatric disorder     depression, anxiety    Psychiatric disorder     schizophrenia       Past Surgical History:  Past Surgical History: Procedure Laterality Date    HX OTHER SURGICAL Right     two right wrist fractures     HX SPLENECTOMY         Family History:  Family History   Problem Relation Age of Onset    No Known Problems Mother     No Known Problems Father     No Known Problems Brother        Social History:  Social History     Tobacco Use    Smoking status: Every Day     Packs/day: 0.50     Types: Cigarettes    Smokeless tobacco: Never   Vaping Use    Vaping Use: Never used   Substance Use Topics    Alcohol use: No    Drug use: No     Comment: \"not for years\"       Allergies: Allergies   Allergen Reactions    Haloperidol Other (comments)       CURRENT MEDICATIONS      Discharge Medication List as of 3/5/2023 10:43 AM        CONTINUE these medications which have NOT CHANGED    Details   acetaminophen (TYLENOL) 325 mg tablet Take 2 Tablets by mouth every six (6) hours as needed for Pain., Normal, Disp-20 Tablet, R-0      ibuprofen (MOTRIN) 600 mg tablet Take 1 Tablet by mouth every six (6) hours as needed for Pain., Normal, Disp-20 Tablet, R-0      !! cloZAPine (CLOZARIL) 200 mg tablet Take 1 Tab by mouth every twelve (12) hours every twelve (12) hours. Indications: treatment-resistant schizophrenia, Normal, Disp-14 Tab, R-0      !! cloZAPine (CLOZARIL) 100 mg tablet Take 1 Tab by mouth nightly. Total evening dose: 300 mg  Indications: treatment-resistant schizophrenia, Normal, Disp-7 Tab, R-0      montelukast (SINGULAIR) 10 mg tablet Take 1 Tab by mouth nightly. Indications: seasonal runny nose, Normal, Disp-30 Tab, R-1      propranoloL (INDERAL) 20 mg tablet Take 1 Tab by mouth two (2) times a day. Indications: tachycardia, Normal, Disp-60 Tab, R-1      senna-docusate (PERICOLACE) 8.6-50 mg per tablet Take 2 Tabs by mouth daily. Indications: constipation, Normal, Disp-60 Tab, R-1      doxycycline (VIBRA-TABS) 100 mg tablet Take 1 Tab by mouth nightly. Indications:  Foot abscess, Normal, Disp-1 Tab, R-0       !! - Potential duplicate medications found. Please discuss with provider. PHYSICAL EXAM      ED Triage Vitals [03/05/23 0903]   ED Encounter Vitals Group      BP (!) 174/116      Pulse (Heart Rate) 92      Resp Rate 19      Temp 98.4 °F (36.9 °C)      Temp src       O2 Sat (%) 98 %      Weight 208 lb 8.9 oz      Height 5' 9.75\"        Physical Exam  Vitals and nursing note reviewed. Constitutional:       Appearance: He is well-developed. HENT:      Head: Normocephalic and atraumatic. Right Ear: External ear normal.      Left Ear: External ear normal.      Mouth/Throat:      Mouth: Mucous membranes are moist.   Eyes:      General:         Right eye: No discharge. Left eye: No discharge. Conjunctiva/sclera: Conjunctivae normal.   Cardiovascular:      Rate and Rhythm: Normal rate and regular rhythm. Heart sounds: Normal heart sounds. Pulmonary:      Effort: Pulmonary effort is normal. No respiratory distress. Breath sounds: Normal breath sounds. No wheezing. Abdominal:      General: Bowel sounds are normal. There is distension. Palpations: Abdomen is soft. Tenderness: There is no abdominal tenderness. Musculoskeletal:         General: Normal range of motion. Cervical back: Normal range of motion and neck supple. Lymphadenopathy:      Cervical: No cervical adenopathy. Skin:     General: Skin is warm and dry. Neurological:      Mental Status: He is alert and oriented to person, place, and time. Cranial Nerves: No cranial nerve deficit. Psychiatric:         Behavior: Behavior normal.         Thought Content: Thought content normal.         Judgment: Judgment normal.        DIAGNOSTIC RESULTS   LABS:     No results found for this or any previous visit (from the past 12 hour(s)). EKG: When ordered, EKG's are interpreted by the Emergency Department Physician in the absence of a cardiologist.  Please see their note for interpretation of EKG.       RADIOLOGY:  Non-plain film images such as CT, Ultrasound and MRI are read by the radiologist. Plain radiographic images are visualized and preliminarily interpreted by the ED Provider with the below findings:          Interpretation per the Radiologist below, if available at the time of this note:     XR ABD (KUB)    Result Date: 3/5/2023  EXAM:  XR ABD (KUB) INDICATION: Abdominal pain. COMPARISON: Abdominal plain film of 5/26/2020. TECHNIQUE: Supine frontal abdomen (KUB). FINDINGS: No dilated small bowel. Stool and gas are present in the colon with mild right colonic fecal retention. No pathologic calcification. Osseous structures are age appropriate. Mild right colon fecal retention. No acute findings otherwise. PROCEDURES   Unless otherwise noted below, none  Procedures     CRITICAL CARE TIME       EMERGENCY DEPARTMENT COURSE and DIFFERENTIAL DIAGNOSIS/MDM   Vitals:    Vitals:    03/05/23 0903 03/05/23 1002   BP: (!) 174/116 (!) 162/109   Pulse: 92 (!) 101   Resp: 19 15   Temp: 98.4 °F (36.9 °C) 99.4 °F (37.4 °C)   SpO2: 98% 95%   Weight: 94.6 kg (208 lb 8.9 oz)    Height: 5' 9.75\" (1.772 m)         Patient was given the following medications:  Medications - No data to display    CONSULTS: (Who and What was discussed)  None    Chronic Conditions: COPD schizophrenia    Social Determinants affecting Dx or Tx: None    Records Reviewed (source and summary of external records): Prior medical records    CC/HPI Summary, DDx, ED Course, and Reassessment: 61-year-old male presents with constipation no bowel movement for the past 5 days history of constipation exam abdomen mildly softly distended. Noted on patient's previous visits to the ER patient had been admitted to the behavioral health unit for ingestion of foreign body. Will order KUB to assess for foreign body/constipation differential diagnosis ingested foreign body constipation small bowel obstruction.     ED Course as of 03/05/23 1336   Sun Mar 05, 2023   1021 Very mild right colon fecal retention we will discharge patient home with mag citrate. Patient states mag citrate has worked in the past.  Will prescribe MiraLAX for as needed use Colace for stool softener. [AN]      ED Course User Index  [AN] Kenny Sahu NP       Disposition Considerations (Tests not done, Shared Decision Making, Pt Expectation of Test or Tx.):      FINAL IMPRESSION     1. Constipation, unspecified constipation type          DISPOSITION/PLAN   Discharged    Discharge Note: The patient is stable for discharge home. The signs, symptoms, diagnosis, and discharge instructions have been discussed, understanding conveyed, and agreed upon. The patient is to follow up as recommended or return to ER should their symptoms worsen. PATIENT REFERRED TO:  Follow-up Information       Follow up With Specialties Details Why Contact Info    [facility]  In 1 week  [address] [facility] [id number]              DISCHARGE MEDICATIONS:  Discharge Medication List as of 3/5/2023 10:43 AM            DISCONTINUED MEDICATIONS:  Discharge Medication List as of 3/5/2023 10:43 AM          ED Attending Involvement : I have seen and evaluated the patient. My supervision physician was available for consultation. I am the Primary Clinician of Record. Kendy Guthrie NP (electronically signed)    (Please note that parts of this dictation were completed with voice recognition software. Quite often unanticipated grammatical, syntax, homophones, and other interpretive errors are inadvertently transcribed by the computer software. Please disregards these errors.  Please excuse any errors that have escaped final proofreading.)

## 2023-03-06 ENCOUNTER — HOSPITAL ENCOUNTER (EMERGENCY)
Age: 48
Discharge: HOME | End: 2023-03-07
Attending: STUDENT IN AN ORGANIZED HEALTH CARE EDUCATION/TRAINING PROGRAM
Payer: MEDICARE

## 2023-03-06 ENCOUNTER — APPOINTMENT (OUTPATIENT)
Dept: CT IMAGING | Age: 48
End: 2023-03-06
Attending: STUDENT IN AN ORGANIZED HEALTH CARE EDUCATION/TRAINING PROGRAM
Payer: MEDICARE

## 2023-03-06 ENCOUNTER — APPOINTMENT (OUTPATIENT)
Dept: GENERAL RADIOLOGY | Age: 48
End: 2023-03-06
Attending: EMERGENCY MEDICINE
Payer: MEDICARE

## 2023-03-06 VITALS
HEART RATE: 95 BPM | BODY MASS INDEX: 31.1 KG/M2 | OXYGEN SATURATION: 96 % | SYSTOLIC BLOOD PRESSURE: 122 MMHG | HEIGHT: 69 IN | TEMPERATURE: 98.1 F | DIASTOLIC BLOOD PRESSURE: 75 MMHG | RESPIRATION RATE: 14 BRPM | WEIGHT: 210 LBS

## 2023-03-06 DIAGNOSIS — I30.9 ACUTE PERICARDITIS, UNSPECIFIED TYPE: Primary | ICD-10-CM

## 2023-03-06 LAB
ALBUMIN SERPL-MCNC: 2.9 G/DL (ref 3.5–5)
ALBUMIN/GLOB SERPL: 0.7 (ref 1.1–2.2)
ALP SERPL-CCNC: 113 U/L (ref 45–117)
ALT SERPL-CCNC: 21 U/L (ref 12–78)
ANION GAP SERPL CALC-SCNC: 5 MMOL/L (ref 5–15)
AST SERPL-CCNC: 16 U/L (ref 15–37)
ATRIAL RATE: 101 BPM
BASOPHILS # BLD: 0.2 K/UL (ref 0–0.1)
BASOPHILS NFR BLD: 1 % (ref 0–1)
BILIRUB SERPL-MCNC: 0.6 MG/DL (ref 0.2–1)
BNP SERPL-MCNC: 202 PG/ML
BUN SERPL-MCNC: 15 MG/DL (ref 6–20)
BUN/CREAT SERPL: 8 (ref 12–20)
CALCIUM SERPL-MCNC: 9 MG/DL (ref 8.5–10.1)
CALCULATED P AXIS, ECG09: 68 DEGREES
CALCULATED R AXIS, ECG10: 93 DEGREES
CALCULATED T AXIS, ECG11: 58 DEGREES
CHLORIDE SERPL-SCNC: 108 MMOL/L (ref 97–108)
CO2 SERPL-SCNC: 27 MMOL/L (ref 21–32)
CREAT SERPL-MCNC: 1.79 MG/DL (ref 0.7–1.3)
DIAGNOSIS, 93000: NORMAL
DIFFERENTIAL METHOD BLD: ABNORMAL
EOSINOPHIL # BLD: 0.4 K/UL (ref 0–0.4)
EOSINOPHIL NFR BLD: 2 % (ref 0–7)
ERYTHROCYTE [DISTWIDTH] IN BLOOD BY AUTOMATED COUNT: 13 % (ref 11.5–14.5)
GLOBULIN SER CALC-MCNC: 4.3 G/DL (ref 2–4)
GLUCOSE SERPL-MCNC: 105 MG/DL (ref 65–100)
HCT VFR BLD AUTO: 36.3 % (ref 36.6–50.3)
HGB BLD-MCNC: 12.4 G/DL (ref 12.1–17)
IMM GRANULOCYTES # BLD AUTO: 0.1 K/UL (ref 0–0.04)
IMM GRANULOCYTES NFR BLD AUTO: 0 % (ref 0–0.5)
LYMPHOCYTES # BLD: 4.7 K/UL (ref 0.8–3.5)
LYMPHOCYTES NFR BLD: 26 % (ref 12–49)
MCH RBC QN AUTO: 31.1 PG (ref 26–34)
MCHC RBC AUTO-ENTMCNC: 34.2 G/DL (ref 30–36.5)
MCV RBC AUTO: 91 FL (ref 80–99)
MONOCYTES # BLD: 1.7 K/UL (ref 0–1)
MONOCYTES NFR BLD: 9 % (ref 5–13)
NEUTS SEG # BLD: 11 K/UL (ref 1.8–8)
NEUTS SEG NFR BLD: 62 % (ref 32–75)
NRBC # BLD: 0 K/UL (ref 0–0.01)
NRBC BLD-RTO: 0 PER 100 WBC
P-R INTERVAL, ECG05: 156 MS
PLATELET # BLD AUTO: 410 K/UL (ref 150–400)
PMV BLD AUTO: 10.8 FL (ref 8.9–12.9)
POTASSIUM SERPL-SCNC: 3.7 MMOL/L (ref 3.5–5.1)
PROT SERPL-MCNC: 7.2 G/DL (ref 6.4–8.2)
Q-T INTERVAL, ECG07: 354 MS
QRS DURATION, ECG06: 90 MS
QTC CALCULATION (BEZET), ECG08: 459 MS
RBC # BLD AUTO: 3.99 M/UL (ref 4.1–5.7)
SODIUM SERPL-SCNC: 140 MMOL/L (ref 136–145)
TROPONIN I SERPL HS-MCNC: 13 NG/L (ref 0–76)
TROPONIN I SERPL HS-MCNC: 14 NG/L (ref 0–76)
VENTRICULAR RATE, ECG03: 101 BPM
WBC # BLD AUTO: 17.9 K/UL (ref 4.1–11.1)

## 2023-03-06 PROCEDURE — 80053 COMPREHEN METABOLIC PANEL: CPT

## 2023-03-06 PROCEDURE — 99285 EMERGENCY DEPT VISIT HI MDM: CPT

## 2023-03-06 PROCEDURE — 71275 CT ANGIOGRAPHY CHEST: CPT

## 2023-03-06 PROCEDURE — 96374 THER/PROPH/DIAG INJ IV PUSH: CPT

## 2023-03-06 PROCEDURE — 74011250636 HC RX REV CODE- 250/636: Performed by: STUDENT IN AN ORGANIZED HEALTH CARE EDUCATION/TRAINING PROGRAM

## 2023-03-06 PROCEDURE — 85025 COMPLETE CBC W/AUTO DIFF WBC: CPT

## 2023-03-06 PROCEDURE — 84484 ASSAY OF TROPONIN QUANT: CPT

## 2023-03-06 PROCEDURE — 74011000636 HC RX REV CODE- 636: Performed by: STUDENT IN AN ORGANIZED HEALTH CARE EDUCATION/TRAINING PROGRAM

## 2023-03-06 PROCEDURE — 93005 ELECTROCARDIOGRAM TRACING: CPT

## 2023-03-06 PROCEDURE — 96361 HYDRATE IV INFUSION ADD-ON: CPT

## 2023-03-06 PROCEDURE — 36415 COLL VENOUS BLD VENIPUNCTURE: CPT

## 2023-03-06 PROCEDURE — 83880 ASSAY OF NATRIURETIC PEPTIDE: CPT

## 2023-03-06 PROCEDURE — 71046 X-RAY EXAM CHEST 2 VIEWS: CPT

## 2023-03-06 RX ORDER — PROPRANOLOL HYDROCHLORIDE 20 MG/1
20 TABLET ORAL 2 TIMES DAILY
Qty: 60 TABLET | Refills: 1 | Status: SHIPPED | OUTPATIENT
Start: 2023-03-06

## 2023-03-06 RX ORDER — KETOROLAC TROMETHAMINE 30 MG/ML
15 INJECTION, SOLUTION INTRAMUSCULAR; INTRAVENOUS
Status: COMPLETED | OUTPATIENT
Start: 2023-03-06 | End: 2023-03-06

## 2023-03-06 RX ORDER — IBUPROFEN 600 MG/1
600 TABLET ORAL EVERY 8 HOURS
Qty: 42 TABLET | Refills: 0 | Status: SHIPPED | OUTPATIENT
Start: 2023-03-06 | End: 2023-03-20

## 2023-03-06 RX ADMIN — SODIUM CHLORIDE 1000 ML: 9 INJECTION, SOLUTION INTRAVENOUS at 16:27

## 2023-03-06 RX ADMIN — KETOROLAC TROMETHAMINE 15 MG: 30 INJECTION, SOLUTION INTRAMUSCULAR; INTRAVENOUS at 20:39

## 2023-03-06 RX ADMIN — IOPAMIDOL 100 ML: 755 INJECTION, SOLUTION INTRAVENOUS at 17:36

## 2023-03-06 NOTE — ED NOTES
Pt starts talking to self. This student RN asks pt what he said. Pt states \"nevermind, I thought you knew what I was thinking. This is apart of my illness, I've said things before that mess it up and make it real uncomfortable\". This student RN asks pt \"what do you mean? \" to which pt responds \"I am schizophrenic\", pt reports taking Zoloft, trazodone, and Zyprexa but been off medications except for trazodone for 2 weeks.
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
## 2023-03-06 NOTE — ED NOTES
Pt denies cough, nausea, diarrhea, fevers. Pt reports sob, tachypnea, chest pain that started Friday 3/3/23.

## 2023-03-06 NOTE — PROGRESS NOTES
CM consulted by ED attending for Medication Management/PCP  Ban Stevens presents from [facility] [address] [facility].  He states \"I have no meds and no doctor\". CM placed call to group home, spoke with [facility]. They are working on getting his meds through [facility]. He had transitioned out of [facility]/Dr. Kaur/Psychiatry. Group home attempted to get him seen through [facility] and he would not go. They are [facility]  verbalized they explained this to him this morning and he understood the plan. They hope to have resolution tomorrow through [facility].     Dr. Katlyn Weber [facility] will follow up in am.    [address]  [phone]    [facility] [address] [place] [address]  Office: [phone]      [address] [place] [address] on [facility]

## 2023-03-07 NOTE — DISCHARGE INSTRUCTIONS
Local Primary Care Physicians  [facility] [phone]  MD Georgia Brumfield, MD Winsome Harrison MD [facility] [phone]  Rommel Crook, [facility]  Jose Guadalupe Dan, MD Kayleen Porter MD [address]  [phone]  Belen Andersen[place] [address] [facility] [phone]  Gab Paz, MD Hanna Lazar MD Nicanor Byers, MD   [facility] [phone]  [facility], MD Olya More, MD Javier Brown, NP [address] [phone]  MD Ramez Santamaria, MD Cathie Mancini, MD Luciano Proctor, MD Victoriano Vincent, MD Belynda Rose, MD Theressa Denver, MD   [address] [facility]  Nithin Borden MD [facility] [phone]  Yue Olivo, MD Braydon Joya, NP  Faustina Castanon, MD Swapnil Savage, MD Parveen López, MD Eric Aguilar, MD Booker Hager MD   [address] [facility] [phone]  Wenceslao Suazo, MD Vincent Martinez, [facility]  Danilo Hameed, NP  Surendra López, MD Kenya Black, MD Gloria Landeros MD [facility] [phone]  MD Lynn Rivera, MD [place], MD Clarissa Chance MD   [address] [phone]  MD Emmanuel Ramon MD [place] [phone]  MD Speedy Kemp MD Clent Curet, MD   [facility] [phone]  MD Anabella Manjarrez, MD Lianna Izquierdo MD Marlinda Scarpa, MD Akbar Cheung, JAMISON Mclaughlin MD [address]   [phone]  Katia Sandy, MD Triny Herrera, MD Delon Robledo MD               [address] [facility] [phone]  Lexx Eddy, MD Holli Haile, MOLLY Diaz, PA-C  Vicente Diaz, MOLLY Noel MD Gianna Hong, JAMISON Major, DO             Miscellaneous:  Samara Cabral [phone]

## 2023-07-15 NOTE — PROGRESS NOTES
Problem: Altered Thought Process (Adult/Pediatric)  Goal: *STG: Remains safe in hospital  Outcome: Progressing Towards Goal  Pt rested quietly in bed with eyes closed. No signs/symptoms of distress, agitation, or anxiety. Will monitor for changes. Q 15 minute checks continue.  Pt slept 7 hrs Aspiration of foreign body into lung

## 2024-04-08 ENCOUNTER — HOSPITAL ENCOUNTER (INPATIENT)
Facility: HOSPITAL | Age: 49
LOS: 3 days | Discharge: HOME | DRG: 683 | End: 2024-04-11
Attending: INTERNAL MEDICINE | Admitting: INTERNAL MEDICINE
Payer: MEDICARE

## 2024-04-08 DIAGNOSIS — N17.9 ACUTE RENAL FAILURE, UNSPECIFIED ACUTE RENAL FAILURE TYPE (HCC): Primary | ICD-10-CM

## 2024-04-08 DIAGNOSIS — R44.3 HALLUCINATIONS: ICD-10-CM

## 2024-04-08 DIAGNOSIS — F25.0 SCHIZOAFFECTIVE DISORDER, BIPOLAR TYPE (HCC): ICD-10-CM

## 2024-04-08 PROBLEM — N18.9 ACUTE KIDNEY INJURY SUPERIMPOSED ON CHRONIC KIDNEY DISEASE (HCC): Status: ACTIVE | Noted: 2024-04-08

## 2024-04-08 LAB
ALBUMIN SERPL-MCNC: 3.2 G/DL (ref 3.5–5)
ALBUMIN/GLOB SERPL: 0.7 (ref 1.1–2.2)
ALP SERPL-CCNC: 120 U/L (ref 45–117)
ALT SERPL-CCNC: 15 U/L (ref 12–78)
AMPHET UR QL SCN: NEGATIVE
ANION GAP SERPL CALC-SCNC: 8 MMOL/L (ref 5–15)
APAP SERPL-MCNC: <2 UG/ML (ref 10–30)
APPEARANCE UR: CLEAR
AST SERPL-CCNC: 14 U/L (ref 15–37)
BACTERIA URNS QL MICRO: NEGATIVE /HPF
BARBITURATES UR QL SCN: NEGATIVE
BASOPHILS # BLD: 0.1 K/UL (ref 0–0.1)
BASOPHILS NFR BLD: 1 % (ref 0–1)
BENZODIAZ UR QL: NEGATIVE
BILIRUB SERPL-MCNC: 0.4 MG/DL (ref 0.2–1)
BILIRUB UR QL: NEGATIVE
BUN SERPL-MCNC: 27 MG/DL (ref 6–20)
BUN/CREAT SERPL: 9 (ref 12–20)
CALCIUM SERPL-MCNC: 8.4 MG/DL (ref 8.5–10.1)
CANNABINOIDS UR QL SCN: NEGATIVE
CHLORIDE SERPL-SCNC: 99 MMOL/L (ref 97–108)
CO2 SERPL-SCNC: 26 MMOL/L (ref 21–32)
COCAINE UR QL SCN: NEGATIVE
COLOR UR: ABNORMAL
CREAT SERPL-MCNC: 3.09 MG/DL (ref 0.7–1.3)
DIFFERENTIAL METHOD BLD: ABNORMAL
EOSINOPHIL # BLD: 0.6 K/UL (ref 0–0.4)
EOSINOPHIL NFR BLD: 4 % (ref 0–7)
EPITH CASTS URNS QL MICRO: ABNORMAL /LPF
ERYTHROCYTE [DISTWIDTH] IN BLOOD BY AUTOMATED COUNT: 13.2 % (ref 11.5–14.5)
ETHANOL SERPL-MCNC: <10 MG/DL (ref 0–0.08)
GLOBULIN SER CALC-MCNC: 4.5 G/DL (ref 2–4)
GLUCOSE SERPL-MCNC: 97 MG/DL (ref 65–100)
GLUCOSE UR STRIP.AUTO-MCNC: NEGATIVE MG/DL
HCT VFR BLD AUTO: 37.4 % (ref 36.6–50.3)
HGB BLD-MCNC: 12.4 G/DL (ref 12.1–17)
HGB UR QL STRIP: ABNORMAL
IMM GRANULOCYTES # BLD AUTO: 0 K/UL
IMM GRANULOCYTES NFR BLD AUTO: 0 %
KETONES UR QL STRIP.AUTO: NEGATIVE MG/DL
LEUKOCYTE ESTERASE UR QL STRIP.AUTO: NEGATIVE
LYMPHOCYTES # BLD: 4.9 K/UL (ref 0.8–3.5)
LYMPHOCYTES NFR BLD: 33 % (ref 12–49)
Lab: NORMAL
MCH RBC QN AUTO: 29.3 PG (ref 26–34)
MCHC RBC AUTO-ENTMCNC: 33.2 G/DL (ref 30–36.5)
MCV RBC AUTO: 88.4 FL (ref 80–99)
METHADONE UR QL: NEGATIVE
MONOCYTES # BLD: 0.7 K/UL (ref 0–1)
MONOCYTES NFR BLD: 5 % (ref 5–13)
NEUTS SEG # BLD: 8.4 K/UL (ref 1.8–8)
NEUTS SEG NFR BLD: 57 % (ref 32–75)
NITRITE UR QL STRIP.AUTO: NEGATIVE
NRBC # BLD: 0 K/UL (ref 0–0.01)
NRBC BLD-RTO: 0 PER 100 WBC
OPIATES UR QL: NEGATIVE
PCP UR QL: NEGATIVE
PH UR STRIP: 5.5 (ref 5–8)
PHOSPHATE SERPL-MCNC: 3.6 MG/DL (ref 2.6–4.7)
PLATELET # BLD AUTO: 315 K/UL (ref 150–400)
PLATELET COMMENT: ABNORMAL
PMV BLD AUTO: 11.6 FL (ref 8.9–12.9)
POTASSIUM SERPL-SCNC: 4.6 MMOL/L (ref 3.5–5.1)
PROT SERPL-MCNC: 7.7 G/DL (ref 6.4–8.2)
PROT UR STRIP-MCNC: 100 MG/DL
RBC # BLD AUTO: 4.23 M/UL (ref 4.1–5.7)
RBC #/AREA URNS HPF: ABNORMAL /HPF (ref 0–5)
RBC MORPH BLD: ABNORMAL
SALICYLATES SERPL-MCNC: 5.2 MG/DL (ref 2.8–20)
SODIUM SERPL-SCNC: 133 MMOL/L (ref 136–145)
SP GR UR REFRACTOMETRY: <1.005
TSH SERPL DL<=0.05 MIU/L-ACNC: 1.98 UIU/ML (ref 0.36–3.74)
UROBILINOGEN UR QL STRIP.AUTO: 0.2 EU/DL (ref 0.2–1)
WBC # BLD AUTO: 14.7 K/UL (ref 4.1–11.1)
WBC URNS QL MICRO: ABNORMAL /HPF (ref 0–4)

## 2024-04-08 PROCEDURE — 90791 PSYCH DIAGNOSTIC EVALUATION: CPT

## 2024-04-08 PROCEDURE — 80053 COMPREHEN METABOLIC PANEL: CPT

## 2024-04-08 PROCEDURE — 2580000003 HC RX 258: Performed by: INTERNAL MEDICINE

## 2024-04-08 PROCEDURE — 80179 DRUG ASSAY SALICYLATE: CPT

## 2024-04-08 PROCEDURE — 82077 ASSAY SPEC XCP UR&BREATH IA: CPT

## 2024-04-08 PROCEDURE — 82306 VITAMIN D 25 HYDROXY: CPT

## 2024-04-08 PROCEDURE — 80307 DRUG TEST PRSMV CHEM ANLYZR: CPT

## 2024-04-08 PROCEDURE — 6370000000 HC RX 637 (ALT 250 FOR IP): Performed by: PHYSICIAN ASSISTANT

## 2024-04-08 PROCEDURE — 6370000000 HC RX 637 (ALT 250 FOR IP): Performed by: INTERNAL MEDICINE

## 2024-04-08 PROCEDURE — 84443 ASSAY THYROID STIM HORMONE: CPT

## 2024-04-08 PROCEDURE — 81001 URINALYSIS AUTO W/SCOPE: CPT

## 2024-04-08 PROCEDURE — 85025 COMPLETE CBC W/AUTO DIFF WBC: CPT

## 2024-04-08 PROCEDURE — 80143 DRUG ASSAY ACETAMINOPHEN: CPT

## 2024-04-08 PROCEDURE — 6360000002 HC RX W HCPCS: Performed by: INTERNAL MEDICINE

## 2024-04-08 PROCEDURE — 84100 ASSAY OF PHOSPHORUS: CPT

## 2024-04-08 PROCEDURE — 1200000000 HC SEMI PRIVATE

## 2024-04-08 PROCEDURE — 36415 COLL VENOUS BLD VENIPUNCTURE: CPT

## 2024-04-08 PROCEDURE — 99285 EMERGENCY DEPT VISIT HI MDM: CPT

## 2024-04-08 RX ORDER — ONDANSETRON 2 MG/ML
4 INJECTION INTRAMUSCULAR; INTRAVENOUS EVERY 6 HOURS PRN
Status: DISCONTINUED | OUTPATIENT
Start: 2024-04-08 | End: 2024-04-11 | Stop reason: HOSPADM

## 2024-04-08 RX ORDER — ACETAMINOPHEN 325 MG/1
650 TABLET ORAL EVERY 6 HOURS PRN
Status: DISCONTINUED | OUTPATIENT
Start: 2024-04-08 | End: 2024-04-11 | Stop reason: HOSPADM

## 2024-04-08 RX ORDER — OLANZAPINE 10 MG/1
10 TABLET ORAL DAILY
Status: ON HOLD | COMMUNITY
Start: 2024-03-18 | End: 2024-04-11 | Stop reason: HOSPADM

## 2024-04-08 RX ORDER — BUPROPION HYDROCHLORIDE 100 MG/1
100 TABLET, EXTENDED RELEASE ORAL DAILY
Status: DISCONTINUED | OUTPATIENT
Start: 2024-04-09 | End: 2024-04-11 | Stop reason: HOSPADM

## 2024-04-08 RX ORDER — SODIUM CHLORIDE 0.9 % (FLUSH) 0.9 %
5-40 SYRINGE (ML) INJECTION EVERY 12 HOURS SCHEDULED
Status: DISCONTINUED | OUTPATIENT
Start: 2024-04-08 | End: 2024-04-11 | Stop reason: HOSPADM

## 2024-04-08 RX ORDER — POLYETHYLENE GLYCOL 3350 17 G/17G
17 POWDER, FOR SOLUTION ORAL DAILY PRN
Status: DISCONTINUED | OUTPATIENT
Start: 2024-04-08 | End: 2024-04-11 | Stop reason: HOSPADM

## 2024-04-08 RX ORDER — ALBUTEROL SULFATE 90 UG/1
2 AEROSOL, METERED RESPIRATORY (INHALATION) EVERY 6 HOURS PRN
Status: ON HOLD | COMMUNITY
End: 2024-04-08 | Stop reason: ALTCHOICE

## 2024-04-08 RX ORDER — PROPRANOLOL HYDROCHLORIDE 80 MG/1
80 CAPSULE, EXTENDED RELEASE ORAL DAILY
Status: ON HOLD | COMMUNITY
End: 2024-04-11 | Stop reason: HOSPADM

## 2024-04-08 RX ORDER — POTASSIUM CHLORIDE 750 MG/1
40 TABLET, FILM COATED, EXTENDED RELEASE ORAL PRN
Status: DISCONTINUED | OUTPATIENT
Start: 2024-04-08 | End: 2024-04-11 | Stop reason: HOSPADM

## 2024-04-08 RX ORDER — DM/P-EPHED/ACETAMINOPH/DOXYLAM
LIQUID (ML) ORAL DAILY
Status: ON HOLD | COMMUNITY
Start: 2024-02-17 | End: 2024-04-08 | Stop reason: ALTCHOICE

## 2024-04-08 RX ORDER — OLANZAPINE 10 MG/1
10 TABLET ORAL DAILY
Status: DISCONTINUED | OUTPATIENT
Start: 2024-04-09 | End: 2024-04-11 | Stop reason: HOSPADM

## 2024-04-08 RX ORDER — TRAZODONE HYDROCHLORIDE 50 MG/1
50 TABLET ORAL NIGHTLY
Status: DISCONTINUED | OUTPATIENT
Start: 2024-04-08 | End: 2024-04-11 | Stop reason: HOSPADM

## 2024-04-08 RX ORDER — NICOTINE 21 MG/24HR
1 PATCH, TRANSDERMAL 24 HOURS TRANSDERMAL DAILY
Status: DISCONTINUED | OUTPATIENT
Start: 2024-04-08 | End: 2024-04-11 | Stop reason: HOSPADM

## 2024-04-08 RX ORDER — ENOXAPARIN SODIUM 100 MG/ML
30 INJECTION SUBCUTANEOUS EVERY EVENING
Status: DISCONTINUED | OUTPATIENT
Start: 2024-04-08 | End: 2024-04-11 | Stop reason: HOSPADM

## 2024-04-08 RX ORDER — AMLODIPINE BESYLATE 10 MG/1
10 TABLET ORAL DAILY
Status: ON HOLD | COMMUNITY
Start: 2024-03-18 | End: 2024-04-10

## 2024-04-08 RX ORDER — BUPROPION HYDROCHLORIDE 100 MG/1
100 TABLET, EXTENDED RELEASE ORAL DAILY
Status: ON HOLD | COMMUNITY
Start: 2024-03-18 | End: 2024-04-11 | Stop reason: HOSPADM

## 2024-04-08 RX ORDER — HYDROXYZINE HYDROCHLORIDE 25 MG/1
50 TABLET, FILM COATED ORAL
Status: COMPLETED | OUTPATIENT
Start: 2024-04-08 | End: 2024-04-08

## 2024-04-08 RX ORDER — OLANZAPINE 20 MG/1
20 TABLET ORAL NIGHTLY
Status: ON HOLD | COMMUNITY
Start: 2024-03-18 | End: 2024-04-11 | Stop reason: HOSPADM

## 2024-04-08 RX ORDER — TRAZODONE HYDROCHLORIDE 50 MG/1
50 TABLET ORAL NIGHTLY
Status: ON HOLD | COMMUNITY
Start: 2024-03-18 | End: 2024-04-10

## 2024-04-08 RX ORDER — HYDROCHLOROTHIAZIDE 25 MG/1
25 TABLET ORAL DAILY
Status: ON HOLD | COMMUNITY
End: 2024-04-08 | Stop reason: ALTCHOICE

## 2024-04-08 RX ORDER — SODIUM CHLORIDE 9 MG/ML
INJECTION, SOLUTION INTRAVENOUS PRN
Status: DISCONTINUED | OUTPATIENT
Start: 2024-04-08 | End: 2024-04-11 | Stop reason: HOSPADM

## 2024-04-08 RX ORDER — ACETAMINOPHEN 650 MG/1
650 SUPPOSITORY RECTAL EVERY 6 HOURS PRN
Status: DISCONTINUED | OUTPATIENT
Start: 2024-04-08 | End: 2024-04-11 | Stop reason: HOSPADM

## 2024-04-08 RX ORDER — POTASSIUM CHLORIDE 7.45 MG/ML
10 INJECTION INTRAVENOUS PRN
Status: DISCONTINUED | OUTPATIENT
Start: 2024-04-08 | End: 2024-04-11 | Stop reason: HOSPADM

## 2024-04-08 RX ORDER — PROPRANOLOL HYDROCHLORIDE 80 MG/1
80 CAPSULE, EXTENDED RELEASE ORAL DAILY
Status: DISCONTINUED | OUTPATIENT
Start: 2024-04-09 | End: 2024-04-11 | Stop reason: HOSPADM

## 2024-04-08 RX ORDER — SERTRALINE HYDROCHLORIDE 100 MG/1
100 TABLET, FILM COATED ORAL DAILY
Status: ON HOLD | COMMUNITY
Start: 2024-03-18 | End: 2024-04-10

## 2024-04-08 RX ORDER — AMLODIPINE BESYLATE 5 MG/1
10 TABLET ORAL DAILY
Status: DISCONTINUED | OUTPATIENT
Start: 2024-04-09 | End: 2024-04-11 | Stop reason: HOSPADM

## 2024-04-08 RX ORDER — ONDANSETRON 4 MG/1
4 TABLET, ORALLY DISINTEGRATING ORAL EVERY 8 HOURS PRN
Status: DISCONTINUED | OUTPATIENT
Start: 2024-04-08 | End: 2024-04-11 | Stop reason: HOSPADM

## 2024-04-08 RX ORDER — SODIUM CHLORIDE 0.9 % (FLUSH) 0.9 %
5-40 SYRINGE (ML) INJECTION PRN
Status: DISCONTINUED | OUTPATIENT
Start: 2024-04-08 | End: 2024-04-11 | Stop reason: HOSPADM

## 2024-04-08 RX ORDER — OLANZAPINE 10 MG/1
20 TABLET ORAL NIGHTLY
Status: DISCONTINUED | OUTPATIENT
Start: 2024-04-08 | End: 2024-04-11 | Stop reason: HOSPADM

## 2024-04-08 RX ADMIN — TRAZODONE HYDROCHLORIDE 50 MG: 50 TABLET ORAL at 19:59

## 2024-04-08 RX ADMIN — ENOXAPARIN SODIUM 30 MG: 100 INJECTION SUBCUTANEOUS at 19:58

## 2024-04-08 RX ADMIN — HYDROXYZINE HYDROCHLORIDE 50 MG: 25 TABLET, FILM COATED ORAL at 11:47

## 2024-04-08 RX ADMIN — SODIUM CHLORIDE, PRESERVATIVE FREE 10 ML: 5 INJECTION INTRAVENOUS at 19:58

## 2024-04-08 RX ADMIN — OLANZAPINE 20 MG: 10 TABLET, FILM COATED ORAL at 19:58

## 2024-04-08 ASSESSMENT — ENCOUNTER SYMPTOMS
EYE PAIN: 0
PHOTOPHOBIA: 0
VOMITING: 0
SORE THROAT: 0
DIARRHEA: 0
COUGH: 0
ABDOMINAL PAIN: 0
BACK PAIN: 0
NAUSEA: 0
WHEEZING: 0
CHEST TIGHTNESS: 0
RHINORRHEA: 0
SHORTNESS OF BREATH: 0

## 2024-04-08 ASSESSMENT — PAIN - FUNCTIONAL ASSESSMENT: PAIN_FUNCTIONAL_ASSESSMENT: 0-10

## 2024-04-08 ASSESSMENT — LIFESTYLE VARIABLES
HOW OFTEN DO YOU HAVE A DRINK CONTAINING ALCOHOL: NEVER
HOW MANY STANDARD DRINKS CONTAINING ALCOHOL DO YOU HAVE ON A TYPICAL DAY: PATIENT DOES NOT DRINK

## 2024-04-08 ASSESSMENT — PAIN DESCRIPTION - DESCRIPTORS: DESCRIPTORS: THROBBING

## 2024-04-08 ASSESSMENT — PAIN DESCRIPTION - ORIENTATION: ORIENTATION: RIGHT

## 2024-04-08 ASSESSMENT — PAIN DESCRIPTION - LOCATION: LOCATION: ARM;BACK

## 2024-04-08 ASSESSMENT — PAIN SCALES - GENERAL: PAINLEVEL_OUTOF10: 2

## 2024-04-08 NOTE — ED PROVIDER NOTES
[facility] EMERGENCY DEPT  EMERGENCY DEPARTMENT ENCOUNTER         Pt Name: Brandon Gould  MRN: [id number]  Birthdate 1975  Date of evaluation: 4/8/2024  Provider: Marie Stokes PA-C   PCP: None, None  Note Started: 10:47 AM EDT on 4/8/24     CHIEF COMPLAINT       Chief Complaint   Patient presents with    Hallucinations        HISTORY OF PRESENT ILLNESS: 1 or more elements      History From: Patient  None     Brandon Gould is a 49 y.o. male with medical history significant for tobacco abuse, schizophrenia, hallucinations/psychosis, hypertension, COPD, splenectomy, constipation, anxiety, depression who presents via self with complaints of worsening hallucinations X 2 months.  States that he is having voices that tell him that they are going to kill him and his father.  Endorses vague occasional SI, but no current SI or plan.  No HI.  No EtOH or substance abuse.  No fever, chills, nausea, vomiting chest pain or shortness of breath, syncope, seizure, headaches.  Endorses taking his medications as prescribed and following up with a psychiatrist at the [facility] whom he called today before arriving in the ED.  No recent change in medications.       Nursing Notes were all reviewed and agreed with or any disagreements were addressed in the HPI.     REVIEW OF SYSTEMS      Review of Systems   Constitutional:  Negative for activity change, appetite change, chills, diaphoresis, fatigue, fever and unexpected weight change.   HENT:  Negative for congestion, rhinorrhea and sore throat.    Eyes:  Negative for photophobia, pain and visual disturbance.   Respiratory:  Negative for cough, chest tightness, shortness of breath and wheezing.    Cardiovascular:  Negative for chest pain and palpitations.   Gastrointestinal:  Negative for abdominal pain, diarrhea, nausea and vomiting.   Musculoskeletal:  Negative for arthralgias, back pain, neck pain and neck stiffness.   Skin:  Negative for rash and wound.   Neurological:  Negative for
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
## 2024-04-08 NOTE — PROGRESS NOTES
[facility] Admission Pharmacy Medication Reconciliation    Information obtained from: [facility] data available1:Yes     Comments/recommendations:  PTA list updated with Meg at [facility] where patient resides. Last reported dose was this morning 4/8/24.     The [place] Prescription Monitoring Program () was accessed to determine fill history of any controlled medications.No record found.     Medication changes (since last review):  Added  Propranolol ER 80mg  Removed  Acetaminophen  Albuterol HFA  Clozapine  Vitamin D  Docusate  Doxycycline Hyclate  Hydrochlorothiazide  Montelukast  Miralax  Senna-Docusate  Anoro Ellipta   Adjusted  Amlodipine  Buproprion        1[employer] pharmacy benefit data reflects medications filled and processed through the patient's insurance, however                this data does NOT capture whether the medication was picked up or is currently being taken by the patient.         Patient allergies:   Allergies as of 04/08/2024 - Fully Reviewed 04/08/2024   Allergen Reaction Noted    Haloperidol Other (See Comments) 12/30/2013       Prior to Admission Medications   Prescriptions Last Dose Informant   OLANZapine (ZYPREXA) 10 MG tablet 4/8/2024    Sig: Take 1 tablet by mouth daily   OLANZapine (ZYPREXA) 20 MG tablet 4/7/2024    Sig: Take 1 tablet by mouth nightly   amLODIPine (NORVASC) 10 MG tablet 4/8/2024    Sig: Take 1 tablet by mouth daily   buPROPion (WELLBUTRIN SR) 100 MG extended release tablet 4/8/2024    Sig: Take 1 tablet by mouth daily   propranolol (INDERAL LA) 80 MG extended release capsule 4/8/2024    Sig: Take 1 capsule by mouth daily   sertraline (ZOLOFT) 100 MG tablet 4/8/2024    Sig: Take 1 tablet by mouth daily   traZODone (DESYREL) 50 MG tablet 4/9/2024    Sig: Take 1 tablet by mouth nightly      Facility-Administered Medications: None      Thank you,     Janell Short, PharmD   Contact: [phone]

## 2024-04-08 NOTE — ED NOTES
Pt belongings placed near the nurses station. Security notified about pt and belongings needed to be wanded.    Belongings include clothing, shoes and wallet.

## 2024-04-08 NOTE — H&P
decompensation if discharged from the emergency department. Complex decision making was performed, which includes reviewing the patient's available past medical records, laboratory results, and imaging studies.    Principal Problem:    TAMAR (acute kidney injury) (HCC)  Active Problems:    Acute kidney injury superimposed on chronic kidney disease (HCC)  Resolved Problems:    * No resolved hospital problems. *          Acute on Chronic CKD  -Cr 3.09, baseline Cr 1.79 in 03/06/23,  GFR 24  -UA shows proteinuria  -ordered pth, phos, vit D, hba1c, albumin/cr ratio, urine sodium   -avoid nephrotoxins. I&O    HTN  On home med amlodipine    Schizophrenia  -on olanzapine, wellbutrin per psych  -per nurse management: BSMART evaluation states moderate suicide risk, as such does not require suicide precautions    Elevated alkaline phosphates  -alp 120, normal liver enzymes, normal bilirubin  -order GGT  -if normal, likely bone in origin,  -if elevated, order RUQ US        DIET: ADULT DIET; Regular; Low Phosphorus (Less than 1000 mg)   ISOLATION PRECAUTIONS: No active isolations  CODE STATUS: [unfilled]   DVT PROPHYLAXIS: lovenox  FUNCTIONAL STATUS PRIOR TO HOSPITALIZATION: independent  Ambulatory status/function: independent  ANTICIPATED DISCHARGE: less than 48 hrs  ANTICIPATED DISPOSITION: Per U  EMERGENCY CONTACT/SURROGATE DECISION MAKER: Caregiver Ms Chris Vail [phone]    CRITICAL CARE WAS PERFORMED FOR THIS ENCOUNTER: none      Signed By: Ruddy Ricardo MD     April 8, 2024         Please note that this dictation may have been completed with Dragon, the computer voice recognition software.  Quite often unanticipated grammatical, syntax, homophones, and other interpretive errors are inadvertently transcribed by the computer software.  Please disregard these errors.  Please excuse any errors that have escaped final proofreading.

## 2024-04-08 NOTE — PROGRESS NOTES
LRA reviewed and patient's room was made safe by removing all items that are not medically necessary for his/her treatment or care.  Extra gas connectors/oxygen trees have been removed. Trash bags have been replaced with paper bags. A trained Constant Observer is at the patients bedside.  The bed has been placed in its lowest position and it is locked.

## 2024-04-08 NOTE — PROGRESS NOTES
BSMART assessment completed, and suicide risk level noted to be moderate. Primary Nurse Darling/Oswaldo and Charge Nurse Sudha and Physician JENNIFER Salazar notified. Concerns not observed.  Security/Off- has not been notified.

## 2024-04-08 NOTE — ED NOTES
Pt presents ambulatory to ED complaining of auditory hallucinations that he does not recognized, the voices are not telling him to harm himself or anyone else. Pt reports feeling anxious even though he is taking his psychiatric medication as prescribed. Pt reports seeing his psychiatrist last week. Pt denies having a family support system. Pt lives in a group home . Pt states he has been admitted and received inpatient behavioral health care before. Pt denies SI/HI thoughts. Pt is answering questions appropriately, calm and cooperative. Pt is alert and oriented x 4, RR even and unlabored, skin is warm and dry. Assessment completed and pt updated on plan of care.        Emergency Department Nursing Plan of Care        The Nursing Plan of Care is developed from the Nursing assessment and Emergency Department Attending provider initial evaluation.  The plan of care may be reviewed in the “ED Provider note”.

## 2024-04-08 NOTE — ED NOTES
TRANSFER - OUT REPORT:    Verbal report given to OSVALDO Boyer on Brandon Gould  being transferred to [facility] for routine progression of patient care       Report consisted of patient's Situation, Background, Assessment and   Recommendations(SBAR).     Information from the following report(s) ED SBAR and MAR was reviewed with the receiving nurse.    [place] Assessment:    Presents to emergency department  because of falls (Syncope, seizure, or loss of consciousness): No  Age > 70: No  Altered Mental Status, Intoxication with alcohol or substance confusion (Disorientation, impaired judgment, poor safety awaremess, or inability to follow instructions): Yes  Impaired Mobility: Ambulates or transfers with assistive devices or assistance; Unable to ambulate or transer.: No  Nursing Judgement: No          Lines:   Peripheral IV 04/08/24 Right Antecubital (Active)        Opportunity for questions and clarification was provided.      Patient transported with: Terrance black

## 2024-04-08 NOTE — ED TRIAGE NOTES
Pt reports over the last 2 months having AVH but over the last day the voices telling him they are going to \"kill him and that he is already dead.\" Pt reports occ seeing shadows. Pt denies SI/HI, ETOH or substance use.

## 2024-04-08 NOTE — CARE COORDINATION
04/08/24 1707   Service Assessment   Patient Orientation Unable to Assess     Marissa Dumont RN, CM    Addendum P  Patient was at a procedure was at a procedure      Marissa Araya RN, CM

## 2024-04-09 ENCOUNTER — APPOINTMENT (OUTPATIENT)
Facility: HOSPITAL | Age: 49
DRG: 683 | End: 2024-04-09
Payer: MEDICARE

## 2024-04-09 LAB
25(OH)D3 SERPL-MCNC: 80.9 NG/ML (ref 30–100)
ANION GAP SERPL CALC-SCNC: 6 MMOL/L (ref 5–15)
BUN SERPL-MCNC: 28 MG/DL (ref 6–20)
BUN/CREAT SERPL: 9 (ref 12–20)
CALCIUM SERPL-MCNC: 8.7 MG/DL (ref 8.5–10.1)
CHLORIDE SERPL-SCNC: 105 MMOL/L (ref 97–108)
CO2 SERPL-SCNC: 25 MMOL/L (ref 21–32)
CREAT SERPL-MCNC: 3.11 MG/DL (ref 0.7–1.3)
EST. AVERAGE GLUCOSE BLD GHB EST-MCNC: 117 MG/DL
GGT SERPL-CCNC: 5 U/L (ref 15–85)
GLUCOSE SERPL-MCNC: 97 MG/DL (ref 65–100)
HBA1C MFR BLD: 5.7 % (ref 4–5.6)
POTASSIUM SERPL-SCNC: 4.2 MMOL/L (ref 3.5–5.1)
SODIUM SERPL-SCNC: 136 MMOL/L (ref 136–145)

## 2024-04-09 PROCEDURE — 82977 ASSAY OF GGT: CPT

## 2024-04-09 PROCEDURE — 6370000000 HC RX 637 (ALT 250 FOR IP): Performed by: INTERNAL MEDICINE

## 2024-04-09 PROCEDURE — 83036 HEMOGLOBIN GLYCOSYLATED A1C: CPT

## 2024-04-09 PROCEDURE — 36415 COLL VENOUS BLD VENIPUNCTURE: CPT

## 2024-04-09 PROCEDURE — 76770 US EXAM ABDO BACK WALL COMP: CPT

## 2024-04-09 PROCEDURE — 80048 BASIC METABOLIC PNL TOTAL CA: CPT

## 2024-04-09 PROCEDURE — 84156 ASSAY OF PROTEIN URINE: CPT

## 2024-04-09 PROCEDURE — 82570 ASSAY OF URINE CREATININE: CPT

## 2024-04-09 PROCEDURE — 1200000000 HC SEMI PRIVATE

## 2024-04-09 PROCEDURE — 2580000003 HC RX 258: Performed by: INTERNAL MEDICINE

## 2024-04-09 PROCEDURE — 6360000002 HC RX W HCPCS: Performed by: INTERNAL MEDICINE

## 2024-04-09 RX ORDER — 0.9 % SODIUM CHLORIDE 0.9 %
1000 INTRAVENOUS SOLUTION INTRAVENOUS ONCE
Status: COMPLETED | OUTPATIENT
Start: 2024-04-09 | End: 2024-04-09

## 2024-04-09 RX ORDER — 0.9 % SODIUM CHLORIDE 0.9 %
1500 INTRAVENOUS SOLUTION INTRAVENOUS ONCE
Status: DISCONTINUED | OUTPATIENT
Start: 2024-04-09 | End: 2024-04-09

## 2024-04-09 RX ADMIN — SODIUM CHLORIDE 1000 ML: 9 INJECTION, SOLUTION INTRAVENOUS at 09:58

## 2024-04-09 RX ADMIN — OLANZAPINE 10 MG: 10 TABLET, FILM COATED ORAL at 07:54

## 2024-04-09 RX ADMIN — SERTRALINE HYDROCHLORIDE 100 MG: 50 TABLET ORAL at 07:54

## 2024-04-09 RX ADMIN — TRAZODONE HYDROCHLORIDE 50 MG: 50 TABLET ORAL at 21:25

## 2024-04-09 RX ADMIN — POLYETHYLENE GLYCOL 3350 17 G: 17 POWDER, FOR SOLUTION ORAL at 17:54

## 2024-04-09 RX ADMIN — PROPRANOLOL HYDROCHLORIDE 80 MG: 80 CAPSULE, EXTENDED RELEASE ORAL at 07:54

## 2024-04-09 RX ADMIN — OLANZAPINE 20 MG: 10 TABLET, FILM COATED ORAL at 21:25

## 2024-04-09 RX ADMIN — AMLODIPINE BESYLATE 10 MG: 5 TABLET ORAL at 07:54

## 2024-04-09 RX ADMIN — ENOXAPARIN SODIUM 30 MG: 100 INJECTION SUBCUTANEOUS at 16:53

## 2024-04-09 RX ADMIN — SODIUM CHLORIDE, PRESERVATIVE FREE 10 ML: 5 INJECTION INTRAVENOUS at 21:25

## 2024-04-09 RX ADMIN — BUPROPION HYDROCHLORIDE 100 MG: 100 TABLET, FILM COATED, EXTENDED RELEASE ORAL at 07:54

## 2024-04-09 ASSESSMENT — PAIN SCALES - GENERAL: PAINLEVEL_OUTOF10: 0

## 2024-04-09 NOTE — BSMART NOTE
BSMART Liaison Team Note     LOS:  1     Patient goal(s) for today: take medications as prescribed, make needs known in an appropriate manner, utilize coping skills  BSMART Liaison team focus/goals: assess needs, provide support and education, coordinate care    Progress note: Patient assessed face to face. He was laying in bed looking outside with sitter in his room. Patient reports he is not doing well. He reports physically he is doing well and his shoulder is feeling better. He reports that his mental health is getting better but that he is struggling with his housing situation. He reports that he is still having trouble with hearing voices but that he has been able to cope with them today as they are not as loud. He reports that he has been taking his medication but he does not feel it is working. He reports taking Invega before and had the PORTILLO and felt it worked but it was stopped in march. He does not know why it was stopped and he was switched. He reports that he is living at [facility] now since November. He was previously at [facility] but that he and 10-15 others were sent to [facility]. He reports that he was told that it took a while for his chary and SS check to be sent to [facility] so he has not been getting his allowance. He reports that it was late in December, January, and February but that he did not get any in March. He reports he has not received April's yet. He reports that he does not like his facility and would like help with finding a new facility. Explained that this writer can not help with this and that typically the hospital can not find a new placement when patient already has placement but that care management would be updated on his request. He asked about getting the phone number for [facility] so he can verify that his payments have gone to the [facility] so that he can determine if the owner has bene truthful with him or not. Patient reports that he and others are having the same

## 2024-04-09 NOTE — PROGRESS NOTES
[facility]  Hospitalist Group                                                                                          Hospitalist Progress Note  Ruddy Ricardo MD  Office Phone: [phone]        Date of Service:  2024  NAME:  Brandon Gould  :  1975  MRN:  [id number]       Admission Summary:   Brandon Gould is a 49 y.o. male with past medical history of schizophrenia, hypertension, COPD, anxiety presents to the ED due to intrusive thoughts.  Patient endorsed hearing voices that are telling him to kill himself and others.  Stated concerns concerns of suicide ideation but with no plan.  Psychiatry evaluated patient and he fit admission criteria however patient's creatinine was elevated at 3.09. Patient's last creatinine in 2023 was 1.79.  There is concern of possible acute on chronic kidney injury.  As such patient was transferred to medical unit for further evaluation.     He states he was following a kidney doctor at \"[facility]\" on [address] [place] [address]. He plan to follow-up with them in May.  He was not sure if he had kidney issues in the past.  Currently patient endorses continued intrusive thoughts but has no other complaints. He denies any headache, blurry vision, sore throat, trouble swallowing, trouble with speech, chest pain, SOB, cough, fever, chills, N/V/D, abd pain,  constipation, recent travels denies any other concerns or problems besides as mentioned above.      Interval history / Subjective:   Patient had no complaints today. Endorsed not drinking fluids. Encouraged patient again to drink more fluids. Patient stated the only fluid he would drink is soft drink Coke. Writer provided some counseling regarding being dehydrated and kidney function. Patient endorsed understanding.     Currently, patient denies thoughts of hurting self or others, headache, vision or hearing changes, fever, chills, weakness, chest pain, dyspnea, cough, abd

## 2024-04-09 NOTE — PROGRESS NOTES
Pt received in bed, sitting up by laying on his side.Pt is alert and verbal. When asked how he was stated\" I could be better, my kidney aren't doing to fine\", I need to drink more water\"call bell in reach.    1952- Pt awake , watching tv,denies any self harm . Also denies wanting to harm others. Call bell in reach.    0300- Pt asleep rise and fall of chest observed, call bell in reach

## 2024-04-09 NOTE — PROGRESS NOTES
Patient’s case reviewed during interdisciplinary team meeting in Med Surg/Tele Unit 2.  Rev. Jeniffer Carvalho MDiv, [facility]

## 2024-04-09 NOTE — PLAN OF CARE
Problem: Discharge Planning  Goal: Discharge to home or other facility with appropriate resources  4/9/2024 0824 by Jono Curran, RN  Outcome: Progressing  4/8/2024 2142 by Hortencia Pelaez, RN  Outcome: Progressing     Problem: Pain  Goal: Verbalizes/displays adequate comfort level or baseline comfort level  4/9/2024 0824 by Jono Curran, RN  Outcome: Progressing  4/8/2024 2142 by Hortencia Pelaez, RN  Outcome: Progressing

## 2024-04-10 LAB
ANION GAP SERPL CALC-SCNC: 8 MMOL/L (ref 5–15)
BUN SERPL-MCNC: 23 MG/DL (ref 6–20)
BUN/CREAT SERPL: 9 (ref 12–20)
CALCIUM SERPL-MCNC: 8.3 MG/DL (ref 8.5–10.1)
CHLORIDE SERPL-SCNC: 103 MMOL/L (ref 97–108)
CO2 SERPL-SCNC: 26 MMOL/L (ref 21–32)
CREAT SERPL-MCNC: 2.56 MG/DL (ref 0.7–1.3)
CREAT UR-MCNC: 18.9 MG/DL
GLUCOSE SERPL-MCNC: 115 MG/DL (ref 65–100)
POTASSIUM SERPL-SCNC: 4.5 MMOL/L (ref 3.5–5.1)
PROT UR-MCNC: 22 MG/DL (ref 0–11.9)
PROT/CREAT UR-RTO: 1.2
SODIUM SERPL-SCNC: 137 MMOL/L (ref 136–145)

## 2024-04-10 PROCEDURE — 6370000000 HC RX 637 (ALT 250 FOR IP): Performed by: INTERNAL MEDICINE

## 2024-04-10 PROCEDURE — 1200000000 HC SEMI PRIVATE

## 2024-04-10 PROCEDURE — 2580000003 HC RX 258: Performed by: INTERNAL MEDICINE

## 2024-04-10 PROCEDURE — 36415 COLL VENOUS BLD VENIPUNCTURE: CPT

## 2024-04-10 PROCEDURE — 6360000002 HC RX W HCPCS: Performed by: INTERNAL MEDICINE

## 2024-04-10 PROCEDURE — 80048 BASIC METABOLIC PNL TOTAL CA: CPT

## 2024-04-10 RX ADMIN — SERTRALINE HYDROCHLORIDE 100 MG: 50 TABLET ORAL at 09:53

## 2024-04-10 RX ADMIN — OLANZAPINE 10 MG: 10 TABLET, FILM COATED ORAL at 09:53

## 2024-04-10 RX ADMIN — OLANZAPINE 20 MG: 10 TABLET, FILM COATED ORAL at 20:25

## 2024-04-10 RX ADMIN — PROPRANOLOL HYDROCHLORIDE 80 MG: 80 CAPSULE, EXTENDED RELEASE ORAL at 10:01

## 2024-04-10 RX ADMIN — SODIUM CHLORIDE, PRESERVATIVE FREE 10 ML: 5 INJECTION INTRAVENOUS at 20:25

## 2024-04-10 RX ADMIN — BUPROPION HYDROCHLORIDE 100 MG: 100 TABLET, FILM COATED, EXTENDED RELEASE ORAL at 09:53

## 2024-04-10 RX ADMIN — TRAZODONE HYDROCHLORIDE 50 MG: 50 TABLET ORAL at 20:25

## 2024-04-10 RX ADMIN — ENOXAPARIN SODIUM 30 MG: 100 INJECTION SUBCUTANEOUS at 18:01

## 2024-04-10 RX ADMIN — AMLODIPINE BESYLATE 10 MG: 5 TABLET ORAL at 09:53

## 2024-04-10 RX ADMIN — SODIUM CHLORIDE, PRESERVATIVE FREE 10 ML: 5 INJECTION INTRAVENOUS at 09:54

## 2024-04-10 ASSESSMENT — PAIN SCALES - GENERAL: PAINLEVEL_OUTOF10: 0

## 2024-04-10 NOTE — PROGRESS NOTES
Interdisciplinary team rounds were held 4/10/2024 with the following team members:Care Management, Nursing, Pastoral Care, Pharmacy, Physical Therapy, and Physician and the patient.    Plan of care discussed. See clinical pathway and/or care plan for interventions and desired outcomes.

## 2024-04-10 NOTE — PROGRESS NOTES
[facility]  Hospitalist Group                                                                                          Hospitalist Progress Note  Ruddy Ricardo MD  Office Phone: [phone]        Date of Service:  4/10/2024  NAME:  Brandon Gould  :  1975  MRN:  [id number]       Admission Summary:   Brandon Gould is a 49 y.o. male with past medical history of schizophrenia, hypertension, COPD, anxiety presents to the ED due to intrusive thoughts.  Patient endorsed hearing voices that are telling him to kill himself and others.  Stated concerns concerns of suicide ideation but with no plan.  Psychiatry evaluated patient and he fit admission criteria however patient's creatinine was elevated at 3.09. Patient's last creatinine in 2023 was 1.79.  There is concern of possible acute on chronic kidney injury.  As such patient was transferred to medical unit for further evaluation.     He states he was following a kidney doctor at \"[facility]\" on [address] [place] [address]. He plan to follow-up with them in May.  He was not sure if he had kidney issues in the past.  Currently patient endorses continued intrusive thoughts but has no other complaints. He denies any headache, blurry vision, sore throat, trouble swallowing, trouble with speech, chest pain, SOB, cough, fever, chills, N/V/D, abd pain,  constipation, recent travels denies any other concerns or problems besides as mentioned above.      Interval history / Subjective:   Patient had no complaints today. Patient drank lots of fluids the day prior. Stated he has been drinking a liter of pop a day prior to coming to the hospital. \"Never drink water, only pop and coffee\"     Writer provided some counseling regarding being dehydrated and kidney function. Patient endorsed understanding. States he will drink only one can of pop a day.     Currently, patient denies thoughts of hurting self or others, headache,

## 2024-04-10 NOTE — CARE COORDINATION
DALIA   RUR  12 %     Plan   Second IMM Letter  Transportation.     Talked to Dr. Ricardo again - Discharge is now on hold - Nephrology made some recommendations.  To monitor another 24 hours.     Talked to the Home. Mr. Foss.  Discussed the above. And we will call them back with updates in am.   # to call report  [phone].  # to fax discharge inforamtion  [phone].     Transportation cancel for today.     Deann GARZA RN   [phone]

## 2024-04-10 NOTE — BSMART NOTE
BSMART Liaison Team Note     LOS:  2     Patient goal(s) for today: take medications as prescribed, make needs known in an appropriate manner, utilize coping skills  BSMART Liaison team focus/goals: assess needs, provide support and education, coordinate care    Progress note: Patient assessed face to face with Dr Newman present. Patient reports he is feeling better today. He denies SI and HI. He denies visual hallucinations and reports the voices are barely there. He reports they are no longer an issue. He reports his main issue is his housing situation. This writer and Dr Newman explained that the hospital is unable to secure another residence for him as he has a place to live and is frustrated with the environment. He reports it is loud and causes him to not be able to sleep and his hallucinations to get worse. He reports frustration over his money and the fact that he has to struggle to get soda, snacks, and cigarettes. Patient was asked by this writer if he had called the SSA number this writer gave him yesterday. He reports that he does not have a phone and there is no phone in his room. Patient is not on suicide precautions and could have a phone so this writer spoke with nursing supervisor who gave this writer a phone for the patient's room. Patient shown how to use the phone to get an outside line. Patient also encouraged to talk to his psychiatrist about a . He sees [facility] but reports he does not have a  there. He was encouraged to ask about one and care management here was informed that patient would benefit from case management services when discharged to help with his housing concerns. Patient denies any anxiety or depression. He denies any issues with his medications and does not want any changes at this time.    Barriers to Discharge: medical, psych consult for recommendations  Guns in the home: No     Outpatient provider(s):  Natasha Langford- Shreya Lemon

## 2024-04-10 NOTE — CONSULTS
PSYCHIATRY CONSULT NOTE:    REASON FOR CONSULT:  depression  suicidal ideation    HISTORY OF PRESENTING COMPLAINT:  Brandon Gould is a 49 y.o. male  with past medical history of schizophrenia, hypertension, COPD, anxiety presents to the ED due to intrusive thoughts.  Patient endorsed hearing voices that are telling him to kill himself and others.  Stated concerns concerns of suicide ideation but with no plan.  Psychiatry evaluated patient and he fit admission criteria however patient's creatinine was elevated at 3.09. Patient's last creatinine in 03/06/2023 was 1.79.  There is concern of possible acute on chronic kidney injury.  As such patient was transferred to medical unit for further evaluation.     He states he was following a kidney doctor at \"[facility]\" on [address] [place] [address]. He plan to follow-up with them in May.  He was not sure if he had kidney issues in the past.  Currently patient endorses continued intrusive thoughts but has no other complaints. He denies any headache, blurry vision, sore throat, trouble swallowing, trouble with speech, chest pain, SOB, cough, fever, chills, N/V/D, abd pain,  constipation, recent travels denies any other concerns or problems besides as mentioned above.      Brandon Gould reports feeling better than on admission and moods are fair.  Interested in getting into a new group home or nursing home.  Appropriately dressed and groomed.  Denies SI/HI/AH/VH.  No aggression or violence.  Appropriately interactive and aware. Tolerating medications well (Miralax prn given).  Appetite is fair.  Eating and sleeping fairly.  No acute concerns psychiatrically.    PAST PSYCHIATRIC HISTORY:  Therapy, Out Patient      SUBSTANCE ABUSE HISTORY:  Social History     Substance and Sexual Activity   Drug Use No     Social History     Substance and Sexual Activity   Alcohol Use No     Social History     Tobacco Use   Smoking Status Every Day    Current packs/day: 0.50    Types:

## 2024-04-10 NOTE — PLAN OF CARE
Problem: Pain  Goal: Verbalizes/displays adequate comfort level or baseline comfort level  4/9/2024 2103 by Ni Johnson, RN  Outcome: Progressing     Problem: Self Harm/Suicidality  Goal: Will have no self-injury during hospital stay  Description: INTERVENTIONS:  1.  Ensure constant observer at bedside with Q15M safety checks  2.  Maintain a safe environment  3.  Secure patient belongings  4.  Ensure family/visitors adhere to safety recommendations  5.  Ensure safety tray has been added to patient's diet order  6.  Every shift and PRN: Re-assess suicidal risk via Frequent Screener    4/10/2024 0746 by Beena Velazquez, RN  Outcome: Progressing  4/9/2024 2103 by Ni Johnson, RN  Outcome: Progressing

## 2024-04-10 NOTE — CARE COORDINATION
DALIA     RUR 12 %     IDR rounds this am with MD and team.   Psych consult and follow-up   TAYA later today if medically stable     Talked to patient in the room.  Follow-up to finish initial assessment and transition plan.   Permission to call the home and coordinate transfer back.   Talked to Mr. Foss [phone]      Plan   PCP  home provider   [facility]  patient indicates has an appointment he thinks in 2 week April 23.   Pharmacy  called to [facility]  Medicaid/[employer]  Recommend a CM for him to help navigate his concerns -  Second IMM Letter        04/10/24 1725   Service Assessment   Patient Orientation Alert and Oriented;Person;Place   Cognition Alert   History Provided By Patient  (Also gave permission to call the Group Home)   Primary Caregiver Other (Comment)  (Supervision at the [facility] [address])   Support Systems Other (Comment)  (Group Home Staff-  Mental Health Provider)   PCP Verified by CM Yes  (Marshal Tijerina  NP follows at the [facility])   Last Visit to PCP Within last 3 months   Prior Functional Level Assistance with the following:;Cooking;Housework;Shopping   Can patient return to prior living arrangement Yes   Ability to make needs known: Fair   Family able to assist with home care needs: No   Would you like for me to discuss the discharge plan with any other family members/significant others, and if so, who? Yes  (Mr. Foss and the Group Home Team)   Financial Resources Medicaid;Other (Comment)  (See BSMART note- pateint expressed some concerns about his finances. With the transition to new home- his amount due to him post [facility].  He was given # to SS and also needs to follow-up with the With . Has Medicaid for Health Care &Meds)   Community Resources Assisted Living;Transportation;Psychiatric Treatment  (Mental Health Treatment at the [facility])   CM/SW Referral Psychiatry;Other (see comment)  (Discharge

## 2024-04-10 NOTE — DISCHARGE SUMMARY
breath, fever, chills, nausea, vomiting, diarrhea, change in mentation, falling, weakness, bleeding. Severe pain or pain not relieved by medications.  Or, any other signs or symptoms that you may have questions about.    DISPOSITION:    Home With:   OT  PT  HH  RN       Long term SNF/Inpatient Rehab   x Independent/assisted living    Hospice    Other:       PATIENT CONDITION AT DISCHARGE:     Functional status    Poor     Deconditioned    x Independent      Cognition    x Lucid     Forgetful     Dementia      Catheters/lines (plus indication)    Gutiérrez     PICC     PEG    x None      Code status    x Full code     DNR      PHYSICAL EXAMINATION AT DISCHARGE:    General: No acute distress. Appears disshoveled  HEENT: Eyes: perrl, no discharge or icterus.    Cardiovascular: S1, S2, rrr, no mrg  Respiratory: clear to auscultation b/l  Abdomen: abdomen distension, active bowel sounds on 4q, abdomen soft, nontender, no guarding or rigidity, no peritoneal signs  Extremities: No lower ext edema, cyanosis, (+2) bi dorsalis pedal pulse  Neurological:  a&ox3. No facial asymmetry. Normal speech.  Mental Status: calm, cooperative.    CHRONIC MEDICAL DIAGNOSES:      Greater than 31 minutes were spent with the patient on counseling and coordination of care    Signed:   Ruddy Ricardo MD  4/11/2024  1:10 PM

## 2024-04-10 NOTE — PLAN OF CARE
Problem: Pain  Goal: Verbalizes/displays adequate comfort level or baseline comfort level  4/9/2024 2103 by Ni Johnson, RN  Outcome: Progressing  4/9/2024 0824 by Jono Curran, RN  Outcome: Progressing     Problem: Self Harm/Suicidality  Goal: Will have no self-injury during hospital stay  Description: INTERVENTIONS:  1.  Ensure constant observer at bedside with Q15M safety checks  2.  Maintain a safe environment  3.  Secure patient belongings  4.  Ensure family/visitors adhere to safety recommendations  5.  Ensure safety tray has been added to patient's diet order  6.  Every shift and PRN: Re-assess suicidal risk via Frequent Screener    Outcome: Progressing

## 2024-04-11 VITALS
WEIGHT: 179.6 LBS | SYSTOLIC BLOOD PRESSURE: 140 MMHG | DIASTOLIC BLOOD PRESSURE: 90 MMHG | HEART RATE: 82 BPM | RESPIRATION RATE: 14 BRPM | TEMPERATURE: 98.5 F | BODY MASS INDEX: 26.6 KG/M2 | HEIGHT: 69 IN | OXYGEN SATURATION: 97 %

## 2024-04-11 LAB
ALBUMIN SERPL-MCNC: 2.6 G/DL (ref 3.5–5)
ALBUMIN/GLOB SERPL: 0.7 (ref 1.1–2.2)
ALP SERPL-CCNC: 94 U/L (ref 45–117)
ALT SERPL-CCNC: 9 U/L (ref 12–78)
ANION GAP SERPL CALC-SCNC: 8 MMOL/L (ref 5–15)
AST SERPL-CCNC: 9 U/L (ref 15–37)
BILIRUB SERPL-MCNC: 0.2 MG/DL (ref 0.2–1)
BUN SERPL-MCNC: 24 MG/DL (ref 6–20)
BUN/CREAT SERPL: 10 (ref 12–20)
CALCIUM SERPL-MCNC: 8.4 MG/DL (ref 8.5–10.1)
CHLORIDE SERPL-SCNC: 105 MMOL/L (ref 97–108)
CO2 SERPL-SCNC: 26 MMOL/L (ref 21–32)
CREAT SERPL-MCNC: 2.32 MG/DL (ref 0.7–1.3)
GLOBULIN SER CALC-MCNC: 4 G/DL (ref 2–4)
GLUCOSE SERPL-MCNC: 95 MG/DL (ref 65–100)
POTASSIUM SERPL-SCNC: 4.1 MMOL/L (ref 3.5–5.1)
PROT SERPL-MCNC: 6.6 G/DL (ref 6.4–8.2)
SODIUM SERPL-SCNC: 139 MMOL/L (ref 136–145)

## 2024-04-11 PROCEDURE — 2580000003 HC RX 258: Performed by: INTERNAL MEDICINE

## 2024-04-11 PROCEDURE — 6370000000 HC RX 637 (ALT 250 FOR IP): Performed by: INTERNAL MEDICINE

## 2024-04-11 PROCEDURE — 36415 COLL VENOUS BLD VENIPUNCTURE: CPT

## 2024-04-11 PROCEDURE — 80053 COMPREHEN METABOLIC PANEL: CPT

## 2024-04-11 RX ORDER — PROPRANOLOL HYDROCHLORIDE 80 MG/1
80 CAPSULE, EXTENDED RELEASE ORAL DAILY
Qty: 30 CAPSULE | Refills: 0 | Status: SHIPPED | OUTPATIENT
Start: 2024-04-11

## 2024-04-11 RX ORDER — OLANZAPINE 10 MG/1
10 TABLET ORAL DAILY
Qty: 30 TABLET | Refills: 0 | Status: SHIPPED | OUTPATIENT
Start: 2024-04-11

## 2024-04-11 RX ORDER — BUPROPION HYDROCHLORIDE 100 MG/1
100 TABLET, EXTENDED RELEASE ORAL DAILY
Qty: 60 TABLET | Refills: 0 | Status: SHIPPED | OUTPATIENT
Start: 2024-04-11

## 2024-04-11 RX ORDER — NICOTINE 21 MG/24HR
1 PATCH, TRANSDERMAL 24 HOURS TRANSDERMAL DAILY
Qty: 30 PATCH | Refills: 0 | Status: SHIPPED | OUTPATIENT
Start: 2024-04-11

## 2024-04-11 RX ORDER — OLANZAPINE 20 MG/1
20 TABLET ORAL NIGHTLY
Qty: 30 TABLET | Refills: 0 | Status: SHIPPED | OUTPATIENT
Start: 2024-04-11

## 2024-04-11 RX ORDER — TRAZODONE HYDROCHLORIDE 50 MG/1
50 TABLET ORAL NIGHTLY
Qty: 30 TABLET | Refills: 0 | Status: SHIPPED | OUTPATIENT
Start: 2024-04-11

## 2024-04-11 RX ORDER — SERTRALINE HYDROCHLORIDE 100 MG/1
100 TABLET, FILM COATED ORAL DAILY
Qty: 30 TABLET | Refills: 0 | Status: SHIPPED | OUTPATIENT
Start: 2024-04-11

## 2024-04-11 RX ORDER — AMLODIPINE BESYLATE 10 MG/1
10 TABLET ORAL DAILY
Qty: 30 TABLET | Refills: 0 | Status: SHIPPED | OUTPATIENT
Start: 2024-04-11

## 2024-04-11 RX ADMIN — OLANZAPINE 10 MG: 10 TABLET, FILM COATED ORAL at 09:16

## 2024-04-11 RX ADMIN — SODIUM CHLORIDE, PRESERVATIVE FREE 10 ML: 5 INJECTION INTRAVENOUS at 09:17

## 2024-04-11 RX ADMIN — BUPROPION HYDROCHLORIDE 100 MG: 100 TABLET, FILM COATED, EXTENDED RELEASE ORAL at 09:16

## 2024-04-11 RX ADMIN — SERTRALINE HYDROCHLORIDE 100 MG: 50 TABLET ORAL at 09:16

## 2024-04-11 RX ADMIN — AMLODIPINE BESYLATE 10 MG: 5 TABLET ORAL at 09:17

## 2024-04-11 RX ADMIN — PROPRANOLOL HYDROCHLORIDE 80 MG: 80 CAPSULE, EXTENDED RELEASE ORAL at 09:53

## 2024-04-11 ASSESSMENT — PAIN SCALES - GENERAL: PAINLEVEL_OUTOF10: 0

## 2024-04-11 NOTE — CARE COORDINATION
DALIA     RUR13%     IDR rounds this am with MD and team.   TAYA later today.  MD indicates meds sent to requested Pharmacy     Called the facility- informed of discharge today and anticipated time.   Informed them the medications been sent and to check with the pharmacy.   Nursing to call report and send discharge package with patient. [phone]    Met with patient and discussed discharge- still in agreement for transition back to the home.   Discussed the Medicare IMM - signed the form and copy on the chart.     Fanohemy discharge summary, AVS ,  current MAR to the [facility]   [phone].     Patient to transport via [employer]-  ride arranged for  3PM.  Cost 13-17.00. Billed to Nursing Unit.     Deann GARZA RN    [phone]       Addendum:  5:46PM   Received  ADILSON Blackwell   [phone] Left Message to see who is assigned as his Community  - to help him navigate community  resources- outside of the worker at the [facility].

## 2024-04-11 NOTE — PROGRESS NOTES
Patient’s case reviewed during interdisciplinary team meeting in Med Surg/Tele Unit 2.  Rev. Jeniffer Carvalho MDiv, [facility]

## 2024-04-11 NOTE — PLAN OF CARE
Problem: Discharge Planning  Goal: Discharge to home or other facility with appropriate resources  4/11/2024 1406 by Beena Velazquez RN  Outcome: Adequate for Discharge  4/11/2024 1352 by Beena Velazquez RN  Outcome: Adequate for Discharge  Flowsheets (Taken 4/11/2024 0914)  Discharge to home or other facility with appropriate resources:   Identify barriers to discharge with patient and caregiver   Arrange for needed discharge resources and transportation as appropriate     Problem: Pain  Goal: Verbalizes/displays adequate comfort level or baseline comfort level  4/11/2024 1406 by Beena Velazquez RN  Outcome: Adequate for Discharge  4/11/2024 1352 by Beena Velazquez RN  Outcome: Adequate for Discharge     Problem: Self Harm/Suicidality  Goal: Will have no self-injury during hospital stay  Description: INTERVENTIONS:  1.  Ensure constant observer at bedside with Q15M safety checks  2.  Maintain a safe environment  3.  Secure patient belongings  4.  Ensure family/visitors adhere to safety recommendations  5.  Ensure safety tray has been added to patient's diet order  6.  Every shift and PRN: Re-assess suicidal risk via Frequent Screener    4/11/2024 1406 by Beena Velazquez RN  Outcome: Adequate for Discharge  4/11/2024 1352 by Beena Velazquez RN  Outcome: Adequate for Discharge

## 2024-04-11 NOTE — DISCHARGE INSTRUCTIONS
Dear   Bryanna,    You were admitted to [facility] due to concerns of suicide.  Over time it appears your thoughts of suicide have dissipated however while you were admitted we advised we kept you in the medical rodriguez for evaluation of your kidneys.  It appears your kidneys were stressed due to dehydration and greatly improved once you increase your water intake.  However you still are required to see a nephrologist for further workup.  Make sure to also see your primary care clinician within 7 days of discharge.  Thank you for choosing [facility] for your care.

## 2024-04-11 NOTE — PLAN OF CARE
Problem: Discharge Planning  Goal: Discharge to home or other facility with appropriate resources  Outcome: Adequate for Discharge  Flowsheets (Taken 4/11/2024 5455)  Discharge to home or other facility with appropriate resources:   Identify barriers to discharge with patient and caregiver   Arrange for needed discharge resources and transportation as appropriate     Problem: Pain  Goal: Verbalizes/displays adequate comfort level or baseline comfort level  Outcome: Adequate for Discharge     Problem: Self Harm/Suicidality  Goal: Will have no self-injury during hospital stay  Description: INTERVENTIONS:  1.  Ensure constant observer at bedside with Q15M safety checks  2.  Maintain a safe environment  3.  Secure patient belongings  4.  Ensure family/visitors adhere to safety recommendations  5.  Ensure safety tray has been added to patient's diet order  6.  Every shift and PRN: Re-assess suicidal risk via Frequent Screener    Outcome: Adequate for Discharge

## 2024-04-11 NOTE — PROGRESS NOTES
Patient’s case reviewed during interdisciplinary team meeting in Med Surg/Tele Unit 2.  Rev. Jeniffer Carvalho MDiv, [facility]

## 2024-04-22 ENCOUNTER — TELEPHONE (OUTPATIENT)
Facility: HOSPITAL | Age: 49
End: 2024-04-22

## 2024-04-22 NOTE — TELEPHONE ENCOUNTER
DALIA    Care Management Follow-up Call   Reviewed chart.   Unable to reach patient and the Home x 2   Patient was given Discharge Instruction- Copy fax to the [facility] -Fax confirmation- Also [facility].   Patient had PCP and MH follow-up arranged.     Deann GARZA RN   [phone]

## 2024-10-23 NOTE — BH NOTES
PSYCHIATRIC PROGRESS NOTE         Patient Name  Halima Lee   Date of Birth 1975   [facility] [id number]   Medical Record Number  [id number]      Age  50 y.o. PCP None   Admit date:  6/6/2020    Room Number  321/01  @ [facility]   Date of Service  6/9/2020         E & M PROGRESS NOTE:         HISTORY       CC:  \"psychosis\"  HISTORY OF PRESENT ILLNESS/INTERVAL HISTORY:  (reviewed/updated 6/9/2020). per initial evaluation: The Suzanne Quinteros, is L 1 y.o. WHITE OR  male with a past psychiatric history significant for schizoaffective disorder, who was admitted to the medical floor for the treatment of a foot abscess. Patient reports/evidences the following emotional symptoms:  psychosis and hallucinations.  The above symptoms have been present for 2+ weeks. These symptoms are of moderate severity. The symptoms are constant in nature.  Additional symptomatology include concern about health problems . The patient's condition has been precipitated by psychosocial stressors (stress of hospitalization and medical illness).     Patient transferred back to psychiatry following IV Abx for a foot abscess. Per IM, he will need to remain on PO Abx with wound care (daily dry dressings) at this time, but can resume psychiatric treatment primary. Patient with on going [address], stating that she bother him at times, but overall more pleasant. Tolerating Clozaril titration. Patient agreeable with the plan to continue treatment, still ambivalent about disposition options. 06/08 - patient compliant with medication, has been isolative but out for meals and coherent. Patient discharge focused, reported hearing \"spirits\" and got Zyprexa PRN. Clozaril titrated to 400 mg daily. He denies SI/HI, still with AH as above.  Patient reported pain in his foot to nursing but denies to team. Disposition options are tenuous as he still states [place] but will continue making medication if it is available. 06/09 - no acute overnight events. patient has been visible, odd, slept 8 hours, reports no worsening of his AH. 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 Foot is draining serous fluid, but he states the pain is improved. SIDE EFFECTS: (reviewed/updated 6/9/2020)  None reported or admitted to. No noted toxicity with use of Clozaril   ALLERGIES:(reviewed/updated 6/9/2020)  Allergies   Allergen Reactions    Haloperidol Other (comments)      MEDICATIONS PRIOR TO ADMISSION:(reviewed/updated 6/9/2020)  No medications prior to admission. PAST MEDICAL HISTORY: Past medical history from the initial psychiatric evaluation has been reviewed (reviewed/updated 6/9/2020) with no additional updates (I asked patient and no additional past medical history provided). Past Medical History:   Diagnosis Date    Back pain     Chronic bronchitis (HCC)     COPD    Elevated WBCs     H/O splenectomy     HTN (hypertension)     Ill-defined condition     constipation    Left foot pain 6/2/2020    Psychiatric disorder     depression, anxiety     Past Surgical History:   Procedure Laterality Date    HX OTHER SURGICAL Right     two right wrist fractures     HX SPLENECTOMY        SOCIAL HISTORY: Social history from the initial psychiatric evaluation has been reviewed (reviewed/updated 6/9/2020) with no additional updates (I asked patient and no additional social history provided).    Social History     Socioeconomic History    Marital status: SINGLE     Spouse name: Not on file    Number of children: Not on file    Years of education: Not on file    Highest education level: Not on file   Occupational History    Not on file   Social Needs    Financial resource strain: Not on file    Food insecurity     Worry: Not on file     Inability: Not on file    Transportation needs     Medical: Not on file Non-medical: Not on file   Tobacco Use    Smoking status: Former Smoker    Smokeless tobacco: Never Used   Substance and Sexual Activity    Alcohol use: No    Drug use: No     Comment: \"not for years\"    Sexual activity: Not on file   Lifestyle    Physical activity     Days per week: Not on file     Minutes per session: Not on file    Stress: Not on file   Relationships    Social connections     Talks on phone: Not on file     Gets together: Not on file     Attends [employer] service: Not on file     Active member of club or organization: Not on file     Attends meetings of clubs or organizations: Not on file     Relationship status: Not on file    Intimate partner violence     Fear of current or ex partner: Not on file     Emotionally abused: Not on file     Physically abused: Not on file     Forced sexual activity: Not on file   Other Topics Concern    Not on file   Social History Narrative    Social History:       Reviewed history from 02/03/2017 and no changes required:          Home: Lives with Marlene Sparks, his girlfriend of 2 years, in a [place] apartment with pet lizard and fish          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 Tony Genao, [employer] psychiatry                     Smoking History:          Patient currently smokes every day. Patient has been counseled to quit. FAMILY HISTORY: Family history from the initial psychiatric evaluation has been reviewed (reviewed/updated 6/9/2020) with no additional updates (I asked patient and no additional family history provided).    Family History   Problem Relation Age of Onset    No Known Problems Mother     No Known Problems Father     No Known Problems Brother        REVIEW OF SYSTEMS: (reviewed/updated 6/9/2020)  Appetite:no change from normal   Sleep: improved   All other Review of Systems: Negative except foot pain per HPI         [facility] (MSE):    MSE FINDINGS ARE WITHIN NORMAL LIMITS (WNL) UNLESS OTHERWISE STATED BELOW. ( ALL OF THE BELOW CATEGORIES OF THE MSE HAVE BEEN REVIEWED (reviewed 6/9/2020) AND UPDATED AS DEEMED APPROPRIATE )  General Presentation age appropriate, cooperative and guarded   Orientation oriented to time, place and person   Vital Signs  See below (reviewed 6/9/2020); Vital Signs (BP, Pulse, & Temp) are within normal limits if not listed below.    Gait and Station Stable/steady, no ataxia   Musculoskeletal System No extrapyramidal symptoms (EPS); no abnormal muscular movements or Tardive Dyskinesia (TD); muscle strength and tone are within normal limits   Language No aphasia or dysarthria   Speech:  normal volume and non-pressured   Thought Processes illogical; normal rate of thoughts; fair abstract reasoning/computation   Thought Associations goal directed   Thought Content auditory hallucinations and internally preoccupied   Suicidal Ideations none   Homicidal Ideations none   Mood:  euthymic   Affect:  blunted and mood-congruent   Memory recent  intact   Memory remote:  intact   Concentration/Attention:  intact   Fund of Knowledge average   Insight:  limited   Reliability fair   Judgment:  poor          VITALS:     Patient Vitals for the past 24 hrs:   Temp Pulse Resp BP SpO2   06/09/20 0759 98.3 °F (36.8 °C) 74 16 134/80 99 %   06/08/20 2000 98.3 °F (36.8 °C) 90 16 140/81 100 %     Wt Readings from Last 3 Encounters:   06/06/20 67.6 kg (149 lb)   06/05/20 68 kg (149 lb 14.6 oz)   05/26/20 68 kg (150 lb)     Temp Readings from Last 3 Encounters:   06/09/20 98.3 °F (36.8 °C)   06/06/20 99 °F (37.2 °C)   06/04/20 98.1 °F (36.7 °C)     BP Readings from Last 3 Encounters:   06/09/20 134/80   06/06/20 156/80   06/04/20 135/86     Pulse Readings from Last 3 Encounters:   06/09/20 74   06/06/20 (!) 107   06/04/20 97            DATA     LABORATORY DATA:(reviewed/updated 6/9/2020)  No results found for this or any previous visit (from the past 24 hour(s)). No results found for: VALF2, VALAC, VALP, VALPR, DS6, CRBAM, CRBAMP, CARB2, XCRBAM  No results found for: LITHM   RADIOLOGY REPORTS:(reviewed/updated 6/9/2020)  Xr Foot Lt Min 3 V    Result Date: 5/28/2020  EXAM: XR FOOT LT MIN 3 V INDICATION: swelling, pain, evaluate for foreign body. Patient describes object lodged in his foot, history somewhat unclear. Site of object not specified. COMPARISON: None. FINDINGS: Three views of the left foot demonstrate no fracture or other acute osseous or articular abnormality. The soft tissues are within normal limits. IMPRESSION: No acute abnormality. Xr Abd Port  1 V    Result Date: 5/26/2020  EXAM: XR ABD PORT  1 V INDICATION: ? of patient ingesting rocks COMPARISON: 12.2.2018. FINDINGS: A supine radiograph of the abdomen shows a nonspecific abdominal gas pattern. There are 2 radiopaque structures in the right lower quadrant that individually measure 16 and 11 mm in size. In addition, in the distal transverse colon, there is a 12 x 3 mm calcification. IMPRESSION: Radiopaque structures in the right lower quadrant and transverse colon could be compatible with ingested rocks.  No plain film evidence for obstruction          MEDICATIONS     ALL MEDICATIONS:   Current Facility-Administered Medications   Medication Dose Route Frequency    senna-docusate (PERICOLACE) 8.6-50 mg per tablet 2 Tab  2 Tab Oral DAILY    ibuprofen (MOTRIN) tablet 400 mg  400 mg Oral Q6H PRN    calcium carbonate (OS-REJI) tablet 500 mg [elemental]  500 mg Oral TID WITH MEALS    cefdinir (OMNICEF) capsule 300 mg  300 mg Oral Q12H    doxycycline (VIBRA-TABS) tablet 100 mg  100 mg Oral Q12H    montelukast (SINGULAIR) tablet 10 mg  10 mg Oral QHS    cloZAPine (CLOZARIL) tablet 100 mg  100 mg Oral DAILY    cloZAPine (CLOZARIL) tablet 300 mg  300 mg Oral QHS    propranoloL (INDERAL) tablet 20 mg  20 mg Oral BID    nicotine (NICODERM CQ) 21 mg/24 hr patch 1 Patch  1 Patch TransDERmal DAILY    OLANZapine (ZyPREXA) tablet 5 mg  5 mg Oral Q6H PRN    benztropine (COGENTIN) tablet 1 mg  1 mg Oral BID PRN    diphenhydrAMINE (BENADRYL) injection 50 mg  50 mg IntraMUSCular BID PRN    hydrOXYzine HCL (ATARAX) tablet 50 mg  50 mg Oral TID PRN    LORazepam (ATIVAN) injection 1 mg  1 mg IntraMUSCular Q4H PRN    traZODone (DESYREL) tablet 50 mg  50 mg Oral QHS PRN    acetaminophen (TYLENOL) tablet 650 mg  650 mg Oral Q4H PRN    magnesium hydroxide (MILK OF MAGNESIA) 400 mg/5 mL oral suspension 30 mL  30 mL Oral DAILY PRN    chlorproMAZINE (THORAZINE) injection 50 mg  50 mg IntraMUSCular Q6H PRN      SCHEDULED MEDICATIONS:   Current Facility-Administered Medications   Medication Dose Route Frequency    senna-docusate (PERICOLACE) 8.6-50 mg per tablet 2 Tab  2 Tab Oral DAILY    calcium carbonate (OS-REJI) tablet 500 mg [elemental]  500 mg Oral TID WITH MEALS    cefdinir (OMNICEF) capsule 300 mg  300 mg Oral Q12H    doxycycline (VIBRA-TABS) tablet 100 mg  100 mg Oral Q12H    montelukast (SINGULAIR) tablet 10 mg  10 mg Oral QHS    cloZAPine (CLOZARIL) tablet 100 mg  100 mg Oral DAILY    cloZAPine (CLOZARIL) tablet 300 mg  300 mg Oral QHS    propranoloL (INDERAL) tablet 20 mg  20 mg Oral BID    nicotine (NICODERM CQ) 21 mg/24 hr patch 1 Patch  1 Patch TransDERmal DAILY          ASSESSMENT & PLAN     DIAGNOSES REQUIRING ACTIVE TREATMENT AND MONITORING: (reviewed/updated 6/9/2020)  Patient Active Hospital Problem List:  Schizoaffective disorder    Assessment: patient improving on Clozaril, AH appears to be approaching baseline, will complete titration and likely discharge to shelter.    Plan:  - INCREASE Clozaril to 200 mg QDAY and 300 mg QHS for treatment resistant psychosis  - Clozaril level at next blood draw  - CONTINUE Pericolace 2 tablet QDAY for clozapine bowel regimen  - CONTINUE Propranolol 20 mg BID for iatrongenic tachycardia  - Foot abscess tx per IM, Gen Surg recs  - IGM therapy as tolerated  - Expand database / obtain collateral  - Dispo planning     I will continue to monitor blood levels (clozapine---a drug with a narrow therapeutic index= NTI) and associated labs for drug therapy implemented that require intense monitoring for toxicity as deemed appropriate based on current medication side effects and pharmacodynamically determined drug 1/2 lives. In summary, Aura Quinonez, is a 50 y.o.  male who presents with a severe exacerbation of the principal diagnosis of Schizoaffective disorder, bipolar type without good prognostic features ([facility] [address]    Patient's condition is improving. Patient requires continued inpatient hospitalization for further stabilization, safety monitoring and medication management. I will continue to coordinate the provision of individual, milieu, occupational, group, and substance abuse therapies to address target symptoms/diagnoses as deemed appropriate for the individual patient. A coordinated, multidisplinary treatment team round was conducted with the patient (this team consists of the nurse, psychiatric unit pharmacist,  and writer). Complete current electronic health record for patient has been reviewed today including consultant notes, ancillary staff notes, nurses and psychiatric tech notes. Suicide risk assessment completed and patient deemed to be of low risk for suicide at this time. The following regarding medications was addressed during rounds with patient:   the risks and benefits of the proposed medication. The patient was given the opportunity to ask questions. Informed consent given to the use of the above medications.  Will continue to adjust psychiatric and non-psychiatric medications (see above \"medication\" section and orders section for details) as deemed appropriate & based upon diagnoses and response to treatment. I will continue to order blood tests/labs and diagnostic tests as deemed appropriate and review results as they become available (see orders for details and above listed lab/test results). I will order psychiatric records from previous [facility] hospitals to further elucidate the nature of patient's psychopathology and review once available. I will gather additional collateral information from friends, family and o/p treatment team to further elucidate the nature of patient's psychopathology and baselline level of psychiatric functioning. I certify that this patient's inpatient psychiatric hospital services furnished since the previous certification were, and continue to be, required for treatment that could reasonably be expected to improve the patient's condition, or for diagnostic study, and that the patient continues to need, on a daily basis, active treatment furnished directly by or requiring the supervision of inpatient psychiatric facility personnel. In addition, the hospital records show that services furnished were intensive treatment services, admission or related services, or equivalent services.     EXPECTED DISCHARGE DATE/DAY: 6/11/2020     DISPOSITION: Shelter       Signed By:   Doris Sutton MD  6/9/2020 4 = No assist / stand by assistance

## 2025-04-28 ENCOUNTER — TRANSCRIBE ORDERS (OUTPATIENT)
Facility: HOSPITAL | Age: 50
End: 2025-04-28

## 2025-04-28 DIAGNOSIS — N18.32 STAGE 3B CHRONIC KIDNEY DISEASE (HCC): Primary | ICD-10-CM
